# Patient Record
Sex: MALE | Race: WHITE | NOT HISPANIC OR LATINO | Employment: FULL TIME | URBAN - METROPOLITAN AREA
[De-identification: names, ages, dates, MRNs, and addresses within clinical notes are randomized per-mention and may not be internally consistent; named-entity substitution may affect disease eponyms.]

---

## 2017-01-16 ENCOUNTER — ALLSCRIPTS OFFICE VISIT (OUTPATIENT)
Dept: OTHER | Facility: OTHER | Age: 42
End: 2017-01-16

## 2017-01-31 ENCOUNTER — ALLSCRIPTS OFFICE VISIT (OUTPATIENT)
Dept: OTHER | Facility: OTHER | Age: 42
End: 2017-01-31

## 2017-01-31 DIAGNOSIS — M54.50 LOW BACK PAIN: ICD-10-CM

## 2017-01-31 DIAGNOSIS — M54.16 RADICULOPATHY OF LUMBAR REGION: ICD-10-CM

## 2017-01-31 DIAGNOSIS — G89.4 CHRONIC PAIN SYNDROME: ICD-10-CM

## 2017-01-31 DIAGNOSIS — M79.10 MYALGIA: ICD-10-CM

## 2017-01-31 DIAGNOSIS — E78.00 PURE HYPERCHOLESTEROLEMIA: ICD-10-CM

## 2017-02-01 ENCOUNTER — ALLSCRIPTS OFFICE VISIT (OUTPATIENT)
Dept: OTHER | Facility: OTHER | Age: 42
End: 2017-02-01

## 2017-02-08 ENCOUNTER — APPOINTMENT (OUTPATIENT)
Dept: PHYSICAL THERAPY | Facility: CLINIC | Age: 42
End: 2017-02-08
Payer: COMMERCIAL

## 2017-02-08 PROCEDURE — 97162 PT EVAL MOD COMPLEX 30 MIN: CPT

## 2017-02-13 ENCOUNTER — APPOINTMENT (OUTPATIENT)
Dept: PHYSICAL THERAPY | Facility: CLINIC | Age: 42
End: 2017-02-13
Payer: COMMERCIAL

## 2017-02-13 PROCEDURE — 97110 THERAPEUTIC EXERCISES: CPT

## 2017-02-14 ENCOUNTER — GENERIC CONVERSION - ENCOUNTER (OUTPATIENT)
Dept: OTHER | Facility: OTHER | Age: 42
End: 2017-02-14

## 2017-02-15 ENCOUNTER — APPOINTMENT (OUTPATIENT)
Dept: PHYSICAL THERAPY | Facility: CLINIC | Age: 42
End: 2017-02-15
Payer: COMMERCIAL

## 2017-02-15 PROCEDURE — 97110 THERAPEUTIC EXERCISES: CPT

## 2017-02-20 ENCOUNTER — APPOINTMENT (OUTPATIENT)
Dept: PHYSICAL THERAPY | Facility: CLINIC | Age: 42
End: 2017-02-20
Payer: COMMERCIAL

## 2017-02-20 PROCEDURE — 97110 THERAPEUTIC EXERCISES: CPT

## 2017-03-01 ENCOUNTER — APPOINTMENT (OUTPATIENT)
Dept: PHYSICAL THERAPY | Facility: CLINIC | Age: 42
End: 2017-03-01
Payer: COMMERCIAL

## 2017-03-01 PROCEDURE — 97110 THERAPEUTIC EXERCISES: CPT

## 2017-03-08 ENCOUNTER — APPOINTMENT (OUTPATIENT)
Dept: PHYSICAL THERAPY | Facility: CLINIC | Age: 42
End: 2017-03-08
Payer: COMMERCIAL

## 2017-03-08 PROCEDURE — 97110 THERAPEUTIC EXERCISES: CPT

## 2017-03-15 ENCOUNTER — GENERIC CONVERSION - ENCOUNTER (OUTPATIENT)
Dept: OTHER | Facility: OTHER | Age: 42
End: 2017-03-15

## 2017-03-15 ENCOUNTER — APPOINTMENT (OUTPATIENT)
Dept: PHYSICAL THERAPY | Facility: CLINIC | Age: 42
End: 2017-03-15
Payer: COMMERCIAL

## 2017-03-15 PROCEDURE — 97110 THERAPEUTIC EXERCISES: CPT

## 2017-03-15 PROCEDURE — 97164 PT RE-EVAL EST PLAN CARE: CPT

## 2017-03-22 ENCOUNTER — APPOINTMENT (OUTPATIENT)
Dept: PHYSICAL THERAPY | Facility: CLINIC | Age: 42
End: 2017-03-22
Payer: COMMERCIAL

## 2017-03-22 PROCEDURE — 97110 THERAPEUTIC EXERCISES: CPT

## 2017-03-29 ENCOUNTER — APPOINTMENT (OUTPATIENT)
Dept: PHYSICAL THERAPY | Facility: CLINIC | Age: 42
End: 2017-03-29
Payer: COMMERCIAL

## 2017-03-29 PROCEDURE — 97110 THERAPEUTIC EXERCISES: CPT

## 2017-04-06 ENCOUNTER — HOSPITAL ENCOUNTER (OUTPATIENT)
Dept: RADIOLOGY | Facility: MEDICAL CENTER | Age: 42
Discharge: HOME | End: 2017-04-06
Payer: COMMERCIAL

## 2017-04-06 DIAGNOSIS — Z85.850 PERSONAL HISTORY OF MALIGNANT NEOPLASM OF THYROID: ICD-10-CM

## 2017-04-06 PROCEDURE — 76536 US EXAM OF HEAD AND NECK: CPT

## 2017-04-11 ENCOUNTER — TRANSCRIBE ORDERS (OUTPATIENT)
Dept: ADMINISTRATIVE | Facility: HOSPITAL | Age: 42
End: 2017-04-11

## 2017-04-11 ENCOUNTER — ALLSCRIPTS OFFICE VISIT (OUTPATIENT)
Dept: OTHER | Facility: OTHER | Age: 42
End: 2017-04-11

## 2017-04-11 DIAGNOSIS — C77.9 MALIGNANT NEOPLASM METASTATIC TO LYMPH NODES, UNSPECIFIED LYMPH NODE REGION (HCC): Primary | ICD-10-CM

## 2017-05-16 ENCOUNTER — ALLSCRIPTS OFFICE VISIT (OUTPATIENT)
Dept: OTHER | Facility: OTHER | Age: 42
End: 2017-05-16

## 2017-05-16 DIAGNOSIS — M54.50 LOW BACK PAIN: ICD-10-CM

## 2017-06-01 ENCOUNTER — OFFICE VISIT (OUTPATIENT)
Dept: URGENT CARE | Facility: MEDICAL CENTER | Age: 42
End: 2017-06-01
Payer: COMMERCIAL

## 2017-06-01 PROCEDURE — 99213 OFFICE O/P EST LOW 20 MIN: CPT

## 2017-06-08 ENCOUNTER — GENERIC CONVERSION - ENCOUNTER (OUTPATIENT)
Dept: OTHER | Facility: OTHER | Age: 42
End: 2017-06-08

## 2017-06-16 ENCOUNTER — ALLSCRIPTS OFFICE VISIT (OUTPATIENT)
Dept: OTHER | Facility: OTHER | Age: 42
End: 2017-06-16

## 2017-06-16 ENCOUNTER — TRANSCRIBE ORDERS (OUTPATIENT)
Dept: ADMINISTRATIVE | Facility: HOSPITAL | Age: 42
End: 2017-06-16

## 2017-06-16 DIAGNOSIS — E03.9 HYPOTHYROIDISM: ICD-10-CM

## 2017-06-16 DIAGNOSIS — M25.562 PAIN IN LEFT KNEE: ICD-10-CM

## 2017-06-16 DIAGNOSIS — E11.65 TYPE 2 DIABETES MELLITUS WITH HYPERGLYCEMIA (HCC): ICD-10-CM

## 2017-06-16 DIAGNOSIS — S83.282A OTHER TEAR OF LATERAL MENISCUS, CURRENT INJURY, LEFT KNEE, INITIAL ENCOUNTER: ICD-10-CM

## 2017-06-16 DIAGNOSIS — E11.9 TYPE 2 DIABETES MELLITUS WITHOUT COMPLICATIONS (HCC): ICD-10-CM

## 2017-06-16 DIAGNOSIS — M25.562 CHRONIC PAIN OF LEFT KNEE: Primary | ICD-10-CM

## 2017-06-16 DIAGNOSIS — C77.9 SECONDARY AND UNSPECIFIED MALIGNANT NEOPLASM OF LYMPH NODE, UNSPECIFIED (HCC): ICD-10-CM

## 2017-06-16 DIAGNOSIS — E20.9 HYPOPARATHYROIDISM (HCC): ICD-10-CM

## 2017-06-16 DIAGNOSIS — I10 ESSENTIAL (PRIMARY) HYPERTENSION: ICD-10-CM

## 2017-06-16 DIAGNOSIS — G89.29 CHRONIC PAIN OF LEFT KNEE: Primary | ICD-10-CM

## 2017-06-22 ENCOUNTER — HOSPITAL ENCOUNTER (OUTPATIENT)
Dept: MRI IMAGING | Facility: HOSPITAL | Age: 42
Discharge: HOME | End: 2017-06-22
Attending: ORTHOPAEDIC SURGERY
Payer: COMMERCIAL

## 2017-06-22 DIAGNOSIS — M25.562 PAIN IN LEFT KNEE: ICD-10-CM

## 2017-06-22 PROCEDURE — 73721 MRI JNT OF LWR EXTRE W/O DYE: CPT

## 2017-06-23 ENCOUNTER — GENERIC CONVERSION - ENCOUNTER (OUTPATIENT)
Dept: OTHER | Facility: OTHER | Age: 42
End: 2017-06-23

## 2017-06-27 ENCOUNTER — GENERIC CONVERSION - ENCOUNTER (OUTPATIENT)
Dept: OTHER | Facility: OTHER | Age: 42
End: 2017-06-27

## 2017-06-27 ENCOUNTER — APPOINTMENT (OUTPATIENT)
Dept: LAB | Facility: OTHER | Age: 42
End: 2017-06-27
Payer: COMMERCIAL

## 2017-06-27 DIAGNOSIS — E78.00 PURE HYPERCHOLESTEROLEMIA: ICD-10-CM

## 2017-06-27 LAB
CHOLEST SERPL-MCNC: 130 MG/DL (ref 50–200)
HDLC SERPL-MCNC: 32 MG/DL (ref 40–60)
LDLC SERPL CALC-MCNC: 58 MG/DL (ref 0–100)
TRIGL SERPL-MCNC: 198 MG/DL

## 2017-06-27 PROCEDURE — 36415 COLL VENOUS BLD VENIPUNCTURE: CPT

## 2017-06-27 PROCEDURE — 80061 LIPID PANEL: CPT

## 2017-06-28 ENCOUNTER — APPOINTMENT (OUTPATIENT)
Dept: LAB | Facility: CLINIC | Age: 42
End: 2017-06-28
Payer: COMMERCIAL

## 2017-06-28 ENCOUNTER — TRANSCRIBE ORDERS (OUTPATIENT)
Dept: LAB | Facility: CLINIC | Age: 42
End: 2017-06-28

## 2017-06-28 ENCOUNTER — ALLSCRIPTS OFFICE VISIT (OUTPATIENT)
Dept: OTHER | Facility: OTHER | Age: 42
End: 2017-06-28

## 2017-06-28 DIAGNOSIS — I10 ESSENTIAL (PRIMARY) HYPERTENSION: ICD-10-CM

## 2017-06-28 DIAGNOSIS — E03.9 HYPOTHYROIDISM: ICD-10-CM

## 2017-06-28 DIAGNOSIS — C77.9 SECONDARY AND UNSPECIFIED MALIGNANT NEOPLASM OF LYMPH NODE, UNSPECIFIED (HCC): ICD-10-CM

## 2017-06-28 DIAGNOSIS — E11.65 TYPE 2 DIABETES MELLITUS WITH HYPERGLYCEMIA (HCC): ICD-10-CM

## 2017-06-28 DIAGNOSIS — E20.9 HYPOPARATHYROIDISM (HCC): ICD-10-CM

## 2017-06-28 LAB
ALBUMIN SERPL BCP-MCNC: 3.5 G/DL (ref 3.5–5)
ALP SERPL-CCNC: 82 U/L (ref 46–116)
ALT SERPL W P-5'-P-CCNC: 43 U/L (ref 12–78)
ANION GAP SERPL CALCULATED.3IONS-SCNC: 4 MMOL/L (ref 4–13)
AST SERPL W P-5'-P-CCNC: 22 U/L (ref 5–45)
BILIRUB SERPL-MCNC: 0.4 MG/DL (ref 0.2–1)
BUN SERPL-MCNC: 18 MG/DL (ref 5–25)
CALCIUM SERPL-MCNC: 7.9 MG/DL (ref 8.3–10.1)
CHLORIDE SERPL-SCNC: 99 MMOL/L (ref 100–108)
CO2 SERPL-SCNC: 33 MMOL/L (ref 21–32)
CREAT SERPL-MCNC: 0.82 MG/DL (ref 0.6–1.3)
CREAT UR-MCNC: 25.8 MG/DL
EST. AVERAGE GLUCOSE BLD GHB EST-MCNC: 157 MG/DL
GFR SERPL CREATININE-BSD FRML MDRD: >60 ML/MIN/1.73SQ M
GLUCOSE SERPL-MCNC: 222 MG/DL (ref 65–140)
HBA1C MFR BLD: 7.1 % (ref 4.2–6.3)
MICROALBUMIN UR-MCNC: 18.6 MG/L (ref 0–20)
MICROALBUMIN/CREAT 24H UR: 72 MG/G CREATININE (ref 0–30)
PHOSPHATE SERPL-MCNC: 3.7 MG/DL (ref 2.7–4.5)
POTASSIUM SERPL-SCNC: 3.8 MMOL/L (ref 3.5–5.3)
PROT SERPL-MCNC: 6.9 G/DL (ref 6.4–8.2)
PTH-INTACT SERPL-MCNC: <5.5 PG/ML (ref 14–72)
SODIUM SERPL-SCNC: 136 MMOL/L (ref 136–145)
T4 FREE SERPL-MCNC: 1.76 NG/DL (ref 0.76–1.46)
TSH SERPL DL<=0.05 MIU/L-ACNC: 0.13 UIU/ML (ref 0.36–3.74)

## 2017-06-28 PROCEDURE — 86800 THYROGLOBULIN ANTIBODY: CPT

## 2017-06-28 PROCEDURE — 80053 COMPREHEN METABOLIC PANEL: CPT

## 2017-06-28 PROCEDURE — 84100 ASSAY OF PHOSPHORUS: CPT

## 2017-06-28 PROCEDURE — 83036 HEMOGLOBIN GLYCOSYLATED A1C: CPT

## 2017-06-28 PROCEDURE — 84439 ASSAY OF FREE THYROXINE: CPT

## 2017-06-28 PROCEDURE — 82043 UR ALBUMIN QUANTITATIVE: CPT

## 2017-06-28 PROCEDURE — 84443 ASSAY THYROID STIM HORMONE: CPT

## 2017-06-28 PROCEDURE — 36415 COLL VENOUS BLD VENIPUNCTURE: CPT

## 2017-06-28 PROCEDURE — 82570 ASSAY OF URINE CREATININE: CPT

## 2017-06-28 PROCEDURE — 83970 ASSAY OF PARATHORMONE: CPT

## 2017-06-28 PROCEDURE — 84432 ASSAY OF THYROGLOBULIN: CPT

## 2017-06-29 ENCOUNTER — GENERIC CONVERSION - ENCOUNTER (OUTPATIENT)
Dept: OTHER | Facility: OTHER | Age: 42
End: 2017-06-29

## 2017-06-29 LAB
THYROGLOB AB SERPL-ACNC: <1 IU/ML (ref 0–0.9)
THYROGLOB SERPL-MCNC: 3.2 NG/ML (ref 1.4–29.2)

## 2017-06-30 ENCOUNTER — GENERIC CONVERSION - ENCOUNTER (OUTPATIENT)
Dept: OTHER | Facility: OTHER | Age: 42
End: 2017-06-30

## 2017-07-14 ENCOUNTER — ALLSCRIPTS OFFICE VISIT (OUTPATIENT)
Dept: OTHER | Facility: OTHER | Age: 42
End: 2017-07-14

## 2017-07-18 ENCOUNTER — GENERIC CONVERSION - ENCOUNTER (OUTPATIENT)
Dept: OTHER | Facility: OTHER | Age: 42
End: 2017-07-18

## 2017-07-22 ENCOUNTER — APPOINTMENT (OUTPATIENT)
Dept: LAB | Facility: MEDICAL CENTER | Age: 42
End: 2017-07-22
Payer: COMMERCIAL

## 2017-07-22 ENCOUNTER — APPOINTMENT (OUTPATIENT)
Dept: URGENT CARE | Facility: MEDICAL CENTER | Age: 42
End: 2017-07-22
Payer: COMMERCIAL

## 2017-07-22 ENCOUNTER — TRANSCRIBE ORDERS (OUTPATIENT)
Dept: ADMINISTRATIVE | Facility: HOSPITAL | Age: 42
End: 2017-07-22

## 2017-07-22 ENCOUNTER — GENERIC CONVERSION - ENCOUNTER (OUTPATIENT)
Dept: OTHER | Facility: OTHER | Age: 42
End: 2017-07-22

## 2017-07-22 DIAGNOSIS — S83.282A ACUTE LATERAL MENISCUS TEAR OF LEFT KNEE, INITIAL ENCOUNTER: Primary | ICD-10-CM

## 2017-07-22 DIAGNOSIS — S83.282A OTHER TEAR OF LATERAL MENISCUS, CURRENT INJURY, LEFT KNEE, INITIAL ENCOUNTER: ICD-10-CM

## 2017-07-22 DIAGNOSIS — E11.9 TYPE 2 DIABETES MELLITUS WITHOUT COMPLICATIONS (HCC): ICD-10-CM

## 2017-07-22 LAB
ALBUMIN SERPL BCP-MCNC: 3.5 G/DL (ref 3.5–5)
ALP SERPL-CCNC: 82 U/L (ref 46–116)
ALT SERPL W P-5'-P-CCNC: 44 U/L (ref 12–78)
ANION GAP SERPL CALCULATED.3IONS-SCNC: 6 MMOL/L (ref 4–13)
AST SERPL W P-5'-P-CCNC: 26 U/L (ref 5–45)
BACTERIA UR QL AUTO: NORMAL /HPF
BASOPHILS # BLD AUTO: 0.04 THOUSANDS/ΜL (ref 0–0.1)
BASOPHILS NFR BLD AUTO: 1 % (ref 0–1)
BILIRUB SERPL-MCNC: 0.62 MG/DL (ref 0.2–1)
BILIRUB UR QL STRIP: NEGATIVE
BUN SERPL-MCNC: 21 MG/DL (ref 5–25)
CALCIUM SERPL-MCNC: 7.4 MG/DL (ref 8.3–10.1)
CHLORIDE SERPL-SCNC: 98 MMOL/L (ref 100–108)
CLARITY UR: CLEAR
CO2 SERPL-SCNC: 32 MMOL/L (ref 21–32)
COLOR UR: YELLOW
CREAT SERPL-MCNC: 1.04 MG/DL (ref 0.6–1.3)
EOSINOPHIL # BLD AUTO: 0.13 THOUSAND/ΜL (ref 0–0.61)
EOSINOPHIL NFR BLD AUTO: 2 % (ref 0–6)
ERYTHROCYTE [DISTWIDTH] IN BLOOD BY AUTOMATED COUNT: 14.3 % (ref 11.6–15.1)
EST. AVERAGE GLUCOSE BLD GHB EST-MCNC: 163 MG/DL
GFR SERPL CREATININE-BSD FRML MDRD: >60 ML/MIN/1.73SQ M
GLUCOSE P FAST SERPL-MCNC: 269 MG/DL (ref 65–99)
GLUCOSE UR STRIP-MCNC: ABNORMAL MG/DL
HBA1C MFR BLD: 7.3 % (ref 4.2–6.3)
HCT VFR BLD AUTO: 45.4 % (ref 36.5–49.3)
HGB BLD-MCNC: 15.4 G/DL (ref 12–17)
HGB UR QL STRIP.AUTO: NEGATIVE
KETONES UR STRIP-MCNC: NEGATIVE MG/DL
LEUKOCYTE ESTERASE UR QL STRIP: NEGATIVE
LYMPHOCYTES # BLD AUTO: 1.43 THOUSANDS/ΜL (ref 0.6–4.47)
LYMPHOCYTES NFR BLD AUTO: 18 % (ref 14–44)
MCH RBC QN AUTO: 27.7 PG (ref 26.8–34.3)
MCHC RBC AUTO-ENTMCNC: 33.9 G/DL (ref 31.4–37.4)
MCV RBC AUTO: 82 FL (ref 82–98)
MONOCYTES # BLD AUTO: 0.55 THOUSAND/ΜL (ref 0.17–1.22)
MONOCYTES NFR BLD AUTO: 7 % (ref 4–12)
NEUTROPHILS # BLD AUTO: 5.95 THOUSANDS/ΜL (ref 1.85–7.62)
NEUTS SEG NFR BLD AUTO: 72 % (ref 43–75)
NITRITE UR QL STRIP: NEGATIVE
NON-SQ EPI CELLS URNS QL MICRO: NORMAL /HPF
NRBC BLD AUTO-RTO: 0 /100 WBCS
PH UR STRIP.AUTO: 6.5 [PH] (ref 4.5–8)
PLATELET # BLD AUTO: 171 THOUSANDS/UL (ref 149–390)
PMV BLD AUTO: 10.4 FL (ref 8.9–12.7)
POTASSIUM SERPL-SCNC: 3.8 MMOL/L (ref 3.5–5.3)
PROT SERPL-MCNC: 7.1 G/DL (ref 6.4–8.2)
PROT UR STRIP-MCNC: NEGATIVE MG/DL
RBC # BLD AUTO: 5.55 MILLION/UL (ref 3.88–5.62)
RBC #/AREA URNS AUTO: NORMAL /HPF
SODIUM SERPL-SCNC: 136 MMOL/L (ref 136–145)
SP GR UR STRIP.AUTO: 1.03 (ref 1–1.03)
UROBILINOGEN UR QL STRIP.AUTO: 0.2 E.U./DL
WBC # BLD AUTO: 8.12 THOUSAND/UL (ref 4.31–10.16)
WBC #/AREA URNS AUTO: NORMAL /HPF

## 2017-07-22 PROCEDURE — 85025 COMPLETE CBC W/AUTO DIFF WBC: CPT

## 2017-07-22 PROCEDURE — 81001 URINALYSIS AUTO W/SCOPE: CPT

## 2017-07-22 PROCEDURE — 93005 ELECTROCARDIOGRAM TRACING: CPT

## 2017-07-22 PROCEDURE — 36415 COLL VENOUS BLD VENIPUNCTURE: CPT

## 2017-07-22 PROCEDURE — 80053 COMPREHEN METABOLIC PANEL: CPT

## 2017-07-22 PROCEDURE — 83036 HEMOGLOBIN GLYCOSYLATED A1C: CPT

## 2017-07-24 LAB
ATRIAL RATE: 66 BPM
P AXIS: 18 DEGREES
PR INTERVAL: 178 MS
QRS AXIS: 55 DEGREES
QRSD INTERVAL: 110 MS
QT INTERVAL: 400 MS
QTC INTERVAL: 419 MS
T WAVE AXIS: 56 DEGREES
VENTRICULAR RATE: 66 BPM

## 2017-08-01 ENCOUNTER — ALLSCRIPTS OFFICE VISIT (OUTPATIENT)
Dept: OTHER | Facility: OTHER | Age: 42
End: 2017-08-01

## 2017-08-10 ENCOUNTER — GENERIC CONVERSION - ENCOUNTER (OUTPATIENT)
Dept: OTHER | Facility: OTHER | Age: 42
End: 2017-08-10

## 2017-08-11 RX ORDER — VALSARTAN AND HYDROCHLOROTHIAZIDE 320; 25 MG/1; MG/1
1 TABLET, FILM COATED ORAL DAILY
COMMUNITY
End: 2018-03-14 | Stop reason: SDUPTHER

## 2017-08-11 RX ORDER — TIZANIDINE HYDROCHLORIDE 4 MG/1
4 CAPSULE, GELATIN COATED ORAL 3 TIMES DAILY PRN
COMMUNITY
End: 2018-05-24 | Stop reason: SDUPTHER

## 2017-08-11 RX ORDER — FLUTICASONE PROPIONATE 50 MCG
1 SPRAY, SUSPENSION (ML) NASAL DAILY
COMMUNITY
End: 2018-05-14 | Stop reason: SDUPTHER

## 2017-08-11 RX ORDER — AMLODIPINE BESYLATE 5 MG/1
5 TABLET ORAL DAILY
COMMUNITY
End: 2018-03-14 | Stop reason: SDUPTHER

## 2017-08-11 RX ORDER — LEVOTHYROXINE SODIUM 0.2 MG/1
200 TABLET ORAL DAILY
COMMUNITY
End: 2018-04-18 | Stop reason: SDUPTHER

## 2017-08-11 RX ORDER — ALBUTEROL SULFATE 90 UG/1
2 AEROSOL, METERED RESPIRATORY (INHALATION) EVERY 6 HOURS PRN
COMMUNITY
End: 2018-05-14 | Stop reason: SDUPTHER

## 2017-08-11 RX ORDER — CALCITRIOL 0.25 UG/1
0.25 CAPSULE, LIQUID FILLED ORAL 2 TIMES DAILY
COMMUNITY
End: 2018-03-14 | Stop reason: SDUPTHER

## 2017-08-11 RX ORDER — SIMVASTATIN 10 MG
10 TABLET ORAL
COMMUNITY
End: 2018-02-23 | Stop reason: SDUPTHER

## 2017-08-11 RX ORDER — ASPIRIN 81 MG/1
81 TABLET ORAL DAILY
COMMUNITY

## 2017-08-17 ENCOUNTER — ANESTHESIA EVENT (OUTPATIENT)
Dept: PERIOP | Facility: HOSPITAL | Age: 42
End: 2017-08-17
Payer: COMMERCIAL

## 2017-08-17 ENCOUNTER — GENERIC CONVERSION - ENCOUNTER (OUTPATIENT)
Dept: PERIOP | Facility: HOSPITAL | Age: 42
End: 2017-08-17

## 2017-08-17 ENCOUNTER — HOSPITAL ENCOUNTER (OUTPATIENT)
Facility: HOSPITAL | Age: 42
Setting detail: OUTPATIENT SURGERY
Discharge: HOME | End: 2017-08-17
Attending: ORTHOPAEDIC SURGERY | Admitting: ORTHOPAEDIC SURGERY
Payer: COMMERCIAL

## 2017-08-17 ENCOUNTER — ANESTHESIA (OUTPATIENT)
Dept: PERIOP | Facility: HOSPITAL | Age: 42
End: 2017-08-17
Payer: COMMERCIAL

## 2017-08-17 VITALS
TEMPERATURE: 99.4 F | SYSTOLIC BLOOD PRESSURE: 160 MMHG | HEART RATE: 64 BPM | BODY MASS INDEX: 40.43 KG/M2 | OXYGEN SATURATION: 97 % | HEIGHT: 74 IN | RESPIRATION RATE: 18 BRPM | WEIGHT: 315 LBS | DIASTOLIC BLOOD PRESSURE: 81 MMHG

## 2017-08-17 DIAGNOSIS — E03.9 HYPOTHYROIDISM: ICD-10-CM

## 2017-08-17 LAB — GLUCOSE SERPL-MCNC: 140 MG/DL (ref 65–140)

## 2017-08-17 PROCEDURE — 82948 REAGENT STRIP/BLOOD GLUCOSE: CPT

## 2017-08-17 RX ORDER — BUPIVACAINE HYDROCHLORIDE 2.5 MG/ML
INJECTION, SOLUTION EPIDURAL; INFILTRATION; INTRACAUDAL AS NEEDED
Status: DISCONTINUED | OUTPATIENT
Start: 2017-08-17 | End: 2017-08-17 | Stop reason: HOSPADM

## 2017-08-17 RX ORDER — SCOLOPAMINE TRANSDERMAL SYSTEM 1 MG/1
PATCH, EXTENDED RELEASE TRANSDERMAL
Status: DISCONTINUED
Start: 2017-08-17 | End: 2017-08-17 | Stop reason: HOSPADM

## 2017-08-17 RX ORDER — LIDOCAINE HYDROCHLORIDE 10 MG/ML
INJECTION, SOLUTION INFILTRATION; PERINEURAL AS NEEDED
Status: DISCONTINUED | OUTPATIENT
Start: 2017-08-17 | End: 2017-08-17 | Stop reason: SURG

## 2017-08-17 RX ORDER — PROPOFOL 10 MG/ML
INJECTION, EMULSION INTRAVENOUS AS NEEDED
Status: DISCONTINUED | OUTPATIENT
Start: 2017-08-17 | End: 2017-08-17 | Stop reason: SURG

## 2017-08-17 RX ORDER — METOCLOPRAMIDE HYDROCHLORIDE 5 MG/ML
10 INJECTION INTRAMUSCULAR; INTRAVENOUS ONCE AS NEEDED
Status: DISCONTINUED | OUTPATIENT
Start: 2017-08-17 | End: 2017-08-17 | Stop reason: HOSPADM

## 2017-08-17 RX ORDER — MIDAZOLAM HYDROCHLORIDE 1 MG/ML
INJECTION INTRAMUSCULAR; INTRAVENOUS AS NEEDED
Status: DISCONTINUED | OUTPATIENT
Start: 2017-08-17 | End: 2017-08-17 | Stop reason: SURG

## 2017-08-17 RX ORDER — EPHEDRINE SULFATE 50 MG/ML
INJECTION, SOLUTION INTRAVENOUS AS NEEDED
Status: DISCONTINUED | OUTPATIENT
Start: 2017-08-17 | End: 2017-08-17 | Stop reason: SURG

## 2017-08-17 RX ORDER — ONDANSETRON 2 MG/ML
4 INJECTION INTRAMUSCULAR; INTRAVENOUS ONCE AS NEEDED
Status: DISCONTINUED | OUTPATIENT
Start: 2017-08-17 | End: 2017-08-17 | Stop reason: HOSPADM

## 2017-08-17 RX ORDER — FENTANYL CITRATE 50 UG/ML
INJECTION, SOLUTION INTRAMUSCULAR; INTRAVENOUS AS NEEDED
Status: DISCONTINUED | OUTPATIENT
Start: 2017-08-17 | End: 2017-08-17 | Stop reason: SURG

## 2017-08-17 RX ORDER — SODIUM CHLORIDE, SODIUM LACTATE, POTASSIUM CHLORIDE, CALCIUM CHLORIDE 600; 310; 30; 20 MG/100ML; MG/100ML; MG/100ML; MG/100ML
50 INJECTION, SOLUTION INTRAVENOUS CONTINUOUS
Status: DISCONTINUED | OUTPATIENT
Start: 2017-08-17 | End: 2017-08-17 | Stop reason: HOSPADM

## 2017-08-17 RX ORDER — FENTANYL CITRATE/PF 50 MCG/ML
25 SYRINGE (ML) INJECTION
Status: DISCONTINUED | OUTPATIENT
Start: 2017-08-17 | End: 2017-08-17 | Stop reason: HOSPADM

## 2017-08-17 RX ORDER — ONDANSETRON 2 MG/ML
INJECTION INTRAMUSCULAR; INTRAVENOUS AS NEEDED
Status: DISCONTINUED | OUTPATIENT
Start: 2017-08-17 | End: 2017-08-17 | Stop reason: SURG

## 2017-08-17 RX ADMIN — SODIUM CHLORIDE, SODIUM LACTATE, POTASSIUM CHLORIDE, AND CALCIUM CHLORIDE: .6; .31; .03; .02 INJECTION, SOLUTION INTRAVENOUS at 08:29

## 2017-08-17 RX ADMIN — SODIUM CHLORIDE, SODIUM LACTATE, POTASSIUM CHLORIDE, AND CALCIUM CHLORIDE 50 ML/HR: .6; .31; .03; .02 INJECTION, SOLUTION INTRAVENOUS at 08:01

## 2017-08-17 RX ADMIN — LIDOCAINE HYDROCHLORIDE 40 MG: 10 INJECTION, SOLUTION INFILTRATION; PERINEURAL at 08:39

## 2017-08-17 RX ADMIN — CEFAZOLIN SODIUM 2000 MG: 2 SOLUTION INTRAVENOUS at 08:59

## 2017-08-17 RX ADMIN — ONDANSETRON 4 MG: 2 INJECTION INTRAMUSCULAR; INTRAVENOUS at 08:46

## 2017-08-17 RX ADMIN — DEXAMETHASONE SODIUM PHOSPHATE 10 MG: 10 INJECTION INTRAMUSCULAR; INTRAVENOUS at 08:46

## 2017-08-17 RX ADMIN — PROPOFOL 250 MG: 10 INJECTION, EMULSION INTRAVENOUS at 08:39

## 2017-08-17 RX ADMIN — FENTANYL CITRATE 50 MCG: 50 INJECTION, SOLUTION INTRAMUSCULAR; INTRAVENOUS at 08:39

## 2017-08-17 RX ADMIN — EPHEDRINE SULFATE 10 MG: 50 INJECTION, SOLUTION INTRAMUSCULAR; INTRAVENOUS; SUBCUTANEOUS at 08:40

## 2017-08-17 RX ADMIN — EPHEDRINE SULFATE 5 MG: 50 INJECTION, SOLUTION INTRAMUSCULAR; INTRAVENOUS; SUBCUTANEOUS at 08:43

## 2017-08-17 RX ADMIN — CEFAZOLIN SODIUM 1000 MG: 1 SOLUTION INTRAVENOUS at 08:59

## 2017-08-17 RX ADMIN — FENTANYL CITRATE 50 MCG: 50 INJECTION, SOLUTION INTRAMUSCULAR; INTRAVENOUS at 08:47

## 2017-08-17 RX ADMIN — MIDAZOLAM HYDROCHLORIDE 2 MG: 1 INJECTION, SOLUTION INTRAMUSCULAR; INTRAVENOUS at 08:29

## 2017-08-23 ENCOUNTER — APPOINTMENT (OUTPATIENT)
Dept: PHYSICAL THERAPY | Facility: CLINIC | Age: 42
End: 2017-08-23
Payer: COMMERCIAL

## 2017-08-23 ENCOUNTER — GENERIC CONVERSION - ENCOUNTER (OUTPATIENT)
Dept: OTHER | Facility: OTHER | Age: 42
End: 2017-08-23

## 2017-08-23 PROCEDURE — 97110 THERAPEUTIC EXERCISES: CPT

## 2017-08-23 PROCEDURE — 97535 SELF CARE MNGMENT TRAINING: CPT

## 2017-08-23 PROCEDURE — 97161 PT EVAL LOW COMPLEX 20 MIN: CPT

## 2017-08-25 ENCOUNTER — APPOINTMENT (OUTPATIENT)
Dept: PHYSICAL THERAPY | Facility: CLINIC | Age: 42
End: 2017-08-25
Payer: COMMERCIAL

## 2017-08-25 PROCEDURE — 97110 THERAPEUTIC EXERCISES: CPT

## 2017-08-25 PROCEDURE — 97140 MANUAL THERAPY 1/> REGIONS: CPT

## 2017-08-29 ENCOUNTER — APPOINTMENT (OUTPATIENT)
Dept: PHYSICAL THERAPY | Facility: CLINIC | Age: 42
End: 2017-08-29
Payer: COMMERCIAL

## 2017-08-29 PROCEDURE — 97110 THERAPEUTIC EXERCISES: CPT

## 2017-08-29 PROCEDURE — G0283 ELEC STIM OTHER THAN WOUND: HCPCS

## 2017-08-29 PROCEDURE — 97140 MANUAL THERAPY 1/> REGIONS: CPT

## 2017-08-29 PROCEDURE — 97014 ELECTRIC STIMULATION THERAPY: CPT

## 2017-08-31 ENCOUNTER — APPOINTMENT (OUTPATIENT)
Dept: PHYSICAL THERAPY | Facility: CLINIC | Age: 42
End: 2017-08-31
Payer: COMMERCIAL

## 2017-08-31 ENCOUNTER — GENERIC CONVERSION - ENCOUNTER (OUTPATIENT)
Dept: OTHER | Facility: OTHER | Age: 42
End: 2017-08-31

## 2017-08-31 PROCEDURE — 97014 ELECTRIC STIMULATION THERAPY: CPT

## 2017-08-31 PROCEDURE — 97140 MANUAL THERAPY 1/> REGIONS: CPT

## 2017-08-31 PROCEDURE — G0283 ELEC STIM OTHER THAN WOUND: HCPCS

## 2017-08-31 PROCEDURE — 97110 THERAPEUTIC EXERCISES: CPT

## 2017-09-01 ENCOUNTER — ALLSCRIPTS OFFICE VISIT (OUTPATIENT)
Dept: OTHER | Facility: OTHER | Age: 42
End: 2017-09-01

## 2017-09-05 ENCOUNTER — APPOINTMENT (OUTPATIENT)
Dept: PHYSICAL THERAPY | Facility: CLINIC | Age: 42
End: 2017-09-05
Payer: COMMERCIAL

## 2017-09-05 PROCEDURE — 97140 MANUAL THERAPY 1/> REGIONS: CPT

## 2017-09-05 PROCEDURE — 97110 THERAPEUTIC EXERCISES: CPT

## 2017-09-07 ENCOUNTER — APPOINTMENT (OUTPATIENT)
Dept: PHYSICAL THERAPY | Facility: CLINIC | Age: 42
End: 2017-09-07
Payer: COMMERCIAL

## 2017-09-07 PROCEDURE — 97140 MANUAL THERAPY 1/> REGIONS: CPT

## 2017-09-07 PROCEDURE — 97110 THERAPEUTIC EXERCISES: CPT

## 2017-09-12 ENCOUNTER — APPOINTMENT (OUTPATIENT)
Dept: PHYSICAL THERAPY | Facility: CLINIC | Age: 42
End: 2017-09-12
Payer: COMMERCIAL

## 2017-09-14 ENCOUNTER — APPOINTMENT (OUTPATIENT)
Dept: PHYSICAL THERAPY | Facility: CLINIC | Age: 42
End: 2017-09-14
Payer: COMMERCIAL

## 2017-09-14 PROCEDURE — 97110 THERAPEUTIC EXERCISES: CPT

## 2017-09-14 PROCEDURE — 97140 MANUAL THERAPY 1/> REGIONS: CPT

## 2017-10-11 ENCOUNTER — APPOINTMENT (OUTPATIENT)
Dept: LAB | Facility: HOSPITAL | Age: 42
End: 2017-10-11
Attending: INTERNAL MEDICINE
Payer: COMMERCIAL

## 2017-10-11 ENCOUNTER — ALLSCRIPTS OFFICE VISIT (OUTPATIENT)
Dept: OTHER | Facility: OTHER | Age: 42
End: 2017-10-11

## 2017-10-11 DIAGNOSIS — E55.9 VITAMIN D DEFICIENCY: ICD-10-CM

## 2017-10-11 DIAGNOSIS — C77.9 SECONDARY AND UNSPECIFIED MALIGNANT NEOPLASM OF LYMPH NODE, UNSPECIFIED (CODE): ICD-10-CM

## 2017-10-11 DIAGNOSIS — E83.51 HYPOCALCEMIA: ICD-10-CM

## 2017-10-11 DIAGNOSIS — E20.9 HYPOPARATHYROIDISM (HCC): ICD-10-CM

## 2017-10-11 DIAGNOSIS — E11.65 TYPE 2 DIABETES MELLITUS WITH HYPERGLYCEMIA (HCC): ICD-10-CM

## 2017-10-11 DIAGNOSIS — E03.9 HYPOTHYROIDISM: ICD-10-CM

## 2017-10-11 LAB
25(OH)D3 SERPL-MCNC: 38.2 NG/ML (ref 30–100)
ALBUMIN SERPL BCP-MCNC: 3.5 G/DL (ref 3.5–5)
ALP SERPL-CCNC: 89 U/L (ref 46–116)
ALT SERPL W P-5'-P-CCNC: 46 U/L (ref 12–78)
ANION GAP SERPL CALCULATED.3IONS-SCNC: 9 MMOL/L (ref 4–13)
AST SERPL W P-5'-P-CCNC: 25 U/L (ref 5–45)
BILIRUB SERPL-MCNC: 0.49 MG/DL (ref 0.2–1)
BUN SERPL-MCNC: 22 MG/DL (ref 5–25)
CALCIUM SERPL-MCNC: 7.6 MG/DL (ref 8.3–10.1)
CHLORIDE SERPL-SCNC: 101 MMOL/L (ref 100–108)
CO2 SERPL-SCNC: 27 MMOL/L (ref 21–32)
CREAT SERPL-MCNC: 0.85 MG/DL (ref 0.6–1.3)
EST. AVERAGE GLUCOSE BLD GHB EST-MCNC: 169 MG/DL
GFR SERPL CREATININE-BSD FRML MDRD: 107 ML/MIN/1.73SQ M
GLUCOSE P FAST SERPL-MCNC: 206 MG/DL (ref 65–99)
HBA1C MFR BLD: 7.5 % (ref 4.2–6.3)
POTASSIUM SERPL-SCNC: 3.8 MMOL/L (ref 3.5–5.3)
PROT SERPL-MCNC: 7.2 G/DL (ref 6.4–8.2)
SODIUM SERPL-SCNC: 137 MMOL/L (ref 136–145)
T4 FREE SERPL-MCNC: 1.9 NG/DL (ref 0.76–1.46)
TSH SERPL DL<=0.05 MIU/L-ACNC: 0.12 UIU/ML (ref 0.36–3.74)

## 2017-10-11 PROCEDURE — 82306 VITAMIN D 25 HYDROXY: CPT

## 2017-10-11 PROCEDURE — 84443 ASSAY THYROID STIM HORMONE: CPT

## 2017-10-11 PROCEDURE — 84432 ASSAY OF THYROGLOBULIN: CPT

## 2017-10-11 PROCEDURE — 86800 THYROGLOBULIN ANTIBODY: CPT

## 2017-10-11 PROCEDURE — 83036 HEMOGLOBIN GLYCOSYLATED A1C: CPT

## 2017-10-11 PROCEDURE — 80053 COMPREHEN METABOLIC PANEL: CPT

## 2017-10-11 PROCEDURE — 36415 COLL VENOUS BLD VENIPUNCTURE: CPT

## 2017-10-11 PROCEDURE — 84439 ASSAY OF FREE THYROXINE: CPT

## 2017-10-12 ENCOUNTER — GENERIC CONVERSION - ENCOUNTER (OUTPATIENT)
Dept: OTHER | Facility: OTHER | Age: 42
End: 2017-10-12

## 2017-10-12 LAB
THYROGLOB AB SERPL-ACNC: <1 IU/ML (ref 0–0.9)
THYROGLOB SERPL-MCNC: 3.2 NG/ML (ref 1.4–29.2)

## 2017-10-12 NOTE — PROGRESS NOTES
Assessment  1  Diabetes type 2, uncontrolled (250 02) (E11 65)   2  Hypoparathyroidism (252 1) (E20 9)   3  Hypothyroidism (244 9) (E03 9)   4  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 (196 9) (C77 9)   5  Morbid obesity due to excess calories (278 01) (E66 01)    Plan  Diabetes type 2, uncontrolled    · (1) HEMOGLOBIN A1C; Status:Active; Requested for:11Oct2017;    Perform:[facility]erged with [facility]; Due:11Oct2018; Ordered; For:Diabetes type 2, uncontrolled; Ordered By:Jay Ordoñez;  Diabetes type 2, uncontrolled, Hypocalcemia, Hypoparathyroidism, Hypothyroidism,  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    · Follow-up visit in 4 Months Evaluation and Treatment  Follow-up  Status: Complete   Done: 28PKR6107   Ordered; For: Diabetes type 2, uncontrolled, Hypocalcemia, Hypoparathyroidism, Hypothyroidism, 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; Ordered By: Anoop Denney Performed:  Due: 96KEP2357; Last Updated By: Ludy Fernandes; 10/11/2017 9:06:53 AM  Diabetes type 2, uncontrolled, Hypoparathyroidism, Hypothyroidism, Metastatic papillary  carcinoma to lymph node    · (1) COMPREHENSIVE METABOLIC PANEL; Status:Active; Requested for:11Oct2017;    Perform:[facility]ed with [facility]; Due:11Oct2018; Ordered; For:Diabetes type 2, uncontrolled, Hypoparathyroidism, Hypothyroidism, 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; Ordered By:Margie Ordoñez;  Hypocalcemia, Hypoparathyroidism, Vitamin D deficiency    · (1) VITAMIN D 25-HYDROXY; Status:Active; Requested for:11Oct2017;    Perform:[facility] with [facility]; Due:11Oct2018; Ordered;For:Hypocalcemia, Hypoparathyroidism, Vitamin D deficiency; Ordered By:Jay Ordoñez;   · (Q) CALCIUM, 24 HOUR URINE (W/ CREATININE); Status:Active; Requested  for:11Oct2017;    Perform:[facility]; Due:11Oct2018; Ordered;For:Hypocalcemia, Hypoparathyroidism, Vitamin D deficiency; Ordered By:Jay Ordoñez;  Hypothyroidism    · (1) T4, FREE; Status:Active;  Requested for:11Oct2017;    Perform:[facility]; UDM:95GQB4313; Ordered;For:Hypothyroidism; Ordered By:Carmencita Ordoñez;   · (1) TSH; Status:Active; Requested for:11Oct2017;    Perform:[facility]; Due:11Oct2018; Ordered;For:Hypothyroidism; Ordered By:Carmencita Ordoñez;  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    · (1) THYROGLOBULIN/QUANT W/ANTIBODY PANEL; Status:Active; Requested  for:11Oct2017;    Perform:[facility]; Due:11Oct2018; Ordered; For: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; Ordered By:Carmencita Ordoñez;    Discussion/Summary  Uncontrolled type 2 diabetes - will check hemoglobin A1c today, continue current regimen, focus on dietary and lifestyle modifications and weight loss  2  Hypoparathyroidism /hypocalcemia, currently on calcium 2000 mg daily on divided doses, Calcitriol twice daily and vitamin D3 2000/10 units daily  Occasionally misses medications  Will check calcium today  3  Hypothyroidism- continue Synthroid at current dose, will check thyroid function test  4  Metastatic papillary thyroid cancer-thyroglobulin has been stable for the past couple of years, will recheck thyroglobulin and thyroglobulin antibodies, will be due for neck ultrasound next year   5  Morbid obesity- counseled on metabolic complications obesity, dietary and lifestyle modification and weight loss   Counseling Documentation With Imm: The patient was counseled regarding diagnostic results,-instructions for management,-risk factor reductions,-impressions,-risks and benefits of treatment options,-importance of compliance with treatment  Patient's Capacity to Self-Care: Patient is able to Self-Care  Medication SE Review and Pt Understands Tx: Possible side effects of new medications were reviewed with the patient/guardian today  The treatment plan was reviewed with the patient/guardian  The patient/guardian understands and agrees with the treatment plan      Chief Complaint  Chief Complaint Free Text Note Form: Follow up  History of Present Illness  Thyroid Cancer: The patient is being seen for follow-up of thyroid cancer  Past treatment has included total thyroidectomy, modified radical neck dissection, radioiodine thyroid ablation, levothyroxine suppression, vitamin D, calcitriol and calcium supplements  Current treatment includes levothyroxine suppression  Symptoms:  no hoarseness,-no dysphagia,-no dyspnea,-no weight loss,-no weight gain,-no cold intolerance,-no heat intolerance,-no fatigue,-no palpitations,-no anxiety,-no nausea,-no dry skin-and-no frequent stools  Pertinent medical history:  no radiation exposure  Family history:  no thyroid cancer  Diabetes: The patient is being seen for routine follow-up of Diabetes Mellitus 2  Current treatment includes Metformin HCl-and-jardiance  See Medication List for current medication(s)  See Medication List for dosage(s)  Source of information reported by the patient and indicates that the patient checks his blood sugars daily  Fasting blood sugars: 110-120s  By report, there is fair compliance with treatment  Current pertinent lifestyle factors include obesity  Symptoms reported by the patient include extremity numbness-and-extremity paresthesias, but-no polydipsia,-no polyuria,-no blurred vision,-no change in vision,-no diarrhea,-no vomiting,-no lower extremity ulcers,-no recent weight gain,-no nausea-and-no edema  Family History: diabetes mellitus type 2  Hypothyroidism: The patient is being seen for follow-up of hypothyroidism  The patient has surgically induced hypothyroidism  Current treatment includes levothyroxine  Symptoms:  arthralgias, but-no fatigue,-no weight gain,-no weight loss,-no depression,-no insomnia,-no dyspnea on exertion,-no palpitations,-no constipation,-no diarrhea,-no neck swelling,-no neck pain,-no hoarseness,-no myalgias-and-no peripheral edema  Risk factors:  no previous head/neck irradiation  Family history:  thyroid disorder        Review of Systems  Endo Adult ROS Male Established v2 - [facility]:   Constitutional/General: no recent weight gain,-no recent weight loss,-no poor energy/fatigue,-no increased energy level,-no insomnia/sleep problems,-no fever-and-no feeling weak  Heart: no high blood pressure,-no chest pain/tightness,-no rapid/racing heart rate-and-no palpitations  Genitourinary - Urinary no frequent urination,-no excess urination-and-no urinating during the night  Eyes: no blurred vision,-no double vision,-no bulging eyes,-no gritty/scratchy eyes-and-no excessive tearing  Mouth / Throat: no hoarseness-and-no difficulty swallowing  Neck: no lumps,-no swollen glands,-no neck pain,-no neck stiffness-and-no enlarged thyroid  Respiratory: no wheezing,-no asthma-and-no persistent cough  Musculoskeletal: no muscle aches/pain,-no joint aches/pain-and-no muscle weakness  Skin & Hair: no dry skin,-no acne,-the hair texture was not oily,-no hair loss-and-no excessive hair growth  Gastrointestinal: no constipation,-no diarrhea,-no waking at night to drink-and-no stomach ache  Neurological: no blackouts,-no weakness-and-no tremors  Genital: no testicular pain,-no testicular lumps/bumps/mass-and-does not perform monthly testicular exam    Endocrine: no feeling hot frequently,-no feeling cold frequently,-no shifts between feeling hot and cold,-no cold hands or feet,-no excessive sweating,-no thyroid problems,-no blood sugar problems,-no excessive thirst,-no excessive hunger,-no change in shoe size,-no nausea or vomiting-and-no shaky hands  ROS Reviewed:   ROS reviewed  Active Problems  1  Aftercare following other surgery of musculoskeletal system (V58 78) (Z47 89)   2  Benign essential hypertension (401 1) (I10)   3  Chronic pain syndrome (338 4) (G89 4)   4  Complex tear of lateral meniscus of left knee, unspecified whether old or current tear,   subsequent encounter (V58 89,836 1) (A52 236L)   5   Diabetes type 2, uncontrolled (250 02) (E11 65)   6  Gait difficulty (781 2) (R26 9)   7  Hypercholesterolemia (272 0) (E78 00)   8  Hypocalcemia (275 41) (E83 51)   9  Hypoparathyroidism (252 1) (E20 9)   10  Hypothyroidism (244 9) (E03 9)   11  Ingrown left greater toenail (703 0) (L60 0)   12  Knee pain, left (719 46) (M25 562)   13  Left knee pain (719 46) (M25 562)   14  Low back pain (724 2) (M54 5)   15  Lumbar radiculopathy (724 4) (M54 16)   16  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 (196 9) (C77 9)   17  Microalbuminuria (791 0) (R80 9)   18  Morbid obesity due to excess calories (278 01) (E66 01)   19  Myofascial pain syndrome (729 1) (M79 1)   20  Pre-operative clearance (V72 84) (Z01 818)   21  Type 2 diabetes mellitus (250 00) (E11 9)   22  Vitamin D deficiency (268 9) (E55 9)    Past Medical History  1  Acute bronchitis (466 0) (J20 9)   2  History of Asthma (493 90) (J45 909)   3  History of Colon, diverticulosis (562 10) (K57 30)   4  History of malignant neoplasm of thyroid (V10 87) (Z85 850)   5  History of Left knee pain (719 46) (M25 562)   6  History of Tiring Easily  Active Problems And Past Medical History Reviewed: The active problems and past medical history were reviewed and updated today  Surgical History  1  History of Biopsy Thyroid Using Percutaneous Core Needle   2  History of Colostomy   3  History of Thyroid Surgery Total Thyroidectomy  Surgical History Reviewed: The surgical history was reviewed and updated today  Family History  Mother    1  Family history of Diabetes Mellitus (V18 0)  Sister    2  Family history of Thyroid Disorder (V18 19)  Family History Reviewed: The family history was reviewed and updated today  Social History   · Denied: History of Alcohol Use (History)   · Denied: History of Drug Use   · Former smoker (T58 98) (F56 819)  Social History Reviewed: The social history was reviewed and updated today  Current Meds   1   AmLODIPine Besylate 5 MG Oral Tablet; Take 1 tablet daily; Therapy: 45Yfw4106 to (Last Rx:16Bbl5563)  Requested for: 72Ayb7499 Ordered   2  Aspirin 81 MG TABS; Therapy: (Recorded:09Mar2015) to Recorded   3  Calcitriol 0 25 MCG Oral Capsule; take 1 capsule twice a day; Therapy: 10DWB4040 to (Last Rx:46Hsm1244)  Requested for: 40Sfk3699 Ordered   4  Diclofenac-Misoprostol 75-0 2 MG Oral Tablet Delayed Release; take one tablet by   mouth twice daily; Therapy: 55Ald4919 to (Evaluate:17Mar2018)  Requested for: 18Jsf9675; Last   Rx:93Rtc8692 Ordered   5  Jardiance 10 MG Oral Tablet; Take 1 tablet daily; Therapy: 42Qvw8787 to (Last Rx:50Cwc4630)  Requested for: 97Nug3496 Ordered   6  MetFORMIN HCl - 1000 MG Oral Tablet; take 1 tablet twice a day; Therapy: 73KUB8530 to (Last Rx:14Meu8076)  Requested for: 12Sla7269 Ordered   7  Oyster Shell Calcium TABS; taking a total of 2000mg calcium split throughout the day; Therapy: (092 4858) to Recorded   8  Simvastatin 10 MG Oral Tablet; TAKE 1 TABLET AT BEDTIME; Therapy: 04CUX1619 to (Last Viji Memory)  Requested for: 14Yvd9095 Ordered   9  Synthroid 200 MCG Oral Tablet; Take 2 tablets daily; Therapy: 19VII4692 to (Last Rx:09Ztq1687)  Requested for: 10Ato7591 Ordered   10  Valsartan-Hydrochlorothiazide 320-25 MG Oral Tablet; Take 1 tablet daily; Therapy: 27MSJ9201 to (Last Rx:11Ifc2589)  Requested for: 42Qhf8080 Ordered   11  Vitamin D 2000 UNIT Oral Capsule; 1 po daily; Therapy: 95UGK4328 to (Evaluate:28Apr2017); Last MW:31NOW1782 Ordered  Medication List Reviewed: The medication list was reviewed and updated today  Allergies  1  No Known Drug Allergies    Vitals  Vital Signs    Recorded: 85XLN7519 08:36AM   Heart Rate 80   Systolic 047   Diastolic 88   Weight 740 lb 8 96 oz     Physical Exam    Constitutional   General appearance: No acute distress, well appearing and well nourished  Eyes   Conjunctiva and lids: No swelling, erythema, or discharge      Pupils: Equal, round and reactive to light  The sclera are anicteric  Extraocular movements are intact  Ears, Nose, Mouth, and Throat   External inspection of ears, nose and lips: Normal     Exam of Head: The head is atraumatic and normocephalic  Neck: Abnormal  -anterior neck surgical scar , no masses  Pulmonary   Auscultation of lungs: Clear to auscultation bilaterally with normal chest expansion  Cardiovascular   Auscultation of heart: Normal rate and rhythm with no murmurs, gallops or rubs  Examination of extremities for edema and/or varicosities: Normal     Abdomen   Abdomen: Abdomen is soft, non-tender with normal bowel sounds  Lymphatic   Palpation of lymph nodes: No supraclavicular or suboccipital lymphadenopathy  Musculoskeletal   Gait and station: Normal     Inspection/palpation of joints, bones, and muscles: Muscle bulk and tone is normal     Skin   Skin and subcutaneous tissue: Normal skin temperature and color  Neurologic   Motor Strength: Strength is 5/5 bilaterally  Psychiatric   Orientation to person, place and time: Normal     Mood and affect: Affect and attention span are normal        Health Management  Diabetes type 2, uncontrolled   (1) HEMOGLOBIN A1C; every 6 months; Last 12TWS4974; Next Due: 93JIE6604; Active  (1) MICROALBUMIN CREATININE RATIO, RANDOM URINE; every 1 year; Last 28Jun2017; Next Due:  84NHQ1691; Active  *VB - Eye Exam; every 1 year; Last 83ADR2632; Next Due: H8088681; Overdue  *VB - Foot Exam; every 1 year; Last 72RLK0737; Next Due: 14JZG4084; Overdue  History of Screening for diabetic retinopathy   *VB - Eye Exam; every 1 year; Last 68CXH5180; Next Due: A5860468; Overdue  Type 2 diabetes mellitus   *VB - Eye Exam; every 1 year; Last 77HXL7700; Next Due: V3768785;  Overdue    Future Appointments    Date/Time Provider Specialty Site   02/13/2018 03:00 PM Tamika Jaeger [facility] ENDOCRINOLOGY   04/10/2018 08:00 AM Goldy Austin MD Surgical On[facility]     Signatures   Electronically signed by : AUDREY Alejandra ; Oct 11 2017  9:34AM EST                       (Author)

## 2017-11-09 ENCOUNTER — GENERIC CONVERSION - ENCOUNTER (OUTPATIENT)
Dept: OTHER | Facility: OTHER | Age: 42
End: 2017-11-09

## 2017-12-05 ENCOUNTER — GENERIC CONVERSION - ENCOUNTER (OUTPATIENT)
Dept: OTHER | Facility: OTHER | Age: 42
End: 2017-12-05

## 2018-01-09 NOTE — RESULT NOTES
Verified Results  (1) THYROGLOBULIN/QUANT W/ANTIBODY PANEL 13JJK7464 10:22AM [facility] Order Number: [id number]     Test Name Result Flag Reference   THYROGLOB AB <1 0 IU/mL  0 0 - 0 9   Thyroglobulin Antibody measured by [facility] Methodology    Performed at:  [address]  [id number]  : Jeni Casas MD, Phone:  [phone]   THYROGLOBULIN-VERONICA 3 2 ng/mL  1 4 - 29 2   According to the [facility] of Clinical Biochemistry, the  reference interval for Thyroglobulin (TG) should be related to  euthyroid patients and not for patients who underwent thyroidectomy  TG reference intervals for these patients depend on the residual  mass of the thyroid tissue left after surgery  Establishing a  post-operative baseline is recommended    The assay limit of quantitation is 0 1 ng/mL  Thyroglobulin measured by [facility] Immunometric Assay    Performed at:  [address]  [id number]  : Jeni Casas MD, Phone:  [phone]

## 2018-01-10 NOTE — RESULT NOTES
Message   please get tg level     Verified Results  (1) ANTITHYROGLOBULIN ANTIBODY 55LJZ1750 10:08AM Marisela Winston   Performed at:  [address]  [id number]  : Luis Cuadra MD, Phone:  [phone]     Test Name Result Flag Reference   THYROGLOB AB <1 0 IU/mL  0 0 - 0 9   Thyroglobulin Antibody measured by [facility]

## 2018-01-10 NOTE — MISCELLANEOUS
Message   Recorded as Task   Date: 10/27/2016 03:50 PM, Created By: Angie Small   Task Name: Follow Up   Assigned To: [facility] clerical,Team   Regarding Patient: Cinthia Colindres, Status: In Progress   Comment:    MadiRoberta saenz - 27 Oct 2016 3:50 PM     TASK CREATED  Please inform patient of his xray results:      Lumbar spine:    Diffuse facet arthropathy (arthritic changes) most prominent L4-5 and L5-S1  Hip:   Moderate degeneartive changes of the right hip  During ov we discussed both Right hip injection and mbb  I would recommend right hip injection first     If we would like to discuss results during ov, please schedule it with Dr Camarena or Dr [place] - 28 Oct 2016 8:31 AM     TASK EDITED  Attempted to call the pt  and left a detailed mom in regards to the previous task  The pt  will CB to schedule a sovs c/ new provider  Susan Zimmerman - 28 Oct 2016 8:31 AM     TASK IN PROGRESS   Rachael Schmidt - 31 Oct 2016 3:40 PM     TASK EDITED   Cris Odonnell - 01 Nov 2016 8:03 AM     TASK EDITED   Cris Odonnell - 02 Nov 2016 9:28 AM     TASK EDITED     Savita Pemberton for pt to return call to discuss results & set up an sovs    Susan Zimmerman - 04 Nov 2016 7:52 AM     TASK EDITED  Can you please try to schedule a sovs? thanks   Marzena London - 07 Nov 2016 11:14 AM     TASK REPLIED TO: Previously Assigned To [facility] clerical,Team   LMOM FOR PT TO CB  Susan Zimmerman - 10 Nov 2016 1:08 PM     TASK EDITED  Received a vmlom from [facility] today requesting to schedule an SOVS c/ new physician  thanks   Martine Barragan - 14 Nov 2016 1:44 PM     TASK EDITED             PT IS SCHEDULED FOR 12/23/16 AT 2:45        Active Problems    1  Acute sinusitis (461 9) (J01 90)   2  Backache (724 5) (M54 9)   3  Benign essential hypertension (401 1) (I10)   4  Diabetes type 2, uncontrolled (250 02) (E11 65)   5  Gait difficulty (781 2) (R26 9)   6  Head congestion (478 19) (R09 81)   7   History of papillary adenocarcinoma of thyroid (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 87) (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 850)   8  Hypercholesterolemia (272 0) (E78 00)   9  Hypocalcemia (275 41) (E83 51)   10  Hypoparathyroidism (252 1) (E20 9)   11  Hypothyroidism (244 9) (E03 9)   12  Lower back pain (724 2) (M54 5)   13  Lump of skin of back (782 2) (R22 2)   14  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 (196 9) (C77 9)   15  Microalbuminuria (791 0) (R80 9)   16  Morbid obesity due to excess calories (278 01) (E66 01)   17  Need for influenza vaccination (V04 81) (Z23)   18  Right hip pain (719 45) (M25 551)   19  Screening for diabetic retinopathy (V80 2) (Z13 5)   20  Strep throat (034 0) (J02 0)   21  Thoracic back pain (724 1) (M54 6)   22  Type 2 diabetes mellitus (250 00) (E11 9)   23  Vitamin D deficiency (268 9) (E55 9)    Current Meds   1  AmLODIPine Besylate 5 MG Oral Tablet; Take 1 tablet daily; Therapy: 11Zor4933 to (Last Ardath Fabiano)  Requested for: 42Jek9696 Ordered   2  Aspirin 81 MG TABS; Therapy: (Recorded:09Mar2015) to Recorded   3  Calcitriol 0 25 MCG Oral Capsule; take 1 capsule twice a day; Therapy: 02HNJ6025 to (Last Ardath Fabiano)  Requested for: 45STW7525 Ordered   4  Diclofenac-Misoprostol 50-0 2 MG Oral Tablet Delayed Release; Take 1 tablet twice daily    Requested for: 32DVV7914; Last Rx:20Tkl9432 Ordered   5  Fexofenadine-Pseudoephed ER  MG Oral Tablet Extended Release 12 Hour   ([facility]); TAKE 1 TABLET EVERY 12 HOURS; Therapy: 61GSY2247 to (Farheen Like)  Requested for: 18VYW2666; Last   Rx:96Dpt2949 Ordered   6  Jardiance 10 MG Oral Tablet; 1 Tab daily  Requested for: 02Jun2016; Last   Rx:02Jun2016 Ordered   7  MetFORMIN HCl - 1000 MG Oral Tablet; take 1 tablet twice a day; Therapy: 99ROB4811 to (Last Rx:18Gne6292)  Requested for: 54JPR5999 Ordered   8  Oyster Shell Calcium TABS; taking a total of 2000mg calcium split throughout the day; Therapy: (Stephanie Ramos) to Recorded   9   Pennsaid 2 % Transdermal Solution; 2 pumps BID prn; Therapy: 90YBC1898 to (Evaluate:24Nov2016); Last Rx:06Ker1344 Ordered   10  ProAir  (90 Base) MCG/ACT Inhalation Aerosol Solution; INHALE 1 TO 2 PUFFS    EVERY 4 TO 6 HOURS AS NEEDED; Therapy: 28PKR4265 to (Last Rx:53Nwt0480)  Requested for: 00LUH9874 Ordered   11  Simvastatin 10 MG Oral Tablet; TAKE 1 TABLET AT BEDTIME; Therapy: 45VJJ5157 to (Last Jodi Reasoner)  Requested for: 81LCF6295 Ordered   12  Synthroid 200 MCG Oral Tablet (Levothyroxine Sodium); Take 2 tablets daily; Therapy: 43BAT7030 to (Last Rx:26Oct2016)  Requested for: 91Afb6748 Ordered   13  TiZANidine HCl - 4 MG Oral Tablet; TAKE 1 TABLET AT BEDTIME; Therapy: 61Hjr7695 to (Daria Salinas)  Requested for: 33Lmt1435; Last    Rx:40Ulo1427 Ordered   14  Tramadol-Acetaminophen 37 5-325 MG Oral Tablet; TAKE 1 TABLET 3 TIMES DAILY AS    NEEDED; Therapy: 80Hqx9843 to (Evaluate:38Rbs3301); Last Rx:93Bay8709 Ordered   15  Valsartan-Hydrochlorothiazide 320-25 MG Oral Tablet (Diovan HCT); Take 1 tablet daily; Therapy: 96YIG2991 to (Last Jodi Reasoner)  Requested for: 32MKC0315 Ordered   16  Vitamin D 2000 UNIT Oral Capsule; 1 po daily; Therapy: 91YGM7730 to (Evaluate:28Apr2017); Last QU:82PAV1570 Ordered    Allergies    1  No Known Drug Allergies    Signatures   Electronically signed by :  Paulina Summers, ; Nov 14 2016  1:45PM EST                       (Author)

## 2018-01-11 NOTE — RESULT NOTES
Verified Results  * MRI KNEE LEFT  WO CONTRAST [id number] 04:55PM Loree Rivera Order Number: [id number]   Performing Comments: 6 months of left knee pain,  Chrystine Hook and refractory to oral medication, anti-inflammatories and NSAIDs,    And unimproved by formal physical therapy and transition to a home exercise program    Predictable pain  Lateral compartment with provocative testing  A positive Jaja's test   - Patient Instructions: THURSDAY JUNE [email or web address]   [facility]   [address] [facility]   PLEASE ARRIVE 15MINS PRIOR TO APPT   BRING INS CARDS,PHOTO ID AND SCRIPT   NO METAL OR JEWELRY   To reschedule this appointment, please contact Central Scheduling at [phone]  Test Name Result Flag Reference   MRI KNEE LEFT  WO CONTRAST (Report)     MRI LEFT KNEE     INDICATION: [id number]: Pain in left knee  History taken directly from the electronic ordering system  COMPARISON: None  TECHNIQUE:  MR sequences were obtained of the left knee including: Localizer, axial T2 fat sat, coronal T1/T2 fat sat, sagittal PD/T2 fat sat  Images were acquired on a 1 5 Cadence unit  Gadolinium was not used  FINDINGS:     SUBCUTANEOUS TISSUES: Mild diffuse stranding  No focal collections or soft tissue masses  JOINT EFFUSION: Small  No synovial hypertrophy  Sizable osteochondral bodies in the lateral aspect of the suprapatellar recess, the largest measuring 10 mm x 5 mm  BAKER'S CYST: None  MENISCI: Complex tearing/degenerative maceration of the anterior horn and body of the lateral meniscus including vertical component far anterior  No displaced fragments  Free edge of the medial meniscus appears truncated suggesting very mild degenerative free age maceration  Miniscule roots appear intact  CRUCIATE LIGAMENTS: Increased central signal within the ACL, near fluid bright  Orientation of the ACL fibers remains normal with parallelism to Blumensaat's line  PCL is intact  Posterolateral corner structures including the popliteus are    unremarkable  EXTENSOR APPARATUS: Partial-thickness tearing of the patellar tendon at its patellar origin superimposed on chronic tendon thickening  Distal quadriceps appears thickened, though without ranjit tearing  COLLATERAL LIGAMENTS: Thickened appearance of the medial collateral ligament  Normal appearance of the lateral collateral ligamentous complex  ARTICULAR SURFACES: Thinning of the medial compartment cartilage without high-grade fissuring  Full-thickness fissuring of the cartilage at the lateral femoral condyle and lateral tibial plateau with associated subchondral edema  Diffuse thinning of    the patellar cartilage with areas of full-thickness fissuring at the apex and lateral facet  Trochlear cartilage is diffusely thin, though without full-thickness fissuring  Trochlear is morphologically shallow  Small synovial cyst arising from the    proximal tibiofibular joint  BONE MARROW: Chronic, nonunified avulsion fracture at the medial pole of the patella  No acute fractures  No pathologic marrow infiltration  Small chondral rest in the proximal tibial diaphysis  MUSCULATURE: Intact  IMPRESSION:     1  Sequelae of old, nonunified medial patellar pole avulsion and patellofemoral predominant degenerative changes likely due to chronic maltracking  2  Femorotibial degenerative changes are relatively mild though there is associated lateral greater than medial macerative tearing of the menisci  Secondary osteochondral fragments are seen in the joint  3  Increased signal centrally within the ACL could represent ganglion (favored) or partial thickness tearing         Workstation performed: [id number]     Signed by:   Kristine Reeves MD   6/23/17

## 2018-01-11 NOTE — RESULT NOTES
Verified Results  (1) HEMOGLOBIN A1C 28Jun2017 10:22AM [employer]  Order Number: [id number]     Test Name Result Flag Reference   HEMOGLOBIN A1C 7 1 % H 4 2-6 3   EST  AVG  GLUCOSE 157 mg/dl       (1) MICROALBUMIN CREATININE RATIO, RANDOM URINE [id number] 10:22AM [employer]  Order Number: [id number]     Test Name Result Flag Reference   MICROALBUMIN/ CREAT R 72 mg/g creatinine H 0-30   MICROALBUMIN,URINE 18 6 mg/L  0 0-20 0   CREATININE URINE 25 8 mg/dL       (1) COMPREHENSIVE METABOLIC PANEL 75WZQ9647 73:98DP [employer]  Order Number: [id number]     Test Name Result Flag Reference   GLUCOSE,RANDM 222 mg/dL H    If the patient is fasting, the ADA then defines impaired fasting glucose as > 100 mg/dL and diabetes as > or equal to 123 mg/dL  SODIUM 136 mmol/L  136-145   POTASSIUM 3 8 mmol/L  3 5-5 3   CHLORIDE 99 mmol/L L 100-108   CARBON DIOXIDE 33 mmol/L H 21-32   ANION GAP (CALC) 4 mmol/L  4-13   BLOOD UREA NITROGEN 18 mg/dL  5-25   CREATININE 0 82 mg/dL  0 60-1 30   Standardized to IDMS reference method   CALCIUM 7 9 mg/dL L 8 3-10 1   BILI, TOTAL 0 40 mg/dL  0 20-1 00   ALK PHOSPHATAS 82 U/L     ALT (SGPT) 43 U/L  12-78   AST(SGOT) 22 U/L  5-45   ALBUMIN 3 5 g/dL  3 5-5 0   TOTAL PROTEIN 6 9 g/dL  6 4-8 2   eGFR Non-African American      >60 0 ml/min/1 73sq m   [facility] Disease Education Program recommendations are as follows:  GFR calculation is accurate only with a steady state creatinine  Chronic Kidney disease less than 60 ml/min/1 73 sq  meters  Kidney failure less than 15 ml/min/1 73 sq  meters       (1) PHOSPHORUS 28Jun2017 10:22AM [employer] Order Number: [id number]     Test Name Result Flag Reference   PHOSPHORUS 3 7 mg/dL  2 7-4 5     (1) PTH N-TERMINAL (INTACT) 83LSJ4149 10:22BO Ramon Order Number: [id number]     Test Name Result Flag Reference   PARATHYROID HORMONE INTACT <5 5 pg/mL L 14 0-72 0     (1) TSH 28Jun2017 10:22AM [employer] Order Number: [id number]     Test Name Result Flag Reference   TSH 0 130 uIU/mL L 0 358-3 740   Patients undergoing fluorescein dye angiography may retain small amounts of fluorescein in the body for 48-72 hours post procedure  Samples containing fluorescein can produce falsely depressed TSH values  If the patient had this procedure,a specimen should be resubmitted post fluorescein clearance  (1) T4, FREE 28Jun2017 10:22AM [employer] Sheffield Order Number: [id number]     Test Name Result Flag Reference   T4,FREE 1 76 ng/dL H 0 76-1 46       Plan  Diabetes type 2, uncontrolled    · (1) HEMOGLOBIN A1C ; every 6 months; Last 08NKT2308; Next 60JKW4651;  Status:Active   · (1) MICROALBUMIN CREATININE RATIO, RANDOM URINE ; every 1 year;  Last  93MIB7330; Next 03CEF4148; Status:Active

## 2018-01-12 VITALS
BODY MASS INDEX: 40.43 KG/M2 | HEART RATE: 84 BPM | WEIGHT: 315 LBS | HEIGHT: 74 IN | SYSTOLIC BLOOD PRESSURE: 108 MMHG | DIASTOLIC BLOOD PRESSURE: 80 MMHG

## 2018-01-12 VITALS
HEART RATE: 78 BPM | HEIGHT: 74 IN | TEMPERATURE: 97.5 F | BODY MASS INDEX: 40.43 KG/M2 | DIASTOLIC BLOOD PRESSURE: 84 MMHG | OXYGEN SATURATION: 98 % | WEIGHT: 315 LBS | SYSTOLIC BLOOD PRESSURE: 120 MMHG

## 2018-01-12 VITALS
HEART RATE: 88 BPM | HEIGHT: 74 IN | OXYGEN SATURATION: 98 % | TEMPERATURE: 96.7 F | SYSTOLIC BLOOD PRESSURE: 128 MMHG | WEIGHT: 315 LBS | DIASTOLIC BLOOD PRESSURE: 80 MMHG | BODY MASS INDEX: 40.43 KG/M2

## 2018-01-12 NOTE — RESULT NOTES
Message   has upcoming f/u     Verified Results  (1) THYROGLOBULIN/QUANT W/ANTIBODY PANEL 66EBL4845 08:07AM Saurav Martinez Order Number: [id number]     Test Name Result Flag Reference   THYROGLOB AB <1 0 IU/mL  0 0 - 0 9   Thyroglobulin Antibody measured by [facility] Methodology    Performed at:  [address]  [id number]  : Kailey Shaw MD, Phone:  [phone]   THYROGLOBULIN-VERONICA 3 0 ng/mL  1 4 - 29 2   According to the [facility] of Clinical Biochemistry, the  reference interval for Thyroglobulin (TG) should be related to  euthyroid patients and not for patients who underwent thyroidectomy  TG reference intervals for these patients depend on the residual  mass of the thyroid tissue left after surgery  Establishing a  post-operative baseline is recommended    The assay limit of quantitation is 0 1 ng/mL  Thyroglobulin measured by [facility] Immunometric Assay    Performed at:  [address]  [id number]  : Kailey Shaw MD, Phone:  [phone]

## 2018-01-12 NOTE — RESULT NOTES
Discussion/Summary   calcium low at 7 6 - continue increase calcium by 500 mg daily and repeat BMP in 1 week   tsh at goal, continue synthroid    A1C 7 5 - improved but still high , watch diet , continue current regimen , check f s 2/day for 2 weeks and send over f s log    TG - 3 2 - stable , will moniitor      Verified Results  (1) COMPREHENSIVE METABOLIC PANEL 16PWK9526 04:37OB Jake Bailey    Order Number: [id number]     Test Name Result Flag Reference   SODIUM 137 mmol/L  136-145   POTASSIUM 3 8 mmol/L  3 5-5 3   CHLORIDE 101 mmol/L  100-108   CARBON DIOXIDE 27 mmol/L  21-32   ANION GAP (CALC) 9 mmol/L  4-13   BLOOD UREA NITROGEN 22 mg/dL  5-25   CREATININE 0 85 mg/dL  0 60-1 30   Standardized to IDMS reference method   CALCIUM 7 6 mg/dL L 8 3-10 1   BILI, TOTAL 0 49 mg/dL  0 20-1 00   ALK PHOSPHATAS 89 U/L     ALT (SGPT) 46 U/L  12-78   Specimen collection should occur prior to Sulfasalazine and/or Sulfapyridine administration due to the potential for falsely depressed results  AST(SGOT) 25 U/L  5-45   Specimen collection should occur prior to Sulfasalazine administration due to the potential for falsely depressed results  ALBUMIN 3 5 g/dL  3 5-5 0   TOTAL PROTEIN 7 2 g/dL  6 4-8 2   eGFR 107 ml/min/1 73sq m     [facility] Disease Education Program recommendations are as follows:  GFR calculation is accurate only with a steady state creatinine  Chronic Kidney disease less than 60 ml/min/1 73 sq  meters  Kidney failure less than 15 ml/min/1 73 sq  meters  GLUCOSE FASTING 206 mg/dL H 65-99   Specimen collection should occur prior to Sulfasalazine administration due to the potential for falsely depressed results  Specimen collection should occur prior to Sulfapyridine administration due to the potential for falsely elevated results       (1) HEMOGLOBIN A1C 11Oct2017 04:33PM Spencer Stout Order Number: [id number]     Test Name Result Flag Reference   HEMOGLOBIN A1C 7 5 % H 4 2-6 3   EST  AVG  GLUCOSE 169 mg/dl       (1) TSH 11Oct2017 04:33PM Santiago Ren    Order Number: [id number]     Test Name Result Flag Reference   TSH 0 120 uIU/mL L 0 358-3 740   Patients undergoing fluorescein dye angiography may retain small amounts of fluorescein in the body for 48-72 hours post procedure  Samples containing fluorescein can produce falsely depressed TSH values  If the patient had this procedure,a specimen should be resubmitted post fluorescein clearance  (1) T4, FREE 54JME3428 04:33PM Santiago Ren    Order Number: [id number]     Test Name Result Flag Reference   T4,FREE 1 90 ng/dL H 0 76-1 46   Specimen collection should occur prior to Sulfasalazine administration due to the potential for falsely elevated results  (1) THYROGLOBULIN/QUANT W/ANTIBODY PANEL 58NRG2803 04:33PM [employer] Order Number: [id number]     Test Name Result Flag Reference   THYROGLOB AB <1 0 IU/mL  0 0 - 0 9   Thyroglobulin Antibody measured by [facility] Methodology    Performed at:  [address] [facility] [employer] [address]  [id number]  : Edie Gunn MD, Phone:  [phone]   THYROGLOBULIN-VERONICA 3 2 ng/mL  1 4 - 29 2   According to the [facility] of Clinical Biochemistry, the  reference interval for Thyroglobulin (TG) should be related to  euthyroid patients and not for patients who underwent thyroidectomy  TG reference intervals for these patients depend on the residual  mass of the thyroid tissue left after surgery  Establishing a  post-operative baseline is recommended    The assay limit of quantitation is 0 1 ng/mL  Thyroglobulin measured by [facility] Immunometric Assay    Performed at:  [facility] [address]  [id number]  : Edie Gunn MD, Phone:  [phone]     (1) VITAMIN D 25-HYDROXY 15ARI2050 04:33PM [employer] Order Number: [id number]     Test Name Result Flag Reference   VIT D 25-HYDROX 38 2 ng/mL  30 0-100 0   This assay is a certified procedure of the CDC Vitamin D Standardization Certification Program (VDSCP)     Deficiency <20ng/ml   Insufficiency 20-30ng/ml   Sufficient  ng/ml     *Patients undergoing fluorescein dye angiography may retain small amounts of fluorescein in the body for 48-72 hours post procedure  Samples containing fluorescein can produce falsely elevated Vitamin D values  If the patient had this procedure, a specimen should be resubmitted post fluorescein clearance  Plan  Diabetes type 2, uncontrolled    · (1) HEMOGLOBIN A1C ; every 6 months;  Last 86EIH6653; Next 89Ily0304; Status:Active

## 2018-01-13 VITALS
SYSTOLIC BLOOD PRESSURE: 122 MMHG | BODY MASS INDEX: 40.43 KG/M2 | HEART RATE: 83 BPM | HEIGHT: 74 IN | WEIGHT: 315 LBS | DIASTOLIC BLOOD PRESSURE: 85 MMHG

## 2018-01-13 VITALS
HEIGHT: 74 IN | DIASTOLIC BLOOD PRESSURE: 78 MMHG | BODY MASS INDEX: 40.43 KG/M2 | SYSTOLIC BLOOD PRESSURE: 132 MMHG | HEART RATE: 94 BPM | WEIGHT: 315 LBS

## 2018-01-13 VITALS
BODY MASS INDEX: 40.43 KG/M2 | HEART RATE: 70 BPM | HEIGHT: 74 IN | DIASTOLIC BLOOD PRESSURE: 80 MMHG | SYSTOLIC BLOOD PRESSURE: 122 MMHG | WEIGHT: 315 LBS

## 2018-01-13 VITALS
WEIGHT: 315 LBS | DIASTOLIC BLOOD PRESSURE: 82 MMHG | BODY MASS INDEX: 40.43 KG/M2 | HEART RATE: 68 BPM | SYSTOLIC BLOOD PRESSURE: 130 MMHG | HEIGHT: 74 IN

## 2018-01-13 VITALS
BODY MASS INDEX: 40.43 KG/M2 | SYSTOLIC BLOOD PRESSURE: 143 MMHG | WEIGHT: 315 LBS | DIASTOLIC BLOOD PRESSURE: 90 MMHG | HEIGHT: 74 IN | HEART RATE: 82 BPM | OXYGEN SATURATION: 96 %

## 2018-01-13 NOTE — PROGRESS NOTES
History of Present Illness  Care Coordination Encounter Information:   Type of Encounter: Telephonic   Contact: Initial Contact    Spoke to Patient  Care Coordination SL Nurse [employer]:   The reason for call is to discuss outreach for follow up/needed services and coordination of meeting care plan treatment goals  Patient doing well  Denied pain or discomfort  Blood sugars has been in the 110s  Continues to follow diabetic diet  Continues to tolerate medications as ordered  Has no questions or concerns at this time  Stated he is managing health well, needs are met and does not need another f/u phone call at this time  Patient continues to have contact information  Will close from care coordination  Active Problems    1  Acute lateral meniscal tear, left, initial encounter (836 1) (A65 930Y)   2  Benign essential hypertension (401 1) (I10)   3  Chronic pain syndrome (338 4) (G89 4)   4  Diabetes type 2, uncontrolled (250 02) (E11 65)   5  Gait difficulty (781 2) (R26 9)   6  Hypercholesterolemia (272 0) (E78 00)   7  Hypocalcemia (275 41) (E83 51)   8  Hypoparathyroidism (252 1) (E20 9)   9  Hypothyroidism (244 9) (E03 9)   10  Ingrown left greater toenail (703 0) (L60 0)   11  Knee pain, left (719 46) (M25 562)   12  Left knee pain (719 46) (M25 562)   13  Low back pain (724 2) (M54 5)   14  Lumbar radiculopathy (724 4) (M54 16)   15  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 (196 9) (C77 9)   16  Microalbuminuria (791 0) (R80 9)   17  Morbid obesity due to excess calories (278 01) (E66 01)   18  Myofascial pain syndrome (729 1) (M79 1)   19  Type 2 diabetes mellitus (250 00) (E11 9)   20  Vitamin D deficiency (268 9) (E55 9)    Past Medical History    1  Acute bronchitis (466 0) (J20 9)   2  History of Asthma (493 90) (J45 909)   3  History of Colon, diverticulosis (562 10) (K57 30)   4  History of malignant neoplasm of thyroid (V10 87) (Z85 850)   5  History of Left knee pain (719 46) (M25 562)   6  History of Tiring Easily    Surgical History    1  History of Biopsy Thyroid Using Percutaneous Core Needle   2  History of Colostomy   3  History of Thyroid Surgery Total Thyroidectomy    Family History  Mother    1  Family history of Diabetes Mellitus (V18 0)  Sister    2  Family history of Thyroid Disorder (V18 19)    Social History    · Denied: History of Alcohol Use (History)   · Denied: History of Drug Use   · Former smoker (V15 82) (G08 139)    Current Meds    1  AmLODIPine Besylate 5 MG Oral Tablet; Take 1 tablet daily; Therapy: 90Tta1140 to (Last Rx:17Jun2017)  Requested for: 00YMC5823 Ordered   2  Valsartan-Hydrochlorothiazide 320-25 MG Oral Tablet (Diovan HCT); Take 1 tablet daily; Therapy: 47FAV7982 to (Last Rx:17Jun2017)  Requested for: 73EPA2609 Ordered    3  Jardiance 10 MG Oral Tablet; Take 1 tablet daily; Therapy: 57Cvu2523 to (Gideon Hummel)  Requested for: 20Jun2017 Ordered    4  Aspirin 81 MG TABS; Therapy: ([phone]) to Recorded   5  Oyster Shell Calcium TABS; taking a total of 2000mg calcium split throughout the day; Therapy: (Nayeli Hills) to Recorded    6  Simvastatin 10 MG Oral Tablet; TAKE 1 TABLET AT BEDTIME; Therapy: 03TCZ2010 to (Last Rx:25Jsl3011)  Requested for: 46PSL5508 Ordered    7  Calcitriol 0 25 MCG Oral Capsule; take 1 capsule twice a day; Therapy: 17THX7401 to (Last Rx:17Jun2017)  Requested for: 50SWS4055 Ordered    8  Synthroid 200 MCG Oral Tablet (Levothyroxine Sodium); Take 2 tablets Mon-Sat and one   tab on Sunday; Therapy: 98WHD3748 to [phone]  Requested for: 00UEW4941 Recorded    9  Cephalexin 500 MG Oral Capsule; TAKE 1 CAPSULE 3 TIMES DAILY; Therapy: 62Fii6154 to (Giorgi Chamorro)  Requested for: 25Uet6742; Last   Rx:78Bzl6038 Ordered    10  ProAir  (90 Base) MCG/ACT Inhalation Aerosol Solution; INHALE 1 TO 2 PUFFS    EVERY 4 TO 6 HOURS AS NEEDED;     Therapy: 22NMV6651 to (Last Rx:17Nov2014)  Requested for: 21FLD4183 Ordered    11  Fluticasone Propionate 50 MCG/ACT Nasal Suspension; use 1 spray in each nostril    twice daily; Therapy: 39XUO5571 to (Last Rx:09Dec2016)  Requested for: 86XWY9071 Ordered    12  TiZANidine HCl - 4 MG Oral Tablet; TAKE 1 TABLET AT BEDTIME; Therapy: 56Kdt8603 to (Terence Ibarra)  Requested for: 92Lkf2734; Last    Rx:74Pbt1443 Ordered    13  Pennsaid 2 % Transdermal Solution; 2 pumps BID prn; Therapy: 53VZK5431 to (Evaluate:24Nov2016); Last Rx:48Udc5441 Ordered    14  Diclofenac-Misoprostol 75-0 2 MG Oral Tablet Delayed Release; Take 1 tablet twice daily; Therapy: 85Iob3649 to (Evaluate:21Apr2017)  Requested for: 88GPZ1403; Last    Rx:23Mar2017 Ordered    15  MetFORMIN HCl - 1000 MG Oral Tablet; take 1 tablet twice a day; Therapy: 11BVO4138 to (Last Rx:17Jun2017)  Requested for: 89GEE1479 Ordered    16  Vitamin D 2000 UNIT Oral Capsule; 1 po daily; Therapy: 97BAM6513 to (Evaluate:28Apr2017); Last HM:83JJO3871 Ordered    Allergies    1  No Known Drug Allergies    Health Management   (1) HEMOGLOBIN A1C; every 6 months; Last 28Jun2017; Next Due: 71VDN4262; Active  (1) MICROALBUMIN CREATININE RATIO, RANDOM URINE; every 1 year; Last 28Jun2017; Next Due:  77HBC1380; Active  *VB - Eye Exam; every 1 year; Last 43VSR2598; Next Due: E1839947; Overdue  *VB - Foot Exam; every 1 year; Last 33MRW8915; Next Due: 91IBD7948; Overdue   *VB - Eye Exam; every 1 year; Last 09OYX3423; Next Due: I1042695; Overdue   *VB - Eye Exam; every 1 year; Last 67BVN7186; Next Due: Y7290291; Overdue    End of Encounter Meds    1  AmLODIPine Besylate 5 MG Oral Tablet; Take 1 tablet daily; Therapy: 30Gbv0463 to (Last Rx:17Jun2017)  Requested for: 86DTR5031 Ordered   2  Valsartan-Hydrochlorothiazide 320-25 MG Oral Tablet (Diovan HCT); Take 1 tablet daily; Therapy: 28MVI7856 to (Last Rx:17Jun2017)  Requested for: 18MQX4149 Ordered    3  Jardiance 10 MG Oral Tablet; Take 1 tablet daily;    Therapy: 17PZI4221 to (Last [facility] Pacer)  Requested for: 20Jun2017 Ordered    4  Aspirin 81 MG TABS; Therapy: (384 4776) to Recorded   5  Oyster Shell Calcium TABS; taking a total of 2000mg calcium split throughout the day; Therapy: (Honey Aw) to Recorded    6  Simvastatin 10 MG Oral Tablet; TAKE 1 TABLET AT BEDTIME; Therapy: 63LTD8222 to (Last Rx:50Yhz1968)  Requested for: 62TBE5491 Ordered    7  Calcitriol 0 25 MCG Oral Capsule; take 1 capsule twice a day; Therapy: 95EMI5179 to (Last Rx:17Jun2017)  Requested for: 16ANQ4991 Ordered    8  Synthroid 200 MCG Oral Tablet (Levothyroxine Sodium); Take 2 tablets Mon-Sat and one   tab on Sunday; Therapy: 38YHP2188 to ([phone])  Requested for: 22WNU4668 Recorded    9  Cephalexin 500 MG Oral Capsule; TAKE 1 CAPSULE 3 TIMES DAILY; Therapy: 22Gzk4543 to (Summer Frausto)  Requested for: 72Zlt8023; Last   Rx:11Apr2017 Ordered    10  ProAir  (90 Base) MCG/ACT Inhalation Aerosol Solution; INHALE 1 TO 2 PUFFS    EVERY 4 TO 6 HOURS AS NEEDED; Therapy: 83AGU7894 to (Last Rx:17Nov2014)  Requested for: 68HJP5450 Ordered    11  Fluticasone Propionate 50 MCG/ACT Nasal Suspension; use 1 spray in each nostril    twice daily; Therapy: 45BPI8064 to (Last Rx:01Cuy7648)  Requested for: 72UMW5109 Ordered    12  TiZANidine HCl - 4 MG Oral Tablet; TAKE 1 TABLET AT BEDTIME; Therapy: 44Xya8789 to (Enrike Yadav)  Requested for: 64Sky6274; Last    Rx:24Hlf2811 Ordered    13  Pennsaid 2 % Transdermal Solution; 2 pumps BID prn; Therapy: 24ELL8561 to (Evaluate:24Nov2016); Last Rx:29Vlj4192 Ordered    14  Diclofenac-Misoprostol 75-0 2 MG Oral Tablet Delayed Release; Take 1 tablet twice daily; Therapy: 02Erh8688 to (Evaluate:21Apr2017)  Requested for: 42ZAC1191; Last    Rx:23Mar2017 Ordered    15  MetFORMIN HCl - 1000 MG Oral Tablet; take 1 tablet twice a day;     Therapy: 11VKM7347 to (Last Rx:17Jun2017)  Requested for: 07YKI8666 Ordered 16  Vitamin D 2000 UNIT Oral Capsule; 1 po daily; Therapy: 01HIF4314 to (Evaluate:20Dmv5408); Last CJ:11CNM7666 Ordered    Future Appointments    Date/Time Provider Specialty Site   08/17/2017 09:45 AM AUDREY Fagan  [address] [place] [address] OR   09/01/2017 09:45 AM AUDREY Fagan  Orthopedic Surgery [facility] MEDICAL [facility] SURGICAL [facility]   10/11/2017 08:20 AUDREY San  Endocrinology [facility] ENDOCRINOLOGY   08/01/2017 03:20 PM Syed Choi MD Family Medicine [address] [place] [address]   04/10/2018 08:00 AM Ayden Beckham MD Surgical Oncology [facility] [address]     Patient Care Team    Care Team Member Role Specialty Office Number   Beckiemarco antonio [place]  Specialist Endocrinology [phone]   Devika Aparicio MD  Surgical Oncology [phone]   LEONARD Mcginnis MD  Pain Management [phone]   [address] [place]    Pain Management , [address] [phone]   Syed Choi MD  Family Medicine [phone]   Dann Lane  Nurse Practitioner [phone]     Signatures   Electronically signed by : Julio Cesar Batista RN; Jul 18 2017 11:56AM EST                       (Author)

## 2018-01-13 NOTE — RESULT NOTES
Verified Results  (1) LIPID PANEL, FASTING 01Oct2016 08:07AM Reyna ARVIZU Order Number: [id number]     Test Name Result Flag Reference   CHOLESTEROL 114 mg/dL     HDL,DIRECT 31 mg/dL L 40-60   Specimen collection should occur prior to Metamizole administration due to the potential for falsely depressed results  LDL CHOLESTEROL CALCULATED 56 mg/dL  0-100   - Patient Instructions: This is a fasting blood test  Water,black tea or black  coffee only after 9:00pm the night before test   Drink 2 glasses of water the morning of test     - Patient Instructions: This is a fasting blood test  Water,black tea or black  coffee only after 9:00pm the night before test Drink 2 glasses of water the morning of test - Patient Instructions: This bloodwork is non-fasting  Please drink two glasses of   water morning of bloodwork  Triglyceride:         Normal              <150 mg/dl       Borderline High    150-199 mg/dl       High               200-499 mg/dl       Very High          >499 mg/dl  Cholesterol:         Desirable        <200 mg/dl      Borderline High  200-239 mg/dl      High             >239 mg/dl  HDL Cholesterol:        High    >59 mg/dL      Low     <41 mg/dL  LDL CALCULATED:    This screening LDL is a calculated result  It does not have the accuracy of the Direct Measured LDL in the monitoring of patients with hyperlipidemia and/or statin therapy  Direct Measure LDL (AYA010) must be ordered separately in these patients  TRIGLYCERIDES 136 mg/dL  <=150   Specimen collection should occur prior to N-Acetylcysteine or Metamizole administration due to the potential for falsely depressed results       (1) COMPREHENSIVE METABOLIC PANEL 91OXW2838 81:24LB Katie Ellis Order Number: [id number]     Test Name Result Flag Reference   GLUCOSE,RANDM 136 mg/dL     If the patient is fasting, the ADA then defines impaired fasting glucose as > 100 mg/dL and diabetes as > or equal to 123 mg/dL    SODIUM 135 mmol/L L 136-145   POTASSIUM 3 8 mmol/L  3 5-5 3   CHLORIDE 99 mmol/L L 100-108   CARBON DIOXIDE 28 mmol/L  21-32   ANION GAP (CALC) 8 mmol/L  4-13   BLOOD UREA NITROGEN 17 mg/dL  5-25   CREATININE 0 78 mg/dL  0 60-1 30   Standardized to IDMS reference method   CALCIUM 7 3 mg/dL L 8 3-10 1   BILI, TOTAL 0 50 mg/dL  0 20-1 00   ALK PHOSPHATAS 82 U/L     ALT (SGPT) 44 U/L  12-78   AST(SGOT) 27 U/L  5-45   ALBUMIN 3 7 g/dL  3 5-5 0   TOTAL PROTEIN 7 8 g/dL  6 4-8 2   eGFR Non-African American      >60 0 ml/min/1 73sq m   - Patient Instructions: This is a fasting blood test  Water,black tea or black  coffee only after 9:00pm the night before test Drink 2 glasses of water the morning of test - Patient Instructions: This bloodwork is non-fasting  Please drink two glasses of   water morning of bloodwork  National Kidney Disease Education Program recommendations are as follows:  GFR calculation is accurate only with a steady state creatinine  Chronic Kidney disease less than 60 ml/min/1 73 sq  meters  Kidney failure less than 15 ml/min/1 73 sq  meters  (1) TSH 01Oct2016 08:07AM Kuldeep Cruz Order Number: [id number]     Test Name Result Flag Reference   TSH 0 103 uIU/mL L 0 358-3 740   - Patient Instructions: This bloodwork is non-fasting  Please drink two glasses of water morning of bloodwork  - Patient Instructions: This is a fasting blood test  Water,black tea or black  coffee only after 9:00pm the night before test Drink 2 glasses of water the morning of test - Patient Instructions: This bloodwork is non-fasting  Please drink two glasses of   water morning of bloodwork  Patients undergoing fluorescein dye angiography may retain small amounts of fluorescein in the body for 48-72 hours post procedure  Samples containing fluorescein can produce falsely depressed TSH values  If the patient had this procedure,a specimen should be resubmitted post fluorescein clearance  (1) T4, FREE 38ENB2366 08:07AM Brian Madrid Order Number: [id number]     Test Name Result Flag Reference   T4,FREE 1 81 ng/dL H 0 76-1 46   - Patient Instructions: This is a fasting blood test  Water,black tea or black  coffee only after 9:00pm the night before test Drink 2 glasses of water the morning of test - Patient Instructions: This bloodwork is non-fasting  Please drink two glasses of   water morning of bloodwork  (1) MICROALBUMIN CREATININE RATIO, RANDOM URINE 01Oct2016 08:07AM Mary Jane Pio    Order Number: [id number]     Test Name Result Flag Reference   MICROALBUMIN/ CREAT R 83 mg/g creatinine H 0-30   MICROALBUMIN,URINE 113 0 mg/L H 0 0-20 0   CREATININE URINE 136 0 mg/dL         Plan  Diabetes type 2, uncontrolled    · (1) MICROALBUMIN CREATININE RATIO, RANDOM URINE ; every 1 year;  Last  10CMN4678; Next 11SZW5215; Status:Active

## 2018-01-14 VITALS
DIASTOLIC BLOOD PRESSURE: 82 MMHG | BODY MASS INDEX: 40.43 KG/M2 | TEMPERATURE: 97.8 F | WEIGHT: 315 LBS | RESPIRATION RATE: 16 BRPM | OXYGEN SATURATION: 98 % | SYSTOLIC BLOOD PRESSURE: 120 MMHG | HEIGHT: 74 IN | HEART RATE: 77 BPM

## 2018-01-14 VITALS
OXYGEN SATURATION: 97 % | HEART RATE: 88 BPM | WEIGHT: 315 LBS | HEIGHT: 74 IN | SYSTOLIC BLOOD PRESSURE: 140 MMHG | DIASTOLIC BLOOD PRESSURE: 88 MMHG | TEMPERATURE: 97.6 F | BODY MASS INDEX: 40.43 KG/M2

## 2018-01-14 VITALS
BODY MASS INDEX: 50.01 KG/M2 | WEIGHT: 315 LBS | SYSTOLIC BLOOD PRESSURE: 146 MMHG | HEART RATE: 80 BPM | DIASTOLIC BLOOD PRESSURE: 88 MMHG

## 2018-01-14 VITALS
BODY MASS INDEX: 40.43 KG/M2 | TEMPERATURE: 97.6 F | DIASTOLIC BLOOD PRESSURE: 79 MMHG | SYSTOLIC BLOOD PRESSURE: 130 MMHG | WEIGHT: 315 LBS | HEIGHT: 74 IN | HEART RATE: 78 BPM

## 2018-01-16 NOTE — PROGRESS NOTES
History of Present Illness  Care Coordination Encounter Information:   Type of Encounter: Telephonic   Contact: Initial Contact    Spoke to Patient  Care Coordination SL Nurse Marky Fay:   The reason for call is to discuss outreach for follow up/needed services and coordination of meeting care plan treatment goals  Patient in stable condition  Stated that he has been doing good managing health at home  Denied pain but stated he does get discomfort to his knees and ortho is aware  He has a f/u appt  on 6/16/17  Blood sugars have been in the 120s  Reviewed over good diabetic foods along with signs and symptoms of hypo/hyperglycemia  Also when to seek medical attention with his health; appropriate ER utilization  Patient verbalized understanding  He did not have any questions or concerns at the moment but gladly took down contact information  Will f/u with patient in a few weeks  Active Problems    1  Benign essential hypertension (401 1) (I10)   2  Chronic pain syndrome (338 4) (G89 4)   3  Diabetes type 2, uncontrolled (250 02) (E11 65)   4  Gait difficulty (781 2) (R26 9)   5  Hypercholesterolemia (272 0) (E78 00)   6  Hypocalcemia (275 41) (E83 51)   7  Hypoparathyroidism (252 1) (E20 9)   8  Hypothyroidism (244 9) (E03 9)   9  Ingrown left greater toenail (703 0) (L60 0)   10  Knee pain, left (719 46) (M25 562)   11  Left knee pain (719 46) (M25 562)   12  Low back pain (724 2) (M54 5)   13  Lumbar radiculopathy (724 4) (M54 16)   14  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 (196 9) (C77 9)   15  Microalbuminuria (791 0) (R80 9)   16  Morbid obesity due to excess calories (278 01) (E66 01)   17  Myofascial pain syndrome (729 1) (M79 1)   18  Type 2 diabetes mellitus (250 00) (E11 9)   19  Vitamin D deficiency (268 9) (E55 9)    Past Medical History    1  Acute bronchitis (466 0) (J20 9)   2  History of Asthma (493 90) (J45 909)   3  History of Colon, diverticulosis (562 10) (K57 30)   4   History of malignant neoplasm of thyroid (V10 87) (Z85 189)   5  History of Left knee pain (719 46) (M29 650)   6  History of Tiring Easily    Surgical History    1  History of Biopsy Thyroid Using Percutaneous Core Needle   2  History of Colostomy   3  History of Thyroid Surgery Total Thyroidectomy    Family History  Mother    1  Family history of Diabetes Mellitus (V18 0)  Sister    2  Family history of Thyroid Disorder (V18 19)    Social History    · Denied: History of Alcohol Use (History)   · Denied: History of Drug Use   · Former smoker (V15 82) (L65 131)    Current Meds    1  AmLODIPine Besylate 5 MG Oral Tablet; Take 1 tablet daily; Therapy: 43Dbq8479 to (Last Rx:19Mar2017)  Requested for: 34DGC4168 Ordered   2  Valsartan-Hydrochlorothiazide 320-25 MG Oral Tablet (Diovan HCT); Take 1 tablet daily; Therapy: 60RCA8512 to (Last Rx:19Mar2017)  Requested for: 61HQB1495 Ordered    3  Jardiance 10 MG Oral Tablet; Take 1 tablet daily; Therapy: 91Qwt9532 to (Last Rx:78Alp9161)  Requested for: 82Uqq1332 Ordered    4  Aspirin 81 MG TABS; Therapy: (963 8006) to Recorded   5  Oyster Shell Calcium TABS; taking a total of 2000mg calcium split throughout the day; Therapy: (Brittany Basil) to Recorded    6  Simvastatin 10 MG Oral Tablet; TAKE 1 TABLET AT BEDTIME; Therapy: 73FVG7073 to (Last Rx:25Rhq7347)  Requested for: 11DQZ3192 Ordered    7  Calcitriol 0 25 MCG Oral Capsule; take 1 capsule twice a day; Therapy: 15DED0977 to (Last Rx:19Mar2017)  Requested for: 61GWW9606 Ordered    8  Synthroid 200 MCG Oral Tablet (Levothyroxine Sodium); Take 2 tablets daily; Therapy: 29IGQ3224 to (iGdeon Campo)  Requested for: 19Qnm3831 Ordered    9  Cephalexin 500 MG Oral Capsule; TAKE 1 CAPSULE 3 TIMES DAILY; Therapy: 24Hru5330 to (Zach Gaxiola)  Requested for: 67Zri0044; Last   Rx:70Pcf4899 Ordered    10   ProAir  (90 Base) MCG/ACT Inhalation Aerosol Solution; INHALE 1 TO 2 PUFFS    EVERY 4 TO 6 HOURS AS NEEDED; Therapy: 34UCN3832 to (Last Rx:17Nov2014)  Requested for: 44ASL2311 Ordered    11  Fluticasone Propionate 50 MCG/ACT Nasal Suspension; use 1 spray in each nostril    twice daily; Therapy: 40JKP4051 to (Last Rx:41Igb8219)  Requested for: 77HVA7235 Ordered    12  TiZANidine HCl - 4 MG Oral Tablet; TAKE 1 TABLET AT BEDTIME; Therapy: 82Hss0124 to (Teryl Arti)  Requested for: 27Uee7212; Last    Rx:29Dnn6253 Ordered    13  Pennsaid 2 % Transdermal Solution; 2 pumps BID prn; Therapy: 29VBN6627 to (Evaluate:24Nov2016); Last Rx:83Mvt7189 Ordered    14  Diclofenac-Misoprostol 75-0 2 MG Oral Tablet Delayed Release; Take 1 tablet twice daily; Therapy: 29Snv7057 to (Evaluate:21Apr2017)  Requested for: 60CJQ3750; Last    Rx:23Mar2017 Ordered    15  MetFORMIN HCl - 1000 MG Oral Tablet; take 1 tablet twice a day; Therapy: 59KDF4513 to (Last Rx:19Mar2017)  Requested for: 16WQZ9289 Ordered    16  Vitamin D 2000 UNIT Oral Capsule; 1 po daily; Therapy: 08WJG7828 to (Evaluate:28Apr2017); Last QY:89LUA9617 Ordered    Allergies    1  No Known Drug Allergies    Health Management   (1) HEMOGLOBIN A1C; every 6 months; Last 99ICG7402; Next Due: 37BPF9697; Overdue  (1) MICROALBUMIN CREATININE RATIO, RANDOM URINE; every 1 year; Last 11FRE1826; Next Due:  38LNN7109; Active  *VB - Eye Exam; every 1 year; Last 81TAT2512; Next Due: Y9438434; Overdue  *VB - Foot Exam; every 1 year; Last 50SYW5785; Next Due: 88VWR4285; Overdue   *VB - Eye Exam; every 1 year; Last 95GDV9239; Next Due: K0178589; Overdue   *VB - Eye Exam; every 1 year; Last 56SDY0521; Next Due: O8242612; Overdue    End of Encounter Meds    1  AmLODIPine Besylate 5 MG Oral Tablet; Take 1 tablet daily; Therapy: 61Kxf8771 to (Last Rx:19Mar2017)  Requested for: 66MGA0835 Ordered   2  Valsartan-Hydrochlorothiazide 320-25 MG Oral Tablet (Diovan HCT); Take 1 tablet daily;    Therapy: 47BYJ7041 to (Last Rx:19Mar2017)  Requested for: 92DJI1091 Ordered    3  Jardiance 10 MG Oral Tablet; Take 1 tablet daily; Therapy: 62Qdx5976 to (Last Rx:32Xgx2287)  Requested for: 76Vwo3764 Ordered    4  Aspirin 81 MG TABS; Therapy: (78 [phone]) to Recorded   5  Oyster Shell Calcium TABS; taking a total of 2000mg calcium split throughout the day; Therapy: (Bharti Hargrove) to Recorded    6  Simvastatin 10 MG Oral Tablet; TAKE 1 TABLET AT BEDTIME; Therapy: 86QKE0661 to (Last Rx:83Hoj7481)  Requested for: 62SGV9873 Ordered    7  Calcitriol 0 25 MCG Oral Capsule; take 1 capsule twice a day; Therapy: 74RHA2587 to (Last Rx:19Mar2017)  Requested for: 43IJC5361 Ordered    8  Synthroid 200 MCG Oral Tablet (Levothyroxine Sodium); Take 2 tablets daily; Therapy: 55SCA6975 to (Gideon Cuevas)  Requested for: 22Apr2017 Ordered    9  Cephalexin 500 MG Oral Capsule; TAKE 1 CAPSULE 3 TIMES DAILY; Therapy: 91Wue6435 to (Guido Menendez)  Requested for: 32Fhj0970; Last   Rx:54Xho5179 Ordered    10  ProAir  (90 Base) MCG/ACT Inhalation Aerosol Solution; INHALE 1 TO 2 PUFFS    EVERY 4 TO 6 HOURS AS NEEDED; Therapy: 43HCJ0684 to (Last Rx:17Nov2014)  Requested for: 61XAK1690 Ordered    11  Fluticasone Propionate 50 MCG/ACT Nasal Suspension; use 1 spray in each nostril    twice daily; Therapy: 99TSS9289 to (Last Rx:85Lga7453)  Requested for: 92QMX1785 Ordered    12  TiZANidine HCl - 4 MG Oral Tablet; TAKE 1 TABLET AT BEDTIME; Therapy: 30Ayi5287 to (Henny Graham)  Requested for: 66Jom1374; Last    Rx:55Xtw5889 Ordered    13  Pennsaid 2 % Transdermal Solution; 2 pumps BID prn; Therapy: 75XOT4954 to (Evaluate:24Nov2016); Last Rx:41Pja6993 Ordered    14  Diclofenac-Misoprostol 75-0 2 MG Oral Tablet Delayed Release; Take 1 tablet twice daily; Therapy: 64Qwp5222 to (Evaluate:21Apr2017)  Requested for: 60GEC7622; Last    Rx:23Mar2017 Ordered    15  MetFORMIN HCl - 1000 MG Oral Tablet; take 1 tablet twice a day;     Therapy: 67YOD5347 to (Last Rx:42Lge5071)  Requested for: 35RKC8555 Ordered    16  Vitamin D 2000 UNIT Oral Capsule; 1 po daily; Therapy: 40QFJ6188 to (Evaluate:95Vkj0450); Last LH:84NUA9869 Ordered    Future Appointments    Date/Time Provider Specialty Site   06/16/2017 09:15 AM AUDREY Suarez  Orthopedic Surgery [facility] MEDICAL AND SURGICAL [facility]   04/10/2018 08:00 AM Bryan Garcia MD Surgical Oncology [facility] SURGICAL ONCOLOGY   06/28/2017 08:15 AM Akash Topete, [address] [facility] ENDOCRINOLOGY     Patient Care Team    Care Team Member Role Specialty Office Number   Osiris WINSLOW  Specialist Endocrinology [phone]   Bryan Hence MD  Surgical Oncology [phone]   ISCGQBTW Olga Chi MD  Pain Management [phone]   Joceline WINSLOW    Pain Management , [address] [phone]   Cheyenne Mcghee MD  Family Medicine [phone]   Olaf Mckeon  Nurse Practitioner [phone]     Signatures   Electronically signed by : Kody Randolph RN; Jun 8 2017 11:17AM EST                       (Author)

## 2018-01-16 NOTE — RESULT NOTES
Message   a1c improved 7 9 but still high - send over log in 2 weeks   calcium and vitamin d low - is he taking calcitriol 0 25 mcg twice a day , calcium 500 mg 4/day ? ?    if so start vitamin d3 2000 iu daily , otc  if he is not taking meds on regular basis , let me know   repeat lbs in 6 weeks - do labs before taking synthroid in morning -    tsh, free t4 , ca/phos /albumin   Verified Results  (1) HEMOGLOBIN A1C 83BJM2010 07:56AM Beckie Phani   5 7-6 4% impaired fasting glucosern>=6 5% diagnosis of diabetesrnrnFalsely low levels are seen in conditions linked to short RBC life span-rnhemolytic anemia, and splenomegalyrnFalsely elevated levels are seen in situations where there is an increased production of RBC- receipt of erythropoietin or blood transfusionsrnAdopted from ADA-Clinical Practice Recommendations     Test Name Result Flag Reference   HEMOGLOBIN A1C 7 9 % H 4 0-5 6   EST  AVG  GLUCOSE 180 mg/dl       (1) VITAMIN D 25-HYDROXY 55RWG4493 08:33PM [employer]     Test Name Result Flag Reference   VIT D 25-HYDROX 25 2 ng/mL L 30 0-100 0     (1) T4, FREE 77QZC7702 07:53PM [employer]     Test Name Result Flag Reference   T4,FREE 1 79 ng/dL H 0 76-1 46     (1) COMPREHENSIVE METABOLIC PANEL 31BXP9902 21:85LA Pham Ordoñez Kidney Disease Education Program recommendations are as follows:rnGFR calculation is accurate only with a steady state creatininernChronic Kidney disease less than 60 ml/min/1 73 sq  metersrnKidney failure less than 15 ml/min/1 73 sq  meters       Test Name Result Flag Reference   GLUCOSE,RANDM 155 mg/dL H    SODIUM 138 mmol/L  136-145   POTASSIUM 3 8 mmol/L  3 5-5 3   CHLORIDE 101 mmol/L  100-108   CARBON DIOXIDE 27 mmol/L  21-32   ANION GAP (CALC) 10 mmol/L  4-13   BLOOD UREA NITROGEN 14 mg/dL  5-25   CREATININE 0 75 mg/dL  0 60-1 30   Standardized to IDMS reference method   CALCIUM 6 8 mg/dL L 8 3-10 1   BILI, TOTAL 0 46 mg/dL  0 20-1 00 ALK PHOSPHATAS 87 U/L     ALT (SGPT) 36 U/L  12-78   AST(SGOT) 20 U/L  5-45   ALBUMIN 3 4 g/dL L 3 5-5 0   TOTAL PROTEIN 7 2 g/dL  6 4-8 2   eGFR Non-African American > ml/min/1 73sq m       (1) TSH 10GCF3102 07:53PM Lyn Ordoñez   Patients undergoing fluorescein dye angiography may retain small amounts of fluorescein in the body for 48-72 hours post procedure  Samples containing fluorescein can produce falsely depressed TSH values  If the patient had this procedure,a specimen should be resubmitted post fluorescein clearance  Test Name Result Flag Reference   TSH 0 152 uIU/mL L 0 358-3 740     (1) CALCIUM, URINE 24HR 41JVM9300 07:29PM Nicola Maria     Test Name Result Flag Reference   24 HR URINE VOLUME 2100 mL     CALCIUM 24 HOUR URINE 277 2 mg/24 hrs         Plan  Diabetes type 2, uncontrolled    · (1) HEMOGLOBIN A1C ; every 6 months; Last 49VKN1474; Next 09FKD0036; Status:Active  Hypocalcemia    · (1) ALBUMIN; Status:Active; Requested for:13Mig6022;    · (1) CALCIUM; Status:Active; Requested for:95Sih2959;    · (1) PHOSPHORUS; Status:Active; Requested for:27Ctz7338;    · (1) T4, FREE; Status:Active; Requested for:10Szg9940;    · (1) TSH; Status:Active;  Requested for:94Nhm7790;

## 2018-01-16 NOTE — RESULT NOTES
Verified Results  (1) LIPID PANEL, FASTING 42NMU2537 07:13AM Elza Marquez Order Number: [id number]     Test Name Result Flag Reference   CHOLESTEROL 130 mg/dL     HDL,DIRECT 32 mg/dL L 40-60   Specimen collection should occur prior to Metamizole administration due to the potential for falsely depressed results  LDL CHOLESTEROL CALCULATED 58 mg/dL  0-100   - Patient Instructions: This is a fasting blood test  Water,black tea or black  coffee only after 9:00pm the night before test   Drink 2 glasses of water the morning of test       Triglyceride:         Normal              <150 mg/dl       Borderline High    150-199 mg/dl       High               200-499 mg/dl       Very High          >499 mg/dl  Cholesterol:         Desirable        <200 mg/dl      Borderline High  200-239 mg/dl      High             >239 mg/dl  HDL Cholesterol:        High    >59 mg/dL      Low     <41 mg/dL  LDL CALCULATED:    This screening LDL is a calculated result  It does not have the accuracy of the Direct Measured LDL in the monitoring of patients with hyperlipidemia and/or statin therapy  Direct Measure LDL (HDI160) must be ordered separately in these patients  TRIGLYCERIDES 198 mg/dL H <=150   Specimen collection should occur prior to N-Acetylcysteine or Metamizole administration due to the potential for falsely depressed results

## 2018-01-18 NOTE — MISCELLANEOUS
Provider Comments  Provider Comments:   PATIENT NO SHOWED FOR 9- APPT  Signatures   Electronically signed by :  Milton Hernandez; Sep 19 2016  9:04AM EST                       (Author)

## 2018-01-23 NOTE — MISCELLANEOUS
To whom it may concern: This letter is on behalf of Gautam Mills, who I first treated in 2009 for cancer, and again in 2011  He has done well post treatment and has remained cancer-free since 2011  I consider him cured       Sincerely,    Elsie Flak MD FACS      Electronically signed by:Sanket Donohue MD  Dec  6 2017  6:55PM EST

## 2018-01-23 NOTE — MISCELLANEOUS
Message  Return to work or school:  Phone call 5 December 2017     He can perform work without limitations  Patient is cleared to work as he desires,    per patient request         Signatures   Electronically signed by : Jj Brown, [facility]; Dec  5 2017 12:02PM EST                       (Author)

## 2018-02-23 DIAGNOSIS — E78.5 HYPERLIPIDEMIA, UNSPECIFIED HYPERLIPIDEMIA TYPE: Primary | ICD-10-CM

## 2018-02-23 RX ORDER — SIMVASTATIN 10 MG
TABLET ORAL
Qty: 90 TABLET | Refills: 0 | Status: SHIPPED | OUTPATIENT
Start: 2018-02-23 | End: 2018-05-24 | Stop reason: SDUPTHER

## 2018-02-27 DIAGNOSIS — R52 PAIN: Primary | ICD-10-CM

## 2018-02-27 RX ORDER — DICLOFENAC SODIUM AND MISOPROSTOL 75; 200 MG/1; UG/1
TABLET, DELAYED RELEASE ORAL
Qty: 180 TABLET | Refills: 1 | Status: SHIPPED | OUTPATIENT
Start: 2018-02-27 | End: 2018-08-26 | Stop reason: SDUPTHER

## 2018-03-14 DIAGNOSIS — E11.9 TYPE 2 DIABETES MELLITUS (HCC): ICD-10-CM

## 2018-03-14 DIAGNOSIS — I10 HYPERTENSION, UNSPECIFIED TYPE: Primary | ICD-10-CM

## 2018-03-14 RX ORDER — CALCITRIOL 0.25 UG/1
CAPSULE, LIQUID FILLED ORAL
Qty: 180 CAPSULE | Refills: 0 | Status: SHIPPED | OUTPATIENT
Start: 2018-03-14 | End: 2018-06-12 | Stop reason: SDUPTHER

## 2018-03-14 RX ORDER — VALSARTAN AND HYDROCHLOROTHIAZIDE 320; 25 MG/1; MG/1
TABLET, FILM COATED ORAL
Qty: 90 TABLET | Refills: 0 | Status: SHIPPED | OUTPATIENT
Start: 2018-03-14 | End: 2018-06-12 | Stop reason: SDUPTHER

## 2018-03-14 RX ORDER — AMLODIPINE BESYLATE 5 MG/1
TABLET ORAL
Qty: 90 TABLET | Refills: 0 | Status: SHIPPED | OUTPATIENT
Start: 2018-03-14 | End: 2018-06-12 | Stop reason: SDUPTHER

## 2018-03-19 ENCOUNTER — OFFICE VISIT (OUTPATIENT)
Dept: URGENT CARE | Facility: CLINIC | Age: 43
End: 2018-03-19
Payer: COMMERCIAL

## 2018-03-19 VITALS
SYSTOLIC BLOOD PRESSURE: 147 MMHG | RESPIRATION RATE: 16 BRPM | WEIGHT: 315 LBS | OXYGEN SATURATION: 97 % | TEMPERATURE: 96.7 F | BODY MASS INDEX: 40.43 KG/M2 | DIASTOLIC BLOOD PRESSURE: 79 MMHG | HEIGHT: 74 IN | HEART RATE: 86 BPM

## 2018-03-19 DIAGNOSIS — L03.031 PARONYCHIA OF GREAT TOE OF RIGHT FOOT: Primary | ICD-10-CM

## 2018-03-19 PROCEDURE — 99213 OFFICE O/P EST LOW 20 MIN: CPT | Performed by: PHYSICIAN ASSISTANT

## 2018-03-19 RX ORDER — CEPHALEXIN 250 MG/1
250 CAPSULE ORAL 4 TIMES DAILY
Qty: 28 CAPSULE | Refills: 0 | Status: SHIPPED | OUTPATIENT
Start: 2018-03-19 | End: 2018-03-26

## 2018-03-19 NOTE — PATIENT INSTRUCTIONS
paronychia is draining now  Continue warm soapy soaks  Will start Keflex due to diabetes and concern for spreading infection  Folow up with  Podiatry  Follow up with PCP in 3-5 days  Proceed to  ER if symptoms worsen

## 2018-03-19 NOTE — PROGRESS NOTES
[facility]        NAME: Elie Kearney  is a 36 y o  male  : 1975    MRN: [id number]  DATE: 2018  TIME: 6:52 PM    Assessment and Plan   Paronychia of great toe of right foot [L03 031]  1  Paronychia of great toe of right foot  cephalexin (KEFLEX) 250 mg capsule         Patient Instructions      paronychia is draining now  Continue warm soapy soaks  Will start Keflex due to diabetes and concern for spreading infection  Folow up with  Podiatry  Follow up with PCP in 3-5 days  Proceed to  ER if symptoms worsen  Chief Complaint     Chief Complaint   Patient presents with    Toe Pain     Started today, redness/swelling, and some yellow pus on side of nail         History of Present Illness        68-year-old male complains of right great toe swelling and redness and pain starting today  He woke up from a nap and noticed that  No fever or chills  He cleaned with peroxide  He does have diabetes          Review of Systems   Review of Systems      Current Medications       Current Outpatient Prescriptions:     albuterol (PROVENTIL HFA,VENTOLIN HFA) 90 mcg/act inhaler, Inhale 2 puffs every 6 (six) hours as needed for wheezing, Disp: , Rfl:     amLODIPine (NORVASC) 5 mg tablet, TAKE 1 TABLET DAILY, Disp: 90 tablet, Rfl: 0    aspirin (ECOTRIN LOW STRENGTH) 81 mg EC tablet, Take 81 mg by mouth daily, Disp: , Rfl:     calcitriol (ROCALTROL) 0 25 mcg capsule, TAKE 1 CAPSULE TWICE A DAY, Disp: 180 capsule, Rfl: 0    Calcium Carb-Cholecalciferol (CALCIUM 600 + D PO), Take by mouth, Disp: , Rfl:     diclofenac-misoprostol (ARTHROTEC 75) 75-0 2 MG per tablet, TAKE 1 TABLET TWICE A DAY, Disp: 180 tablet, Rfl: 1    Empagliflozin (JARDIANCE) 10 MG TABS, Take 10 mg by mouth every morning, Disp: , Rfl:     fluticasone (FLONASE) 50 mcg/act nasal spray, 1 spray into each nostril daily, Disp: , Rfl:     levothyroxine 200 mcg tablet, Take 200 mcg by mouth daily, Disp: , Rfl:     metFORMIN (GLUCOPHAGE) 1000 MG tablet, TAKE 1 TABLET TWICE A DAY, Disp: 180 tablet, Rfl: 0    simvastatin (ZOCOR) 10 mg tablet, TAKE 1 TABLET AT BEDTIME, Disp: 90 tablet, Rfl: 0    valsartan-hydrochlorothiazide (DIOVAN-HCT) 320-25 MG per tablet, TAKE 1 TABLET DAILY, Disp: 90 tablet, Rfl: 0    cephalexin (KEFLEX) 250 mg capsule, Take 1 capsule (250 mg total) by mouth 4 (four) times a day for 7 days, Disp: 28 capsule, Rfl: 0    TiZANidine (ZANAFLEX) 4 MG capsule, Take 4 mg by mouth 3 (three) times a day as needed for muscle spasms, Disp: , Rfl:     Current Allergies     Allergies as of 03/19/2018    (No Known Allergies)            The following portions of the patient's history were reviewed and updated as appropriate: allergies, current medications, past family history, past medical history, past social history, past surgical history and problem list      Past Medical History:   Diagnosis Date    Diabetes mellitus ([facility] [address] )     Diverticulitis     Hyperlipidemia     Hypertension     Thyroid cancer ([facility] [address] )     radioactive iodine        Past Surgical History:   Procedure Laterality Date    COLOSTOMY      with reversal    NE KNEE SCOPE,MED/LAT MENISECTOMY Left 8/17/2017    Procedure: KNEE ARTHROSCOPIC PARTIAL LATERAL MENISCECTOMY ; PATELLO FEMOEAL CHONDROPLASTY;  Surgeon: Huang Velazquez MD;  Location: [facility];  Service: Orthopedics    THYROIDECTOMY         History reviewed  No pertinent family history  Medications have been verified  Objective   /79   Pulse 86   Temp (!) 96 7 °F (35 9 °C)   Resp 16   Ht 6' 2" (1 88 m)   Wt (!) 179 kg (394 lb 12 8 oz)   SpO2 97%   BMI 50 69 kg/m²        Physical Exam     Physical Exam   Constitutional: He appears well-developed and well-nourished  Skin:     Right great toe lateral nail fold with small paronychia erythema some purulent drainage expressed  Ingrown right lateral nail  Erythema over lateral nail fold

## 2018-04-05 ENCOUNTER — HOSPITAL ENCOUNTER (OUTPATIENT)
Dept: RADIOLOGY | Facility: MEDICAL CENTER | Age: 43
Discharge: HOME | End: 2018-04-05
Payer: COMMERCIAL

## 2018-04-05 DIAGNOSIS — C77.9 SECONDARY AND UNSPECIFIED MALIGNANT NEOPLASM OF LYMPH NODES: ICD-10-CM

## 2018-04-05 DIAGNOSIS — C77.9 SECONDARY AND UNSPECIFIED MALIGNANT NEOPLASM OF LYMPH NODE, UNSPECIFIED (HCC): ICD-10-CM

## 2018-04-05 PROCEDURE — 76536 US EXAM OF HEAD AND NECK: CPT

## 2018-04-11 DIAGNOSIS — C77.9 SECONDARY AND UNSPECIFIED MALIGNANT NEOPLASM OF LYMPH NODE, UNSPECIFIED (HCC): ICD-10-CM

## 2018-04-11 PROBLEM — M25.562 KNEE PAIN, LEFT: Status: RESOLVED | Noted: 2017-05-16 | Resolved: 2018-04-11

## 2018-04-11 PROBLEM — L60.0 INGROWN LEFT GREATER TOENAIL: Status: ACTIVE | Noted: 2017-04-11

## 2018-04-11 PROBLEM — L60.0 INGROWN LEFT GREATER TOENAIL: Status: RESOLVED | Noted: 2017-04-11 | Resolved: 2018-04-11

## 2018-04-11 PROBLEM — M25.562 LEFT KNEE PAIN: Status: ACTIVE | Noted: 2017-06-01

## 2018-04-11 PROBLEM — M25.562 KNEE PAIN, LEFT: Status: ACTIVE | Noted: 2017-05-16

## 2018-04-11 PROBLEM — M54.16 LUMBAR RADICULOPATHY: Status: ACTIVE | Noted: 2017-01-31

## 2018-04-11 PROBLEM — G89.4 CHRONIC PAIN DISORDER: Status: ACTIVE | Noted: 2017-01-31

## 2018-04-11 PROBLEM — M79.18 MYOFASCIAL PAIN SYNDROME: Status: ACTIVE | Noted: 2017-01-31

## 2018-04-11 PROBLEM — M54.50 LOW BACK PAIN: Status: ACTIVE | Noted: 2017-05-16

## 2018-04-11 PROBLEM — M54.50 LOW BACK PAIN: Status: RESOLVED | Noted: 2017-05-16 | Resolved: 2018-04-11

## 2018-04-11 PROBLEM — M25.562 LEFT KNEE PAIN: Status: RESOLVED | Noted: 2017-06-01 | Resolved: 2018-04-11

## 2018-04-16 ENCOUNTER — OFFICE VISIT (OUTPATIENT)
Dept: SURGICAL ONCOLOGY | Facility: CLINIC | Age: 43
End: 2018-04-16
Payer: COMMERCIAL

## 2018-04-16 VITALS
HEART RATE: 80 BPM | BODY MASS INDEX: 40.43 KG/M2 | RESPIRATION RATE: 16 BRPM | HEIGHT: 74 IN | DIASTOLIC BLOOD PRESSURE: 80 MMHG | WEIGHT: 315 LBS | TEMPERATURE: 98.4 F | SYSTOLIC BLOOD PRESSURE: 120 MMHG

## 2018-04-16 DIAGNOSIS — C77.9 METASTATIC PAPILLARY CARCINOMA TO LYMPH NODE (HCC): Primary | ICD-10-CM

## 2018-04-16 PROCEDURE — 99214 OFFICE O/P EST MOD 30 MIN: CPT | Performed by: SURGERY

## 2018-04-16 NOTE — PROGRESS NOTES
Surgical Oncology Follow Up       [address] [facility] [place]  [address]  [facility] [address] [facility] [address]  [address]   1975  [id number]  [facility]  [address] [facility] [address]  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 ([facility] [address] )  -     Thyroglobulin Panel; Future  -     Thyroid Panel With TSH; Future  -     US head neck lymph node mapping; Future        Advance Care Planning/Advance Directives:  Discussed disease status, cancer treatment plans and/or cancer treatment goals with the patient  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 ([facility] [address] )    2009 Initial Diagnosis     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 [facility])         2009 Surgery     Total thyroidectomy with bilateral neck dissection         2011 Surgery     Reexploration of thyroid bed for recurence            History of Present Illness: NECK ULTRASOUND   -Interval History:Diagnosis and Staging: T3, N1, M0 stage I thyroid cancer   Treatment History: Total thyroidectomy with BLND, 2009 with the reexploration 2011 for recurrent disease  Disease Status: JIHAN   Interval History: Mr Dipesh Pedroza is a 76-year-old man with a history of stage I thyroid cancer status post total thyroidectomy and bilateral neck dissection here for his yearly surveillance visit  He's had no issues since I last saw him one year ago  He continues to follow Dr Alma Goins for adjustments to his thyroid replacement  He had a recent cervical ultrasound done and is doing well  Review of Systems:  Review of Systems   Constitutional: Negative  HENT: Negative  Eyes: Negative  Respiratory: Negative  Cardiovascular: Negative  Gastrointestinal: Negative  Endocrine: Negative  Genitourinary: Negative  Musculoskeletal: Negative  Skin: Negative  Allergic/Immunologic: Negative  Neurological: Negative  Hematological: Negative  Psychiatric/Behavioral: Negative  Patient Active Problem List   Diagnosis    Benign essential hypertension    Chronic pain disorder    Diabetes type 2, uncontrolled ([place] [address] )    Gait difficulty    Hypercholesterolemia    Hypocalcemia    Hypoparathyroidism ([facility] )    Hypothyroidism    Lumbar radiculopathy    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)    Microalbuminuria    Morbid obesity due to excess calories (HCC)    Myofascial pain syndrome    Type 2 diabetes mellitus (HCC)    Vitamin D deficiency     Past Medical History:   Diagnosis Date    Diabetes mellitus ([place] [address] )     Diverticulitis     Hyperlipidemia     Hypertension     Thyroid cancer ([place] [address] )     radioactive iodine      Past Surgical History:   Procedure Laterality Date    COLOSTOMY      with reversal    NE KNEE SCOPE,MED/LAT MENISECTOMY Left 8/17/2017    Procedure: KNEE ARTHROSCOPIC PARTIAL LATERAL MENISCECTOMY ; PATELLO FEMOEAL CHONDROPLASTY;  Surgeon: Miguel Boothe MD;  [facility];  Service: Orthopedics    THYROIDECTOMY       No family history on file  Social History     Social History    Marital status: /Civil Union     Spouse name: N/A    Number of children: N/A    Years of education: N/A     Occupational History    Not on file       Social History Main Topics    Smoking status: Former Smoker     Quit date: 2002    Smokeless tobacco: Never Used    Alcohol use No    Drug use: No    Sexual activity: Not on file     Other Topics Concern    Not on file     Social History Narrative    No narrative on file       Current Outpatient Prescriptions:     albuterol (PROVENTIL HFA,VENTOLIN HFA) 90 mcg/act inhaler, Inhale 2 puffs every 6 (six) hours as needed for wheezing, Disp: , Rfl:     amLODIPine (NORVASC) 5 mg tablet, TAKE 1 TABLET DAILY, Disp: 90 tablet, Rfl: 0    aspirin (ECOTRIN LOW STRENGTH) 81 mg EC tablet, Take 81 mg by mouth daily, Disp: , Rfl:     calcitriol (ROCALTROL) 0 25 mcg capsule, TAKE 1 CAPSULE TWICE A DAY, Disp: 180 capsule, Rfl: 0    Calcium Carb-Cholecalciferol (CALCIUM 600 + D PO), Take by mouth, Disp: , Rfl:     diclofenac-misoprostol (ARTHROTEC 75) 75-0 2 MG per tablet, TAKE 1 TABLET TWICE A DAY, Disp: 180 tablet, Rfl: 1    Empagliflozin (JARDIANCE) 10 MG TABS, Take 10 mg by mouth every morning, Disp: , Rfl:     fluticasone (FLONASE) 50 mcg/act nasal spray, 1 spray into each nostril daily, Disp: , Rfl:     levothyroxine 200 mcg tablet, Take 200 mcg by mouth daily, Disp: , Rfl:     metFORMIN (GLUCOPHAGE) 1000 MG tablet, TAKE 1 TABLET TWICE A DAY, Disp: 180 tablet, Rfl: 0    simvastatin (ZOCOR) 10 mg tablet, TAKE 1 TABLET AT BEDTIME, Disp: 90 tablet, Rfl: 0    TiZANidine (ZANAFLEX) 4 MG capsule, Take 4 mg by mouth 3 (three) times a day as needed for muscle spasms, Disp: , Rfl:     valsartan-hydrochlorothiazide (DIOVAN-HCT) 320-25 MG per tablet, TAKE 1 TABLET DAILY, Disp: 90 tablet, Rfl: 0  No Known Allergies  Vitals:    04/16/18 1612   BP: 120/80   Pulse: 80   Resp: 16   Temp: 98 4 °F (36 9 °C)       Physical Exam   Constitutional: He is oriented to person, place, and time  He appears well-developed and well-nourished  HENT:   Head: Normocephalic and atraumatic  Right Ear: External ear normal    Left Ear: External ear normal    Eyes: Conjunctivae are normal  Pupils are equal, round, and reactive to light  Neck: Normal range of motion  Neck supple  Cardiovascular: Normal rate, regular rhythm, normal heart sounds and intact distal pulses  Pulmonary/Chest: Effort normal and breath sounds normal    Abdominal: Soft   Bowel sounds are normal    Musculoskeletal: Normal range of motion  Neurological: He is alert and oriented to person, place, and time  Skin: Skin is warm and dry  Psychiatric: He has a normal mood and affect  His behavior is normal  Judgment and thought content normal          Results:  Labs:   Ref Range & Units 10/11/17  4:33 PM   Thyroglobulin-VERONICA 1 4 - 29 2 ng/mL 3 2    Comments: According to the [facility] of Clinical Biochemistry, the   reference interval for Thyroglobulin (TG) should be related to   euthyroid patients and not for patients who underwent thyroidectomy  TG reference intervals for these patients depend on the residual   mass of the thyroid tissue left after surgery  Establishing a   post-operative baseline is recommended  The assay limit of quantitation is 0 1 ng/mL   Thyroglobulin measured by [facility] Immunometric Assay   Narrative         Ref Range & Units 10/11/17  4:33 PM   Thyroglobulin Ab 0 0 - 0 9 IU/mL <1 0    Comments: Thyroglobulin Antibody measured by [facility] Methodology   Narrative            Imaging  Us Head Neck Lymph Node Mapping    Result Date: 4/10/2018  Narrative: NECK ULTRASOUND INDICATION:     C77 9: Secondary and unspecified malignant neoplasm of lymph node, unspecified  COMPARISON:   None FINDINGS: Ultrasound of the thyroidectomy bed and cervical lymph node chains was performed with a high frequency linear transducer  No recurrent mass in the thyroid bed  Stable 5 x 4 x 4 mm hypoechoic nodule in the left thyroid bed Lymph nodes maintain normal morphologic contour, echogenicity and short axis dimensions of less than 0 7 cm  No evidence for microcalcification or focal nodularity  Impression: No evidence of recurrent or metastatic disease  Stable subcentimeter nodule in the left thyroidectomy bed  No new nodule  Continued imaging surveillance is advised  Workstation performed: [id number]     I reviewed the above laboratory and imaging data      Discussion/Summary:  History of bilateral neck dissections/thyroid cancer, now JIHAN  Plan on 1 year follow-up with surveillance blood work and ultrasound at that time

## 2018-04-16 NOTE — LETTER
April 16, 2018     Sherry Frye, [address]  [address] [facility]  [address]    Patient: Uriah Mata  YOB: 1975   Date of Visit: 4/16/2018       Dear Dr Mariola Dill:    Thank you for referring Fely Wong to me for evaluation  Below are my notes for this consultation  If you have questions, please do not hesitate to call me  I look forward to following your patient along with you  Sincerely,        Debbie Torres MD        CC: MD Dyaana Ballard MD Zerita Blinks, CRNP Verl Moon, MD  4/16/2018  4:53 PM  Sign at close encounter     Surgical Oncology Follow Up       [facility]  [address] [facility] [address]  [facility] [address] [facility] [address]  [address]  1975  [id number]  [address] [facility] [address] [facility]  [address] [facility] [address]  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 ([facility] [address] )  -     Thyroglobulin Panel; Future  -     Thyroid Panel With TSH; Future  -     US head neck lymph node mapping; Future        Advance Care Planning/Advance Directives:  Discussed disease status, cancer treatment plans and/or cancer treatment goals with the patient          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 ([facility] [address] )    2009 Initial Diagnosis     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 [facility])         2009 Surgery     Total thyroidectomy with bilateral neck dissection         2011 Surgery     Reexploration of thyroid bed for recurence            History of Present Illness: NECK ULTRASOUND   -Interval History:Diagnosis and Staging: T3, N1, M0 stage I thyroid cancer   Treatment History: Total thyroidectomy with BLND, 2009 with the reexploration 2011 for recurrent disease  Disease Status: JIHAN   Interval History: Mr Sandra Davison is a 79-year-old man with a history of stage I thyroid cancer status post total thyroidectomy and bilateral neck dissection here for his yearly surveillance visit  He's had no issues since I last saw him one year ago  He continues to follow Dr Wyatt Brown for adjustments to his thyroid replacement  He had a recent cervical ultrasound done and is doing well  Review of Systems:  Review of Systems   Constitutional: Negative  HENT: Negative  Eyes: Negative  Respiratory: Negative  Cardiovascular: Negative  Gastrointestinal: Negative  Endocrine: Negative  Genitourinary: Negative  Musculoskeletal: Negative  Skin: Negative  Allergic/Immunologic: Negative  Neurological: Negative  Hematological: Negative  Psychiatric/Behavioral: Negative          Patient Active Problem List   Diagnosis    Benign essential hypertension    Chronic pain disorder    Diabetes type 2, uncontrolled ([address] )    Gait difficulty    Hypercholesterolemia    Hypocalcemia    Hypoparathyroidism ([address] )    Hypothyroidism    Lumbar radiculopathy    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)    Microalbuminuria    Morbid obesity due to excess calories (HCC)    Myofascial pain syndrome    Type 2 diabetes mellitus (HCC)    Vitamin D deficiency     Past Medical History:   Diagnosis Date    Diabetes mellitus ([address] )     Diverticulitis     Hyperlipidemia     Hypertension     Thyroid cancer ([address] )     radioactive iodine      Past Surgical History:   Procedure Laterality Date    COLOSTOMY      with reversal    UT KNEE SCOPE,MED/LAT MENISECTOMY Left 8/17/2017    Procedure: KNEE ARTHROSCOPIC PARTIAL LATERAL MENISCECTOMY ; PATELLO FEMOEAL CHONDROPLASTY;  Surgeon: Renu Reyes MD;  [facility];  Service: Orthopedics    THYROIDECTOMY       No family history on file  Social History     Social History    Marital status: /Civil Union     Spouse name: N/A    Number of children: N/A    Years of education: N/A     Occupational History    Not on file       Social History Main Topics    Smoking status: Former Smoker     Quit date: 2002    Smokeless tobacco: Never Used    Alcohol use No    Drug use: No    Sexual activity: Not on file     Other Topics Concern    Not on file     Social History Narrative    No narrative on file       Current Outpatient Prescriptions:     albuterol (PROVENTIL HFA,VENTOLIN HFA) 90 mcg/act inhaler, Inhale 2 puffs every 6 (six) hours as needed for wheezing, Disp: , Rfl:     amLODIPine (NORVASC) 5 mg tablet, TAKE 1 TABLET DAILY, Disp: 90 tablet, Rfl: 0    aspirin (ECOTRIN LOW STRENGTH) 81 mg EC tablet, Take 81 mg by mouth daily, Disp: , Rfl:     calcitriol (ROCALTROL) 0 25 mcg capsule, TAKE 1 CAPSULE TWICE A DAY, Disp: 180 capsule, Rfl: 0    Calcium Carb-Cholecalciferol (CALCIUM 600 + D PO), Take by mouth, Disp: , Rfl:     diclofenac-misoprostol (ARTHROTEC 75) 75-0 2 MG per tablet, TAKE 1 TABLET TWICE A DAY, Disp: 180 tablet, Rfl: 1    Empagliflozin (JARDIANCE) 10 MG TABS, Take 10 mg by mouth every morning, Disp: , Rfl:     fluticasone (FLONASE) 50 mcg/act nasal spray, 1 spray into each nostril daily, Disp: , Rfl:     levothyroxine 200 mcg tablet, Take 200 mcg by mouth daily, Disp: , Rfl:     metFORMIN (GLUCOPHAGE) 1000 MG tablet, TAKE 1 TABLET TWICE A DAY, Disp: 180 tablet, Rfl: 0    simvastatin (ZOCOR) 10 mg tablet, TAKE 1 TABLET AT BEDTIME, Disp: 90 tablet, Rfl: 0    TiZANidine (ZANAFLEX) 4 MG capsule, Take 4 mg by mouth 3 (three) times a day as needed for muscle spasms, Disp: , Rfl:     valsartan-hydrochlorothiazide (DIOVAN-HCT) 320-25 MG per tablet, TAKE 1 TABLET DAILY, Disp: 90 tablet, Rfl: 0  No Known Allergies  Vitals:    04/16/18 1612   BP: 120/80   Pulse: 80 Resp: 16   Temp: 98 4 °F (36 9 °C)       Physical Exam   Constitutional: He is oriented to person, place, and time  He appears well-developed and well-nourished  HENT:   Head: Normocephalic and atraumatic  Right Ear: External ear normal    Left Ear: External ear normal    Eyes: Conjunctivae are normal  Pupils are equal, round, and reactive to light  Neck: Normal range of motion  Neck supple  Cardiovascular: Normal rate, regular rhythm, normal heart sounds and intact distal pulses  Pulmonary/Chest: Effort normal and breath sounds normal    Abdominal: Soft  Bowel sounds are normal    Musculoskeletal: Normal range of motion  Neurological: He is alert and oriented to person, place, and time  Skin: Skin is warm and dry  Psychiatric: He has a normal mood and affect  His behavior is normal  Judgment and thought content normal          Results:  Labs:   Ref Range & Units 10/11/17  4:33 PM   Thyroglobulin-VERONICA 1 4 - 29 2 ng/mL 3 2    Comments: According to the [facility] of Clinical Biochemistry, the   reference interval for Thyroglobulin (TG) should be related to   euthyroid patients and not for patients who underwent thyroidectomy  TG reference intervals for these patients depend on the residual   mass of the thyroid tissue left after surgery  Establishing a   post-operative baseline is recommended  The assay limit of quantitation is 0 1 ng/mL   Thyroglobulin measured by [facility] Immunometric Assay   Narrative         Ref Range & Units 10/11/17  4:33 PM   Thyroglobulin Ab 0 0 - 0 9 IU/mL <1 0    Comments: Thyroglobulin Antibody measured by [facility] Methodology   Narrative            Imaging  Us Head Neck Lymph Node Mapping    Result Date: 4/10/2018  Narrative: NECK ULTRASOUND INDICATION:     C77 9: Secondary and unspecified malignant neoplasm of lymph node, unspecified   COMPARISON:   None FINDINGS: Ultrasound of the thyroidectomy bed and cervical lymph node chains was performed with a high frequency linear transducer  No recurrent mass in the thyroid bed  Stable 5 x 4 x 4 mm hypoechoic nodule in the left thyroid bed Lymph nodes maintain normal morphologic contour, echogenicity and short axis dimensions of less than 0 7 cm  No evidence for microcalcification or focal nodularity  Impression: No evidence of recurrent or metastatic disease  Stable subcentimeter nodule in the left thyroidectomy bed  No new nodule  Continued imaging surveillance is advised  Workstation performed: [id number]     I reviewed the above laboratory and imaging data  Discussion/Summary:  History of bilateral neck dissections/thyroid cancer, now JIHAN  Plan on 1 year follow-up with surveillance blood work and ultrasound at that time

## 2018-04-18 DIAGNOSIS — E03.9 HYPOTHYROIDISM, UNSPECIFIED TYPE: Primary | ICD-10-CM

## 2018-04-18 RX ORDER — LEVOTHYROXINE SODIUM 200 MCG
TABLET ORAL
Qty: 180 TABLET | Refills: 0 | Status: SHIPPED | OUTPATIENT
Start: 2018-04-18 | End: 2018-07-17 | Stop reason: SDUPTHER

## 2018-05-14 ENCOUNTER — OFFICE VISIT (OUTPATIENT)
Dept: URGENT CARE | Facility: CLINIC | Age: 43
End: 2018-05-14
Payer: COMMERCIAL

## 2018-05-14 VITALS
BODY MASS INDEX: 40.43 KG/M2 | TEMPERATURE: 97.9 F | HEART RATE: 87 BPM | RESPIRATION RATE: 18 BRPM | DIASTOLIC BLOOD PRESSURE: 74 MMHG | HEIGHT: 74 IN | OXYGEN SATURATION: 97 % | SYSTOLIC BLOOD PRESSURE: 132 MMHG | WEIGHT: 315 LBS

## 2018-05-14 DIAGNOSIS — J06.9 ACUTE URI: Primary | ICD-10-CM

## 2018-05-14 PROCEDURE — 99213 OFFICE O/P EST LOW 20 MIN: CPT | Performed by: PHYSICIAN ASSISTANT

## 2018-05-14 RX ORDER — FLUTICASONE PROPIONATE 50 MCG
1 SPRAY, SUSPENSION (ML) NASAL DAILY
Qty: 16 G | Refills: 0 | Status: SHIPPED | OUTPATIENT
Start: 2018-05-14 | End: 2020-01-08 | Stop reason: SDUPTHER

## 2018-05-14 RX ORDER — ALBUTEROL SULFATE 90 UG/1
2 AEROSOL, METERED RESPIRATORY (INHALATION) EVERY 6 HOURS PRN
Qty: 8 G | Refills: 0 | Status: SHIPPED | OUTPATIENT
Start: 2018-05-14 | End: 2020-06-15 | Stop reason: ALTCHOICE

## 2018-05-14 RX ORDER — DEXTROMETHORPHAN HYDROBROMIDE AND PROMETHAZINE HYDROCHLORIDE 15; 6.25 MG/5ML; MG/5ML
5 SYRUP ORAL 4 TIMES DAILY PRN
Qty: 118 ML | Refills: 0 | Status: SHIPPED | OUTPATIENT
Start: 2018-05-14 | End: 2019-03-27

## 2018-05-14 NOTE — PROGRESS NOTES
[facility]        NAME: Yeni Garcia  is a 36 y o  male  : 1975    MRN: [id number]  DATE: May 14, 2018  TIME: 6:54 PM    Assessment and Plan   Acute URI [J06 9]  1  Acute URI  albuterol (PROVENTIL HFA,VENTOLIN HFA) 90 mcg/act inhaler    promethazine-dextromethorphan (PHENERGAN-DM) 6 25-15 mg/5 mL oral syrup    fluticasone (FLONASE) 50 mcg/act nasal spray         Patient Instructions       Benign exam here  Certainly can use an inhaler for symptoms and renew the Flonase  Promethazine for cough  Coricidin for decongestant  Follow up with PCP in 3-5 days  Proceed to  ER if symptoms worsen  Chief Complaint     Chief Complaint   Patient presents with    Sore Throat     x last night loss of voice    Cough     x last night, chest burning         History of Present Illness         36year-old male complains of 1-2 days of cough and congestion  His lungs are burning  He has a history of allergies  He is out of his inhaler and Flonase  He has not take anything over-the-counter for this  No fever or chills  No shortness of breath  Tolerating p  o   His daughter is here with similar symptoms          Review of Systems   Review of Systems      Current Medications       Current Outpatient Prescriptions:     albuterol (PROVENTIL HFA,VENTOLIN HFA) 90 mcg/act inhaler, Inhale 2 puffs every 6 (six) hours as needed for wheezing, Disp: 8 g, Rfl: 0    amLODIPine (NORVASC) 5 mg tablet, TAKE 1 TABLET DAILY, Disp: 90 tablet, Rfl: 0    aspirin (ECOTRIN LOW STRENGTH) 81 mg EC tablet, Take 81 mg by mouth daily, Disp: , Rfl:     calcitriol (ROCALTROL) 0 25 mcg capsule, TAKE 1 CAPSULE TWICE A DAY, Disp: 180 capsule, Rfl: 0    Calcium Carb-Cholecalciferol (CALCIUM 600 + D PO), Take by mouth, Disp: , Rfl:     diclofenac-misoprostol (ARTHROTEC 75) 75-0 2 MG per tablet, TAKE 1 TABLET TWICE A DAY, Disp: 180 tablet, Rfl: 1    Empagliflozin (JARDIANCE) 10 MG TABS, Take 10 mg by mouth every morning, Disp: , Rfl:     metFORMIN (GLUCOPHAGE) 1000 MG tablet, TAKE 1 TABLET TWICE A DAY, Disp: 180 tablet, Rfl: 0    simvastatin (ZOCOR) 10 mg tablet, TAKE 1 TABLET AT BEDTIME, Disp: 90 tablet, Rfl: 0    SYNTHROID 200 MCG tablet, TAKE 2 TABLETS DAILY, Disp: 180 tablet, Rfl: 0    valsartan-hydrochlorothiazide (DIOVAN-HCT) 320-25 MG per tablet, TAKE 1 TABLET DAILY, Disp: 90 tablet, Rfl: 0    fluticasone (FLONASE) 50 mcg/act nasal spray, 1 spray into each nostril daily, Disp: 16 g, Rfl: 0    promethazine-dextromethorphan (PHENERGAN-DM) 6 25-15 mg/5 mL oral syrup, Take 5 mL by mouth 4 (four) times a day as needed for cough, Disp: 118 mL, Rfl: 0    TiZANidine (ZANAFLEX) 4 MG capsule, Take 4 mg by mouth 3 (three) times a day as needed for muscle spasms, Disp: , Rfl:     Current Allergies     Allergies as of 05/14/2018    (No Known Allergies)            The following portions of the patient's history were reviewed and updated as appropriate: allergies, current medications, past family history, past medical history, past social history, past surgical history and problem list      Past Medical History:   Diagnosis Date    Diabetes mellitus ([facility] [address] )     Diverticulitis     Hyperlipidemia     Hypertension     Thyroid cancer ([facility] [address] )     radioactive iodine        Past Surgical History:   Procedure Laterality Date    COLOSTOMY      with reversal    ID KNEE SCOPE,MED/LAT MENISECTOMY Left 8/17/2017    Procedure: KNEE ARTHROSCOPIC PARTIAL LATERAL MENISCECTOMY ; PATELLO FEMOEAL CHONDROPLASTY;  Surgeon: Tiffanie Bustamante MD;  Location: [facility];  Service: Orthopedics    THYROIDECTOMY         Family History   Problem Relation Age of Onset    Diabetes Mother     Hypertension Mother     Hypertension Father     Hyperlipidemia Father          Medications have been verified          Objective   /74 (BP Location: Left arm, Patient Position: Sitting, Cuff Size: Large)   Pulse 87   Temp 97 9 °F (36 6 °C) (Tympanic)   Resp 18  6' 2" (1 88 m)   Wt (!) 176 kg (388 lb)   SpO2 97%   BMI 49 82 kg/m²        Physical Exam     Physical Exam   Constitutional: He appears well-developed and well-nourished  No distress  HENT:   Right Ear: Tympanic membrane, external ear and ear canal normal    Left Ear: Tympanic membrane, external ear and ear canal normal    Nose: Nose normal  Right sinus exhibits no maxillary sinus tenderness and no frontal sinus tenderness  Left sinus exhibits no maxillary sinus tenderness and no frontal sinus tenderness  Mouth/Throat: Oropharynx is clear and moist  No posterior oropharyngeal erythema  Eyes: Conjunctivae and EOM are normal  Pupils are equal, round, and reactive to light  No scleral icterus  Neck: Normal range of motion  Neck supple  Cardiovascular: Normal rate, regular rhythm and normal heart sounds  Pulmonary/Chest: Effort normal and breath sounds normal  No respiratory distress  He has no wheezes  He has no rales  Abdominal: Soft  Bowel sounds are normal  He exhibits no distension and no mass  There is no tenderness  There is no rebound and no guarding  Lymphadenopathy:     He has no cervical adenopathy  Skin: Skin is warm and dry  No rash noted

## 2018-05-21 ENCOUNTER — OFFICE VISIT (OUTPATIENT)
Dept: FAMILY MEDICINE CLINIC | Facility: CLINIC | Age: 43
End: 2018-05-21
Payer: COMMERCIAL

## 2018-05-21 VITALS
WEIGHT: 315 LBS | HEIGHT: 74 IN | SYSTOLIC BLOOD PRESSURE: 126 MMHG | DIASTOLIC BLOOD PRESSURE: 78 MMHG | RESPIRATION RATE: 20 BRPM | BODY MASS INDEX: 40.43 KG/M2 | OXYGEN SATURATION: 97 % | HEART RATE: 92 BPM

## 2018-05-21 DIAGNOSIS — G57.02 SCIATIC NERVE DISEASE, LEFT: Primary | ICD-10-CM

## 2018-05-21 PROCEDURE — 99214 OFFICE O/P EST MOD 30 MIN: CPT | Performed by: NURSE PRACTITIONER

## 2018-05-21 RX ORDER — TRAMADOL HYDROCHLORIDE 50 MG/1
50 TABLET ORAL EVERY 8 HOURS PRN
Qty: 30 TABLET | Refills: 0 | Status: SHIPPED | OUTPATIENT
Start: 2018-05-21 | End: 2018-05-24

## 2018-05-21 RX ORDER — KETOROLAC TROMETHAMINE 30 MG/ML
30 INJECTION, SOLUTION INTRAMUSCULAR; INTRAVENOUS ONCE
Status: COMPLETED | OUTPATIENT
Start: 2018-05-21 | End: 2018-05-21

## 2018-05-21 RX ORDER — PREDNISONE 20 MG/1
TABLET ORAL
Qty: 12 TABLET | Refills: 0 | Status: SHIPPED | OUTPATIENT
Start: 2018-05-21 | End: 2018-05-21 | Stop reason: SDUPTHER

## 2018-05-21 RX ORDER — PREDNISONE 20 MG/1
TABLET ORAL
Qty: 21 TABLET | Refills: 0 | Status: SHIPPED | OUTPATIENT
Start: 2018-05-21 | End: 2019-03-27

## 2018-05-21 RX ADMIN — KETOROLAC TROMETHAMINE 30 MG: 30 INJECTION, SOLUTION INTRAMUSCULAR; INTRAVENOUS at 15:04

## 2018-05-21 NOTE — LETTER
May 21, 2018     Patient: Chidi Nielsen  YOB: 1975   Date of Visit: 5/21/2018       To Whom it May Concern:    Orlando Singh is under my professional care  He was seen in my office on 5/21/2018  He may return to work on 5-22-18  If you have any questions or concerns, please don't hesitate to call           Sincerely,          ISAÍAS Romero        CC: No Recipients

## 2018-05-21 NOTE — PATIENT INSTRUCTIONS
Sciatic  Lumbar Radiculopathy   WHAT YOU NEED TO KNOW:   Lumbar radiculopathy is a painful condition that happens when a nerve in your lumbar spine (lower back) is pinched or irritated  Nerves control feeling and movement in your body  You may have numbness or pain that shoots down from your lower back towards your foot  DISCHARGE INSTRUCTIONS:   Medicines:   · Medicines:     ¨ NSAIDs , such as ibuprofen, help decrease swelling, pain, and fever  This medicine is available with or without a doctor's order  NSAIDs can cause stomach bleeding or kidney problems in certain people  If you take blood thinner medicine, always ask your healthcare provider if NSAIDs are safe for you  Always read the medicine label and follow directions  ¨ Muscle relaxers  help decrease pain and muscle spasms  ¨ Opioids: This is a strong medicine given to reduce severe pain  It is also called narcotic pain medicine  Take this medicine exactly as directed by your healthcare provider  ¨ Oral steroids: Steroids may also be given to reduce pain and swelling  ¨ Take your medicine as directed  Contact your healthcare provider if you think your medicine is not helping or if you have side effects  Tell him of her if you are allergic to any medicine  Keep a list of the medicines, vitamins, and herbs you take  Include the amounts, and when and why you take them  Bring the list or the pill bottles to follow-up visits  Carry your medicine list with you in case of an emergency  Follow up with your healthcare provider or spine specialist within 1 to 3 weeks:  After your first follow-up appointment, return to your healthcare provider or spine specialist every 2 weeks until you have healed  Ask for information about physical therapy for your condition  Write down your questions so you remember to ask them during your visits  Physical therapy:  You may need physical therapy to improve your condition   Your physical therapist may teach you certain exercises to improve posture (the way you stand and sit), flexibility, and strength in your lower back  Self care:   · Stay active: It is best to be active when you have lumbar radiculopathy  Your physical therapist or healthcare provider may tell you to take walks to ease yourself back into your daily routine  Avoid long periods of bed rest  Bed rest could worsen your symptoms  Do not move in ways that increase your pain  Ask for more information about the best ways to stay active  · Use ice or heat packs:  Use ice or heat packs as directed on the sore area of your body to decrease the pain and swelling  Put ice in a plastic bag covered with a towel on your low back  Cover heated items with a towel to avoid burns  Use ice and heat as directed  · Avoid heavy lifting: Your condition may worsen if you lift heavy things  Avoid lifting if possible  · Maintain a healthy weight:  Excess body weight may strain your back  Talk with your healthcare provider about ways to lose excess weight if you are overweight  Contact your healthcare provider or spine specialist if:   · Your pain does not improve within 1 to 3 weeks after treatment  · Your pain and weakness keep you from your normal activities at work, home, or school  · You lose more than 10 pounds in 6 months without trying  · You become depressed or sad because of the pain  · You have questions or concerns about your condition or care  Return to the emergency department if:   · You have a fever greater than 100 4°F for longer than 2 days  · You have new, severe back or leg pain, or your pain spreads to both legs  · You have any new signs of numbness or weakness, especially in your lower back, legs, arms, or genital area  · You have new trouble controlling your urine and bowel movements  · You do not feel like your bladder empties when you urinate    © 2017 [address] [employer] [employer] Information is for End User's use only and may not be sold, redistributed or otherwise used for commercial purposes  All illustrations and images included in CareNotes® are the copyrighted property of [employer]  or [employer]  The above information is an  only  It is not intended as medical advice for individual conditions or treatments  Talk to your doctor, nurse or pharmacist before following any medical regimen to see if it is safe and effective for you

## 2018-05-21 NOTE — PROGRESS NOTES
Assessment/Plan:    No problem-specific Assessment & Plan notes found for this encounter  There are no diagnoses linked to this encounter  Subjective:      Patient ID: Renato Scott  is a 36 y o  male  Pt is here for sciatic nerve pain  He has had intermittent flares for 5 years  He tells me he has permanent leg and nerve damage on left side because of it  Typically, he takes his anti inflammatories and the symptoms resolve  Pain started 6-7 days ago  He has been applying ice  He walks throughout the day while at work  The following portions of the patient's history were reviewed and updated as appropriate:   He  has a past medical history of Diabetes mellitus ([facility] ); Diverticulitis; Hyperlipidemia; Hypertension; and Thyroid cancer ([facility] )  He   Patient Active Problem List    Diagnosis Date Noted    Chronic pain disorder 01/31/2017    Lumbar radiculopathy 01/31/2017    Myofascial pain syndrome 01/31/2017    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 ([facility] ) 10/11/2016    Morbid obesity due to excess calories ([facility] ) 10/11/2016    Vitamin D deficiency 05/03/2016    Gait difficulty 10/19/2015    Diabetes type 2, uncontrolled ([facility] ) 05/06/2014    Hypoparathyroidism ([facility] ) 12/17/2013    Microalbuminuria 07/17/2013    Type 2 diabetes mellitus ([facility] ) 07/17/2013    Hypothyroidism 03/15/2013    Benign essential hypertension 10/04/2012    Hypercholesterolemia 08/09/2012    Hypocalcemia 08/09/2012     He  has a past surgical history that includes Thyroidectomy; Colostomy; and pr knee scope,med/lat menisectomy (Left, 8/17/2017)  His family history includes Diabetes in his mother; Hyperlipidemia in his father; Hypertension in his father and mother  He  reports that he quit smoking about 16 years ago  He has never used smokeless tobacco  He reports that he does not drink alcohol or use drugs    Current Outpatient Prescriptions   Medication Sig Dispense Refill    albuterol (PROVENTIL HFA,VENTOLIN HFA) 90 mcg/act inhaler Inhale 2 puffs every 6 (six) hours as needed for wheezing 8 g 0    amLODIPine (NORVASC) 5 mg tablet TAKE 1 TABLET DAILY 90 tablet 0    aspirin (ECOTRIN LOW STRENGTH) 81 mg EC tablet Take 81 mg by mouth daily      calcitriol (ROCALTROL) 0 25 mcg capsule TAKE 1 CAPSULE TWICE A  capsule 0    Calcium Carb-Cholecalciferol (CALCIUM 600 + D PO) Take by mouth      diclofenac-misoprostol (ARTHROTEC 75) 75-0 2 MG per tablet TAKE 1 TABLET TWICE A  tablet 1    Empagliflozin (JARDIANCE) 10 MG TABS Take 10 mg by mouth every morning      fluticasone (FLONASE) 50 mcg/act nasal spray 1 spray into each nostril daily 16 g 0    metFORMIN (GLUCOPHAGE) 1000 MG tablet TAKE 1 TABLET TWICE A  tablet 0    promethazine-dextromethorphan (PHENERGAN-DM) 6 25-15 mg/5 mL oral syrup Take 5 mL by mouth 4 (four) times a day as needed for cough 118 mL 0    simvastatin (ZOCOR) 10 mg tablet TAKE 1 TABLET AT BEDTIME 90 tablet 0    SYNTHROID 200 MCG tablet TAKE 2 TABLETS DAILY 180 tablet 0    TiZANidine (ZANAFLEX) 4 MG capsule Take 4 mg by mouth 3 (three) times a day as needed for muscle spasms      valsartan-hydrochlorothiazide (DIOVAN-HCT) 320-25 MG per tablet TAKE 1 TABLET DAILY 90 tablet 0     No current facility-administered medications for this visit        Current Outpatient Prescriptions on File Prior to Visit   Medication Sig    albuterol (PROVENTIL HFA,VENTOLIN HFA) 90 mcg/act inhaler Inhale 2 puffs every 6 (six) hours as needed for wheezing    amLODIPine (NORVASC) 5 mg tablet TAKE 1 TABLET DAILY    aspirin (ECOTRIN LOW STRENGTH) 81 mg EC tablet Take 81 mg by mouth daily    calcitriol (ROCALTROL) 0 25 mcg capsule TAKE 1 CAPSULE TWICE A DAY    Calcium Carb-Cholecalciferol (CALCIUM 600 + D PO) Take by mouth    diclofenac-misoprostol (ARTHROTEC 75) 75-0 2 MG per tablet TAKE 1 TABLET TWICE A DAY    Empagliflozin (JARDIANCE) 10 MG TABS Take 10 mg by mouth every morning    fluticasone (FLONASE) 50 mcg/act nasal spray 1 spray into each nostril daily    metFORMIN (GLUCOPHAGE) 1000 MG tablet TAKE 1 TABLET TWICE A DAY    promethazine-dextromethorphan (PHENERGAN-DM) 6 25-15 mg/5 mL oral syrup Take 5 mL by mouth 4 (four) times a day as needed for cough    simvastatin (ZOCOR) 10 mg tablet TAKE 1 TABLET AT BEDTIME    SYNTHROID 200 MCG tablet TAKE 2 TABLETS DAILY    TiZANidine (ZANAFLEX) 4 MG capsule Take 4 mg by mouth 3 (three) times a day as needed for muscle spasms    valsartan-hydrochlorothiazide (DIOVAN-HCT) 320-25 MG per tablet TAKE 1 TABLET DAILY     No current facility-administered medications on file prior to visit  He has No Known Allergies       Review of Systems   Constitutional: Negative for fatigue and fever  HENT: Negative for congestion  Eyes: Negative for visual disturbance  Respiratory: Negative for cough and shortness of breath  Cardiovascular: Negative for chest pain, palpitations and leg swelling  Gastrointestinal: Negative for abdominal distention and abdominal pain  Endocrine: Negative for cold intolerance, polydipsia and polyuria  Genitourinary: Negative for difficulty urinating  Musculoskeletal: Positive for arthralgias and back pain  Negative for joint swelling  Skin: Negative for color change and rash  Allergic/Immunologic: Negative for immunocompromised state  Neurological: Positive for numbness  Negative for dizziness and headaches  Hematological: Negative for adenopathy  Psychiatric/Behavioral: Negative for behavioral problems and sleep disturbance  All other systems reviewed and are negative  Objective: There were no vitals taken for this visit  Physical Exam   Constitutional: He is oriented to person, place, and time  He appears well-developed and well-nourished  No distress  HENT:   Head: Normocephalic and atraumatic  Mouth/Throat: No oropharyngeal exudate     Eyes: Conjunctivae and EOM are normal  Pupils are equal, round, and reactive to light  Right eye exhibits no discharge  Left eye exhibits no discharge  Neck: Normal range of motion  Neck supple  Cardiovascular: Normal rate, regular rhythm and normal heart sounds  Exam reveals no gallop and no friction rub  No murmur heard  Pulmonary/Chest: Effort normal and breath sounds normal  No respiratory distress  He has no wheezes  Abdominal: Soft  Musculoskeletal: Normal range of motion  He exhibits tenderness  He exhibits no edema  Tenderness of left lower back    Lymphadenopathy:     He has no cervical adenopathy  Neurological: He is alert and oriented to person, place, and time  Skin: Skin is warm and dry  No rash noted  He is not diaphoretic  No erythema  Psychiatric: He has a normal mood and affect  His behavior is normal  Judgment and thought content normal    Nursing note and vitals reviewed

## 2018-05-22 ENCOUNTER — TELEPHONE (OUTPATIENT)
Dept: FAMILY MEDICINE CLINIC | Facility: CLINIC | Age: 43
End: 2018-05-22

## 2018-05-24 ENCOUNTER — OFFICE VISIT (OUTPATIENT)
Dept: FAMILY MEDICINE CLINIC | Facility: CLINIC | Age: 43
End: 2018-05-24
Payer: COMMERCIAL

## 2018-05-24 VITALS
HEIGHT: 74 IN | DIASTOLIC BLOOD PRESSURE: 90 MMHG | BODY MASS INDEX: 40.43 KG/M2 | WEIGHT: 315 LBS | TEMPERATURE: 98.2 F | SYSTOLIC BLOOD PRESSURE: 142 MMHG | OXYGEN SATURATION: 98 % | HEART RATE: 80 BPM

## 2018-05-24 DIAGNOSIS — E78.5 HYPERLIPIDEMIA, UNSPECIFIED HYPERLIPIDEMIA TYPE: ICD-10-CM

## 2018-05-24 DIAGNOSIS — G57.02 SCIATIC NERVE DISEASE, LEFT: Primary | ICD-10-CM

## 2018-05-24 DIAGNOSIS — E66.01 MORBID OBESITY DUE TO EXCESS CALORIES (HCC): ICD-10-CM

## 2018-05-24 DIAGNOSIS — G89.4 CHRONIC PAIN DISORDER: ICD-10-CM

## 2018-05-24 DIAGNOSIS — M54.16 LUMBAR RADICULOPATHY: ICD-10-CM

## 2018-05-24 PROCEDURE — 99213 OFFICE O/P EST LOW 20 MIN: CPT | Performed by: NURSE PRACTITIONER

## 2018-05-24 RX ORDER — TIZANIDINE HYDROCHLORIDE 4 MG/1
4 CAPSULE, GELATIN COATED ORAL 3 TIMES DAILY PRN
Qty: 45 CAPSULE | Refills: 0 | Status: SHIPPED | OUTPATIENT
Start: 2018-05-24 | End: 2019-07-02

## 2018-05-24 RX ORDER — SIMVASTATIN 10 MG
TABLET ORAL
Qty: 90 TABLET | Refills: 0 | Status: SHIPPED | OUTPATIENT
Start: 2018-05-24 | End: 2018-08-22 | Stop reason: SDUPTHER

## 2018-05-24 NOTE — PROGRESS NOTES
Assessment/Plan:    No problem-specific Assessment & Plan notes found for this encounter  Diagnoses and all orders for this visit:    Sciatic nerve disease, left  Comments:  discussed wiht patient will add the zanaflex and the TENs unit   Orders:  -     Nerve Stimulator (TENS THERAPY PAIN RELIEF) GHAZAL; 1 application by Does not apply route daily for 30 days  -     TiZANidine (ZANAFLEX) 4 MG capsule; Take 1 capsule (4 mg total) by mouth 3 (three) times a day as needed for muscle spasms for up to 15 days    Lumbar radiculopathy  -     Nerve Stimulator (TENS THERAPY PAIN RELIEF) GHAZAL; 1 application by Does not apply route daily for 30 days  -     TiZANidine (ZANAFLEX) 4 MG capsule; Take 1 capsule (4 mg total) by mouth 3 (three) times a day as needed for muscle spasms for up to 15 days    Chronic pain disorder  -     Nerve Stimulator (TENS THERAPY PAIN RELIEF) GHAZAL; 1 application by Does not apply route daily for 30 days  -     TiZANidine (ZANAFLEX) 4 MG capsule; Take 1 capsule (4 mg total) by mouth 3 (three) times a day as needed for muscle spasms for up to 15 days    Morbid obesity due to excess calories [facility])  Comments:  discussed with patient will order the TENs unit rx provided           Subjective:      Patient ID: Tequila Aquino  is a 36 y o  male  Pt is here for sciatic nerve pain  He has had intermittent flares for 5 years  He tells me he has permanent leg and nerve damage on left side because of it  Typically, he takes his anti inflammatories and the symptoms resolve  Pain started 6-7 days ago  He has been applying ice  He walks throughout the day while at work  Patient was seen a few days ago and things are improving and just having lower back pain and travels to the back of his knee symptoms are improving but still present no recent injuries to his back  Patient has had this before usually taking antiinflammatories and stretching exercises and when flares up he needs to have prednisone taper  Back Pain   Pertinent negatives include no abdominal pain, chest pain or fever  The following portions of the patient's history were reviewed and updated as appropriate:   He  has a past medical history of Diabetes mellitus ([place] [address] ); Diverticulitis; Hyperlipidemia; Hypertension; and Thyroid cancer ([place] [address] )  He   Patient Active Problem List    Diagnosis Date Noted    Sciatic nerve disease, left 05/21/2018    Chronic pain disorder 01/31/2017    Lumbar radiculopathy 01/31/2017    Myofascial pain syndrome 01/31/2017    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 ([place] [address] ) 10/11/2016    Morbid obesity due to excess calories ([place] [address] ) 10/11/2016    Vitamin D deficiency 05/03/2016    Gait difficulty 10/19/2015    Diabetes type 2, uncontrolled ([place] [address] ) 05/06/2014    Hypoparathyroidism ([place] [address] ) 12/17/2013    Microalbuminuria 07/17/2013    Type 2 diabetes mellitus ([place] [address] ) 07/17/2013    Hypothyroidism 03/15/2013    Benign essential hypertension 10/04/2012    Hypercholesterolemia 08/09/2012    Hypocalcemia 08/09/2012     He  has a past surgical history that includes Thyroidectomy; Colostomy; and pr knee scope,med/lat menisectomy (Left, 8/17/2017)  His family history includes Diabetes in his mother; Hyperlipidemia in his father; Hypertension in his father and mother  He  reports that he quit smoking about 16 years ago  He has never used smokeless tobacco  He reports that he does not drink alcohol or use drugs  He has No Known Allergies       Review of Systems   Constitutional: Negative for activity change, appetite change, chills, diaphoresis, fatigue, fever and unexpected weight change  HENT: Negative for congestion, ear pain, hearing loss, postnasal drip, sinus pain, sinus pressure, sneezing and sore throat  Eyes: Negative for pain, redness and visual disturbance  Respiratory: Negative for cough and shortness of breath  Cardiovascular: Negative for chest pain and leg swelling     Gastrointestinal: Negative for abdominal pain, diarrhea, nausea and vomiting  Musculoskeletal: Positive for arthralgias and back pain  Neurological: Negative for dizziness and light-headedness  Psychiatric/Behavioral: Negative for behavioral problems and dysphoric mood  Objective:      /90   Pulse 80   Temp 98 2 °F (36 8 °C)   Ht 6' 2" (1 88 m)   Wt (!) 172 kg (379 lb 9 6 oz)   SpO2 98%   BMI 48 74 kg/m²          Physical Exam   Constitutional: He is oriented to person, place, and time  Vital signs are normal  He appears well-developed and well-nourished  No distress  Obesity    HENT:   Head: Normocephalic and atraumatic  Eyes: Pupils are equal, round, and reactive to light  Neck: Normal range of motion  No thyromegaly present  Cardiovascular: Normal rate, regular rhythm, normal heart sounds and intact distal pulses  No murmur heard  Pulmonary/Chest: Effort normal and breath sounds normal  No respiratory distress  He has no wheezes  Abdominal: Soft  Bowel sounds are normal    Musculoskeletal:        Lumbar back: He exhibits decreased range of motion, tenderness and spasm  Neurological: He is alert and oriented to person, place, and time  Skin: Skin is warm and dry  Psychiatric: He has a normal mood and affect  Nursing note and vitals reviewed

## 2018-05-24 NOTE — LETTER
May 24, 2018     Patient: Uriah Mata  YOB: 1975   Date of Visit: 5/24/2018       To Whom it May Concern:    Fely Wong is under my professional care  He was seen in my office on 5/24/2018  He may return to work on 5/25/18 please excuse for 5/24/18  If you have any questions or concerns, please don't hesitate to call           Sincerely,          ISAÍAS Auguste        CC: No Recipients

## 2018-06-12 DIAGNOSIS — E11.9 TYPE 2 DIABETES MELLITUS (HCC): ICD-10-CM

## 2018-06-12 DIAGNOSIS — I10 HYPERTENSION, UNSPECIFIED TYPE: ICD-10-CM

## 2018-06-12 RX ORDER — CALCITRIOL 0.25 UG/1
CAPSULE, LIQUID FILLED ORAL
Qty: 180 CAPSULE | Refills: 0 | Status: SHIPPED | OUTPATIENT
Start: 2018-06-12 | End: 2018-09-10 | Stop reason: SDUPTHER

## 2018-06-12 RX ORDER — AMLODIPINE BESYLATE 5 MG/1
TABLET ORAL
Qty: 90 TABLET | Refills: 0 | Status: SHIPPED | OUTPATIENT
Start: 2018-06-12 | End: 2018-09-10 | Stop reason: SDUPTHER

## 2018-06-12 RX ORDER — VALSARTAN AND HYDROCHLOROTHIAZIDE 320; 25 MG/1; MG/1
TABLET, FILM COATED ORAL
Qty: 90 TABLET | Refills: 0 | Status: SHIPPED | OUTPATIENT
Start: 2018-06-12 | End: 2018-09-10 | Stop reason: SDUPTHER

## 2018-07-17 DIAGNOSIS — E03.9 HYPOTHYROIDISM, UNSPECIFIED TYPE: ICD-10-CM

## 2018-07-17 RX ORDER — LEVOTHYROXINE SODIUM 200 MCG
TABLET ORAL
Qty: 180 TABLET | Refills: 0 | Status: SHIPPED | OUTPATIENT
Start: 2018-07-17 | End: 2018-10-15 | Stop reason: SDUPTHER

## 2018-08-22 DIAGNOSIS — E78.5 HYPERLIPIDEMIA, UNSPECIFIED HYPERLIPIDEMIA TYPE: ICD-10-CM

## 2018-08-22 RX ORDER — SIMVASTATIN 10 MG
TABLET ORAL
Qty: 90 TABLET | Refills: 0 | Status: SHIPPED | OUTPATIENT
Start: 2018-08-22 | End: 2018-11-20 | Stop reason: SDUPTHER

## 2018-08-26 DIAGNOSIS — R52 PAIN: ICD-10-CM

## 2018-08-27 RX ORDER — DICLOFENAC SODIUM AND MISOPROSTOL 75; 200 MG/1; UG/1
TABLET, DELAYED RELEASE ORAL
Qty: 180 TABLET | Refills: 1 | Status: SHIPPED | OUTPATIENT
Start: 2018-08-27 | End: 2019-02-23 | Stop reason: SDUPTHER

## 2018-09-10 DIAGNOSIS — I10 HYPERTENSION, UNSPECIFIED TYPE: ICD-10-CM

## 2018-09-10 DIAGNOSIS — E11.9 TYPE 2 DIABETES MELLITUS (HCC): ICD-10-CM

## 2018-09-10 RX ORDER — VALSARTAN AND HYDROCHLOROTHIAZIDE 320; 25 MG/1; MG/1
TABLET, FILM COATED ORAL
Qty: 90 TABLET | Refills: 0 | Status: CANCELLED | OUTPATIENT
Start: 2018-09-10

## 2018-09-10 RX ORDER — CALCITRIOL 0.25 UG/1
CAPSULE, LIQUID FILLED ORAL
Qty: 180 CAPSULE | Refills: 0 | Status: SHIPPED | OUTPATIENT
Start: 2018-09-10 | End: 2018-12-09 | Stop reason: SDUPTHER

## 2018-09-10 RX ORDER — AMLODIPINE BESYLATE 5 MG/1
TABLET ORAL
Qty: 90 TABLET | Refills: 0 | Status: SHIPPED | OUTPATIENT
Start: 2018-09-10 | End: 2018-12-09 | Stop reason: SDUPTHER

## 2018-09-10 RX ORDER — LOSARTAN POTASSIUM AND HYDROCHLOROTHIAZIDE 25; 100 MG/1; MG/1
1 TABLET ORAL DAILY
Qty: 90 TABLET | Refills: 3 | Status: SHIPPED | OUTPATIENT
Start: 2018-09-10 | End: 2018-09-19 | Stop reason: SDUPTHER

## 2018-09-13 RX ORDER — EMPAGLIFLOZIN 10 MG/1
TABLET, FILM COATED ORAL
Qty: 90 TABLET | Refills: 3 | OUTPATIENT
Start: 2018-09-13

## 2018-09-19 ENCOUNTER — TELEPHONE (OUTPATIENT)
Dept: ENDOCRINOLOGY | Facility: CLINIC | Age: 43
End: 2018-09-19

## 2018-09-19 ENCOUNTER — OFFICE VISIT (OUTPATIENT)
Dept: ENDOCRINOLOGY | Facility: CLINIC | Age: 43
End: 2018-09-19
Payer: COMMERCIAL

## 2018-09-19 VITALS
HEIGHT: 74 IN | HEART RATE: 95 BPM | BODY MASS INDEX: 40.43 KG/M2 | WEIGHT: 315 LBS | SYSTOLIC BLOOD PRESSURE: 138 MMHG | DIASTOLIC BLOOD PRESSURE: 74 MMHG

## 2018-09-19 DIAGNOSIS — E20.9 HYPOPARATHYROIDISM, UNSPECIFIED HYPOPARATHYROIDISM TYPE (HCC): ICD-10-CM

## 2018-09-19 DIAGNOSIS — I10 HYPERTENSION, UNSPECIFIED TYPE: ICD-10-CM

## 2018-09-19 DIAGNOSIS — I10 BENIGN ESSENTIAL HYPERTENSION: ICD-10-CM

## 2018-09-19 DIAGNOSIS — C77.9 METASTATIC PAPILLARY CARCINOMA TO LYMPH NODE (HCC): ICD-10-CM

## 2018-09-19 DIAGNOSIS — E11.65 UNCONTROLLED TYPE 2 DIABETES MELLITUS WITH HYPERGLYCEMIA, WITHOUT LONG-TERM CURRENT USE OF INSULIN (HCC): Primary | ICD-10-CM

## 2018-09-19 DIAGNOSIS — E83.51 HYPOCALCEMIA: ICD-10-CM

## 2018-09-19 DIAGNOSIS — E89.0 POSTOPERATIVE HYPOTHYROIDISM: ICD-10-CM

## 2018-09-19 DIAGNOSIS — E78.00 HYPERCHOLESTEROLEMIA: ICD-10-CM

## 2018-09-19 PROCEDURE — 99214 OFFICE O/P EST MOD 30 MIN: CPT | Performed by: NURSE PRACTITIONER

## 2018-09-19 RX ORDER — LOSARTAN POTASSIUM AND HYDROCHLOROTHIAZIDE 25; 100 MG/1; MG/1
1 TABLET ORAL DAILY
Qty: 90 TABLET | Refills: 3 | Status: SHIPPED | OUTPATIENT
Start: 2018-09-19 | End: 2019-07-11 | Stop reason: ALTCHOICE

## 2018-09-19 NOTE — PROGRESS NOTES
Established Patient Progress Note      Chief Complaint   Patient presents with    Diabetes Type 2          History of Present Illness:   Tigist Coppola  is a 39 y o  male with a history of HTN HLD, thyroid cancer, postsurgical hypothyroidism, hypoparathyroidism/hypocalcemia, and type 2 diabetes without long term use of insulin  Reports no complications of diabetes  Last A1C 7 5  He reports he was not checking his BG and only started checking about 4 days ago  Denies recent illness or hospitalizations  Denies recent severe hypoglycemic or severe hyperglycemic episodes  Denies any issues with his current regimen  Home glucose monitoring: are performed sporadically    Home blood glucose readings:   Before breakfast: 150-170s  Before lunch: does not check  Before dinner: 200s  Bedtime: does not check     Current regimen: Jardiance 25 mg and Metformin 1,000 mg BID   compliant all of the timedenies any side effects from current medications     Hypoglycemic episodes: No never   H/o of hypoglycemia causing hospitalization or Intervention such as glucagon injection or ambulance call No     Last Eye Exam: yearly, no retinopathy   Last Foot Exam: every 6 months, has neuropathy     Has hypertension: Taking Amlodipine   Has hyperlipidemia: Taking Simvastatin     Has history of papillary thyroid cancer: had thyroidectomy in 2009 with subsequent RUIZ and radical neck dissection in 2011, thyroid US showed no evidence of recurrence or metastasis, thyroglobulin level has been stable   Has postsurgical hypothyroidism: Taking Levothyroxine 200 mcg, 2 tablets daily , denies symptoms of hypo or hyperthyroidism   Has hypoparathyroidism/hypocalcemia: Taking calcium 2,000 mg daily, calcitriol BID and vitamin D 2,000 units daily     Patient Active Problem List   Diagnosis    Benign essential hypertension    Chronic pain disorder    Diabetes type 2, uncontrolled ([facility] [address] )    Gait difficulty    Hypercholesterolemia    Hypocalcemia    Hypoparathyroidism ([place] [address] )    Hypothyroidism    Lumbar radiculopathy    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)    Microalbuminuria    Morbid obesity due to excess calories (HCC)    Myofascial pain syndrome    Type 2 diabetes mellitus ([place] [address] )    Vitamin D deficiency    Sciatic nerve disease, left      Past Medical History:   Diagnosis Date    Diabetes mellitus ([place] [address] )     Diverticulitis     Hyperlipidemia     Hypertension     Thyroid cancer ([place] [address] )     radioactive iodine       Past Surgical History:   Procedure Laterality Date    COLOSTOMY      with reversal    AR KNEE SCOPE,MED/LAT MENISECTOMY Left 8/17/2017    Procedure: KNEE ARTHROSCOPIC PARTIAL LATERAL MENISCECTOMY ; PATELLO FEMOEAL CHONDROPLASTY;  Surgeon: Lyndsey Ogden MD;  [facility];  Service: Orthopedics    THYROIDECTOMY        Family History   Problem Relation Age of Onset    Diabetes Mother     Hypertension Mother     Hypertension Father     Hyperlipidemia Father      Social History   Substance Use Topics    Smoking status: Former Smoker     Quit date: 2002    Smokeless tobacco: Never Used    Alcohol use No     No Known Allergies      Current Outpatient Prescriptions:     albuterol (PROVENTIL HFA,VENTOLIN HFA) 90 mcg/act inhaler, Inhale 2 puffs every 6 (six) hours as needed for wheezing, Disp: 8 g, Rfl: 0    amLODIPine (NORVASC) 5 mg tablet, TAKE 1 TABLET DAILY, Disp: 90 tablet, Rfl: 0    aspirin (ECOTRIN LOW STRENGTH) 81 mg EC tablet, Take 81 mg by mouth daily, Disp: , Rfl:     calcitriol (ROCALTROL) 0 25 mcg capsule, TAKE 1 CAPSULE TWICE A DAY, Disp: 180 capsule, Rfl: 0    Calcium Carb-Cholecalciferol (CALCIUM 600 + D PO), Take by mouth, Disp: , Rfl:     diclofenac-misoprostol (ARTHROTEC 75) 75-0 2 MG per tablet, TAKE 1 TABLET TWICE A DAY, Disp: 180 tablet, Rfl: 1    Empagliflozin (JARDIANCE) 10 MG TABS, Take 1 tablet (10 mg total) by mouth every morning, Disp: 90 tablet, Rfl: 3    fluticasone (FLONASE) 50 mcg/act nasal spray, 1 spray into each nostril daily, Disp: 16 g, Rfl: 0    losartan-hydrochlorothiazide (HYZAAR) 100-25 MG per tablet, Take 1 tablet by mouth daily, Disp: 90 tablet, Rfl: 3    metFORMIN (GLUCOPHAGE) 1000 MG tablet, TAKE 1 TABLET TWICE A DAY, Disp: 180 tablet, Rfl: 0    simvastatin (ZOCOR) 10 mg tablet, TAKE 1 TABLET AT BEDTIME, Disp: 90 tablet, Rfl: 0    SYNTHROID 200 MCG tablet, TAKE 2 TABLETS DAILY, Disp: 180 tablet, Rfl: 0    predniSONE 20 mg tablet, Take tabs twice a day for 3 days then 1 tab twice a day for 3 days then one tab every other day until gone  (Patient not taking: Reported on 9/19/2018 ), Disp: 21 tablet, Rfl: 0    promethazine-dextromethorphan (PHENERGAN-DM) 6 25-15 mg/5 mL oral syrup, Take 5 mL by mouth 4 (four) times a day as needed for cough (Patient not taking: Reported on 9/19/2018 ), Disp: 118 mL, Rfl: 0    TiZANidine (ZANAFLEX) 4 MG capsule, Take 1 capsule (4 mg total) by mouth 3 (three) times a day as needed for muscle spasms for up to 15 days, Disp: 45 capsule, Rfl: 0    Review of Systems   Constitutional: Negative for activity change, appetite change and fatigue  HENT: Negative for sore throat, trouble swallowing and voice change  Eyes: Negative for visual disturbance  Respiratory: Negative for choking, chest tightness and shortness of breath  Cardiovascular: Negative for chest pain, palpitations and leg swelling  Gastrointestinal: Negative for abdominal pain, constipation and diarrhea  Endocrine: Negative for cold intolerance, heat intolerance, polydipsia, polyphagia and polyuria  Genitourinary: Negative for frequency  Musculoskeletal: Negative for arthralgias and myalgias  Skin: Negative for rash  Neurological: Negative for dizziness and syncope  Hematological: Negative for adenopathy  Psychiatric/Behavioral: Negative for sleep disturbance  All other systems reviewed and are negative        Physical Exam:  Body mass index is 48 83 kg/m²   /74   Pulse 95   Ht 6' 2" (1 88 m)   Wt (!) 173 kg (380 lb 4 8 oz)   BMI 48 83 kg/m²    Wt Readings from Last 3 Encounters:   09/19/18 (!) 173 kg (380 lb 4 8 oz)   05/24/18 (!) 172 kg (379 lb 9 6 oz)   05/21/18 (!) 175 kg (385 lb 8 oz)       Physical Exam   Constitutional: He is oriented to person, place, and time  He appears well-developed and well-nourished  No distress  HENT:   Head: Normocephalic and atraumatic  Mouth/Throat: Oropharynx is clear and moist    Eyes: Conjunctivae and EOM are normal  Pupils are equal, round, and reactive to light  Neck: Normal range of motion  Neck supple  No thyromegaly present  Cardiovascular: Normal rate, regular rhythm and normal heart sounds  No murmur heard  Pulmonary/Chest: Effort normal and breath sounds normal  No respiratory distress  He has no wheezes  He has no rales  Abdominal: Soft  Bowel sounds are normal  He exhibits no distension  There is no tenderness  Musculoskeletal: Normal range of motion  He exhibits no edema  Lymphadenopathy:     He has no cervical adenopathy  Neurological: He is alert and oriented to person, place, and time  Skin: Skin is warm and dry  Psychiatric: He has a normal mood and affect  Vitals reviewed      Diabetic Foot Exam: not performed, sees podiatrist     Labs:     Lab Results   Component Value Date    HGBA1C 7 5 (H) 10/11/2017       Lab Results   Component Value Date     10/11/2017    K 3 8 10/11/2017     10/11/2017    CO2 27 10/11/2017    ANIONGAP 5 10/03/2015    BUN 22 10/11/2017    CREATININE 0 85 10/11/2017    GLUCOSE 241 (H) 10/03/2015    GLUF 206 (H) 10/11/2017    CALCIUM 7 6 (L) 10/11/2017    AST 25 10/11/2017    ALT 46 10/11/2017    ALKPHOS 89 10/11/2017    PROT 7 3 10/03/2015    BILITOT 0 44 10/03/2015    EGFR 107 10/11/2017         Lab Results   Component Value Date    CHOL 129 10/03/2015    HDL 32 (L) 06/27/2017    TRIG 198 (H) 06/27/2017       Lab Results   Component Value Date    SDM1EXUSBNYC 0 120 (L) 10/11/2017    DPP0KAIGKBIG 0 130 (L) 06/28/2017    ACZ0HOOLREJZ 0 103 (L) 10/01/2016     Lab Results   Component Value Date    FREET4 1 90 (H) 10/11/2017     Component      Latest Ref Rng & Units 10/11/2017   Thyroglobulin-VERONICA      1 4 - 29 2 ng/mL 3 2       NECK ULTRASOUND     INDICATION:     C77 9: Secondary and unspecified malignant neoplasm of lymph node, unspecified      COMPARISON:   None      FINDINGS:     Ultrasound of the thyroidectomy bed and cervical lymph node chains was performed with a high frequency linear transducer  No recurrent mass in the thyroid bed  Stable 5 x 4 x 4 mm hypoechoic nodule in the left thyroid bed     Lymph nodes maintain normal morphologic contour, echogenicity and short axis dimensions of less than 0 7 cm  No evidence for microcalcification or focal nodularity      IMPRESSION:     No evidence of recurrent or metastatic disease  Stable subcentimeter nodule in the left thyroidectomy bed  No new nodule  Continued imaging surveillance is advised      Impression & Plan:    Problem List Items Addressed This Visit     Benign essential hypertension     BP stable, continue current regimen          Relevant Medications    losartan-hydrochlorothiazide (HYZAAR) 100-25 MG per tablet    Diabetes type 2, uncontrolled ([facility] [address] ) - Primary     Uncontrolled/poorly controlled, has not been seen since last October, patient has not been checking BG  Instructed to check BG 1-2x per day at alternating times of day and send BG readings into the office in two weeks  Continue current regimen for now  Focus on dietary and lifestyle modifications in addition to weight loss  Check A1C, cmp, and microalbumin              Relevant Medications    Empagliflozin (JARDIANCE) 10 MG TABS    Other Relevant Orders    Hemoglobin A1C    Microalbumin / creatinine urine ratio    Comprehensive metabolic panel    Hypercholesterolemia     Continue statin, check lipid panel         Relevant Orders    Lipid Panel with Direct LDL reflex    Hypocalcemia     Continue calcium supplementation  Relevant Orders    Comprehensive metabolic panel    Calcium, urine, 24 hour Lab Collect    Hypoparathyroidism (HCC)     Continue calcium and vitamin d supplementation          Relevant Orders    Comprehensive metabolic panel    Calcium, urine, 24 hour Lab Collect    Hypothyroidism     Continue current dose of Synthroid, check TSH and T4  Relevant Orders    T4, free    TSH, 3rd generation    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 ([address] )     Recent US was negative for recurrence or metastasis, will be repeated next year for continued surveillance  Due for thyroglobulin level  Following with Dr Garland Mean yearly  Continue TSH suppression  Relevant Orders    Thyroglobulin      Other Visit Diagnoses     Hypertension, unspecified type        Relevant Medications    losartan-hydrochlorothiazide (HYZAAR) 100-25 MG per tablet          Orders Placed This Encounter   Procedures    Hemoglobin A1C    Lipid Panel with Direct LDL reflex     This is a patient instruction: This test requires patient fasting for 10-12 hours or longer  Drinking of black coffee or black tea is acceptable   Microalbumin / creatinine urine ratio    T4, free    TSH, 3rd generation     This is a patient instruction: This test is non-fasting  Please drink two glasses of water morning of bloodwork   Comprehensive metabolic panel     This is a patient instruction: Patient fasting for 8 hours or longer recommended   Calcium, urine, 24 hour Lab Collect     Standing Status:   Future     Standing Expiration Date:   9/19/2019    Thyroglobulin     Thyroglobulin PANEL-- includes both quantitative thyroglobulin and thyroglobulin Antibody         Discussed with the patient and all questioned fully answered  He will call me if any problems arise  Follow-up appointment in 3 months       Counseled patient on diagnostic results, prognosis, risk and benefit of treatment options, instruction for management, importance of treatment compliance, Risk  factor reduction and impressions      ISA[address] Lane

## 2018-09-20 NOTE — ASSESSMENT & PLAN NOTE
Uncontrolled/poorly controlled, has not been seen since last October, patient has not been checking BG  Instructed to check BG 1-2x per day at alternating times of day and send BG readings into the office in two weeks  Continue current regimen for now  Focus on dietary and lifestyle modifications in addition to weight loss  Check A1C, cmp, and microalbumin

## 2018-09-20 NOTE — ASSESSMENT & PLAN NOTE
Recent US was negative for recurrence or metastasis, will be repeated next year for continued surveillance  Due for thyroglobulin level  Following with Dr Lizy Uriostegui yearly  Continue TSH suppression

## 2018-09-22 ENCOUNTER — APPOINTMENT (OUTPATIENT)
Dept: LAB | Facility: CLINIC | Age: 43
End: 2018-09-22
Payer: COMMERCIAL

## 2018-09-22 LAB
ALBUMIN SERPL BCP-MCNC: 3.4 G/DL (ref 3.5–5)
ALP SERPL-CCNC: 76 U/L (ref 46–116)
ALT SERPL W P-5'-P-CCNC: 47 U/L (ref 12–78)
ANION GAP SERPL CALCULATED.3IONS-SCNC: 6 MMOL/L (ref 4–13)
AST SERPL W P-5'-P-CCNC: 26 U/L (ref 5–45)
BILIRUB SERPL-MCNC: 0.42 MG/DL (ref 0.2–1)
BUN SERPL-MCNC: 19 MG/DL (ref 5–25)
CALCIUM SERPL-MCNC: 7.1 MG/DL (ref 8.3–10.1)
CHLORIDE SERPL-SCNC: 101 MMOL/L (ref 100–108)
CHOLEST SERPL-MCNC: 105 MG/DL (ref 50–200)
CO2 SERPL-SCNC: 29 MMOL/L (ref 21–32)
CREAT SERPL-MCNC: 0.77 MG/DL (ref 0.6–1.3)
CREAT UR-MCNC: 279 MG/DL
EST. AVERAGE GLUCOSE BLD GHB EST-MCNC: 163 MG/DL
GFR SERPL CREATININE-BSD FRML MDRD: 110 ML/MIN/1.73SQ M
GLUCOSE P FAST SERPL-MCNC: 126 MG/DL (ref 65–99)
HBA1C MFR BLD: 7.3 % (ref 4.2–6.3)
HDLC SERPL-MCNC: 34 MG/DL (ref 40–60)
LDLC SERPL CALC-MCNC: 48 MG/DL (ref 0–100)
MICROALBUMIN UR-MCNC: 183 MG/L (ref 0–20)
MICROALBUMIN/CREAT 24H UR: 66 MG/G CREATININE (ref 0–30)
POTASSIUM SERPL-SCNC: 3.7 MMOL/L (ref 3.5–5.3)
PROT SERPL-MCNC: 7.2 G/DL (ref 6.4–8.2)
SODIUM SERPL-SCNC: 136 MMOL/L (ref 136–145)
T4 FREE SERPL-MCNC: 1.95 NG/DL (ref 0.76–1.46)
TRIGL SERPL-MCNC: 115 MG/DL
TSH SERPL DL<=0.05 MIU/L-ACNC: 0.02 UIU/ML (ref 0.36–3.74)

## 2018-09-22 PROCEDURE — 84443 ASSAY THYROID STIM HORMONE: CPT | Performed by: NURSE PRACTITIONER

## 2018-09-22 PROCEDURE — 36415 COLL VENOUS BLD VENIPUNCTURE: CPT | Performed by: NURSE PRACTITIONER

## 2018-09-22 PROCEDURE — 84439 ASSAY OF FREE THYROXINE: CPT | Performed by: NURSE PRACTITIONER

## 2018-09-22 PROCEDURE — 82043 UR ALBUMIN QUANTITATIVE: CPT | Performed by: NURSE PRACTITIONER

## 2018-09-22 PROCEDURE — 86800 THYROGLOBULIN ANTIBODY: CPT | Performed by: NURSE PRACTITIONER

## 2018-09-22 PROCEDURE — 83036 HEMOGLOBIN GLYCOSYLATED A1C: CPT | Performed by: NURSE PRACTITIONER

## 2018-09-22 PROCEDURE — 84432 ASSAY OF THYROGLOBULIN: CPT | Performed by: NURSE PRACTITIONER

## 2018-09-22 PROCEDURE — 3060F POS MICROALBUMINURIA REV: CPT | Performed by: NURSE PRACTITIONER

## 2018-09-22 PROCEDURE — 82570 ASSAY OF URINE CREATININE: CPT | Performed by: NURSE PRACTITIONER

## 2018-09-22 PROCEDURE — 80053 COMPREHEN METABOLIC PANEL: CPT | Performed by: NURSE PRACTITIONER

## 2018-09-22 PROCEDURE — 80061 LIPID PANEL: CPT | Performed by: NURSE PRACTITIONER

## 2018-09-24 ENCOUNTER — APPOINTMENT (OUTPATIENT)
Dept: LAB | Facility: MEDICAL CENTER | Age: 43
End: 2018-09-24
Payer: COMMERCIAL

## 2018-09-24 DIAGNOSIS — E83.51 HYPOCALCEMIA: ICD-10-CM

## 2018-09-24 DIAGNOSIS — E20.9 HYPOPARATHYROIDISM, UNSPECIFIED HYPOPARATHYROIDISM TYPE (HCC): ICD-10-CM

## 2018-09-24 LAB
CALCIUM 24H UR-MCNC: 190.8 MG/24 HRS (ref 42–353)
SPECIMEN VOL UR: 1200 ML

## 2018-09-24 PROCEDURE — 82340 ASSAY OF CALCIUM IN URINE: CPT

## 2018-09-25 ENCOUNTER — TELEPHONE (OUTPATIENT)
Dept: ENDOCRINOLOGY | Facility: CLINIC | Age: 43
End: 2018-09-25

## 2018-09-25 DIAGNOSIS — E20.9 HYPOPARATHYROIDISM, UNSPECIFIED HYPOPARATHYROIDISM TYPE (HCC): Primary | ICD-10-CM

## 2018-09-25 LAB
THYROGLOB AB SERPL-ACNC: <1 IU/ML (ref 0–0.9)
THYROGLOB SERPL-MCNC: 3.1 NG/ML (ref 1.4–29.2)

## 2018-10-15 DIAGNOSIS — E03.9 HYPOTHYROIDISM, UNSPECIFIED TYPE: ICD-10-CM

## 2018-10-15 RX ORDER — LEVOTHYROXINE SODIUM 200 MCG
TABLET ORAL
Qty: 180 TABLET | Refills: 0 | Status: SHIPPED | OUTPATIENT
Start: 2018-10-15 | End: 2019-01-13 | Stop reason: SDUPTHER

## 2018-10-16 DIAGNOSIS — E11.649 UNCONTROLLED TYPE 2 DIABETES MELLITUS WITH HYPOGLYCEMIA, UNSPECIFIED HYPOGLYCEMIA COMA STATUS (HCC): Primary | ICD-10-CM

## 2018-10-31 LAB
LEFT EYE DIABETIC RETINOPATHY: NORMAL
RIGHT EYE DIABETIC RETINOPATHY: NORMAL

## 2018-11-20 DIAGNOSIS — E78.5 HYPERLIPIDEMIA, UNSPECIFIED HYPERLIPIDEMIA TYPE: ICD-10-CM

## 2018-11-20 RX ORDER — SIMVASTATIN 10 MG
TABLET ORAL
Qty: 90 TABLET | Refills: 0 | Status: SHIPPED | OUTPATIENT
Start: 2018-11-20 | End: 2019-02-18 | Stop reason: SDUPTHER

## 2018-12-02 ENCOUNTER — OFFICE VISIT (OUTPATIENT)
Dept: URGENT CARE | Facility: CLINIC | Age: 43
End: 2018-12-02
Payer: COMMERCIAL

## 2018-12-02 VITALS
TEMPERATURE: 98.1 F | RESPIRATION RATE: 16 BRPM | DIASTOLIC BLOOD PRESSURE: 80 MMHG | OXYGEN SATURATION: 95 % | HEART RATE: 77 BPM | BODY MASS INDEX: 40.43 KG/M2 | WEIGHT: 315 LBS | HEIGHT: 74 IN | SYSTOLIC BLOOD PRESSURE: 140 MMHG

## 2018-12-02 DIAGNOSIS — J01.00 ACUTE MAXILLARY SINUSITIS, RECURRENCE NOT SPECIFIED: Primary | ICD-10-CM

## 2018-12-02 PROCEDURE — 99213 OFFICE O/P EST LOW 20 MIN: CPT | Performed by: PHYSICIAN ASSISTANT

## 2018-12-02 RX ORDER — AMOXICILLIN 500 MG/1
500 TABLET, FILM COATED ORAL 3 TIMES DAILY
Qty: 21 TABLET | Refills: 0 | Status: SHIPPED | OUTPATIENT
Start: 2018-12-02 | End: 2018-12-09

## 2018-12-02 NOTE — PROGRESS NOTES
[facility]        NAME: Minerva Room  is a 39 y o  male  : 1975    MRN: [id number]  DATE: 2018  TIME: 12:07 PM    Assessment and Plan   Acute maxillary sinusitis, recurrence not specified [J01 00]  1  Acute maxillary sinusitis, recurrence not specified  amoxicillin (AMOXIL) 500 MG tablet         Patient Instructions       Follow up with PCP in 3-5 days  Proceed to  ER if symptoms worsen  Chief Complaint     Chief Complaint   Patient presents with    Earache     R ear, started yesterday    Facial Pain     started yesterday         History of Present Illness        50-year-old male complains of right ear and right cheek pressure and pain starting last night  He has Flonase over-the-counter  No fever at home          Review of Systems   Review of Systems      Current Medications       Current Outpatient Prescriptions:     albuterol (PROVENTIL HFA,VENTOLIN HFA) 90 mcg/act inhaler, Inhale 2 puffs every 6 (six) hours as needed for wheezing, Disp: 8 g, Rfl: 0    amLODIPine (NORVASC) 5 mg tablet, TAKE 1 TABLET DAILY, Disp: 90 tablet, Rfl: 0    amoxicillin (AMOXIL) 500 MG tablet, Take 1 tablet (500 mg total) by mouth 3 (three) times a day for 7 days, Disp: 21 tablet, Rfl: 0    aspirin (ECOTRIN LOW STRENGTH) 81 mg EC tablet, Take 81 mg by mouth daily, Disp: , Rfl:     calcitriol (ROCALTROL) 0 25 mcg capsule, TAKE 1 CAPSULE TWICE A DAY, Disp: 180 capsule, Rfl: 0    Calcium Carb-Cholecalciferol (CALCIUM 600 + D PO), Take by mouth, Disp: , Rfl:     diclofenac-misoprostol (ARTHROTEC 75) 75-0 2 MG per tablet, TAKE 1 TABLET TWICE A DAY, Disp: 180 tablet, Rfl: 1    Empagliflozin (JARDIANCE) 10 MG TABS, Take 1 tablet (10 mg total) by mouth every morning, Disp: 90 tablet, Rfl: 3    fluticasone (FLONASE) 50 mcg/act nasal spray, 1 spray into each nostril daily, Disp: 16 g, Rfl: 0    glucose blood (ONETOUCH VERIO) test strip, 1 each by Other route 5 (five) times a day for 90 days Use as instructed, Disp: 450 each, Rfl: 0    losartan-hydrochlorothiazide (HYZAAR) 100-25 MG per tablet, Take 1 tablet by mouth daily, Disp: 90 tablet, Rfl: 3    metFORMIN (GLUCOPHAGE) 1000 MG tablet, TAKE 1 TABLET TWICE A DAY, Disp: 180 tablet, Rfl: 0    ONETOUCH DELICA LANCETS FINE MISC, by Does not apply route 5 (five) times a day for 90 days, Disp: 450 each, Rfl: 0    predniSONE 20 mg tablet, Take tabs twice a day for 3 days then 1 tab twice a day for 3 days then one tab every other day until gone   (Patient not taking: Reported on 9/19/2018 ), Disp: 21 tablet, Rfl: 0    promethazine-dextromethorphan (PHENERGAN-DM) 6 25-15 mg/5 mL oral syrup, Take 5 mL by mouth 4 (four) times a day as needed for cough (Patient not taking: Reported on 9/19/2018 ), Disp: 118 mL, Rfl: 0    simvastatin (ZOCOR) 10 mg tablet, TAKE 1 TABLET AT BEDTIME, Disp: 90 tablet, Rfl: 0    SYNTHROID 200 MCG tablet, TAKE 2 TABLETS DAILY, Disp: 180 tablet, Rfl: 0    TiZANidine (ZANAFLEX) 4 MG capsule, Take 1 capsule (4 mg total) by mouth 3 (three) times a day as needed for muscle spasms for up to 15 days, Disp: 45 capsule, Rfl: 0    Current Allergies     Allergies as of 12/02/2018    (No Known Allergies)            The following portions of the patient's history were reviewed and updated as appropriate: allergies, current medications, past family history, past medical history, past social history, past surgical history and problem list      Past Medical History:   Diagnosis Date    Diabetes mellitus ([address] )     Diverticulitis     Hyperlipidemia     Hypertension     Thyroid cancer ([facility] [address] )     radioactive iodine        Past Surgical History:   Procedure Laterality Date    COLOSTOMY      with reversal    AK KNEE SCOPE,MED/LAT MENISECTOMY Left 8/17/2017    Procedure: KNEE ARTHROSCOPIC PARTIAL LATERAL MENISCECTOMY ; PATELLO FEMOEAL CHONDROPLASTY;  Surgeon: Kori Gilliland MD;  [facility];  Service: Orthopedics    THYROIDECTOMY Family History   Problem Relation Age of Onset    Diabetes Mother     Hypertension Mother     Hypertension Father     Hyperlipidemia Father          Medications have been verified  Objective   /80   Pulse 77   Temp 98 1 °F (36 7 °C) (Temporal)   Resp 16   Ht 6' 2" (1 88 m)   Wt (!) 162 kg (357 lb)   SpO2 95%   BMI 45 84 kg/m²        Physical Exam     Physical Exam   Constitutional: He appears well-developed and well-nourished  No distress  HENT:   Right Ear: Tympanic membrane, external ear and ear canal normal    Left Ear: Tympanic membrane, external ear and ear canal normal    Nose: Mucosal edema present  Right sinus exhibits maxillary sinus tenderness  Right sinus exhibits no frontal sinus tenderness  Left sinus exhibits no maxillary sinus tenderness and no frontal sinus tenderness  Mouth/Throat: Oropharynx is clear and moist  No posterior oropharyngeal erythema  Eyes: Pupils are equal, round, and reactive to light  Conjunctivae and EOM are normal  No scleral icterus  Neck: Normal range of motion  Neck supple  Cardiovascular: Normal rate, regular rhythm and normal heart sounds  Pulmonary/Chest: Effort normal and breath sounds normal  No respiratory distress  He has no wheezes  He has no rales  Abdominal: Soft  Bowel sounds are normal  He exhibits no distension and no mass  There is no tenderness  There is no rebound and no guarding  Lymphadenopathy:     He has no cervical adenopathy  Skin: Skin is warm and dry  No rash noted

## 2018-12-09 DIAGNOSIS — E11.9 TYPE 2 DIABETES MELLITUS (HCC): ICD-10-CM

## 2018-12-09 DIAGNOSIS — I10 HYPERTENSION, UNSPECIFIED TYPE: ICD-10-CM

## 2018-12-09 RX ORDER — CALCITRIOL 0.25 UG/1
CAPSULE, LIQUID FILLED ORAL
Qty: 180 CAPSULE | Refills: 0 | Status: SHIPPED | OUTPATIENT
Start: 2018-12-09 | End: 2019-03-09 | Stop reason: SDUPTHER

## 2018-12-09 RX ORDER — AMLODIPINE BESYLATE 5 MG/1
TABLET ORAL
Qty: 90 TABLET | Refills: 0 | Status: SHIPPED | OUTPATIENT
Start: 2018-12-09 | End: 2019-03-09 | Stop reason: SDUPTHER

## 2018-12-27 DIAGNOSIS — E11.649 UNCONTROLLED TYPE 2 DIABETES MELLITUS WITH HYPOGLYCEMIA, UNSPECIFIED HYPOGLYCEMIA COMA STATUS (HCC): ICD-10-CM

## 2018-12-27 RX ORDER — BLOOD SUGAR DIAGNOSTIC
STRIP MISCELLANEOUS
Qty: 450 EACH | Refills: 0 | Status: SHIPPED | OUTPATIENT
Start: 2018-12-27 | End: 2019-04-03 | Stop reason: SDUPTHER

## 2019-01-03 ENCOUNTER — OFFICE VISIT (OUTPATIENT)
Dept: FAMILY MEDICINE CLINIC | Facility: CLINIC | Age: 44
End: 2019-01-03
Payer: COMMERCIAL

## 2019-01-03 VITALS
HEART RATE: 82 BPM | OXYGEN SATURATION: 97 % | DIASTOLIC BLOOD PRESSURE: 92 MMHG | RESPIRATION RATE: 18 BRPM | WEIGHT: 315 LBS | BODY MASS INDEX: 40.43 KG/M2 | HEIGHT: 74 IN | SYSTOLIC BLOOD PRESSURE: 138 MMHG

## 2019-01-03 DIAGNOSIS — M54.42 CHRONIC BILATERAL LOW BACK PAIN WITH LEFT-SIDED SCIATICA: Primary | ICD-10-CM

## 2019-01-03 DIAGNOSIS — Z23 NEED FOR INFLUENZA VACCINATION: ICD-10-CM

## 2019-01-03 DIAGNOSIS — M25.462 EFFUSION OF BURSA OF LEFT KNEE: ICD-10-CM

## 2019-01-03 DIAGNOSIS — Z23 NEED FOR PNEUMOCOCCAL VACCINATION: ICD-10-CM

## 2019-01-03 DIAGNOSIS — Z23 NEED FOR TETANUS BOOSTER: ICD-10-CM

## 2019-01-03 DIAGNOSIS — M51.26 LUMBAR DISC HERNIATION: ICD-10-CM

## 2019-01-03 DIAGNOSIS — G89.29 CHRONIC BILATERAL LOW BACK PAIN WITH LEFT-SIDED SCIATICA: Primary | ICD-10-CM

## 2019-01-03 PROCEDURE — 3008F BODY MASS INDEX DOCD: CPT | Performed by: NURSE PRACTITIONER

## 2019-01-03 PROCEDURE — 99214 OFFICE O/P EST MOD 30 MIN: CPT | Performed by: NURSE PRACTITIONER

## 2019-01-03 PROCEDURE — 90471 IMMUNIZATION ADMIN: CPT

## 2019-01-03 PROCEDURE — 90715 TDAP VACCINE 7 YRS/> IM: CPT

## 2019-01-03 PROCEDURE — 90472 IMMUNIZATION ADMIN EACH ADD: CPT

## 2019-01-03 PROCEDURE — 90686 IIV4 VACC NO PRSV 0.5 ML IM: CPT

## 2019-01-03 PROCEDURE — 90732 PPSV23 VACC 2 YRS+ SUBQ/IM: CPT

## 2019-01-03 NOTE — PROGRESS NOTES
Assessment/Plan:    No problem-specific Assessment & Plan notes found for this encounter  Diagnoses and all orders for this visit:    Chronic bilateral low back pain with left-sided sciatica    Lumbar disc herniation    Effusion of bursa of left knee    Need for influenza vaccination  -     SYRINGE/SINGLE-DOSE VIAL: influenza vaccine, [id number], quadrivalent, 0 5 mL, preservative-free (AFLURIA, FLUARIX, FLULAVAL, FLUZONE)    Need for tetanus booster  -     TDAP VACCINE GREATER THAN OR EQUAL TO 6YO IM    Need for pneumococcal vaccination  -     Cancel: PNEUMOCOCCAL CONJUGATE VACCINE 13-VALENT GREATER THAN 6 MONTHS  -     PNEUMOCOCCAL POLYSACCHARIDE VACCINE 23-VALENT =>1YO SQ IM          Subjective:      Patient ID: Quinn Schroeder  is a 39 y o  male  Patient has had chronic back pain due to multiple herniated discs in his back  Most of the pain is in the lumbar spine  He has some numbness of his left lower legs due to this  He has a TENS unit which he uses once a week  He also uses anti inflammatory medication every day  Muscle relaxants were tried in the past, but they didn't work  He works as [employer] on the [employer]  He will need his FMLA paperwork completed again  Pt had surgery last year left knee for meniscal repair  At times the knee "locks" and he feels a lump  He started keto diet 2 months ago and has lost 40 pounds  The following portions of the patient's history were reviewed and updated as appropriate:   He  has a past medical history of Asthma; Diabetes mellitus ([address] ); Diverticulitis; Hyperlipidemia; Hypertension; and Thyroid cancer ([facility] [address] )    He   Patient Active Problem List    Diagnosis Date Noted    Lumbar disc herniation 01/03/2019    Need for influenza vaccination 01/03/2019    Need for pneumococcal vaccination 01/03/2019    Sciatic nerve disease, left 05/21/2018    Chronic bilateral low back pain with left-sided sciatica 05/16/2017    Chronic pain disorder 01/31/2017  Lumbar radiculopathy 01/31/2017    Myofascial pain syndrome 01/31/2017    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 ) 10/11/2016    Morbid obesity due to excess calories ([facility] ) 10/11/2016    Vitamin D deficiency 05/03/2016    Gait difficulty 10/19/2015    Diabetes type 2, uncontrolled ([facility] ) 05/06/2014    Hypoparathyroidism ([place] [address] ) 12/17/2013    Microalbuminuria 07/17/2013    Type 2 diabetes mellitus ([place] [address] ) 07/17/2013    Hypothyroidism 03/15/2013    Benign essential hypertension 10/04/2012    Hypercholesterolemia 08/09/2012    Hypocalcemia 08/09/2012     He  has a past surgical history that includes Thyroidectomy; Colostomy; pr knee scope,med/lat menisectomy (Left, 8/17/2017); and BIOPSY CORE NEEDLE  His family history includes Diabetes in his mother; Hyperlipidemia in his father; Hypertension in his father and mother; Thyroid disease unspecified in his sister  He  reports that he quit smoking about 17 years ago  He has never used smokeless tobacco  He reports that he drinks alcohol  He reports that he does not use drugs    Current Outpatient Prescriptions   Medication Sig Dispense Refill    albuterol (PROVENTIL HFA,VENTOLIN HFA) 90 mcg/act inhaler Inhale 2 puffs every 6 (six) hours as needed for wheezing 8 g 0    amLODIPine (NORVASC) 5 mg tablet TAKE 1 TABLET DAILY 90 tablet 0    aspirin (ECOTRIN LOW STRENGTH) 81 mg EC tablet Take 81 mg by mouth daily      calcitriol (ROCALTROL) 0 25 mcg capsule TAKE 1 CAPSULE TWICE A  capsule 0    Calcium Carb-Cholecalciferol (CALCIUM 600 + D PO) Take by mouth      diclofenac-misoprostol (ARTHROTEC 75) 75-0 2 MG per tablet TAKE 1 TABLET TWICE A  tablet 1    Empagliflozin (JARDIANCE) 10 MG TABS Take 1 tablet (10 mg total) by mouth every morning 90 tablet 3    fluticasone (FLONASE) 50 mcg/act nasal spray 1 spray into each nostril daily 16 g 0    losartan-hydrochlorothiazide (HYZAAR) 100-25 MG per tablet Take 1 tablet by mouth daily 90 tablet 3    metFORMIN (GLUCOPHAGE) 1000 MG tablet TAKE 1 TABLET TWICE A  tablet 0    ONETOUCH DELICA LANCETS FINE MISC USE 5 TIMES DAILY 500 each 0    ONETOUCH VERIO test strip USE AS INSTRUCTED 5 TIMES DAILY 450 each 0    predniSONE 20 mg tablet Take tabs twice a day for 3 days then 1 tab twice a day for 3 days then one tab every other day until gone  (Patient not taking: Reported on 9/19/2018 ) 21 tablet 0    promethazine-dextromethorphan (PHENERGAN-DM) 6 25-15 mg/5 mL oral syrup Take 5 mL by mouth 4 (four) times a day as needed for cough (Patient not taking: Reported on 9/19/2018 ) 118 mL 0    simvastatin (ZOCOR) 10 mg tablet TAKE 1 TABLET AT BEDTIME 90 tablet 0    SYNTHROID 200 MCG tablet TAKE 2 TABLETS DAILY 180 tablet 0    TiZANidine (ZANAFLEX) 4 MG capsule Take 1 capsule (4 mg total) by mouth 3 (three) times a day as needed for muscle spasms for up to 15 days 45 capsule 0     No current facility-administered medications for this visit        Current Outpatient Prescriptions on File Prior to Visit   Medication Sig    albuterol (PROVENTIL HFA,VENTOLIN HFA) 90 mcg/act inhaler Inhale 2 puffs every 6 (six) hours as needed for wheezing    amLODIPine (NORVASC) 5 mg tablet TAKE 1 TABLET DAILY    aspirin (ECOTRIN LOW STRENGTH) 81 mg EC tablet Take 81 mg by mouth daily    calcitriol (ROCALTROL) 0 25 mcg capsule TAKE 1 CAPSULE TWICE A DAY    Calcium Carb-Cholecalciferol (CALCIUM 600 + D PO) Take by mouth    diclofenac-misoprostol (ARTHROTEC 75) 75-0 2 MG per tablet TAKE 1 TABLET TWICE A DAY    Empagliflozin (JARDIANCE) 10 MG TABS Take 1 tablet (10 mg total) by mouth every morning    fluticasone (FLONASE) 50 mcg/act nasal spray 1 spray into each nostril daily    losartan-hydrochlorothiazide (HYZAAR) 100-25 MG per tablet Take 1 tablet by mouth daily    metFORMIN (GLUCOPHAGE) 1000 MG tablet TAKE 1 TABLET TWICE A DAY    ONETOUCH DELICA LANCETS FINE MISC USE 5 TIMES DAILY    ONETOUCH VERIO test strip USE AS INSTRUCTED 5 TIMES DAILY    predniSONE 20 mg tablet Take tabs twice a day for 3 days then 1 tab twice a day for 3 days then one tab every other day until gone  (Patient not taking: Reported on 9/19/2018 )    promethazine-dextromethorphan (PHENERGAN-DM) 6 25-15 mg/5 mL oral syrup Take 5 mL by mouth 4 (four) times a day as needed for cough (Patient not taking: Reported on 9/19/2018 )    simvastatin (ZOCOR) 10 mg tablet TAKE 1 TABLET AT BEDTIME    SYNTHROID 200 MCG tablet TAKE 2 TABLETS DAILY    TiZANidine (ZANAFLEX) 4 MG capsule Take 1 capsule (4 mg total) by mouth 3 (three) times a day as needed for muscle spasms for up to 15 days     No current facility-administered medications on file prior to visit  He has No Known Allergies       Review of Systems   Constitutional: Negative for fatigue and fever  HENT: Negative for congestion  Eyes: Negative for visual disturbance  Respiratory: Negative for cough and shortness of breath  Cardiovascular: Negative for chest pain, palpitations and leg swelling  Gastrointestinal: Negative for abdominal distention and abdominal pain  Endocrine: Negative for cold intolerance, polydipsia and polyuria  Genitourinary: Negative for difficulty urinating  Musculoskeletal: Positive for back pain and neck pain  Negative for joint swelling  Left knee pain   Skin: Negative for color change and rash  Allergic/Immunologic: Negative for immunocompromised state  Neurological: Positive for numbness  Negative for dizziness and headaches  Hematological: Negative for adenopathy  Psychiatric/Behavioral: Negative for behavioral problems and sleep disturbance  All other systems reviewed and are negative          Objective:      /92 (BP Location: Left arm, Patient Position: Sitting)   Pulse 82   Resp 18   Ht 6' 2" (1 88 m)   Wt (!) 162 kg (358 lb)   SpO2 97%   BMI 45 96 kg/m²          Physical Exam   Constitutional: He is oriented to person, place, and time  He appears well-developed and well-nourished  No distress  HENT:   Head: Normocephalic and atraumatic  Right Ear: External ear normal    Left Ear: External ear normal    Nose: Nose normal    Mouth/Throat: Oropharynx is clear and moist  No oropharyngeal exudate  Eyes: Pupils are equal, round, and reactive to light  Conjunctivae and EOM are normal  Right eye exhibits no discharge  Left eye exhibits no discharge  Neck: Normal range of motion  Neck supple  Cardiovascular: Normal rate, regular rhythm and normal heart sounds  Exam reveals no gallop and no friction rub  No murmur heard  Pulmonary/Chest: Effort normal and breath sounds normal  No respiratory distress  He has no wheezes  Abdominal: Soft  Bowel sounds are normal  He exhibits no distension  obese   Musculoskeletal: Normal range of motion  He exhibits no edema  Possible effusion of left knee  Lymphadenopathy:     He has no cervical adenopathy  Neurological: He is alert and oriented to person, place, and time  Skin: Skin is warm and dry  No rash noted  He is not diaphoretic  No erythema  Psychiatric: He has a normal mood and affect  His behavior is normal  Judgment and thought content normal    Nursing note and vitals reviewed

## 2019-01-03 NOTE — PATIENT INSTRUCTIONS
chronic back pain due to herniated discs- completed FMLA paperwork for work  Left knee effusion- take ricco inflammatory meds , effusion is small  No aspiration recommended at this time  Obesity-conitnue current dietary mods as they seem to be working   Encourage daily exercise

## 2019-01-13 DIAGNOSIS — E03.9 HYPOTHYROIDISM, UNSPECIFIED TYPE: ICD-10-CM

## 2019-01-13 RX ORDER — LEVOTHYROXINE SODIUM 200 MCG
TABLET ORAL
Qty: 180 TABLET | Refills: 0 | Status: SHIPPED | OUTPATIENT
Start: 2019-01-13 | End: 2019-01-28 | Stop reason: SDUPTHER

## 2019-01-28 ENCOUNTER — OFFICE VISIT (OUTPATIENT)
Dept: ENDOCRINOLOGY | Facility: CLINIC | Age: 44
End: 2019-01-28
Payer: COMMERCIAL

## 2019-01-28 VITALS
BODY MASS INDEX: 40.43 KG/M2 | HEIGHT: 74 IN | WEIGHT: 315 LBS | SYSTOLIC BLOOD PRESSURE: 136 MMHG | DIASTOLIC BLOOD PRESSURE: 86 MMHG | HEART RATE: 75 BPM

## 2019-01-28 DIAGNOSIS — E89.0 POSTOPERATIVE HYPOTHYROIDISM: ICD-10-CM

## 2019-01-28 DIAGNOSIS — E83.51 HYPOCALCEMIA: ICD-10-CM

## 2019-01-28 DIAGNOSIS — E11.65 UNCONTROLLED TYPE 2 DIABETES MELLITUS WITH HYPERGLYCEMIA (HCC): Primary | ICD-10-CM

## 2019-01-28 DIAGNOSIS — E03.9 HYPOTHYROIDISM, UNSPECIFIED TYPE: ICD-10-CM

## 2019-01-28 DIAGNOSIS — E55.9 VITAMIN D DEFICIENCY: ICD-10-CM

## 2019-01-28 DIAGNOSIS — C77.9 METASTATIC PAPILLARY CARCINOMA TO LYMPH NODE (HCC): ICD-10-CM

## 2019-01-28 PROCEDURE — 99214 OFFICE O/P EST MOD 30 MIN: CPT | Performed by: INTERNAL MEDICINE

## 2019-01-28 RX ORDER — LEVOTHYROXINE SODIUM 200 MCG
TABLET ORAL
Qty: 180 TABLET | Refills: 1 | Status: SHIPPED | OUTPATIENT
Start: 2019-01-28 | End: 2019-06-19

## 2019-01-28 NOTE — PROGRESS NOTES
Eugenio Camargo  39 y o  male MRN: [id number]    Encounter: [id number]      Assessment/Plan     Problem List Items Addressed This Visit     Diabetes type 2, uncontrolled ([address] ) - Primary     Lab Results   Component Value Date    HGBA1C 7 3 (H) 09/22/2018       Fingersticks have improved-continue current regimen and will check hemoglobin A1c          Relevant Orders    Comprehensive metabolic panel Lab Collect    HEMOGLOBIN A1C W/ EAG ESTIMATION Lab Collect    Hypocalcemia     Increase calcium to 2500 mg daily, continue calcitriol  Will repeat calcium and phos in the next month         Relevant Orders    Phosphorus Lab Collect    Hypothyroidism     TSH was suppressed in September-he was supposed to cut back on Synthroid however never did  Given recent 40 lb weight loss his requirements are going to be lower  For now I have advised him to take Synthroid 1 tablet on Sunday, continue 2 tablets Monday to Saturday  Repeat thyroid function test in the next 4 weeks         Relevant Medications    SYNTHROID 200 MCG tablet    Other Relevant Orders    TSH, 3rd generation Lab Collect    T4, free Lab Collect    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)     Status post total thyroidectomy and neck dissection-radioactive iodine ablation twice  Thyroglobulin has been stable around 3 for the past few years    Will continue to monitor-no evidence of structural disease on neck ultrasound         Relevant Orders    Thyroglobulin w/ab Lab Collect    Vitamin D deficiency    Relevant Orders    Vitamin D 25 hydroxy Lab Collect    Phosphorus Lab Collect        CC:   Thyroid cancer, postsurgical hypothyroidism and type 2 diabetes    History of Present Illness      HPI:  79-year-old gentleman with history of thyroid cancer, postsurgical hypothyroidism, post surgical hypoparathyroidism, type 2 diabetes, hypertension and hyperlipidemia here for follow-up  Since last visit he has lost - 40 lbs over the past 3-4 months Currently on metformin ,jardiance - checks f s 2/day  Most sugars are ranging between 90-120s   No polyuria ,polydypsia     + numbness and tingling in feet  None in fingerstick    For postsurgical hypothyroidism is currently on Synthroid 400 mcg daily and has been taking it regularly and properly      For postsurgical hypothyroidism is currently on Calcitriol 0 25 mg BID and Calcium 600 mg 4/day         Review of Systems   Constitutional: Positive for fatigue  Negative for unexpected weight change  Eyes: Negative for visual disturbance  Respiratory: Negative for cough and shortness of breath  Cardiovascular: Negative for palpitations and leg swelling  Gastrointestinal: Negative for constipation, diarrhea, nausea and vomiting  Endocrine: Negative for polydipsia and polyuria  Musculoskeletal: Positive for arthralgias and myalgias  Negative for gait problem  Skin: Negative for color change  Psychiatric/Behavioral: Negative for sleep disturbance  All other systems reviewed and are negative        Historical Information   Past Medical History:   Diagnosis Date    Asthma     Diabetes mellitus ([facility] [address] )     Diverticulitis     Hyperlipidemia     Hypertension     Thyroid cancer ([facility] )     radioactive iodine      Past Surgical History:   Procedure Laterality Date    BIOPSY CORE NEEDLE      Thyroid    COLOSTOMY      with reversal    AK KNEE SCOPE,MED/LAT MENISECTOMY Left 8/17/2017    Procedure: KNEE ARTHROSCOPIC PARTIAL LATERAL MENISCECTOMY ; PATELLO FEMOEAL CHONDROPLASTY;  Surgeon: Bryanna Schmidt MD;  Location: [facility];  Service: Orthopedics    THYROIDECTOMY       Social History   History   Alcohol Use    Yes     Comment: 1 drink every 2 weeks     History   Drug Use No     History   Smoking Status    Former Smoker    Quit date: 2002   Smokeless Tobacco    Never Used     Family History:   Family History   Problem Relation Age of Onset    Diabetes Mother     Hypertension Mother    Vandana Marialuisa Hypertension Father     Hyperlipidemia Father     Thyroid disease unspecified Sister        Meds/Allergies   Current Outpatient Prescriptions   Medication Sig Dispense Refill    albuterol (PROVENTIL HFA,VENTOLIN HFA) 90 mcg/act inhaler Inhale 2 puffs every 6 (six) hours as needed for wheezing 8 g 0    amLODIPine (NORVASC) 5 mg tablet TAKE 1 TABLET DAILY 90 tablet 0    aspirin (ECOTRIN LOW STRENGTH) 81 mg EC tablet Take 81 mg by mouth daily      calcitriol (ROCALTROL) 0 25 mcg capsule TAKE 1 CAPSULE TWICE A  capsule 0    Calcium Carb-Cholecalciferol (CALCIUM 600 + D PO) Take 600 mg by mouth 4 (four) times a day        diclofenac-misoprostol (ARTHROTEC 75) 75-0 2 MG per tablet TAKE 1 TABLET TWICE A  tablet 1    Empagliflozin (JARDIANCE) 10 MG TABS Take 1 tablet (10 mg total) by mouth every morning 90 tablet 3    fluticasone (FLONASE) 50 mcg/act nasal spray 1 spray into each nostril daily 16 g 0    losartan-hydrochlorothiazide (HYZAAR) 100-25 MG per tablet Take 1 tablet by mouth daily 90 tablet 3    metFORMIN (GLUCOPHAGE) 1000 MG tablet TAKE 1 TABLET TWICE A  tablet 0    ONETOUCH DELICA LANCETS FINE MISC USE 5 TIMES DAILY 500 each 0    ONETOUCH VERIO test strip USE AS INSTRUCTED 5 TIMES DAILY 450 each 0    simvastatin (ZOCOR) 10 mg tablet TAKE 1 TABLET AT BEDTIME 90 tablet 0    SYNTHROID 200 MCG tablet 2 tab mon-sat and one on Sunday 180 tablet 1    predniSONE 20 mg tablet Take tabs twice a day for 3 days then 1 tab twice a day for 3 days then one tab every other day until gone   (Patient not taking: Reported on 9/19/2018 ) 21 tablet 0    promethazine-dextromethorphan (PHENERGAN-DM) 6 25-15 mg/5 mL oral syrup Take 5 mL by mouth 4 (four) times a day as needed for cough (Patient not taking: Reported on 9/19/2018 ) 118 mL 0    TiZANidine (ZANAFLEX) 4 MG capsule Take 1 capsule (4 mg total) by mouth 3 (three) times a day as needed for muscle spasms for up to 15 days (Patient not taking: Reported on 1/28/2019 ) 45 capsule 0     No current facility-administered medications for this visit  No Known Allergies    Objective   Vitals: Blood pressure 136/86, pulse 75, height 6' 2" (1 88 m), weight (!) 157 kg (347 lb 3 2 oz)  Physical Exam   Constitutional: He is oriented to person, place, and time  He appears well-developed and well-nourished  No distress  HENT:   Head: Normocephalic and atraumatic  Mouth/Throat: No oropharyngeal exudate  Eyes: Conjunctivae and EOM are normal  No scleral icterus  Neck: Normal range of motion  Neck supple  Anterior neck surgical scar-no masses   Cardiovascular: Normal rate and regular rhythm  No murmur heard  Pulmonary/Chest: Effort normal and breath sounds normal  No respiratory distress  He has no wheezes  He has no rales  Abdominal: Soft  Bowel sounds are normal  He exhibits no distension  There is no tenderness  There is no rebound  Musculoskeletal: Normal range of motion  He exhibits no edema or deformity  Lymphadenopathy:     He has no cervical adenopathy  Neurological: He is alert and oriented to person, place, and time  Skin: Skin is dry  No rash noted  No erythema  Psychiatric: He has a normal mood and affect  His behavior is normal  Judgment and thought content normal        The history was obtained from the review of the chart, patient  Lab Results:   Lab Results   Component Value Date/Time    TSH 3RD GENERATON 0 016 (L) 09/22/2018 07:59 AM    Free T4 1 95 (H) 09/22/2018 07:59 AM    Thyroglobulin Ab <1 0 09/22/2018 07:59 AM     Imaging Studies:  Results for orders placed during the hospital encounter of 04/05/18   US head neck lymph node mapping    Impression No evidence of recurrent or metastatic disease  Stable subcentimeter nodule in the left thyroidectomy bed  No new nodule  Continued imaging surveillance is advised        Workstation performed: [id number]            I have personally reviewed pertinent reports  Portions of the record may have been created with voice recognition software  Occasional wrong word or "sound a like" substitutions may have occurred due to the inherent limitations of voice recognition software  Read the chart carefully and recognize, using context, where substitutions have occurred

## 2019-01-28 NOTE — PATIENT INSTRUCTIONS
Take Synthroid 2  tablet Monday to Saturday- only 1 on Sunday    Increase calcium by 1 tablet daily    Repeat blood test in 1 month

## 2019-01-29 NOTE — ASSESSMENT & PLAN NOTE
Lab Results   Component Value Date    HGBA1C 7 3 (H) 09/22/2018       Fingersticks have improved-continue current regimen and will check hemoglobin A1c

## 2019-01-29 NOTE — ASSESSMENT & PLAN NOTE
TSH was suppressed in September-he was supposed to cut back on Synthroid however never did  Given recent 40 lb weight loss his requirements are going to be lower  For now I have advised him to take Synthroid 1 tablet on Sunday, continue 2 tablets Monday to Saturday    Repeat thyroid function test in the next 4 weeks

## 2019-01-29 NOTE — ASSESSMENT & PLAN NOTE
Increase calcium to 2500 mg daily, continue calcitriol    Will repeat calcium and phos in the next month

## 2019-01-29 NOTE — ASSESSMENT & PLAN NOTE
Status post total thyroidectomy and neck dissection-radioactive iodine ablation twice  Thyroglobulin has been stable around 3 for the past few years    Will continue to monitor-no evidence of structural disease on neck ultrasound

## 2019-02-18 DIAGNOSIS — E78.5 HYPERLIPIDEMIA, UNSPECIFIED HYPERLIPIDEMIA TYPE: ICD-10-CM

## 2019-02-18 RX ORDER — SIMVASTATIN 10 MG
TABLET ORAL
Qty: 90 TABLET | Refills: 0 | Status: SHIPPED | OUTPATIENT
Start: 2019-02-18 | End: 2019-05-19 | Stop reason: SDUPTHER

## 2019-02-23 DIAGNOSIS — R52 PAIN: ICD-10-CM

## 2019-02-25 RX ORDER — DICLOFENAC SODIUM AND MISOPROSTOL 75; 200 MG/1; UG/1
TABLET, DELAYED RELEASE ORAL
Qty: 180 TABLET | Refills: 1 | Status: SHIPPED | OUTPATIENT
Start: 2019-02-25 | End: 2019-08-24 | Stop reason: SDUPTHER

## 2019-03-09 DIAGNOSIS — I10 HYPERTENSION, UNSPECIFIED TYPE: ICD-10-CM

## 2019-03-09 DIAGNOSIS — E11.9 TYPE 2 DIABETES MELLITUS (HCC): ICD-10-CM

## 2019-03-10 RX ORDER — CALCITRIOL 0.25 UG/1
CAPSULE, LIQUID FILLED ORAL
Qty: 180 CAPSULE | Refills: 0 | Status: SHIPPED | OUTPATIENT
Start: 2019-03-10 | End: 2019-06-07 | Stop reason: SDUPTHER

## 2019-03-10 RX ORDER — AMLODIPINE BESYLATE 5 MG/1
TABLET ORAL
Qty: 90 TABLET | Refills: 0 | Status: SHIPPED | OUTPATIENT
Start: 2019-03-10 | End: 2019-06-07 | Stop reason: SDUPTHER

## 2019-03-27 ENCOUNTER — LAB (OUTPATIENT)
Dept: LAB | Facility: CLINIC | Age: 44
End: 2019-03-27
Payer: COMMERCIAL

## 2019-03-27 ENCOUNTER — OFFICE VISIT (OUTPATIENT)
Dept: URGENT CARE | Facility: CLINIC | Age: 44
End: 2019-03-27
Payer: COMMERCIAL

## 2019-03-27 VITALS
OXYGEN SATURATION: 98 % | WEIGHT: 315 LBS | HEIGHT: 74 IN | SYSTOLIC BLOOD PRESSURE: 128 MMHG | TEMPERATURE: 96.2 F | BODY MASS INDEX: 40.43 KG/M2 | RESPIRATION RATE: 18 BRPM | DIASTOLIC BLOOD PRESSURE: 80 MMHG | HEART RATE: 82 BPM

## 2019-03-27 DIAGNOSIS — E20.9 HYPOPARATHYROIDISM, UNSPECIFIED HYPOPARATHYROIDISM TYPE (HCC): ICD-10-CM

## 2019-03-27 DIAGNOSIS — J31.0 RHINOSINUSITIS: Primary | ICD-10-CM

## 2019-03-27 DIAGNOSIS — E83.51 HYPOCALCEMIA: ICD-10-CM

## 2019-03-27 DIAGNOSIS — C77.9 METASTATIC PAPILLARY CARCINOMA TO LYMPH NODE (HCC): ICD-10-CM

## 2019-03-27 DIAGNOSIS — E89.0 POSTOPERATIVE HYPOTHYROIDISM: ICD-10-CM

## 2019-03-27 DIAGNOSIS — J32.9 RHINOSINUSITIS: Primary | ICD-10-CM

## 2019-03-27 DIAGNOSIS — E11.65 UNCONTROLLED TYPE 2 DIABETES MELLITUS WITH HYPERGLYCEMIA (HCC): ICD-10-CM

## 2019-03-27 DIAGNOSIS — E55.9 VITAMIN D DEFICIENCY: ICD-10-CM

## 2019-03-27 LAB
25(OH)D3 SERPL-MCNC: 61.2 NG/ML (ref 30–100)
ALBUMIN SERPL BCP-MCNC: 3.9 G/DL (ref 3.5–5)
ALP SERPL-CCNC: 64 U/L (ref 46–116)
ALT SERPL W P-5'-P-CCNC: 48 U/L (ref 12–78)
ANION GAP SERPL CALCULATED.3IONS-SCNC: 6 MMOL/L (ref 4–13)
AST SERPL W P-5'-P-CCNC: 24 U/L (ref 5–45)
BILIRUB SERPL-MCNC: 0.76 MG/DL (ref 0.2–1)
BUN SERPL-MCNC: 20 MG/DL (ref 5–25)
CALCIUM SERPL-MCNC: 7.6 MG/DL (ref 8.3–10.1)
CHLORIDE SERPL-SCNC: 102 MMOL/L (ref 100–108)
CO2 SERPL-SCNC: 30 MMOL/L (ref 21–32)
CREAT SERPL-MCNC: 0.76 MG/DL (ref 0.6–1.3)
EST. AVERAGE GLUCOSE BLD GHB EST-MCNC: 100 MG/DL
GFR SERPL CREATININE-BSD FRML MDRD: 111 ML/MIN/1.73SQ M
GLUCOSE P FAST SERPL-MCNC: 94 MG/DL (ref 65–99)
HBA1C MFR BLD: 5.1 % (ref 4.2–6.3)
PHOSPHATE SERPL-MCNC: 4.3 MG/DL (ref 2.7–4.5)
POTASSIUM SERPL-SCNC: 3.7 MMOL/L (ref 3.5–5.3)
PROT SERPL-MCNC: 7.5 G/DL (ref 6.4–8.2)
SODIUM SERPL-SCNC: 138 MMOL/L (ref 136–145)
T3FREE SERPL-MCNC: 2.72 PG/ML (ref 2.3–4.2)
T4 FREE SERPL-MCNC: 2.34 NG/DL (ref 0.76–1.46)
TSH SERPL DL<=0.05 MIU/L-ACNC: <0.007 UIU/ML (ref 0.36–3.74)

## 2019-03-27 PROCEDURE — 84443 ASSAY THYROID STIM HORMONE: CPT

## 2019-03-27 PROCEDURE — 86800 THYROGLOBULIN ANTIBODY: CPT

## 2019-03-27 PROCEDURE — 84481 FREE ASSAY (FT-3): CPT

## 2019-03-27 PROCEDURE — 36415 COLL VENOUS BLD VENIPUNCTURE: CPT

## 2019-03-27 PROCEDURE — 80053 COMPREHEN METABOLIC PANEL: CPT

## 2019-03-27 PROCEDURE — 84100 ASSAY OF PHOSPHORUS: CPT

## 2019-03-27 PROCEDURE — 83036 HEMOGLOBIN GLYCOSYLATED A1C: CPT

## 2019-03-27 PROCEDURE — 84439 ASSAY OF FREE THYROXINE: CPT

## 2019-03-27 PROCEDURE — 99213 OFFICE O/P EST LOW 20 MIN: CPT | Performed by: PHYSICIAN ASSISTANT

## 2019-03-27 PROCEDURE — 84432 ASSAY OF THYROGLOBULIN: CPT

## 2019-03-27 PROCEDURE — 82306 VITAMIN D 25 HYDROXY: CPT

## 2019-03-27 RX ORDER — AMOXICILLIN 500 MG/1
500 TABLET, FILM COATED ORAL 3 TIMES DAILY
Qty: 21 TABLET | Refills: 0 | Status: SHIPPED | OUTPATIENT
Start: 2019-03-27 | End: 2019-04-03

## 2019-03-27 NOTE — PROGRESS NOTES
[facility]        NAME: Suraj Marroquin  is a 39 y o  male  : 1975    MRN: [id number]  DATE: 2019  TIME: 9:49 AM    Assessment and Plan   Rhinosinusitis [J32 9]  1  Rhinosinusitis  amoxicillin (AMOXIL) 500 MG tablet         Patient Instructions     Patient Instructions     Less than 5 days of symptoms  Sinuses nontender  No fever  Likely viral   We can start Coricidin over-the-counter  Continue Flonase  Can use Afrin for 3 days  If symptoms persist past 1 week feel prescription for amoxicillin  Follow up with PCP in 3-5 days  Proceed to  ER if symptoms worsen  Chief Complaint     Chief Complaint   Patient presents with    Cold Like Symptoms     believes he has a sinus infection  nasal congestion x 2 days  also feels like he had fluid in his L ear  History of Present Illness         39year-old male complains of 2 days of cough and congestion with sinus pressure in his cheeks  No fever home  He is using Flonase  No decongestants over-the-counter  No nausea or vomiting  Review of Systems   Review of Systems   Constitutional: Negative for fever  HENT: Positive for congestion, ear pain and sinus pressure  Eyes: Negative  Respiratory: Positive for cough            Current Medications       Current Outpatient Medications:     albuterol (PROVENTIL HFA,VENTOLIN HFA) 90 mcg/act inhaler, Inhale 2 puffs every 6 (six) hours as needed for wheezing, Disp: 8 g, Rfl: 0    amLODIPine (NORVASC) 5 mg tablet, TAKE 1 TABLET DAILY, Disp: 90 tablet, Rfl: 0    aspirin (ECOTRIN LOW STRENGTH) 81 mg EC tablet, Take 81 mg by mouth daily, Disp: , Rfl:     calcitriol (ROCALTROL) 0 25 mcg capsule, TAKE 1 CAPSULE TWICE A DAY, Disp: 180 capsule, Rfl: 0    Calcium Carb-Cholecalciferol (CALCIUM 600 + D PO), Take 600 mg by mouth 4 (four) times a day  , Disp: , Rfl:     diclofenac-misoprostol (ARTHROTEC 75) 75-0 2 MG per tablet, TAKE 1 TABLET TWICE A DAY, Disp: 180 tablet, Rfl: 1    Empagliflozin (JARDIANCE) 10 MG TABS, Take 1 tablet (10 mg total) by mouth every morning, Disp: 90 tablet, Rfl: 3    fluticasone (FLONASE) 50 mcg/act nasal spray, 1 spray into each nostril daily, Disp: 16 g, Rfl: 0    losartan-hydrochlorothiazide (HYZAAR) 100-25 MG per tablet, Take 1 tablet by mouth daily, Disp: 90 tablet, Rfl: 3    metFORMIN (GLUCOPHAGE) 1000 MG tablet, TAKE 1 TABLET TWICE A DAY, Disp: 180 tablet, Rfl: 0    ONETOUCH DELICA LANCETS FINE MISC, USE 5 TIMES DAILY, Disp: 500 each, Rfl: 0    ONETOUCH VERIO test strip, USE AS INSTRUCTED 5 TIMES DAILY, Disp: 450 each, Rfl: 0    simvastatin (ZOCOR) 10 mg tablet, TAKE 1 TABLET AT BEDTIME, Disp: 90 tablet, Rfl: 0    SYNTHROID 200 MCG tablet, 2 tab mon-sat and one on Sunday, Disp: 180 tablet, Rfl: 1    amoxicillin (AMOXIL) 500 MG tablet, Take 1 tablet (500 mg total) by mouth 3 (three) times a day for 7 days, Disp: 21 tablet, Rfl: 0    TiZANidine (ZANAFLEX) 4 MG capsule, Take 1 capsule (4 mg total) by mouth 3 (three) times a day as needed for muscle spasms for up to 15 days (Patient not taking: Reported on 1/28/2019 ), Disp: 45 capsule, Rfl: 0    Current Allergies     Allergies as of 03/27/2019    (No Known Allergies)            The following portions of the patient's history were reviewed and updated as appropriate: allergies, current medications, past family history, past medical history, past social history, past surgical history and problem list      Past Medical History:   Diagnosis Date    Asthma     Diabetes mellitus ([address] )     Diverticulitis     Hyperlipidemia     Hypertension     Thyroid cancer ([facility] [address] )     radioactive iodine        Past Surgical History:   Procedure Laterality Date    BIOPSY CORE NEEDLE      Thyroid    COLOSTOMY      with reversal    IN KNEE SCOPE,MED/LAT MENISECTOMY Left 8/17/2017    Procedure: KNEE ARTHROSCOPIC PARTIAL LATERAL MENISCECTOMY ; PATELLO FEMOEAL CHONDROPLASTY;  Surgeon: Sally Alexandra MD; Location: [facility];  Service: Orthopedics    THYROIDECTOMY         Family History   Problem Relation Age of Onset    Diabetes Mother     Hypertension Mother     Hypertension Father     Hyperlipidemia Father     Thyroid disease unspecified Sister          Medications have been verified  Objective   /80 (BP Location: Left arm, Patient Position: Sitting)   Pulse 82   Temp (!) 96 2 °F (35 7 °C) (Tympanic)   Resp 18   Ht 6' 2" (1 88 m)   Wt (!) 149 kg (327 lb 6 4 oz)   SpO2 98%   BMI 42 04 kg/m²        Physical Exam     Physical Exam   Constitutional: He appears well-developed and well-nourished  No distress  HENT:   Right Ear: Tympanic membrane, external ear and ear canal normal    Left Ear: Tympanic membrane, external ear and ear canal normal    Nose: Mucosal edema and rhinorrhea present  Right sinus exhibits no maxillary sinus tenderness and no frontal sinus tenderness  Left sinus exhibits no maxillary sinus tenderness and no frontal sinus tenderness  Mouth/Throat: Oropharynx is clear and moist  No posterior oropharyngeal erythema  Eyes: Pupils are equal, round, and reactive to light  Conjunctivae and EOM are normal  No scleral icterus  Neck: Normal range of motion  Neck supple  Cardiovascular: Normal rate, regular rhythm and normal heart sounds  Pulmonary/Chest: Effort normal and breath sounds normal  No respiratory distress  He has no wheezes  He has no rales  Abdominal: Soft  Bowel sounds are normal  He exhibits no distension and no mass  There is no tenderness  There is no rebound and no guarding  Lymphadenopathy:     He has no cervical adenopathy  Skin: Skin is warm and dry  No rash noted

## 2019-03-27 NOTE — PATIENT INSTRUCTIONS
Less than 5 days of symptoms  Sinuses nontender  No fever  Likely viral   We can start Coricidin over-the-counter  Continue Flonase  Can use Afrin for 3 days  If symptoms persist past 1 week feel prescription for amoxicillin

## 2019-03-28 ENCOUNTER — HOSPITAL ENCOUNTER (OUTPATIENT)
Dept: RADIOLOGY | Facility: MEDICAL CENTER | Age: 44
Discharge: HOME | End: 2019-03-28
Payer: COMMERCIAL

## 2019-03-28 DIAGNOSIS — C77.9 METASTATIC PAPILLARY CARCINOMA TO LYMPH NODE (HCC): ICD-10-CM

## 2019-03-28 LAB
THYROGLOB AB SERPL-ACNC: <1 IU/ML (ref 0–0.9)
THYROGLOB SERPL-MCNC: 1.8 NG/ML (ref 1.4–29.2)

## 2019-03-28 PROCEDURE — 76536 US EXAM OF HEAD AND NECK: CPT

## 2019-03-29 ENCOUNTER — TELEPHONE (OUTPATIENT)
Dept: ENDOCRINOLOGY | Facility: CLINIC | Age: 44
End: 2019-03-29

## 2019-03-29 NOTE — RESULT ENCOUNTER NOTE
Please call the patient regarding labs - labs okay except TSH is suppressed and calcium is still low  Hemoglobin A1c has improved from 7 3-5 1 -has he lost a lot of weight? Any hypoglycemic episodes?   Please confirm the exact dose of levothyroxine, calcium, calcitriol and vitamin-D

## 2019-04-01 DIAGNOSIS — C77.9 METASTATIC PAPILLARY CARCINOMA TO LYMPH NODE (HCC): Primary | ICD-10-CM

## 2019-04-01 DIAGNOSIS — E83.51 HYPOCALCEMIA: ICD-10-CM

## 2019-04-01 DIAGNOSIS — E89.0 POSTOPERATIVE HYPOTHYROIDISM: ICD-10-CM

## 2019-04-01 DIAGNOSIS — E55.9 VITAMIN D DEFICIENCY: ICD-10-CM

## 2019-04-01 RX ORDER — LEVOTHYROXINE SODIUM 200 MCG
200 TABLET ORAL
COMMUNITY
End: 2020-02-12 | Stop reason: SDUPTHER

## 2019-04-03 DIAGNOSIS — E11.649 UNCONTROLLED TYPE 2 DIABETES MELLITUS WITH HYPOGLYCEMIA, UNSPECIFIED HYPOGLYCEMIA COMA STATUS (HCC): ICD-10-CM

## 2019-04-03 RX ORDER — BLOOD SUGAR DIAGNOSTIC
STRIP MISCELLANEOUS
Qty: 450 EACH | Refills: 0 | Status: SHIPPED | OUTPATIENT
Start: 2019-04-03 | End: 2019-07-03 | Stop reason: SDUPTHER

## 2019-04-24 PROBLEM — Z08 ENCOUNTER FOR FOLLOW-UP SURVEILLANCE OF THYROID CANCER: Status: ACTIVE | Noted: 2019-04-24

## 2019-04-24 PROBLEM — Z85.850 ENCOUNTER FOR FOLLOW-UP SURVEILLANCE OF THYROID CANCER: Status: ACTIVE | Noted: 2019-04-24

## 2019-04-29 ENCOUNTER — OFFICE VISIT (OUTPATIENT)
Dept: SURGICAL ONCOLOGY | Facility: CLINIC | Age: 44
End: 2019-04-29
Payer: COMMERCIAL

## 2019-04-29 VITALS
TEMPERATURE: 98.5 F | RESPIRATION RATE: 16 BRPM | DIASTOLIC BLOOD PRESSURE: 76 MMHG | HEIGHT: 74 IN | SYSTOLIC BLOOD PRESSURE: 134 MMHG | BODY MASS INDEX: 40.43 KG/M2 | WEIGHT: 315 LBS | HEART RATE: 73 BPM

## 2019-04-29 DIAGNOSIS — Z08 ENCOUNTER FOR FOLLOW-UP SURVEILLANCE OF THYROID CANCER: Primary | ICD-10-CM

## 2019-04-29 DIAGNOSIS — Z85.850 HISTORY OF THYROID CANCER: ICD-10-CM

## 2019-04-29 DIAGNOSIS — Z85.850 ENCOUNTER FOR FOLLOW-UP SURVEILLANCE OF THYROID CANCER: Primary | ICD-10-CM

## 2019-04-29 PROCEDURE — 99214 OFFICE O/P EST MOD 30 MIN: CPT | Performed by: SURGERY

## 2019-05-19 DIAGNOSIS — E78.5 HYPERLIPIDEMIA, UNSPECIFIED HYPERLIPIDEMIA TYPE: ICD-10-CM

## 2019-05-19 RX ORDER — SIMVASTATIN 10 MG
TABLET ORAL
Qty: 90 TABLET | Refills: 0 | Status: SHIPPED | OUTPATIENT
Start: 2019-05-19 | End: 2019-08-17 | Stop reason: SDUPTHER

## 2019-06-07 DIAGNOSIS — E11.9 TYPE 2 DIABETES MELLITUS (HCC): ICD-10-CM

## 2019-06-07 DIAGNOSIS — I10 HYPERTENSION, UNSPECIFIED TYPE: ICD-10-CM

## 2019-06-07 RX ORDER — CALCITRIOL 0.25 UG/1
CAPSULE, LIQUID FILLED ORAL
Qty: 180 CAPSULE | Refills: 0 | Status: SHIPPED | OUTPATIENT
Start: 2019-06-07 | End: 2019-09-05 | Stop reason: SDUPTHER

## 2019-06-07 RX ORDER — AMLODIPINE BESYLATE 5 MG/1
TABLET ORAL
Qty: 90 TABLET | Refills: 0 | Status: SHIPPED | OUTPATIENT
Start: 2019-06-07 | End: 2019-07-11 | Stop reason: ALTCHOICE

## 2019-06-15 ENCOUNTER — LAB (OUTPATIENT)
Dept: LAB | Facility: CLINIC | Age: 44
End: 2019-06-15
Payer: COMMERCIAL

## 2019-06-15 DIAGNOSIS — E83.51 HYPOCALCEMIA: ICD-10-CM

## 2019-06-15 DIAGNOSIS — E55.9 VITAMIN D DEFICIENCY: ICD-10-CM

## 2019-06-15 DIAGNOSIS — E89.0 POSTOPERATIVE HYPOTHYROIDISM: ICD-10-CM

## 2019-06-15 DIAGNOSIS — C77.9 METASTATIC PAPILLARY CARCINOMA TO LYMPH NODE (HCC): ICD-10-CM

## 2019-06-15 LAB
25(OH)D3 SERPL-MCNC: 47.6 NG/ML (ref 30–100)
CALCIUM SERPL-MCNC: 8.2 MG/DL (ref 8.3–10.1)
T4 FREE SERPL-MCNC: 2.02 NG/DL (ref 0.76–1.46)
TSH SERPL DL<=0.05 MIU/L-ACNC: 0.02 UIU/ML (ref 0.36–3.74)

## 2019-06-15 PROCEDURE — 36415 COLL VENOUS BLD VENIPUNCTURE: CPT

## 2019-06-15 PROCEDURE — 84432 ASSAY OF THYROGLOBULIN: CPT

## 2019-06-15 PROCEDURE — 86800 THYROGLOBULIN ANTIBODY: CPT

## 2019-06-15 PROCEDURE — 84439 ASSAY OF FREE THYROXINE: CPT

## 2019-06-15 PROCEDURE — 82306 VITAMIN D 25 HYDROXY: CPT

## 2019-06-15 PROCEDURE — 82310 ASSAY OF CALCIUM: CPT

## 2019-06-15 PROCEDURE — 84443 ASSAY THYROID STIM HORMONE: CPT

## 2019-06-18 ENCOUNTER — TELEPHONE (OUTPATIENT)
Dept: ENDOCRINOLOGY | Facility: CLINIC | Age: 44
End: 2019-06-18

## 2019-06-18 DIAGNOSIS — E89.0 POSTOPERATIVE HYPOTHYROIDISM: Primary | ICD-10-CM

## 2019-06-19 LAB
THYROGLOB AB SERPL-ACNC: <1 IU/ML (ref 0–0.9)
THYROGLOB SERPL-MCNC: 2.2 NG/ML (ref 1.4–29.2)

## 2019-07-01 NOTE — PROGRESS NOTES
Assessment/Plan:       Diagnoses and all orders for this visit:    Well adult exam  -     Measles/Mumps/Rubella Immunity; Future    Immunity to measles determined by serologic test  -     Measles/Mumps/Rubella Immunity; Future        No problem-specific Assessment & Plan notes found for this encounter  Subjective:      Patient ID: Abbi Mccann  is a 39 y o  male  Patient is here for Physical for [id number] [facility]  The following portions of the patient's history were reviewed and updated as appropriate:   He has a past medical history of Asthma, Diabetes mellitus ([facility] [address] ), Diverticulitis, Hyperlipidemia, Hypertension, and Thyroid cancer ([facility] )  ,  does not have any pertinent problems on file  ,   has a past surgical history that includes Thyroidectomy; Colostomy; pr knee scope,med/lat menisectomy (Left, 8/17/2017); and BIOPSY CORE NEEDLE ,  family history includes Diabetes in his mother; Hyperlipidemia in his father; Hypertension in his father and mother; Thyroid disease unspecified in his sister  ,   reports that he quit smoking about 17 years ago  He has never used smokeless tobacco  He reports that he drinks alcohol  He reports that he does not use drugs  ,  has No Known Allergies     Current Outpatient Medications   Medication Sig Dispense Refill    albuterol (PROVENTIL HFA,VENTOLIN HFA) 90 mcg/act inhaler Inhale 2 puffs every 6 (six) hours as needed for wheezing (Patient not taking: Reported on 4/29/2019) 8 g 0    amLODIPine (NORVASC) 5 mg tablet TAKE 1 TABLET DAILY 90 tablet 0    aspirin (ECOTRIN LOW STRENGTH) 81 mg EC tablet Take 81 mg by mouth daily      calcitriol (ROCALTROL) 0 25 mcg capsule TAKE 1 CAPSULE TWICE A  capsule 0    Calcium Carb-Cholecalciferol (CALCIUM 600 + D PO) Take 600 mg by mouth 4 (four) times a day        diclofenac-misoprostol (ARTHROTEC 75) 75-0 2 MG per tablet TAKE 1 TABLET TWICE A  tablet 1    fluticasone (FLONASE) 50 mcg/act nasal spray 1 spray into each nostril daily 16 g 0    losartan-hydrochlorothiazide (HYZAAR) 100-25 MG per tablet Take 1 tablet by mouth daily 90 tablet 3    metFORMIN (GLUCOPHAGE) 1000 MG tablet TAKE 1 TABLET TWICE A  tablet 0    ONETOUCH DELICA LANCETS FINE MISC USE FIVE TIMES DAILY 500 each 0    ONETOUCH VERIO test strip USE AS INSTRUCTED FIVE TIMES DAILY 450 each 0    simvastatin (ZOCOR) 10 mg tablet TAKE 1 TABLET AT BEDTIME 90 tablet 0    SYNTHROID 200 MCG tablet Take 200 mcg by mouth Take one tab Mon-Fri and 2 tabs Sat/Sun       No current facility-administered medications for this visit  Review of Systems   Constitutional: Negative for fatigue and fever  HENT: Negative for congestion  Eyes: Negative for visual disturbance  Respiratory: Negative for cough and shortness of breath  Cardiovascular: Negative for chest pain, palpitations and leg swelling  Gastrointestinal: Negative for abdominal distention and abdominal pain  Endocrine: Negative for cold intolerance, polydipsia and polyuria  Genitourinary: Negative for difficulty urinating  Musculoskeletal: Negative for back pain and joint swelling  Skin: Negative for color change and rash  Allergic/Immunologic: Negative for immunocompromised state  Neurological: Negative for dizziness and headaches  Hematological: Negative for adenopathy  Psychiatric/Behavioral: Negative for behavioral problems and sleep disturbance  All other systems reviewed and are negative  Objective:  Vitals:    07/02/19 1621 07/02/19 1647   BP: 152/86 130/84   BP Location:  Left arm   Pulse: 78    Temp: (!) 97 4 °F (36 3 °C)    SpO2: 98%    Weight: (!) 140 kg (309 lb)    Height: 6' 2" (1 88 m)      Body mass index is 39 67 kg/m²  Physical Exam   Constitutional: He is oriented to person, place, and time  He appears well-developed and well-nourished  No distress  HENT:   Head: Normocephalic and atraumatic     Right Ear: External ear normal    Left Ear: External ear normal    Nose: Nose normal    Mouth/Throat: Oropharynx is clear and moist  No oropharyngeal exudate  Eyes: Pupils are equal, round, and reactive to light  Conjunctivae and EOM are normal  Right eye exhibits no discharge  Left eye exhibits no discharge  No scleral icterus  Neck: Normal range of motion  Neck supple  No JVD present  No thyromegaly present  Cardiovascular: Normal rate, regular rhythm, normal heart sounds and intact distal pulses  Exam reveals no gallop and no friction rub  No murmur heard  Pulmonary/Chest: Effort normal and breath sounds normal  No respiratory distress  Abdominal: Soft  Bowel sounds are normal  He exhibits no distension  There is no tenderness  Musculoskeletal: Normal range of motion  He exhibits no edema or tenderness  Lymphadenopathy:     He has no cervical adenopathy  Neurological: He is alert and oriented to person, place, and time  He has normal reflexes  No cranial nerve deficit or sensory deficit  Coordination normal    Skin: Skin is warm and dry  Capillary refill takes less than 2 seconds  He is not diaphoretic  Psychiatric: He has a normal mood and affect  His behavior is normal  Judgment and thought content normal    Nursing note and vitals reviewed  BMI Counseling: Body mass index is 39 67 kg/m²  Discussed the patient's BMI with him  The BMI is above average  BMI counseling and education was provided to the patient  Nutrition recommendations include reducing portion sizes and decreasing overall calorie intake  Exercise recommendations include exercising 3-5 times per week

## 2019-07-01 NOTE — PATIENT INSTRUCTIONS
Cleared for [facility]  Wellness Visit for Adults   AMBULATORY CARE:   A wellness visit  is when you see your healthcare provider to get screened for health problems  You can also get advice on how to stay healthy  Write down your questions so you remember to ask them  Ask your healthcare provider how often you should have a wellness visit  What happens at a wellness visit:  Your healthcare provider will ask about your health, and your family history of health problems  This includes high blood pressure, heart disease, and cancer  He or she will ask if you have symptoms that concern you, if you smoke, and about your mood  You may also be asked about your intake of medicines, supplements, food, and alcohol  Any of the following may be done:  · Your weight  will be checked  Your height may also be checked so your body mass index (BMI) can be calculated  Your BMI shows if you are at a healthy weight  · Your blood pressure  and heart rate will be checked  Your temperature may also be checked  · Blood and urine tests  may be done  Blood tests may be done to check your cholesterol levels  Abnormal cholesterol levels increase your risk for heart disease and stroke  You may also need a blood or urine test to check for diabetes if you are at increased risk  Urine tests may be done to look for signs of an infection or kidney disease  · A physical exam  includes checking your heartbeat and lungs with a stethoscope  Your healthcare provider may also check your skin to look for sun damage  · Screening tests  may be recommended  A screening test is done to check for diseases that may not cause symptoms  The screening tests you may need depend on your age, gender, family history, and lifestyle habits  For example, colorectal screening may be recommended if you are 48years old or older  Screening tests you need if you are a woman:   · A Pap smear  is used to screen for cervical cancer   Pap smears are usually done every 3 to 5 years depending on your age  You may need them more often if you have had abnormal Pap smear test results in the past  Ask your healthcare provider how often you should have a Pap smear  · A mammogram  is an x-ray of your breasts to screen for breast cancer  Experts recommend mammograms every 2 years starting at age 48 years  You may need a mammogram at age 52 years or younger if you have an increased risk for breast cancer  Talk to your healthcare provider about when you should start having mammograms and how often you need them  Vaccines you may need:   · Get an influenza vaccine  every year  The influenza vaccine protects you from the flu  Several types of viruses cause the flu  The viruses change over time, so new vaccines are made each year  · Get a tetanus-diphtheria (Td) booster vaccine  every 10 years  This vaccine protects you against tetanus and diphtheria  Tetanus is a severe infection that may cause painful muscle spasms and lockjaw  Diphtheria is a severe bacterial infection that causes a thick covering in the back of your mouth and throat  · Get a human papillomavirus (HPV) vaccine  if you are female and aged 23 to 32 or male 23 to 24 and never received it  This vaccine protects you from HPV infection  HPV is the most common infection spread by sexual contact  HPV may also cause vaginal, penile, and anal cancers  · Get a pneumococcal vaccine  if you are aged 72 years or older  The pneumococcal vaccine is an injection given to protect you from pneumococcal disease  Pneumococcal disease is an infection caused by pneumococcal bacteria  The infection may cause pneumonia, meningitis, or an ear infection  · Get a shingles vaccine  if you are aged 61 or older, even if you have had shingles before  The shingles vaccine is an injection to protect you from the varicella-zoster virus  This is the same virus that causes chickenpox   Shingles is a painful rash that develops in people who had chickenpox or have been exposed to the virus  How to eat healthy:  My Plate is a model for planning healthy meals  It shows the types and amounts of foods that should go on your plate  Fruits and vegetables make up about half of your plate, and grains and protein make up the other half  A serving of dairy is included on the side of your plate  The amount of calories and serving sizes you need depends on your age, gender, weight, and height  Examples of healthy foods are listed below:  · Eat a variety of vegetables  such as dark green, red, and orange vegetables  You can also include canned vegetables low in sodium (salt) and frozen vegetables without added butter or sauces  · Eat a variety of fresh fruits , canned fruit in 100% juice, frozen fruit, and dried fruit  · Include whole grains  At least half of the grains you eat should be whole grains  Examples include whole-wheat bread, wheat pasta, brown rice, and whole-grain cereals such as oatmeal     · Eat a variety of protein foods such as seafood (fish and shellfish), lean meat, and poultry without skin (turkey and chicken)  Examples of lean meats include pork leg, shoulder, or tenderloin, and beef round, sirloin, tenderloin, and extra lean ground beef  Other protein foods include eggs and egg substitutes, beans, peas, soy products, nuts, and seeds  · Choose low-fat dairy products such as skim or 1% milk or low-fat yogurt, cheese, and cottage cheese  · Limit unhealthy fats  such as butter, hard margarine, and shortening  Exercise:  Exercise at least 30 minutes per day on most days of the week  Some examples of exercise include walking, biking, dancing, and swimming  You can also fit in more physical activity by taking the stairs instead of the elevator or parking farther away from stores  Include muscle strengthening activities 2 days each week  Regular exercise provides many health benefits   It helps you manage your weight, and decreases your risk for type 2 diabetes, heart disease, stroke, and high blood pressure  Exercise can also help improve your mood  Ask your healthcare provider about the best exercise plan for you  General health and safety guidelines:   · Do not smoke  Nicotine and other chemicals in cigarettes and cigars can cause lung damage  Ask your healthcare provider for information if you currently smoke and need help to quit  E-cigarettes or smokeless tobacco still contain nicotine  Talk to your healthcare provider before you use these products  · Limit alcohol  A drink of alcohol is 12 ounces of beer, 5 ounces of wine, or 1½ ounces of liquor  · Lose weight, if needed  Being overweight increases your risk of certain health conditions  These include heart disease, high blood pressure, type 2 diabetes, and certain types of cancer  · Protect your skin  Do not sunbathe or use tanning beds  Use sunscreen with a SPF 15 or higher  Apply sunscreen at least 15 minutes before you go outside  Reapply sunscreen every 2 hours  Wear protective clothing, hats, and sunglasses when you are outside  · Drive safely  Always wear your seatbelt  Make sure everyone in your car wears a seatbelt  A seatbelt can save your life if you are in an accident  Do not use your cell phone when you are driving  This could distract you and cause an accident  Pull over if you need to make a call or send a text message  · Practice safe sex  Use latex condoms if are sexually active and have more than one partner  Your healthcare provider may recommend screening tests for sexually transmitted infections (STIs)  · Wear helmets, lifejackets, and protective gear  Always wear a helmet when you ride a bike or motorcycle, go skiing, or play sports that could cause a head injury  Wear protective equipment when you play sports  Wear a lifejacket when you are on a boat or doing water sports    © 2017 [address] [facility] is for End User's use only and may not be sold, redistributed or otherwise used for commercial purposes  All illustrations and images included in CareNotes® are the copyrighted property of A [employer] M [employer] Inc  or [employer]  The above information is an  only  It is not intended as medical advice for individual conditions or treatments  Talk to your doctor, nurse or pharmacist before following any medical regimen to see if it is safe and effective for you

## 2019-07-02 ENCOUNTER — OFFICE VISIT (OUTPATIENT)
Dept: FAMILY MEDICINE CLINIC | Facility: CLINIC | Age: 44
End: 2019-07-02
Payer: COMMERCIAL

## 2019-07-02 VITALS
DIASTOLIC BLOOD PRESSURE: 84 MMHG | SYSTOLIC BLOOD PRESSURE: 130 MMHG | OXYGEN SATURATION: 98 % | TEMPERATURE: 97.4 F | HEART RATE: 78 BPM | WEIGHT: 309 LBS | HEIGHT: 74 IN | BODY MASS INDEX: 39.66 KG/M2

## 2019-07-02 DIAGNOSIS — Z01.84 IMMUNITY TO MEASLES DETERMINED BY SEROLOGIC TEST: ICD-10-CM

## 2019-07-02 DIAGNOSIS — Z00.00 WELL ADULT EXAM: Primary | ICD-10-CM

## 2019-07-02 PROCEDURE — 99396 PREV VISIT EST AGE 40-64: CPT | Performed by: NURSE PRACTITIONER

## 2019-07-03 DIAGNOSIS — E11.649 UNCONTROLLED TYPE 2 DIABETES MELLITUS WITH HYPOGLYCEMIA, UNSPECIFIED HYPOGLYCEMIA COMA STATUS (HCC): ICD-10-CM

## 2019-07-03 RX ORDER — BLOOD SUGAR DIAGNOSTIC
STRIP MISCELLANEOUS
Qty: 500 EACH | Refills: 0 | Status: SHIPPED | OUTPATIENT
Start: 2019-07-03 | End: 2019-10-01 | Stop reason: SDUPTHER

## 2019-07-08 ENCOUNTER — LAB (OUTPATIENT)
Dept: LAB | Facility: MEDICAL CENTER | Age: 44
End: 2019-07-08
Payer: COMMERCIAL

## 2019-07-08 DIAGNOSIS — Z01.84 IMMUNITY TO MEASLES DETERMINED BY SEROLOGIC TEST: ICD-10-CM

## 2019-07-08 DIAGNOSIS — Z00.00 WELL ADULT EXAM: ICD-10-CM

## 2019-07-08 DIAGNOSIS — E89.0 POSTOPERATIVE HYPOTHYROIDISM: ICD-10-CM

## 2019-07-08 LAB
RUBV IGG SERPL IA-ACNC: 29.9 IU/ML
T4 FREE SERPL-MCNC: 1.66 NG/DL (ref 0.76–1.46)
TSH SERPL DL<=0.05 MIU/L-ACNC: 0.05 UIU/ML (ref 0.36–3.74)

## 2019-07-08 PROCEDURE — 86762 RUBELLA ANTIBODY: CPT

## 2019-07-08 PROCEDURE — 86765 RUBEOLA ANTIBODY: CPT

## 2019-07-08 PROCEDURE — 84443 ASSAY THYROID STIM HORMONE: CPT

## 2019-07-08 PROCEDURE — 36415 COLL VENOUS BLD VENIPUNCTURE: CPT

## 2019-07-08 PROCEDURE — 86735 MUMPS ANTIBODY: CPT

## 2019-07-08 PROCEDURE — 84439 ASSAY OF FREE THYROXINE: CPT

## 2019-07-09 LAB
MEV IGG SER QL: NORMAL
MUV IGG SER QL: NORMAL

## 2019-07-11 ENCOUNTER — OFFICE VISIT (OUTPATIENT)
Dept: ENDOCRINOLOGY | Facility: CLINIC | Age: 44
End: 2019-07-11
Payer: COMMERCIAL

## 2019-07-11 VITALS
WEIGHT: 312 LBS | HEART RATE: 78 BPM | SYSTOLIC BLOOD PRESSURE: 114 MMHG | DIASTOLIC BLOOD PRESSURE: 76 MMHG | BODY MASS INDEX: 40.04 KG/M2 | HEIGHT: 74 IN

## 2019-07-11 DIAGNOSIS — I10 HYPERTENSION, UNSPECIFIED TYPE: Primary | ICD-10-CM

## 2019-07-11 DIAGNOSIS — C77.9 METASTATIC PAPILLARY CARCINOMA TO LYMPH NODE (HCC): ICD-10-CM

## 2019-07-11 DIAGNOSIS — E78.5 HYPERLIPIDEMIA, UNSPECIFIED HYPERLIPIDEMIA TYPE: ICD-10-CM

## 2019-07-11 DIAGNOSIS — E89.0 POSTOPERATIVE HYPOTHYROIDISM: ICD-10-CM

## 2019-07-11 DIAGNOSIS — Z85.850 HISTORY OF THYROID CANCER: ICD-10-CM

## 2019-07-11 DIAGNOSIS — E83.51 HYPOCALCEMIA: ICD-10-CM

## 2019-07-11 DIAGNOSIS — I10 BENIGN ESSENTIAL HYPERTENSION: ICD-10-CM

## 2019-07-11 DIAGNOSIS — E55.9 VITAMIN D DEFICIENCY: ICD-10-CM

## 2019-07-11 DIAGNOSIS — R80.9 MICROALBUMINURIA: ICD-10-CM

## 2019-07-11 DIAGNOSIS — E78.00 HYPERCHOLESTEROLEMIA: ICD-10-CM

## 2019-07-11 DIAGNOSIS — E20.9 HYPOPARATHYROIDISM, UNSPECIFIED HYPOPARATHYROIDISM TYPE (HCC): ICD-10-CM

## 2019-07-11 DIAGNOSIS — E11.9 TYPE 2 DIABETES MELLITUS WITHOUT COMPLICATION, WITHOUT LONG-TERM CURRENT USE OF INSULIN (HCC): ICD-10-CM

## 2019-07-11 DIAGNOSIS — E11.649 UNCONTROLLED TYPE 2 DIABETES MELLITUS WITH HYPOGLYCEMIA, UNSPECIFIED HYPOGLYCEMIA COMA STATUS (HCC): ICD-10-CM

## 2019-07-11 PROCEDURE — 3074F SYST BP LT 130 MM HG: CPT | Performed by: PHYSICIAN ASSISTANT

## 2019-07-11 PROCEDURE — 3066F NEPHROPATHY DOC TX: CPT | Performed by: PHYSICIAN ASSISTANT

## 2019-07-11 PROCEDURE — 99214 OFFICE O/P EST MOD 30 MIN: CPT | Performed by: PHYSICIAN ASSISTANT

## 2019-07-11 RX ORDER — LOSARTAN POTASSIUM 100 MG/1
100 TABLET ORAL DAILY
Qty: 90 TABLET | Refills: 1 | Status: SHIPPED | OUTPATIENT
Start: 2019-07-11 | End: 2019-07-11 | Stop reason: ALTCHOICE

## 2019-07-11 RX ORDER — LOSARTAN POTASSIUM AND HYDROCHLOROTHIAZIDE 25; 100 MG/1; MG/1
1 TABLET ORAL DAILY
Qty: 90 TABLET | Refills: 3 | Status: SHIPPED | OUTPATIENT
Start: 2019-07-11 | End: 2020-02-12 | Stop reason: SDUPTHER

## 2019-07-11 NOTE — PROGRESS NOTES
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, He has had history of Thyroidectomy in 2009, RadioIodine Therapy 2009, and Radical Neck Dissection in 2011  Recent thyroglobulin levels have been stable and ultrasound has shown no evidence of recurrent/mestastic disease       For the Hypothyroidism, he is taking Synthroid 200mcg Mon-Fri, 2 tabs sat/sun  Dose has been reduced due to weight loss       For the Hypoparathyroidism, he is taking Calcium supplements and calcitriol  He denies kidney stones or symptoms of hypocalcemia       For the Type 2 Diabetes, he stopped the jardiance now taking metformin only  He has lost 100 pounds with lifestyle modifications, following low carb diet       For the Hypertension, he is taking Amlodipine and Losartan HCTZ  For hyperlipidemia, he is taking Simvastatin and Denies myalgias         Patient Active Problem List   Diagnosis    Benign essential hypertension    Chronic pain disorder    Diabetes type 2, uncontrolled ([address] )    Gait difficulty    Hypercholesterolemia    Hypocalcemia    Hypoparathyroidism ([address] )    Hypothyroidism    Chronic bilateral low back pain with left-sided sciatica    Lumbar radiculopathy    History of thyroid cancer    Microalbuminuria    Morbid obesity due to excess calories (HCC)    Myofascial pain syndrome    Type 2 diabetes mellitus ([address] )    Vitamin D deficiency    Sciatic nerve disease, left    Lumbar disc herniation    Need for influenza vaccination    Need for pneumococcal vaccination    Encounter for follow-up surveillance of thyroid cancer    Well adult exam    Immunity to measles determined by serologic test Past Medical History:   Diagnosis Date    Asthma     [facility]     Diverticulitis     Hyperlipidemia     Hypertension     Thyroid cancer ([facility] [facility] )     radioactive iodine       Past Surgical History:   Procedure Laterality Date    BIOPSY CORE NEEDLE      Thyroid    COLOSTOMY      with reversal    WI KNEE SCOPE,MED/LAT MENISECTOMY Left 2017    Procedure: KNEE ARTHROSCOPIC PARTIAL LATERAL MENISCECTOMY ; PATELLO FEMOEAL CHONDROPLASTY;  Surgeon: Caty Hein MD;  [facility];  Service: Orthopedics    THYROIDECTOMY        Family History   Problem Relation Age of Onset    Diabetes Mother     Hypertension Mother     Hypertension Father     Hyperlipidemia Father     Thyroid disease unspecified Sister      Social History     Tobacco Use    Smoking status: Former Smoker     Last attempt to quit:      Years since quittin 5    Smokeless tobacco: Never Used   Substance Use Topics    Alcohol use: Yes     Comment: 1 drink every 2 weeks     No Known Allergies    Current Outpatient Medications:     albuterol (PROVENTIL HFA,VENTOLIN HFA) 90 mcg/act inhaler, Inhale 2 puffs every 6 (six) hours as needed for wheezing, Disp: 8 g, Rfl: 0    aspirin (ECOTRIN LOW STRENGTH) 81 mg EC tablet, Take 81 mg by mouth daily, Disp: , Rfl:     calcitriol (ROCALTROL) 0 25 mcg capsule, TAKE 1 CAPSULE TWICE A DAY, Disp: 180 capsule, Rfl: 0    Calcium Carb-Cholecalciferol (CALCIUM 600 + D PO), Take 600 mg by mouth 4 (four) times a day  , Disp: , Rfl:     diclofenac-misoprostol (ARTHROTEC 75) 75-0 2 MG per tablet, TAKE 1 TABLET TWICE A DAY, Disp: 180 tablet, Rfl: 1    fluticasone (FLONASE) 50 mcg/act nasal spray, 1 spray into each nostril daily, Disp: 16 g, Rfl: 0    losartan-hydrochlorothiazide (HYZAAR) 100-25 MG per tablet, Take 1 tablet by mouth daily, Disp: 90 tablet, Rfl: 3    metFORMIN (GLUCOPHAGE) 1000 MG tablet, TAKE 1 TABLET TWICE A DAY, Disp: 180 tablet, Rfl: 0    Mercy Hernandez LANCETS FINE MISC, USE FIVE TIMES DAILY, Disp: 500 each, Rfl: 0    ONETOUCH VERIO test strip, USE AS INSTRUCTED FIVE TIMES DAILY, Disp: 500 each, Rfl: 0    simvastatin (ZOCOR) 10 mg tablet, TAKE 1 TABLET AT BEDTIME, Disp: 90 tablet, Rfl: 0    SYNTHROID 200 MCG tablet, Take 200 mcg by mouth Take one tab Mon-Fri and 2 tabs Sat/Sun, Disp: , Rfl:     Review of Systems   Constitutional: Negative for activity change, appetite change and fatigue  HENT: Negative for sore throat, trouble swallowing and voice change  Eyes: Negative for visual disturbance  Respiratory: Negative for choking, chest tightness and shortness of breath  Cardiovascular: Negative for chest pain, palpitations and leg swelling  Gastrointestinal: Negative for abdominal pain, constipation and diarrhea  Endocrine: Negative for cold intolerance, heat intolerance, polydipsia, polyphagia and polyuria  Genitourinary: Negative for frequency  Musculoskeletal: Negative for arthralgias and myalgias  Skin: Negative for rash  Neurological: Negative for dizziness and syncope  Hematological: Negative for adenopathy  Psychiatric/Behavioral: Negative for sleep disturbance  All other systems reviewed and are negative  Physical Exam:  Body mass index is 40 06 kg/m²  /76   Pulse 78   Ht 6' 2" (1 88 m)   Wt (!) 142 kg (312 lb)   BMI 40 06 kg/m²    Wt Readings from Last 3 Encounters:   07/11/19 (!) 142 kg (312 lb)   07/02/19 (!) 140 kg (309 lb)   04/29/19 (!) 150 kg (330 lb)       Physical Exam   Constitutional: He is oriented to person, place, and time  He appears well-developed and well-nourished  No distress  HENT:   Head: Normocephalic and atraumatic  Mouth/Throat: Oropharynx is clear and moist    Eyes: Pupils are equal, round, and reactive to light  Conjunctivae and EOM are normal    Neck: Normal range of motion  Neck supple  No thyromegaly present  Cardiovascular: Normal rate, regular rhythm and normal heart sounds  Pulses are no weak pulses  No murmur heard  Pulses:       Dorsalis pedis pulses are 2+ on the right side, and 2+ on the left side  Posterior tibial pulses are 2+ on the right side, and 2+ on the left side  Pulmonary/Chest: Effort normal and breath sounds normal  No respiratory distress  He has no wheezes  He has no rales  Abdominal: Soft  Bowel sounds are normal  He exhibits no distension  There is no tenderness  Musculoskeletal: Normal range of motion  He exhibits no edema  Feet:   Right Foot:   Skin Integrity: Negative for ulcer, skin breakdown, erythema, warmth, callus or dry skin  Left Foot:   Skin Integrity: Negative for ulcer, skin breakdown, erythema, warmth, callus or dry skin  Lymphadenopathy:     He has no cervical adenopathy  Neurological: He is alert and oriented to person, place, and time  Skin: Skin is warm and dry  Psychiatric: He has a normal mood and affect  Vitals reviewed  Diabetic Foot Exam    Patient's shoes and socks removed  Right Foot/Ankle   Right Foot Inspection  Skin Exam: skin normal and skin intact no dry skin, no warmth, no callus, no erythema, no maceration, no abnormal color, no pre-ulcer, no ulcer and no callus                          Toe Exam: ROM and strength within normal limitsno swelling, no tenderness, erythema and  no right toe deformity  Sensory       Monofilament testing: intact  Vascular  Capillary refills: < 3 seconds  The right DP pulse is 2+  The right PT pulse is 2+  Left Foot/Ankle  Left Foot Inspection  Skin Exam: skin normal and skin intactno dry skin, no warmth, no erythema, no maceration, normal color, no pre-ulcer, no ulcer and no callus                         Toe Exam: ROM and strength within normal limitsno swelling, no tenderness, no erythema and no left toe deformity                   Sensory       Monofilament: intact  Vascular  Capillary refills: < 3 seconds  The left DP pulse is 2+  The left PT pulse is 2+     Assign Risk Category:  No deformity present; No loss of protective sensation; No weak pulses       Risk: 0    Labs:       Lab Results   Component Value Date    CREATININE 0 76 03/27/2019    CREATININE 0 77 09/22/2018    CREATININE 0 85 10/11/2017    BUN 20 03/27/2019     10/03/2015    K 3 7 03/27/2019     03/27/2019    CO2 30 03/27/2019     eGFR   Date Value Ref Range Status   03/27/2019 111 ml/min/1 73sq m Final       Lab Results   Component Value Date    CHOL 129 10/03/2015    HDL 34 (L) 09/22/2018    TRIG 115 09/22/2018       Lab Results   Component Value Date    ALT 48 03/27/2019    AST 24 03/27/2019    ALKPHOS 64 03/27/2019    BILITOT 0 44 10/03/2015       Lab Results   Component Value Date    FREET4 1 66 (H) 07/08/2019         Impression & Plan:    Problem List Items Addressed This Visit        Endocrine    Diabetes type 2, uncontrolled ([facility] [address] )     Well controlled- last A1C 5 1 and he has stopped jardiance  Check A1C continue lifestyle modifications and weight loss  Relevant Orders    Hemoglobin A1C    Comprehensive metabolic panel    Microalbumin / creatinine urine ratio    Hypoparathyroidism (HCC)     Continue calcium, calcitriol, and Vitamin D supplements  Hypothyroidism     Continue levothyroxine Check TSH/Free T4 may need additional reductions due to significant weight loss  Relevant Orders    TSH, 3rd generation    T4, free    Type 2 diabetes mellitus ([facility] )       Cardiovascular and Mediastinum    Benign essential hypertension     He has lost 100 pounds, /76  Continue losartan HCTZ, will discontinue amlodipine and he will monitor BP at home and resume if /85 or higher            Relevant Medications    losartan-hydrochlorothiazide (HYZAAR) 100-25 MG per tablet       Other    Hypercholesterolemia    Hypocalcemia    History of thyroid cancer     Stable thyroglobulin and recent ultrasound showed no evidence of recurrent/metastatic disease will continue to monitor Microalbuminuria     Continue ARB         Vitamin D deficiency     Continue supplements  Other Visit Diagnoses     Hypertension, unspecified type    -  Primary    Relevant Medications    losartan-hydrochlorothiazide (HYZAAR) 100-25 MG per tablet    Hyperlipidemia, unspecified hyperlipidemia type        Relevant Orders    Lipid Panel with Direct LDL reflex    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 ([facility] [address] )              Orders Placed This Encounter   Procedures    TSH, 3rd generation     This is a patient instruction: This test is non-fasting  Please drink two glasses of water morning of bloodwork  Standing Status:   Future     Standing Expiration Date:   7/11/2020    T4, free     Standing Status:   Future     Standing Expiration Date:   7/11/2020    Hemoglobin A1C     Standing Status:   Future     Standing Expiration Date:   7/11/2020    Lipid Panel with Direct LDL reflex     This is a patient instruction: This test requires patient fasting for 10-12 hours or longer  Drinking of black coffee or black tea is acceptable  Standing Status:   Future     Standing Expiration Date:   7/11/2020    Comprehensive metabolic panel     This is a patient instruction: Patient fasting for 8 hours or longer recommended  Standing Status:   Future     Standing Expiration Date:   7/11/2020    Microalbumin / creatinine urine ratio     Standing Status:   Future     Standing Expiration Date:   7/11/2020       There are no Patient Instructions on file for this visit  Discussed with the patient and all questioned fully answered  He will call me if any problems arise  Follow-up appointment in 6 months       Counseled patient on diagnostic results, prognosis, risk and benefit of treatment options, instruction for management, importance of treatment compliance, Risk  factor reduction and impressions      Shawnie Spurling, PA-C

## 2019-07-17 NOTE — ASSESSMENT & PLAN NOTE
Stable thyroglobulin and recent ultrasound showed no evidence of recurrent/metastatic disease will continue to monitor

## 2019-07-17 NOTE — ASSESSMENT & PLAN NOTE
Continue levothyroxine Check TSH/Free T4 may need additional reductions due to significant weight loss

## 2019-07-17 NOTE — ASSESSMENT & PLAN NOTE
Well controlled- last A1C 5 1 and he has stopped jardiance  Check A1C continue lifestyle modifications and weight loss

## 2019-07-17 NOTE — ASSESSMENT & PLAN NOTE
He has lost 100 pounds, /76  Continue losartan HCTZ, will discontinue amlodipine and he will monitor BP at home and resume if /85 or higher

## 2019-08-17 DIAGNOSIS — E78.5 HYPERLIPIDEMIA, UNSPECIFIED HYPERLIPIDEMIA TYPE: ICD-10-CM

## 2019-08-17 RX ORDER — SIMVASTATIN 10 MG
TABLET ORAL
Qty: 90 TABLET | Refills: 4 | Status: SHIPPED | OUTPATIENT
Start: 2019-08-17 | End: 2020-11-09

## 2019-08-24 DIAGNOSIS — R52 PAIN: ICD-10-CM

## 2019-08-24 RX ORDER — DICLOFENAC SODIUM AND MISOPROSTOL 75; 200 MG/1; UG/1
TABLET, DELAYED RELEASE ORAL
Qty: 180 TABLET | Refills: 4 | Status: SHIPPED | OUTPATIENT
Start: 2019-08-24 | End: 2020-10-29

## 2019-09-05 DIAGNOSIS — E11.9 TYPE 2 DIABETES MELLITUS (HCC): ICD-10-CM

## 2019-09-05 DIAGNOSIS — I10 HYPERTENSION, UNSPECIFIED TYPE: ICD-10-CM

## 2019-09-05 RX ORDER — AMLODIPINE BESYLATE 5 MG/1
TABLET ORAL
Qty: 90 TABLET | Refills: 4 | Status: SHIPPED | OUTPATIENT
Start: 2019-09-05 | End: 2020-02-12

## 2019-09-05 RX ORDER — CALCITRIOL 0.25 UG/1
CAPSULE, LIQUID FILLED ORAL
Qty: 180 CAPSULE | Refills: 4 | Status: SHIPPED | OUTPATIENT
Start: 2019-09-05 | End: 2020-11-28

## 2019-09-17 DIAGNOSIS — E03.9 HYPOTHYROIDISM, UNSPECIFIED TYPE: ICD-10-CM

## 2019-09-17 RX ORDER — LEVOTHYROXINE SODIUM 200 MCG
TABLET ORAL
Qty: 180 TABLET | Refills: 4 | Status: SHIPPED | OUTPATIENT
Start: 2019-09-17 | End: 2020-02-12 | Stop reason: SDUPTHER

## 2019-10-01 DIAGNOSIS — E11.649 UNCONTROLLED TYPE 2 DIABETES MELLITUS WITH HYPOGLYCEMIA, UNSPECIFIED HYPOGLYCEMIA COMA STATUS (HCC): ICD-10-CM

## 2019-10-01 RX ORDER — BLOOD SUGAR DIAGNOSTIC
STRIP MISCELLANEOUS
Qty: 500 EACH | Refills: 3 | Status: SHIPPED | OUTPATIENT
Start: 2019-10-01 | End: 2020-06-15 | Stop reason: ALTCHOICE

## 2019-10-12 ENCOUNTER — APPOINTMENT (OUTPATIENT)
Dept: LAB | Facility: CLINIC | Age: 44
End: 2019-10-12
Payer: COMMERCIAL

## 2019-10-12 DIAGNOSIS — E11.649 UNCONTROLLED TYPE 2 DIABETES MELLITUS WITH HYPOGLYCEMIA, UNSPECIFIED HYPOGLYCEMIA COMA STATUS (HCC): ICD-10-CM

## 2019-10-12 DIAGNOSIS — E78.5 HYPERLIPIDEMIA, UNSPECIFIED HYPERLIPIDEMIA TYPE: ICD-10-CM

## 2019-10-12 DIAGNOSIS — E89.0 POSTOPERATIVE HYPOTHYROIDISM: ICD-10-CM

## 2019-10-12 LAB
ALBUMIN SERPL BCP-MCNC: 4.1 G/DL (ref 3.5–5)
ALP SERPL-CCNC: 52 U/L (ref 46–116)
ALT SERPL W P-5'-P-CCNC: 32 U/L (ref 12–78)
ANION GAP SERPL CALCULATED.3IONS-SCNC: 5 MMOL/L (ref 4–13)
AST SERPL W P-5'-P-CCNC: 18 U/L (ref 5–45)
BILIRUB SERPL-MCNC: 0.65 MG/DL (ref 0.2–1)
BUN SERPL-MCNC: 23 MG/DL (ref 5–25)
CALCIUM SERPL-MCNC: 8.6 MG/DL (ref 8.3–10.1)
CHLORIDE SERPL-SCNC: 104 MMOL/L (ref 100–108)
CHOLEST SERPL-MCNC: 158 MG/DL (ref 50–200)
CO2 SERPL-SCNC: 31 MMOL/L (ref 21–32)
CREAT SERPL-MCNC: 0.84 MG/DL (ref 0.6–1.3)
CREAT UR-MCNC: 56.3 MG/DL
EST. AVERAGE GLUCOSE BLD GHB EST-MCNC: 94 MG/DL
GFR SERPL CREATININE-BSD FRML MDRD: 106 ML/MIN/1.73SQ M
GLUCOSE P FAST SERPL-MCNC: 84 MG/DL (ref 65–99)
HBA1C MFR BLD: 4.9 % (ref 4.2–6.3)
HDLC SERPL-MCNC: 53 MG/DL (ref 40–60)
LDLC SERPL CALC-MCNC: 86 MG/DL (ref 0–100)
MICROALBUMIN UR-MCNC: 34.8 MG/L (ref 0–20)
MICROALBUMIN/CREAT 24H UR: 62 MG/G CREATININE (ref 0–30)
POTASSIUM SERPL-SCNC: 4.3 MMOL/L (ref 3.5–5.3)
PROT SERPL-MCNC: 7.6 G/DL (ref 6.4–8.2)
SODIUM SERPL-SCNC: 140 MMOL/L (ref 136–145)
T4 FREE SERPL-MCNC: 1.13 NG/DL (ref 0.76–1.46)
TRIGL SERPL-MCNC: 95 MG/DL
TSH SERPL DL<=0.05 MIU/L-ACNC: 1.31 UIU/ML (ref 0.36–3.74)

## 2019-10-12 PROCEDURE — 80053 COMPREHEN METABOLIC PANEL: CPT

## 2019-10-12 PROCEDURE — 82043 UR ALBUMIN QUANTITATIVE: CPT

## 2019-10-12 PROCEDURE — 82570 ASSAY OF URINE CREATININE: CPT

## 2019-10-12 PROCEDURE — 84439 ASSAY OF FREE THYROXINE: CPT

## 2019-10-12 PROCEDURE — 84443 ASSAY THYROID STIM HORMONE: CPT

## 2019-10-12 PROCEDURE — 36415 COLL VENOUS BLD VENIPUNCTURE: CPT

## 2019-10-12 PROCEDURE — 83036 HEMOGLOBIN GLYCOSYLATED A1C: CPT

## 2019-10-12 PROCEDURE — 80061 LIPID PANEL: CPT

## 2019-10-14 ENCOUNTER — TELEPHONE (OUTPATIENT)
Dept: ENDOCRINOLOGY | Facility: CLINIC | Age: 44
End: 2019-10-14

## 2019-10-14 DIAGNOSIS — E11.649 UNCONTROLLED TYPE 2 DIABETES MELLITUS WITH HYPOGLYCEMIA, UNSPECIFIED HYPOGLYCEMIA COMA STATUS (HCC): Primary | ICD-10-CM

## 2019-10-14 NOTE — TELEPHONE ENCOUNTER
----- Message from Debra Padilla PA-C sent at 10/14/2019  8:45 AM EDT -----  All testing normal Except continues with small amount of protein in urine  A1C down to 4 9--- great job  He can discontinue metformin, monitor BG periodically and repeat A1C in 3 months

## 2020-01-08 ENCOUNTER — OFFICE VISIT (OUTPATIENT)
Dept: FAMILY MEDICINE CLINIC | Facility: CLINIC | Age: 45
End: 2020-01-08
Payer: COMMERCIAL

## 2020-01-08 VITALS
TEMPERATURE: 97.8 F | SYSTOLIC BLOOD PRESSURE: 120 MMHG | HEART RATE: 72 BPM | OXYGEN SATURATION: 95 % | BODY MASS INDEX: 40.43 KG/M2 | WEIGHT: 315 LBS | HEIGHT: 74 IN | DIASTOLIC BLOOD PRESSURE: 78 MMHG

## 2020-01-08 DIAGNOSIS — R22.2 MASS ON BACK: Primary | ICD-10-CM

## 2020-01-08 DIAGNOSIS — J06.9 ACUTE URI: ICD-10-CM

## 2020-01-08 PROBLEM — Z01.84 IMMUNITY TO MEASLES DETERMINED BY SEROLOGIC TEST: Status: RESOLVED | Noted: 2019-07-02 | Resolved: 2020-01-08

## 2020-01-08 PROBLEM — Z23 NEED FOR PNEUMOCOCCAL VACCINATION: Status: RESOLVED | Noted: 2019-01-03 | Resolved: 2020-01-08

## 2020-01-08 PROBLEM — Z23 NEED FOR INFLUENZA VACCINATION: Status: RESOLVED | Noted: 2019-01-03 | Resolved: 2020-01-08

## 2020-01-08 PROBLEM — Z00.00 WELL ADULT EXAM: Status: RESOLVED | Noted: 2019-07-02 | Resolved: 2020-01-08

## 2020-01-08 PROCEDURE — 3008F BODY MASS INDEX DOCD: CPT | Performed by: FAMILY MEDICINE

## 2020-01-08 PROCEDURE — 99214 OFFICE O/P EST MOD 30 MIN: CPT | Performed by: FAMILY MEDICINE

## 2020-01-08 RX ORDER — FLUTICASONE PROPIONATE 50 MCG
1 SPRAY, SUSPENSION (ML) NASAL DAILY
Qty: 16 G | Refills: 1 | Status: SHIPPED | OUTPATIENT
Start: 2020-01-08 | End: 2021-06-03 | Stop reason: ALTCHOICE

## 2020-01-09 ENCOUNTER — HOSPITAL ENCOUNTER (OUTPATIENT)
Dept: RADIOLOGY | Facility: MEDICAL CENTER | Age: 45
Discharge: HOME | End: 2020-01-09
Payer: COMMERCIAL

## 2020-01-09 DIAGNOSIS — R22.2 MASS ON BACK: ICD-10-CM

## 2020-01-09 PROCEDURE — 76705 ECHO EXAM OF ABDOMEN: CPT

## 2020-01-13 ENCOUNTER — TELEPHONE (OUTPATIENT)
Dept: FAMILY MEDICINE CLINIC | Facility: CLINIC | Age: 45
End: 2020-01-13

## 2020-01-28 ENCOUNTER — CONSULT (OUTPATIENT)
Dept: SURGERY | Facility: CLINIC | Age: 45
End: 2020-01-28
Payer: COMMERCIAL

## 2020-01-28 VITALS
BODY MASS INDEX: 40.3 KG/M2 | WEIGHT: 314 LBS | SYSTOLIC BLOOD PRESSURE: 148 MMHG | HEIGHT: 74 IN | DIASTOLIC BLOOD PRESSURE: 86 MMHG | TEMPERATURE: 97.7 F | HEART RATE: 70 BPM

## 2020-01-28 DIAGNOSIS — R22.2 MASS ON BACK: ICD-10-CM

## 2020-01-28 PROCEDURE — 99243 OFF/OP CNSLTJ NEW/EST LOW 30: CPT | Performed by: SURGERY

## 2020-01-28 RX ORDER — SODIUM CHLORIDE, SODIUM LACTATE, POTASSIUM CHLORIDE, CALCIUM CHLORIDE 600; 310; 30; 20 MG/100ML; MG/100ML; MG/100ML; MG/100ML
125 INJECTION, SOLUTION INTRAVENOUS
Status: CANCELLED | OUTPATIENT
Start: 2020-01-29 | End: 2020-01-30

## 2020-01-28 NOTE — PATIENT INSTRUCTIONS
Epidermal Inclusion Cysts   WHAT YOU NEED TO KNOW:   What are epidermal inclusion cysts? Epidermal inclusion cysts are the most common skin cysts in adults  These cysts are usually round, firm lumps filled with a cheese-like material called keratin  They are also called epidermoid, keratin, or sebaceous cysts  They can be found almost anywhere on your body  The cysts are most common on the face, back, neck, chest, and around your ears  They can be caused by blocked hair follicle and oil gland ducts in your skin  Epidermal inclusion cysts may grow slowly but are not cancerous  How are the cysts treated? Treatment is not needed if you have no symptoms  The cysts can be opened and drained if the cysts become infected or cause problems  The cysts can grow larger and make it hard for you to sit or walk if they are on your legs or back  You may also need antibiotics if there is an infection  You may need surgery to remove the cyst completely  When should I contact my healthcare provider? · Your cyst becomes swollen, red, and painful  · Your cyst is large and leads to trouble moving or a deformed area  · You have questions or concerns about your condition or care

## 2020-01-28 NOTE — PROGRESS NOTES
Consult- General Surgery   Jeny Marie  38 y o  male MRN: [id number]  Unit/Bed#:  Encounter: [id number]    Assessment/Plan     Assessment:  Infected sebaceous cyst  Morbid obesity  History of thyroid cancer  Hypothyroidism  Hypertension  Hyperlipidemia  Asthma  Plan:  The patient was advised to undergo excision of sebaceous cyst under mild sedation in the near future  He will not require pre-admission testing  He will stop aspirin 7 days prior to surgery  I discussed the operative procedure, risks, benefits and alternatives with the patient, he understood and agreed to proceed  History of Present Illness     HPI:  Jeny Marie  is a 38 y o  male who presents to my office for evaluation of infected cyst   The patient has been complaining of discomfort and pain from the lump for several months, the cyst becomes painful at times with occasional drainage and has increased in size  Denied having any fever, chills or any other constitutional symptoms  He does not recall any single event the provoked the lump  Review of Systems   Constitutional: Negative for chills and fever  HENT: Negative for nosebleeds and sore throat  Eyes: Negative for pain and discharge  Respiratory: Negative for choking and shortness of breath  Cardiovascular: Negative for chest pain and palpitations  Gastrointestinal: Negative for blood in stool, constipation and diarrhea  Endocrine: Negative for cold intolerance and heat intolerance  Genitourinary: Negative for dysuria and hematuria  Neurological: Negative for seizures and headaches  Hematological: Negative for adenopathy  Does not bruise/bleed easily  Psychiatric/Behavioral: Negative for confusion  The patient is not nervous/anxious          Historical Information   Past Medical History:   Diagnosis Date    Asthma     Diabetes mellitus ([facility] [address] )     Diabetes type 2, uncontrolled ([facility] ) 5/6/2014    Diverticulitis     Hyperlipidemia     Hypertension  Immunity to measles determined by serologic test 2019    Thyroid [facility])     radioactive iodine     Type 2 diabetes mellitus ([facility] [address] ) 2013     Past Surgical History:   Procedure Laterality Date    BIOPSY CORE NEEDLE      Thyroid    COLOSTOMY      with reversal    [place] KNEE SCOPE,MED/LAT MENISECTOMY Left 2017    Procedure: KNEE ARTHROSCOPIC PARTIAL LATERAL MENISCECTOMY ; PATELLO FEMOEAL CHONDROPLASTY;  Surgeon: Yang Ghosh MD;  Location: [facility];  Service: Orthopedics    THYROIDECTOMY       Social History   Social History     Substance and Sexual Activity   Alcohol Use Yes    Comment: 1 drink every 2 weeks     Social History     Substance and Sexual Activity   Drug Use No     Social History     Tobacco Use   Smoking Status Former Smoker    Last attempt to quit:     Years since quittin 0   Smokeless Tobacco Never Used     Family History: non-contributory    Meds/Allergies   all medications and allergies reviewed     Current Outpatient Medications:     albuterol (PROVENTIL HFA,VENTOLIN HFA) 90 mcg/act inhaler, Inhale 2 puffs every 6 (six) hours as needed for wheezing, Disp: 8 g, Rfl: 0    amLODIPine (NORVASC) 5 mg tablet, TAKE 1 TABLET DAILY, Disp: 90 tablet, Rfl: 4    aspirin (ECOTRIN LOW STRENGTH) 81 mg EC tablet, Take 81 mg by mouth daily, Disp: , Rfl:     calcitriol (ROCALTROL) 0 25 mcg capsule, TAKE 1 CAPSULE TWICE A DAY, Disp: 180 capsule, Rfl: 4    Calcium Carb-Cholecalciferol (CALCIUM 600 + D PO), Take 600 mg by mouth 4 (four) times a day  , Disp: , Rfl:     diclofenac-misoprostol (ARTHROTEC 75) 75-0 2 MG per tablet, TAKE 1 TABLET TWICE A DAY, Disp: 180 tablet, Rfl: 4    fluticasone (FLONASE) 50 mcg/act nasal spray, 1 spray into each nostril daily, Disp: 16 g, Rfl: 1    losartan-hydrochlorothiazide (HYZAAR) 100-25 MG per tablet, Take 1 tablet by mouth daily, Disp: 90 tablet, Rfl: 3    ONETOUCH DELICA LANCETS FINE MISC, USE FIVE TIMES DAILY, Disp: 500 each, Rfl: 3    ONETOUCH VERIO test strip, USE AS INSTRUCTED FIVE TIMES DAILY, Disp: 500 each, Rfl: 3    simvastatin (ZOCOR) 10 mg tablet, TAKE 1 TABLET AT BEDTIME, Disp: 90 tablet, Rfl: 4    SYNTHROID 200 MCG tablet, Take 200 mcg by mouth Take one tab Mon-Fri and 2 tabs Sat/Sun, Disp: , Rfl:     SYNTHROID 200 MCG tablet, TAKE 2 TABLETS ON MONDAY THROUGH SATURDAY AND 1 TABLET ON SUNDAY, Disp: 180 tablet, Rfl: 4  No Known Allergies    Objective     Current Vitals:   Blood Pressure: 148/86 (01/28/20 1145)  Pulse: 70 (01/28/20 1145)  Temperature: 97 7 °F (36 5 °C) (01/28/20 1145)  Temp Source: Oral (01/28/20 1145)  Height: 6' 2" (188 cm) (01/28/20 1145)  Weight - Scale: (!) 142 kg (314 lb) (01/28/20 1145)      Invasive Devices     None                 Physical Exam   Constitutional: He is oriented to person, place, and time  He appears well-developed and well-nourished  No distress  Morbidly obese  HENT:   Head: Normocephalic  Mouth/Throat: No oropharyngeal exudate  Neck: Normal range of motion  Lower neck surgical scars  Cardiovascular: Normal rate and regular rhythm  No murmur heard  Pulmonary/Chest: Effort normal and breath sounds normal  No respiratory distress  Abdominal: Soft  He exhibits no mass  There is no tenderness  Lymphadenopathy:     He has no cervical adenopathy  Neurological: He is alert and oriented to person, place, and time  No cranial nerve deficit  Skin: No erythema  No pallor  There is a 3 5 x 3 5 cm soft tissue mass in the middle of the back, mild redness, mild tenderness, no drainage  Psychiatric: He has a normal mood and affect  His behavior is normal    Nursing note and vitals reviewed  Imaging: I have personally reviewed pertinent reports  Procedure: Us Superficial Lump (non Extremity)    Result Date: 1/13/2020  Narrative: Mid upper back ULTRASOUND INDICATION:   R22 2: Localized swelling, mass and lump, trunk   COMPARISON:  None TECHNIQUE:   Real-time ultrasound of the upper mid back was performed with a linear transducer with both volumetric sweeps and still imaging techniques  FINDINGS:  Within the upper back midline soft tissues there is a cystic lesion in a region of palpable abnormality measuring 1 7 x 1 8 x 1 1 cm without evidence of internal vascularity  Low level echoes are noted suggesting complex fluid internally  Findings may reflect a sebaceous cyst         Impression: Cystic collection within the subcutaneous soft tissues of the mid upper back of uncertain etiology, possibly a sebaceous cyst   Consider contrast-enhanced CT examination through this region for further characterization and exclusion of underlying infection  The study was marked in EPIC for significant notification   Workstation performed: [id number]     EKG, Pathology, and Other Studies: I have personally reviewed pertinent films in PACS

## 2020-01-28 NOTE — H&P (VIEW-ONLY)
Consult- General Surgery   Thiago Edouard  38 y o  male MRN: [id number]  Unit/Bed#:  Encounter: [id number]    Assessment/Plan     Assessment:  Infected sebaceous cyst  Morbid obesity  History of thyroid cancer  Hypothyroidism  Hypertension  Hyperlipidemia  Asthma  Plan:  The patient was advised to undergo excision of sebaceous cyst under mild sedation in the near future  He will not require pre-admission testing  He will stop aspirin 7 days prior to surgery  I discussed the operative procedure, risks, benefits and alternatives with the patient, he understood and agreed to proceed  History of Present Illness     HPI:  Thiago Edouard  is a 38 y o  male who presents to my office for evaluation of infected cyst   The patient has been complaining of discomfort and pain from the lump for several months, the cyst becomes painful at times with occasional drainage and has increased in size  Denied having any fever, chills or any other constitutional symptoms  He does not recall any single event the provoked the lump  Review of Systems   Constitutional: Negative for chills and fever  HENT: Negative for nosebleeds and sore throat  Eyes: Negative for pain and discharge  Respiratory: Negative for choking and shortness of breath  Cardiovascular: Negative for chest pain and palpitations  Gastrointestinal: Negative for blood in stool, constipation and diarrhea  Endocrine: Negative for cold intolerance and heat intolerance  Genitourinary: Negative for dysuria and hematuria  Neurological: Negative for seizures and headaches  Hematological: Negative for adenopathy  Does not bruise/bleed easily  Psychiatric/Behavioral: Negative for confusion  The patient is not nervous/anxious          Historical Information   Past Medical History:   Diagnosis Date    Asthma     Diabetes mellitus ([facility] )     Diabetes type 2, uncontrolled ([facility] ) 5/6/2014    Diverticulitis     Hyperlipidemia     Hypertension  Immunity to measles determined by serologic test 2019    Thyroid cancer [facility])     radioactive iodine     Type 2 diabetes mellitus ([facility] [address] ) 2013     Past Surgical History:   Procedure Laterality Date    BIOPSY CORE NEEDLE      Thyroid    COLOSTOMY      with reversal    SD KNEE SCOPE,MED/LAT MENISECTOMY Left 2017    Procedure: KNEE ARTHROSCOPIC PARTIAL LATERAL MENISCECTOMY ; PATELLO FEMOEAL CHONDROPLASTY;  Surgeon: Indira Lorenzo MD;  [facility];  Service: Orthopedics    THYROIDECTOMY       Social History   Social History     Substance and Sexual Activity   Alcohol Use Yes    Comment: 1 drink every 2 weeks     Social History     Substance and Sexual Activity   Drug Use No     Social History     Tobacco Use   Smoking Status Former Smoker    Last attempt to quit:     Years since quittin 0   Smokeless Tobacco Never Used     Family History: non-contributory    Meds/Allergies   all medications and allergies reviewed     Current Outpatient Medications:     albuterol (PROVENTIL HFA,VENTOLIN HFA) 90 mcg/act inhaler, Inhale 2 puffs every 6 (six) hours as needed for wheezing, Disp: 8 g, Rfl: 0    amLODIPine (NORVASC) 5 mg tablet, TAKE 1 TABLET DAILY, Disp: 90 tablet, Rfl: 4    aspirin (ECOTRIN LOW STRENGTH) 81 mg EC tablet, Take 81 mg by mouth daily, Disp: , Rfl:     calcitriol (ROCALTROL) 0 25 mcg capsule, TAKE 1 CAPSULE TWICE A DAY, Disp: 180 capsule, Rfl: 4    Calcium Carb-Cholecalciferol (CALCIUM 600 + D PO), Take 600 mg by mouth 4 (four) times a day  , Disp: , Rfl:     diclofenac-misoprostol (ARTHROTEC 75) 75-0 2 MG per tablet, TAKE 1 TABLET TWICE A DAY, Disp: 180 tablet, Rfl: 4    fluticasone (FLONASE) 50 mcg/act nasal spray, 1 spray into each nostril daily, Disp: 16 g, Rfl: 1    losartan-hydrochlorothiazide (HYZAAR) 100-25 MG per tablet, Take 1 tablet by mouth daily, Disp: 90 tablet, Rfl: 3    ONETOUCH DELICA LANCETS FINE MISC, USE FIVE TIMES DAILY, Disp: 500 each, Rfl: 3    ONETOUCH VERIO test strip, USE AS INSTRUCTED FIVE TIMES DAILY, Disp: 500 each, Rfl: 3    simvastatin (ZOCOR) 10 mg tablet, TAKE 1 TABLET AT BEDTIME, Disp: 90 tablet, Rfl: 4    SYNTHROID 200 MCG tablet, Take 200 mcg by mouth Take one tab Mon-Fri and 2 tabs Sat/Sun, Disp: , Rfl:     SYNTHROID 200 MCG tablet, TAKE 2 TABLETS ON MONDAY THROUGH SATURDAY AND 1 TABLET ON SUNDAY, Disp: 180 tablet, Rfl: 4  No Known Allergies    Objective     Current Vitals:   Blood Pressure: 148/86 (01/28/20 1145)  Pulse: 70 (01/28/20 1145)  Temperature: 97 7 °F (36 5 °C) (01/28/20 1145)  Temp Source: Oral (01/28/20 1145)  Height: 6' 2" (188 cm) (01/28/20 1145)  Weight - Scale: (!) 142 kg (314 lb) (01/28/20 1145)      Invasive Devices     None                 Physical Exam   Constitutional: He is oriented to person, place, and time  He appears well-developed and well-nourished  No distress  Morbidly obese  HENT:   Head: Normocephalic  Mouth/Throat: No oropharyngeal exudate  Neck: Normal range of motion  Lower neck surgical scars  Cardiovascular: Normal rate and regular rhythm  No murmur heard  Pulmonary/Chest: Effort normal and breath sounds normal  No respiratory distress  Abdominal: Soft  He exhibits no mass  There is no tenderness  Lymphadenopathy:     He has no cervical adenopathy  Neurological: He is alert and oriented to person, place, and time  No cranial nerve deficit  Skin: No erythema  No pallor  There is a 3 5 x 3 5 cm soft tissue mass in the middle of the back, mild redness, mild tenderness, no drainage  Psychiatric: He has a normal mood and affect  His behavior is normal    Nursing note and vitals reviewed  Imaging: I have personally reviewed pertinent reports  Procedure: Us Superficial Lump (non Extremity)    Result Date: 1/13/2020  Narrative: Mid upper back ULTRASOUND INDICATION:   R22 2: Localized swelling, mass and lump, trunk   COMPARISON:  None TECHNIQUE:   Real-time ultrasound of the upper mid back was performed with a linear transducer with both volumetric sweeps and still imaging techniques  FINDINGS:  Within the upper back midline soft tissues there is a cystic lesion in a region of palpable abnormality measuring 1 7 x 1 8 x 1 1 cm without evidence of internal vascularity  Low level echoes are noted suggesting complex fluid internally  Findings may reflect a sebaceous cyst         Impression: Cystic collection within the subcutaneous soft tissues of the mid upper back of uncertain etiology, possibly a sebaceous cyst   Consider contrast-enhanced CT examination through this region for further characterization and exclusion of underlying infection  The study was marked in EPIC for significant notification   Workstation performed: [id number]     EKG, Pathology, and Other Studies: I have personally reviewed pertinent films in PACS

## 2020-02-03 DIAGNOSIS — Z91.89 RISK FACTORS FOR OBSTRUCTIVE SLEEP APNEA: Primary | ICD-10-CM

## 2020-02-03 NOTE — PRE-PROCEDURE INSTRUCTIONS
Pre-Surgery Instructions:   Medication Instructions    albuterol (PROVENTIL HFA,VENTOLIN HFA) 90 mcg/act inhaler Instructed patient per Anesthesia Guidelines   amLODIPine (NORVASC) 5 mg tablet Instructed patient per Anesthesia Guidelines   aspirin (ECOTRIN LOW STRENGTH) 81 mg EC tablet Pt instructed to hold for one week prior to surgery    calcitriol (ROCALTROL) 0 25 mcg capsule Instructed patient per Anesthesia Guidelines   Calcium Carb-Cholecalciferol (CALCIUM 600 + D PO) Instructed patient per Anesthesia Guidelines   diclofenac-misoprostol (ARTHROTEC 75) 75-0 2 MG per tablet Instructed patient per Anesthesia Guidelines   fluticasone (FLONASE) 50 mcg/act nasal spray Instructed patient per Anesthesia Guidelines   losartan-hydrochlorothiazide (HYZAAR) 100-25 MG per tablet Pt instructed to hold morning of surgery    simvastatin (ZOCOR) 10 mg tablet Instructed patient per Anesthesia Guidelines   SYNTHROID 200 MCG tablet Instructed patient per Anesthesia Guidelines

## 2020-02-06 ENCOUNTER — ANESTHESIA EVENT (OUTPATIENT)
Dept: PERIOP | Facility: HOSPITAL | Age: 45
End: 2020-02-06
Payer: COMMERCIAL

## 2020-02-07 ENCOUNTER — HOSPITAL ENCOUNTER (OUTPATIENT)
Facility: HOSPITAL | Age: 45
Setting detail: OUTPATIENT SURGERY
Discharge: HOME | End: 2020-02-07
Attending: SURGERY | Admitting: SURGERY
Payer: COMMERCIAL

## 2020-02-07 ENCOUNTER — ANESTHESIA (OUTPATIENT)
Dept: PERIOP | Facility: HOSPITAL | Age: 45
End: 2020-02-07
Payer: COMMERCIAL

## 2020-02-07 VITALS
RESPIRATION RATE: 14 BRPM | BODY MASS INDEX: 41.31 KG/M2 | HEART RATE: 59 BPM | DIASTOLIC BLOOD PRESSURE: 96 MMHG | SYSTOLIC BLOOD PRESSURE: 143 MMHG | WEIGHT: 311.73 LBS | OXYGEN SATURATION: 99 % | TEMPERATURE: 97 F | HEIGHT: 73 IN

## 2020-02-07 DIAGNOSIS — R22.2 MASS ON BACK: ICD-10-CM

## 2020-02-07 LAB
GLUCOSE SERPL-MCNC: 101 MG/DL (ref 65–140)
GLUCOSE SERPL-MCNC: 93 MG/DL (ref 65–140)

## 2020-02-07 PROCEDURE — 82948 REAGENT STRIP/BLOOD GLUCOSE: CPT

## 2020-02-07 PROCEDURE — 88304 TISSUE EXAM BY PATHOLOGIST: CPT | Performed by: PATHOLOGY

## 2020-02-07 PROCEDURE — 11403 EXC TR-EXT B9+MARG 2.1-3CM: CPT | Performed by: SURGERY

## 2020-02-07 PROCEDURE — 11403 EXC TR-EXT B9+MARG 2.1-3CM: CPT | Performed by: CLINICAL NURSE SPECIALIST

## 2020-02-07 RX ORDER — MAGNESIUM HYDROXIDE 1200 MG/15ML
LIQUID ORAL AS NEEDED
Status: DISCONTINUED | OUTPATIENT
Start: 2020-02-07 | End: 2020-02-07 | Stop reason: HOSPADM

## 2020-02-07 RX ORDER — KETOROLAC TROMETHAMINE 30 MG/ML
INJECTION, SOLUTION INTRAMUSCULAR; INTRAVENOUS AS NEEDED
Status: DISCONTINUED | OUTPATIENT
Start: 2020-02-07 | End: 2020-02-07 | Stop reason: SURG

## 2020-02-07 RX ORDER — SODIUM CHLORIDE, SODIUM LACTATE, POTASSIUM CHLORIDE, CALCIUM CHLORIDE 600; 310; 30; 20 MG/100ML; MG/100ML; MG/100ML; MG/100ML
125 INJECTION, SOLUTION INTRAVENOUS CONTINUOUS
Status: DISCONTINUED | OUTPATIENT
Start: 2020-02-07 | End: 2020-02-07 | Stop reason: HOSPADM

## 2020-02-07 RX ORDER — SODIUM CHLORIDE, SODIUM LACTATE, POTASSIUM CHLORIDE, CALCIUM CHLORIDE 600; 310; 30; 20 MG/100ML; MG/100ML; MG/100ML; MG/100ML
125 INJECTION, SOLUTION INTRAVENOUS
Status: DISCONTINUED | OUTPATIENT
Start: 2020-02-07 | End: 2020-02-07 | Stop reason: HOSPADM

## 2020-02-07 RX ORDER — FENTANYL CITRATE 50 UG/ML
INJECTION, SOLUTION INTRAMUSCULAR; INTRAVENOUS AS NEEDED
Status: DISCONTINUED | OUTPATIENT
Start: 2020-02-07 | End: 2020-02-07 | Stop reason: SURG

## 2020-02-07 RX ORDER — SODIUM CHLORIDE, SODIUM LACTATE, POTASSIUM CHLORIDE, CALCIUM CHLORIDE 600; 310; 30; 20 MG/100ML; MG/100ML; MG/100ML; MG/100ML
INJECTION, SOLUTION INTRAVENOUS CONTINUOUS PRN
Status: DISCONTINUED | OUTPATIENT
Start: 2020-02-07 | End: 2020-02-07 | Stop reason: SURG

## 2020-02-07 RX ORDER — MIDAZOLAM HYDROCHLORIDE 2 MG/2ML
INJECTION, SOLUTION INTRAMUSCULAR; INTRAVENOUS AS NEEDED
Status: DISCONTINUED | OUTPATIENT
Start: 2020-02-07 | End: 2020-02-07 | Stop reason: SURG

## 2020-02-07 RX ORDER — CEFAZOLIN SODIUM 2 G/50ML
2000 SOLUTION INTRAVENOUS
Status: COMPLETED | OUTPATIENT
Start: 2020-02-07 | End: 2020-02-07

## 2020-02-07 RX ORDER — PROPOFOL 10 MG/ML
INJECTION, EMULSION INTRAVENOUS AS NEEDED
Status: DISCONTINUED | OUTPATIENT
Start: 2020-02-07 | End: 2020-02-07 | Stop reason: SURG

## 2020-02-07 RX ORDER — GLYCOPYRROLATE 0.2 MG/ML
INJECTION INTRAMUSCULAR; INTRAVENOUS AS NEEDED
Status: DISCONTINUED | OUTPATIENT
Start: 2020-02-07 | End: 2020-02-07 | Stop reason: SURG

## 2020-02-07 RX ORDER — ACETAMINOPHEN 325 MG/1
975 TABLET ORAL EVERY 8 HOURS PRN
Status: DISCONTINUED | OUTPATIENT
Start: 2020-02-07 | End: 2020-02-07 | Stop reason: HOSPADM

## 2020-02-07 RX ORDER — KETAMINE HCL IN NACL, ISO-OSM 100MG/10ML
SYRINGE (ML) INJECTION AS NEEDED
Status: DISCONTINUED | OUTPATIENT
Start: 2020-02-07 | End: 2020-02-07 | Stop reason: SURG

## 2020-02-07 RX ADMIN — PROPOFOL 20 MG: 10 INJECTION, EMULSION INTRAVENOUS at 10:21

## 2020-02-07 RX ADMIN — MIDAZOLAM HYDROCHLORIDE 2 MG: 1 INJECTION, SOLUTION INTRAMUSCULAR; INTRAVENOUS at 10:04

## 2020-02-07 RX ADMIN — PROPOFOL 20 MG: 10 INJECTION, EMULSION INTRAVENOUS at 10:38

## 2020-02-07 RX ADMIN — PROPOFOL 20 MG: 10 INJECTION, EMULSION INTRAVENOUS at 10:19

## 2020-02-07 RX ADMIN — PROPOFOL 20 MG: 10 INJECTION, EMULSION INTRAVENOUS at 10:24

## 2020-02-07 RX ADMIN — SODIUM CHLORIDE, SODIUM LACTATE, POTASSIUM CHLORIDE, AND CALCIUM CHLORIDE: .6; .31; .03; .02 INJECTION, SOLUTION INTRAVENOUS at 09:58

## 2020-02-07 RX ADMIN — CEFAZOLIN SODIUM 2000 MG: 2 SOLUTION INTRAVENOUS at 10:04

## 2020-02-07 RX ADMIN — PROPOFOL 100 MG: 10 INJECTION, EMULSION INTRAVENOUS at 10:11

## 2020-02-07 RX ADMIN — Medication 50 MG: at 10:13

## 2020-02-07 RX ADMIN — KETOROLAC TROMETHAMINE 30 MG: 30 INJECTION, SOLUTION INTRAMUSCULAR at 10:41

## 2020-02-07 RX ADMIN — PROPOFOL 20 MG: 10 INJECTION, EMULSION INTRAVENOUS at 10:17

## 2020-02-07 RX ADMIN — PROPOFOL 20 MG: 10 INJECTION, EMULSION INTRAVENOUS at 10:31

## 2020-02-07 RX ADMIN — GLYCOPYRROLATE 0.2 MG: 0.2 INJECTION, SOLUTION INTRAMUSCULAR; INTRAVENOUS at 10:04

## 2020-02-07 RX ADMIN — SODIUM CHLORIDE, SODIUM LACTATE, POTASSIUM CHLORIDE, AND CALCIUM CHLORIDE 125 ML/HR: .6; .31; .03; .02 INJECTION, SOLUTION INTRAVENOUS at 09:10

## 2020-02-07 RX ADMIN — PROPOFOL 20 MG: 10 INJECTION, EMULSION INTRAVENOUS at 10:14

## 2020-02-07 RX ADMIN — PROPOFOL 20 MG: 10 INJECTION, EMULSION INTRAVENOUS at 10:27

## 2020-02-07 RX ADMIN — PROPOFOL 20 MG: 10 INJECTION, EMULSION INTRAVENOUS at 10:34

## 2020-02-07 RX ADMIN — FENTANYL CITRATE 50 MCG: 50 INJECTION, SOLUTION INTRAMUSCULAR; INTRAVENOUS at 10:04

## 2020-02-07 NOTE — INTERVAL H&P NOTE
H&P reviewed  After examining the patient I find no changes in the patients condition since the H&P had been written      Vitals:    02/07/20 0902   BP: 144/84   Pulse: 69   Resp: 15   Temp: 97 7 °F (36 5 °C)   SpO2: 100%

## 2020-02-07 NOTE — ANESTHESIA PREPROCEDURE EVALUATION
Review of Systems/Medical History  Patient summary reviewed    No history of anesthetic complications     Cardiovascular  Exercise tolerance (METS): >4,  Hyperlipidemia, Hypertension controlled, No dysrhythmias , No angina ,    Pulmonary  Asthma , well controlled/ stable ,        GI/Hepatic  Negative GI/hepatic ROS          Negative  ROS        Endo/Other  Diabetes Diet controlled, History of thyroid disease , hypothyroidism,   Obesity  morbid obesity   GYN       Hematology  Negative hematology ROS      Musculoskeletal  Negative musculoskeletal ROS        Neurology  Negative neurology ROS      Psychology   Negative psychology ROS              Physical Exam    Airway    Mallampati score: I  TM Distance: >3 FB  Neck ROM: full     Dental   No notable dental hx     Cardiovascular  Rhythm: regular, Rate: normal,     Pulmonary  Breath sounds clear to auscultation,     Other Findings        Anesthesia Plan  ASA Score- 3     Anesthesia Type- IV sedation with anesthesia with ASA Monitors  Additional Monitors:   Airway Plan:         Plan Factors-  Patient did not smoke on day of surgery  Induction- intravenous  Postoperative Plan-     Informed Consent- Anesthetic plan and risks discussed with patient  I personally reviewed this patient with the CRNA  Discussed and agreed on the Anesthesia Plan with the CRNA  Renard Barbosa

## 2020-02-07 NOTE — ANESTHESIA POSTPROCEDURE EVALUATION
Post-Op Assessment Note    CV Status:  Stable  Pain Score: 0    Pain management: adequate     Mental Status:  Sleepy   Hydration Status:  Stable   PONV Controlled:  None   Airway Patency:  Patent and adequate   Post Op Vitals Reviewed: Yes      Staff: CRNA   Comments: VSS AP PACU          BP      Temp      Pulse     Resp      SpO2

## 2020-02-07 NOTE — DISCHARGE INSTRUCTIONS
No diet restriction for this surgery  May shower every day  Remove dressings in 3 days  Remove strips in 7 days  Call office to make an appointment in 2 weeks [phone]  Call office with any issues regarding the surgery  Resume home medications  Apply ice to the incisions  May take regular Tylenol or ibuprofen for pain

## 2020-02-07 NOTE — OP NOTE
OPERATIVE REPORT  PATIENT NAME: Deanna Alejo  :  1975  MRN: [id number]  Pt Location: [facility] OR ROOM 02    SURGERY DATE: 2020    Surgeon(s) and Role:     * Henna Orr MD - Primary     * Steffi Mejia PA-C - Assisting    Preop Diagnosis:  Mass on back [R22 2]    Post-Op Diagnosis Codes:     * Mass on back [R22 2]    Procedure(s) (LRB):  EXCISION  BIOPSY LESION/MASS from back (N/A)    Specimen(s):  ID Type Source Tests Collected by Time Destination   1 : mass from back Tissue Back TISSUE EXAM Henna Orr MD 2020 1024        Estimated Blood Loss:   Minimal    Drains:  None    Anesthesia Type:   IV Sedation with Anesthesia    Operative Indications: Mass on back [R22 2]    Operative Findings:  The patient had a cyst measure approximately 3 x 2 cm in the largest diameter  Closest margin 2 mm  Complications:   None    Procedure and Technique:  The patient was identified he was placed in the operating table in a left lateral decubitus  After adequate IV sedation the back was prepped and draped in sterile usual fashion with ChloraPrep  Time-out was called the patient was identified as was surgical site  1% lidocaine with or% Marcaine was infiltrated  An elliptical incision was with a scalpel, taken down through the subcutaneous tissue with scalpel dissection until the cyst mass was completely excised  Specimen sent to pathology  Hemostasis was accomplished with cautery  Subcutaneous tissue was approximated with 3 0 Vicryl in an interrupted fashion skin was closed with 4 O Vicryl in a continuous subcuticular fashion  Sterile dressing were applied  At the end of the case instrument, needles, sponges counts were correct  Patient tolerated the procedure well     I was present for the entire procedure, A qualified resident physician was not available and A physician assistant was required during the procedure for retraction tissue handling,dissection and suturing    Patient Disposition:  PACU, hemodynamically stable      SIGNATURE: Janelle Lilly MD  DATE: February 7, 2020  TIME: 10:36 AM

## 2020-02-12 ENCOUNTER — OFFICE VISIT (OUTPATIENT)
Dept: ENDOCRINOLOGY | Facility: CLINIC | Age: 45
End: 2020-02-12
Payer: COMMERCIAL

## 2020-02-12 VITALS
BODY MASS INDEX: 41.75 KG/M2 | HEIGHT: 73 IN | WEIGHT: 315 LBS | HEART RATE: 62 BPM | DIASTOLIC BLOOD PRESSURE: 90 MMHG | SYSTOLIC BLOOD PRESSURE: 132 MMHG

## 2020-02-12 DIAGNOSIS — C73 THYROID CANCER (HCC): ICD-10-CM

## 2020-02-12 DIAGNOSIS — E78.00 HYPERCHOLESTEROLEMIA: ICD-10-CM

## 2020-02-12 DIAGNOSIS — E03.9 HYPOTHYROIDISM, UNSPECIFIED TYPE: ICD-10-CM

## 2020-02-12 DIAGNOSIS — I10 BENIGN ESSENTIAL HYPERTENSION: ICD-10-CM

## 2020-02-12 DIAGNOSIS — E66.01 MORBID OBESITY DUE TO EXCESS CALORIES (HCC): ICD-10-CM

## 2020-02-12 DIAGNOSIS — I10 HYPERTENSION, UNSPECIFIED TYPE: ICD-10-CM

## 2020-02-12 DIAGNOSIS — E55.9 VITAMIN D DEFICIENCY: ICD-10-CM

## 2020-02-12 DIAGNOSIS — E89.0 POSTOPERATIVE HYPOTHYROIDISM: ICD-10-CM

## 2020-02-12 DIAGNOSIS — E11.9 TYPE 2 DIABETES MELLITUS WITHOUT COMPLICATION, WITHOUT LONG-TERM CURRENT USE OF INSULIN (HCC): ICD-10-CM

## 2020-02-12 DIAGNOSIS — E20.9 HYPOPARATHYROIDISM, UNSPECIFIED HYPOPARATHYROIDISM TYPE (HCC): Primary | ICD-10-CM

## 2020-02-12 PROCEDURE — 99214 OFFICE O/P EST MOD 30 MIN: CPT | Performed by: INTERNAL MEDICINE

## 2020-02-12 PROCEDURE — 3075F SYST BP GE 130 - 139MM HG: CPT | Performed by: INTERNAL MEDICINE

## 2020-02-12 PROCEDURE — 4010F ACE/ARB THERAPY RXD/TAKEN: CPT | Performed by: INTERNAL MEDICINE

## 2020-02-12 PROCEDURE — 3080F DIAST BP >= 90 MM HG: CPT | Performed by: INTERNAL MEDICINE

## 2020-02-12 PROCEDURE — 1036F TOBACCO NON-USER: CPT | Performed by: INTERNAL MEDICINE

## 2020-02-12 PROCEDURE — 3066F NEPHROPATHY DOC TX: CPT | Performed by: INTERNAL MEDICINE

## 2020-02-12 PROCEDURE — 3008F BODY MASS INDEX DOCD: CPT | Performed by: INTERNAL MEDICINE

## 2020-02-12 RX ORDER — LEVOTHYROXINE SODIUM 200 MCG
TABLET ORAL
Qty: 120 TABLET | Refills: 4 | Status: SHIPPED | OUTPATIENT
Start: 2020-02-12 | End: 2020-08-26 | Stop reason: SDUPTHER

## 2020-02-12 RX ORDER — LOSARTAN POTASSIUM AND HYDROCHLOROTHIAZIDE 25; 100 MG/1; MG/1
1 TABLET ORAL DAILY
Qty: 90 TABLET | Refills: 3 | Status: SHIPPED | OUTPATIENT
Start: 2020-02-12 | End: 2021-04-04 | Stop reason: SDUPTHER

## 2020-02-12 NOTE — ASSESSMENT & PLAN NOTE
Lab Results   Component Value Date    HGBA1C 4 9 10/12/2019   Off medications for the past 6 months    Will repeat fasting glucose and check hemoglobin A1c

## 2020-02-12 NOTE — PROGRESS NOTES
Shawn Sutton  38 y o  male MRN: [id number]    Encounter: [id number]      Assessment/Plan     Problem List Items Addressed This Visit        Endocrine    Hypoparathyroidism ([address] ) - Primary     Continue calcium and calcitriol-will check calcium, phosphorus         Relevant Orders    Phosphorus Lab Collect    Hypothyroidism     Continue Synthroid at current dose- will check thyroid function test         Relevant Medications    SYNTHROID 200 MCG tablet    Other Relevant Orders    T4, free Lab Collect    TSH, 3rd generation Lab Collect    Thyroid cancer (HCC)     Repeat thyroglobulin level as well as check neck ultrasound with lymph node mapping         Relevant Medications    SYNTHROID 200 MCG tablet    Other Relevant Orders    Thyroglobulin w/ab Lab Collect    US head neck lymph node mapping    Type 2 diabetes mellitus without complication, without long-term current use of insulin (HCC)       Lab Results   Component Value Date    HGBA1C 4 9 10/12/2019   Off medications for the past 6 months    Will repeat fasting glucose and check hemoglobin A1c         Relevant Orders    Comprehensive metabolic panel Lab Collect    HEMOGLOBIN A1C W/ EAG ESTIMATION Lab Collect    Lipid Panel with Direct LDL reflex Lab Collect    Microalbumin / creatinine urine ratio Lab Collect       Cardiovascular and Mediastinum    Benign essential hypertension    Relevant Medications    losartan-hydrochlorothiazide (HYZAAR) 100-25 MG per tablet       Other    Hypercholesterolemia     Continue statins         Morbid obesity due to excess calories (HCC)    Vitamin D deficiency     Will check vitamin-D 25 hydroxy         Relevant Orders    Vitamin D 25 hydroxy Lab Collect      Other Visit Diagnoses     Hypertension, unspecified type        Relevant Medications    losartan-hydrochlorothiazide (HYZAAR) 100-25 MG per tablet    Other Relevant Orders    Comprehensive metabolic panel Lab Collect        CC:   Postsurgical hypothyroidism, hypoparathyroidism and diabetes    History of Present Illness      HPI:  28-year-old male with history of thyroid cancer, postsurgical hypo thyroidism, postsurgical hypoparathyroidism, please, hypertension, hyperlipidemia and obesity here for follow-up  He has history of 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  He underwent total thyroidectomy, neck dissection as well as radioactive iodine ablation in 2009-had repeat neck dissection in 2011  Thyroglobulin has been stable in the past few years  No radiographic evidence of recurrent cancer  For postsurgical hypothyroidism is currently on Synthroid 200 mon-sat and 400 on Sunday   Waits 45- mins before breakfast         Has been of antidiabetic medication for the past 6 months or so  He had lost about 100 lb with lifestyle modification following a low carb diet  Weight gain -15 lbs since the last 2 months   For postsurgical hypoparathyroidism and hypocalcemia he is currently on the Calcitriol twice a day, calcium  , ( calcium and D) 2 TABS TWICE DAILY AND 1 TAB WITH LUNCH  No myalgias , numbness         Review of Systems   Constitutional: Negative for fatigue and unexpected weight change  HENT: Negative for trouble swallowing  Eyes: Negative for visual disturbance  Cardiovascular: Negative for palpitations and leg swelling  Gastrointestinal: Negative for constipation, diarrhea, nausea and vomiting  Musculoskeletal: Negative for arthralgias, gait problem and myalgias  Skin: Negative for pallor, rash and wound  Neurological: Negative for weakness and numbness  Psychiatric/Behavioral: Negative for sleep disturbance  All other systems reviewed and are negative        Historical Information   Past Medical History:   Diagnosis Date    Asthma     Diabetes mellitus ([facility] [address] )     Diabetes type 2, uncontrolled ([facility] [address] ) 5/6/2014    Diverticulitis     Hyperlipidemia     Hypertension     Immunity to measles determined by serologic test 2019    Thyroid cancer ([facility] [address] )     radioactive iodine     Type 2 diabetes mellitus ([facility] [address] ) 2013     Past Surgical History:   Procedure Laterality Date    BIOPSY CORE NEEDLE      Thyroid    CHEST WALL BIOPSY N/A 2020    Procedure: EXCISION  BIOPSY LESION/MASS from back;  Surgeon: Isa Siemens, MD;  Location: [facility];  Service: General    COLOSTOMY      with reversal    [place] KNEE SCOPE,MED/LAT MENISECTOMY Left 2017    Procedure: KNEE ARTHROSCOPIC PARTIAL LATERAL MENISCECTOMY ; PATELLO FEMOEAL CHONDROPLASTY;  Surgeon: Amelia Flores MD;  Location: [facility];  Service: Orthopedics    THYROIDECTOMY       Social History   Social History     Substance and Sexual Activity   Alcohol Use Yes    Frequency: Monthly or less    Comment: 1 drink every 2 weeks     Social History     Substance and Sexual Activity   Drug Use No     Social History     Tobacco Use   Smoking Status Former Smoker    Last attempt to quit:     Years since quittin 1   Smokeless Tobacco Never Used     Family History:   Family History   Problem Relation Age of Onset    Diabetes Mother     Hypertension Mother     Hypertension Father     Hyperlipidemia Father     Thyroid disease unspecified Sister        Meds/Allergies   Current Outpatient Medications   Medication Sig Dispense Refill    albuterol (PROVENTIL HFA,VENTOLIN HFA) 90 mcg/act inhaler Inhale 2 puffs every 6 (six) hours as needed for wheezing 8 g 0    aspirin (ECOTRIN LOW STRENGTH) 81 mg EC tablet Take 81 mg by mouth daily      calcitriol (ROCALTROL) 0 25 mcg capsule TAKE 1 CAPSULE TWICE A  capsule 4    Calcium Carb-Cholecalciferol (CALCIUM 600 + D PO) Take 600 mg by mouth 4 (four) times a day        diclofenac-misoprostol (ARTHROTEC 75) 75-0 2 MG per tablet TAKE 1 TABLET TWICE A  tablet 4    fluticasone (FLONASE) 50 mcg/act nasal spray 1 spray into each nostril daily 16 g 1    ONETOUCH DELICA LANCETS FINE MISC USE FIVE TIMES DAILY 500 each 3    ONETOUCH VERIO test strip USE AS INSTRUCTED FIVE TIMES DAILY 500 each 3    simvastatin (ZOCOR) 10 mg tablet TAKE 1 TABLET AT BEDTIME 90 tablet 4    losartan-hydrochlorothiazide (HYZAAR) 100-25 MG per tablet Take 1 tablet by mouth daily 90 tablet 3    SYNTHROID 200 MCG tablet TAKE 1 TABLETSON MONDAY THROUGH SATURDAY AND 2 TABLETS ON SUNDAY 120 tablet 4     No current facility-administered medications for this visit  No Known Allergies    Objective   Vitals: Blood pressure 132/90, pulse 62, height 6' 1" (1 854 m), weight (!) 146 kg (322 lb 9 6 oz)  Physical Exam   Constitutional: He is oriented to person, place, and time  He appears well-developed and well-nourished  No distress  HENT:   Head: Normocephalic and atraumatic  Eyes: EOM are normal  No scleral icterus  Neck: Normal range of motion  Neck supple  Anterior neck surgical scar-no masses   Cardiovascular: Normal rate, regular rhythm and normal heart sounds  No murmur heard  Pulmonary/Chest: Effort normal and breath sounds normal  No respiratory distress  He has no wheezes  He has no rales  Abdominal: Soft  Bowel sounds are normal  He exhibits no distension  There is no tenderness  There is no guarding  Musculoskeletal: Normal range of motion  He exhibits no edema or deformity  Lymphadenopathy:     He has no cervical adenopathy  Neurological: He is alert and oriented to person, place, and time  Skin: Skin is warm and dry  Psychiatric: He has a normal mood and affect  His behavior is normal  Judgment and thought content normal    Vitals reviewed  The history was obtained from the review of the chart, patient      Lab Results:   Lab Results   Component Value Date/Time    TSH 3RD GENERATON 1 310 10/12/2019 09:34 AM    TSH 3RD GENERATON 0 046 (L) 07/08/2019 03:39 PM    TSH 3RD GENERATON 0 024 (L) 06/15/2019 08:06 AM    Free T4 1 13 10/12/2019 09:34 AM    Free T4 1 66 (H) 07/08/2019 03:39 PM    Free T4 2 02 (H) 06/15/2019 08:06 AM    Thyroglobulin Ab <1 0 06/15/2019 08:06 AM    Thyroglobulin Ab <1 0 03/27/2019 11:49 AM     Imaging Studies:  Results for orders placed during the hospital encounter of 03/28/19   US head neck lymph node mapping    Impression No evidence of recurrent or metastatic disease  Workstation performed: [id number]            I have personally reviewed pertinent reports  Portions of the record may have been created with voice recognition software  Occasional wrong word or "sound a like" substitutions may have occurred due to the inherent limitations of voice recognition software  Read the chart carefully and recognize, using context, where substitutions have occurred

## 2020-02-18 ENCOUNTER — OFFICE VISIT (OUTPATIENT)
Dept: SURGERY | Facility: CLINIC | Age: 45
End: 2020-02-18

## 2020-02-18 VITALS
HEIGHT: 73 IN | SYSTOLIC BLOOD PRESSURE: 124 MMHG | DIASTOLIC BLOOD PRESSURE: 78 MMHG | BODY MASS INDEX: 41.75 KG/M2 | TEMPERATURE: 97.9 F | HEART RATE: 78 BPM | WEIGHT: 315 LBS

## 2020-02-18 DIAGNOSIS — Z48.89 POSTOPERATIVE VISIT: Primary | ICD-10-CM

## 2020-02-18 PROCEDURE — 3078F DIAST BP <80 MM HG: CPT | Performed by: SURGERY

## 2020-02-18 PROCEDURE — 3008F BODY MASS INDEX DOCD: CPT | Performed by: SURGERY

## 2020-02-18 PROCEDURE — 99024 POSTOP FOLLOW-UP VISIT: CPT | Performed by: SURGERY

## 2020-02-18 PROCEDURE — 3066F NEPHROPATHY DOC TX: CPT | Performed by: SURGERY

## 2020-02-18 PROCEDURE — 3074F SYST BP LT 130 MM HG: CPT | Performed by: SURGERY

## 2020-02-18 NOTE — PROGRESS NOTES
Post-Op Follow Up- General Surgery   Liz Satno  38 y o  male MRN: [id number]  Unit/Bed#:  Encounter: [id number]    Assessment/Plan     Assessment:  Status post excision sebaceous cyst from back, improved  Plan:  Patient is discharged from my care and I will be glad to see him if any problem arises in the future    History of Present Illness     HPI:  Liz Santo  is a 38 y o  male who presents to my office for 1st postop follow-up after excision of sebaceous cyst from back  He offers no complaints at this time        Historical Information   Past Medical History:   Diagnosis Date    Asthma     Diabetes mellitus ([facility] )     Diabetes type 2, uncontrolled ([place] [address] ) 2014    Diverticulitis     Hyperlipidemia     Hypertension     Immunity to measles determined by serologic test 2019    Thyroid cancer ([place] [address] )     radioactive iodine     Type 2 diabetes mellitus ([place] [address] ) 2013     Past Surgical History:   Procedure Laterality Date    BIOPSY CORE NEEDLE      Thyroid    CHEST WALL BIOPSY N/A 2020    Procedure: EXCISION  BIOPSY LESION/MASS from back;  Surgeon: Ulysses Augustine MD;  Location: [facility];  Service: General    COLOSTOMY      with reversal    LA KNEE SCOPE,MED/LAT MENISECTOMY Left 2017    Procedure: KNEE ARTHROSCOPIC PARTIAL LATERAL MENISCECTOMY ; JOSEPHINE Mei;  Surgeon: Lorri Pascual MD;  Location:  MAIN OR;  Service: Orthopedics    THYROIDECTOMY       Social History   Social History     Substance and Sexual Activity   Alcohol Use Yes    Frequency: Monthly or less    Comment: 1 drink every 2 weeks     Social History     Substance and Sexual Activity   Drug Use No     Social History     Tobacco Use   Smoking Status Former Smoker    Last attempt to quit:     Years since quittin 1   Smokeless Tobacco Never Used     Family History: non-contributory    Meds/Allergies   all medications and allergies reviewed     Current Outpatient Medications:   albuterol (PROVENTIL HFA,VENTOLIN HFA) 90 mcg/act inhaler, Inhale 2 puffs every 6 (six) hours as needed for wheezing, Disp: 8 g, Rfl: 0    aspirin (ECOTRIN LOW STRENGTH) 81 mg EC tablet, Take 81 mg by mouth daily, Disp: , Rfl:     calcitriol (ROCALTROL) 0 25 mcg capsule, TAKE 1 CAPSULE TWICE A DAY, Disp: 180 capsule, Rfl: 4    Calcium Carb-Cholecalciferol (CALCIUM 600 + D PO), Take 600 mg by mouth 4 (four) times a day  , Disp: , Rfl:     diclofenac-misoprostol (ARTHROTEC 75) 75-0 2 MG per tablet, TAKE 1 TABLET TWICE A DAY, Disp: 180 tablet, Rfl: 4    fluticasone (FLONASE) 50 mcg/act nasal spray, 1 spray into each nostril daily, Disp: 16 g, Rfl: 1    losartan-hydrochlorothiazide (HYZAAR) 100-25 MG per tablet, Take 1 tablet by mouth daily, Disp: 90 tablet, Rfl: 3    ONETOUCH DELICA LANCETS FINE MISC, USE FIVE TIMES DAILY, Disp: 500 each, Rfl: 3    ONETOUCH VERIO test strip, USE AS INSTRUCTED FIVE TIMES DAILY, Disp: 500 each, Rfl: 3    simvastatin (ZOCOR) 10 mg tablet, TAKE 1 TABLET AT BEDTIME, Disp: 90 tablet, Rfl: 4    SYNTHROID 200 MCG tablet, TAKE 1 TABLETSON MONDAY THROUGH SATURDAY AND 2 TABLETS ON SUNDAY, Disp: 120 tablet, Rfl: 4  No Known Allergies    Objective     Current Vitals:   Blood Pressure: 124/78 (02/18/20 0743)  Pulse: 78 (02/18/20 0743)  Temperature: 97 9 °F (36 6 °C) (02/18/20 0743)  Temp Source: Oral (02/18/20 0743)  Height: 6' 1" (185 4 cm) (02/18/20 0743)  Weight - Scale: (!) 143 kg (315 lb 6 4 oz) (02/18/20 0743)      Invasive Devices     None                 Physical Exam   Skin:   Incision from back is well-healed without evidence of infection  Nursing note and vitals reviewed

## 2020-03-07 ENCOUNTER — LAB (OUTPATIENT)
Dept: LAB | Facility: CLINIC | Age: 45
End: 2020-03-07
Payer: COMMERCIAL

## 2020-03-07 DIAGNOSIS — E11.9 TYPE 2 DIABETES MELLITUS WITHOUT COMPLICATION, WITHOUT LONG-TERM CURRENT USE OF INSULIN (HCC): ICD-10-CM

## 2020-03-07 DIAGNOSIS — E03.9 HYPOTHYROIDISM, UNSPECIFIED TYPE: ICD-10-CM

## 2020-03-07 DIAGNOSIS — E55.9 VITAMIN D DEFICIENCY: ICD-10-CM

## 2020-03-07 DIAGNOSIS — I10 HYPERTENSION, UNSPECIFIED TYPE: ICD-10-CM

## 2020-03-07 DIAGNOSIS — C73 THYROID CANCER (HCC): ICD-10-CM

## 2020-03-07 DIAGNOSIS — E20.9 HYPOPARATHYROIDISM, UNSPECIFIED HYPOPARATHYROIDISM TYPE (HCC): ICD-10-CM

## 2020-03-07 LAB
25(OH)D3 SERPL-MCNC: 65.3 NG/ML (ref 30–100)
ALBUMIN SERPL BCP-MCNC: 3.8 G/DL (ref 3.5–5)
ALP SERPL-CCNC: 47 U/L (ref 46–116)
ALT SERPL W P-5'-P-CCNC: 37 U/L (ref 12–78)
ANION GAP SERPL CALCULATED.3IONS-SCNC: 8 MMOL/L (ref 4–13)
AST SERPL W P-5'-P-CCNC: 20 U/L (ref 5–45)
BILIRUB SERPL-MCNC: 0.51 MG/DL (ref 0.2–1)
BUN SERPL-MCNC: 30 MG/DL (ref 5–25)
CALCIUM SERPL-MCNC: 7.8 MG/DL (ref 8.3–10.1)
CHLORIDE SERPL-SCNC: 104 MMOL/L (ref 100–108)
CHOLEST SERPL-MCNC: 143 MG/DL (ref 50–200)
CO2 SERPL-SCNC: 29 MMOL/L (ref 21–32)
CREAT SERPL-MCNC: 0.87 MG/DL (ref 0.6–1.3)
CREAT UR-MCNC: 78.1 MG/DL
EST. AVERAGE GLUCOSE BLD GHB EST-MCNC: 97 MG/DL
GFR SERPL CREATININE-BSD FRML MDRD: 104 ML/MIN/1.73SQ M
GLUCOSE P FAST SERPL-MCNC: 102 MG/DL (ref 65–99)
HBA1C MFR BLD: 5 %
HDLC SERPL-MCNC: 44 MG/DL
LDLC SERPL CALC-MCNC: 84 MG/DL (ref 0–100)
MICROALBUMIN UR-MCNC: 64.9 MG/L (ref 0–20)
MICROALBUMIN/CREAT 24H UR: 83 MG/G CREATININE (ref 0–30)
PHOSPHATE SERPL-MCNC: 4.7 MG/DL (ref 2.7–4.5)
POTASSIUM SERPL-SCNC: 4 MMOL/L (ref 3.5–5.3)
PROT SERPL-MCNC: 7.3 G/DL (ref 6.4–8.2)
SODIUM SERPL-SCNC: 141 MMOL/L (ref 136–145)
T4 FREE SERPL-MCNC: 1.3 NG/DL (ref 0.76–1.46)
TRIGL SERPL-MCNC: 75 MG/DL
TSH SERPL DL<=0.05 MIU/L-ACNC: 1.33 UIU/ML (ref 0.36–3.74)

## 2020-03-07 PROCEDURE — 84439 ASSAY OF FREE THYROXINE: CPT

## 2020-03-07 PROCEDURE — 83036 HEMOGLOBIN GLYCOSYLATED A1C: CPT

## 2020-03-07 PROCEDURE — 36415 COLL VENOUS BLD VENIPUNCTURE: CPT

## 2020-03-07 PROCEDURE — 86800 THYROGLOBULIN ANTIBODY: CPT

## 2020-03-07 PROCEDURE — 82570 ASSAY OF URINE CREATININE: CPT

## 2020-03-07 PROCEDURE — 84100 ASSAY OF PHOSPHORUS: CPT

## 2020-03-07 PROCEDURE — 84443 ASSAY THYROID STIM HORMONE: CPT

## 2020-03-07 PROCEDURE — 80053 COMPREHEN METABOLIC PANEL: CPT

## 2020-03-07 PROCEDURE — 82043 UR ALBUMIN QUANTITATIVE: CPT

## 2020-03-07 PROCEDURE — 82306 VITAMIN D 25 HYDROXY: CPT

## 2020-03-07 PROCEDURE — 3060F POS MICROALBUMINURIA REV: CPT | Performed by: PHYSICIAN ASSISTANT

## 2020-03-07 PROCEDURE — 84432 ASSAY OF THYROGLOBULIN: CPT

## 2020-03-07 PROCEDURE — 80061 LIPID PANEL: CPT

## 2020-03-10 LAB
THYROGLOB AB SERPL-ACNC: <1 IU/ML (ref 0–0.9)
THYROGLOB SERPL-MCNC: 3.5 NG/ML (ref 1.4–29.2)

## 2020-03-12 NOTE — RESULT ENCOUNTER NOTE
Please call the patient regarding labs - thyroglobulin as a little bit higher than before and calcium is a little lower  Please confirm the current dose of Synthroid, calcium, vitamin-D and Calcitriol

## 2020-03-13 ENCOUNTER — TELEPHONE (OUTPATIENT)
Dept: ENDOCRINOLOGY | Facility: CLINIC | Age: 45
End: 2020-03-13

## 2020-03-13 NOTE — TELEPHONE ENCOUNTER
----- Message from Yaima Hart MD sent at 3/12/2020  3:57 PM EDT -----  Please call the patient regarding labs - thyroglobulin as a little bit higher than before and calcium is a little lower  Please confirm the current dose of Synthroid, calcium, vitamin-D and Calcitriol

## 2020-06-15 ENCOUNTER — OFFICE VISIT (OUTPATIENT)
Dept: FAMILY MEDICINE CLINIC | Facility: CLINIC | Age: 45
End: 2020-06-15
Payer: COMMERCIAL

## 2020-06-15 VITALS
DIASTOLIC BLOOD PRESSURE: 86 MMHG | WEIGHT: 315 LBS | OXYGEN SATURATION: 95 % | TEMPERATURE: 98.2 F | RESPIRATION RATE: 18 BRPM | HEIGHT: 73 IN | HEART RATE: 66 BPM | SYSTOLIC BLOOD PRESSURE: 134 MMHG | BODY MASS INDEX: 41.75 KG/M2

## 2020-06-15 DIAGNOSIS — E89.0 POSTOPERATIVE HYPOTHYROIDISM: ICD-10-CM

## 2020-06-15 DIAGNOSIS — I10 BENIGN ESSENTIAL HYPERTENSION: ICD-10-CM

## 2020-06-15 DIAGNOSIS — Z00.00 ANNUAL PHYSICAL EXAM: Primary | ICD-10-CM

## 2020-06-15 DIAGNOSIS — E78.00 HYPERCHOLESTEROLEMIA: ICD-10-CM

## 2020-06-15 DIAGNOSIS — E66.01 MORBID OBESITY DUE TO EXCESS CALORIES (HCC): ICD-10-CM

## 2020-06-15 PROCEDURE — 99396 PREV VISIT EST AGE 40-64: CPT | Performed by: NURSE PRACTITIONER

## 2020-06-15 PROCEDURE — 3044F HG A1C LEVEL LT 7.0%: CPT | Performed by: NURSE PRACTITIONER

## 2020-06-15 RX ORDER — PHENTERMINE HYDROCHLORIDE 37.5 MG/1
37.5 TABLET ORAL
Qty: 30 TABLET | Refills: 0 | Status: SHIPPED | OUTPATIENT
Start: 2020-06-15 | End: 2020-07-13 | Stop reason: SDUPTHER

## 2020-06-22 ENCOUNTER — APPOINTMENT (OUTPATIENT)
Dept: LAB | Facility: CLINIC | Age: 45
End: 2020-06-22
Payer: COMMERCIAL

## 2020-06-22 DIAGNOSIS — E89.0 POSTOPERATIVE HYPOTHYROIDISM: ICD-10-CM

## 2020-06-22 DIAGNOSIS — I10 BENIGN ESSENTIAL HYPERTENSION: ICD-10-CM

## 2020-06-22 LAB
ALBUMIN SERPL BCP-MCNC: 4.3 G/DL (ref 3.5–5)
ALP SERPL-CCNC: 56 U/L (ref 46–116)
ALT SERPL W P-5'-P-CCNC: 55 U/L (ref 12–78)
ANION GAP SERPL CALCULATED.3IONS-SCNC: 5 MMOL/L (ref 4–13)
AST SERPL W P-5'-P-CCNC: 29 U/L (ref 5–45)
BILIRUB SERPL-MCNC: 0.52 MG/DL (ref 0.2–1)
BUN SERPL-MCNC: 29 MG/DL (ref 5–25)
CALCIUM SERPL-MCNC: 9 MG/DL (ref 8.3–10.1)
CHLORIDE SERPL-SCNC: 105 MMOL/L (ref 100–108)
CO2 SERPL-SCNC: 31 MMOL/L (ref 21–32)
CREAT SERPL-MCNC: 1.44 MG/DL (ref 0.6–1.3)
GFR SERPL CREATININE-BSD FRML MDRD: 58 ML/MIN/1.73SQ M
GLUCOSE SERPL-MCNC: 114 MG/DL (ref 65–140)
POTASSIUM SERPL-SCNC: 3.7 MMOL/L (ref 3.5–5.3)
PROT SERPL-MCNC: 8 G/DL (ref 6.4–8.2)
SODIUM SERPL-SCNC: 141 MMOL/L (ref 136–145)
TSH SERPL DL<=0.05 MIU/L-ACNC: 0.69 UIU/ML (ref 0.36–3.74)

## 2020-06-22 PROCEDURE — 80053 COMPREHEN METABOLIC PANEL: CPT

## 2020-06-22 PROCEDURE — 84443 ASSAY THYROID STIM HORMONE: CPT

## 2020-06-22 PROCEDURE — 36415 COLL VENOUS BLD VENIPUNCTURE: CPT

## 2020-06-23 DIAGNOSIS — N28.9 DECREASED RENAL FUNCTION: Primary | ICD-10-CM

## 2020-07-13 ENCOUNTER — OFFICE VISIT (OUTPATIENT)
Dept: FAMILY MEDICINE CLINIC | Facility: CLINIC | Age: 45
End: 2020-07-13
Payer: COMMERCIAL

## 2020-07-13 VITALS
SYSTOLIC BLOOD PRESSURE: 118 MMHG | HEART RATE: 83 BPM | OXYGEN SATURATION: 97 % | DIASTOLIC BLOOD PRESSURE: 84 MMHG | WEIGHT: 315 LBS | BODY MASS INDEX: 41.75 KG/M2 | HEIGHT: 73 IN

## 2020-07-13 DIAGNOSIS — E66.01 MORBID OBESITY DUE TO EXCESS CALORIES (HCC): Primary | ICD-10-CM

## 2020-07-13 PROCEDURE — 3079F DIAST BP 80-89 MM HG: CPT | Performed by: NURSE PRACTITIONER

## 2020-07-13 PROCEDURE — 3044F HG A1C LEVEL LT 7.0%: CPT | Performed by: NURSE PRACTITIONER

## 2020-07-13 PROCEDURE — 3060F POS MICROALBUMINURIA REV: CPT | Performed by: NURSE PRACTITIONER

## 2020-07-13 PROCEDURE — 99213 OFFICE O/P EST LOW 20 MIN: CPT | Performed by: NURSE PRACTITIONER

## 2020-07-13 PROCEDURE — 3074F SYST BP LT 130 MM HG: CPT | Performed by: NURSE PRACTITIONER

## 2020-07-13 PROCEDURE — 3066F NEPHROPATHY DOC TX: CPT | Performed by: NURSE PRACTITIONER

## 2020-07-13 PROCEDURE — 3008F BODY MASS INDEX DOCD: CPT | Performed by: NURSE PRACTITIONER

## 2020-07-13 PROCEDURE — 1036F TOBACCO NON-USER: CPT | Performed by: NURSE PRACTITIONER

## 2020-07-13 RX ORDER — PHENTERMINE HYDROCHLORIDE 37.5 MG/1
37.5 TABLET ORAL
Qty: 30 TABLET | Refills: 0 | Status: SHIPPED | OUTPATIENT
Start: 2020-07-13 | End: 2020-10-21

## 2020-07-13 NOTE — PROGRESS NOTES
Assessment/Plan:    No problem-specific Assessment & Plan notes found for this encounter  Problem List Items Addressed This Visit        Other    Morbid obesity due to excess calories ([facility] ) - Primary    BMI 40 0-44 9, adult ([facility] )     Will continue with his Phentermine for the next 4 weeks and recheck his weight in 4 weeks          Relevant Medications    phentermine (ADIPEX-P) 37 5 MG tablet            Subjective:      Patient ID: Yoel Brand  is a 38 y o  male  Patient doing well on his Phentermine medication and has lost 12 pounds over the past 4 weeks and is doing well  Patient is not traveling to [place] for his vacation  Patient denies any side effects from the medication  The following portions of the patient's history were reviewed and updated as appropriate: He  has a past medical history of Asthma, Diabetes mellitus ([facility] ), Diabetes type 2, uncontrolled ([facility] ) (5/6/2014), Diverticulitis, Hyperlipidemia, Hypertension, Immunity to measles determined by serologic test (7/2/2019), Thyroid cancer ([facility] ), and Type 2 diabetes mellitus ([facility] ) (7/17/2013)    He   Patient Active Problem List    Diagnosis Date Noted    BMI 40 0-44 9, adult ([facility] ) 06/15/2020    Type 2 diabetes mellitus without complication, without long-term current use of insulin ([facility] ) 02/12/2020    Thyroid cancer ([facility] ) 02/12/2020    Mass on back 01/08/2020    Annual physical exam 07/02/2019    Encounter for follow-up surveillance of thyroid cancer 04/24/2019    Lumbar disc herniation 01/03/2019    Sciatic nerve disease, left 05/21/2018    Chronic bilateral low back pain with left-sided sciatica 05/16/2017    Chronic pain disorder 01/31/2017    Lumbar radiculopathy 01/31/2017    Myofascial pain syndrome 01/31/2017    History of thyroid cancer 10/11/2016    Morbid obesity due to excess calories ([address] ) 10/11/2016    Vitamin D deficiency 05/03/2016    Gait difficulty 10/19/2015    Hypoparathyroidism ([facility] ) 12/17/2013  Microalbuminuria 07/17/2013    Hypothyroidism 03/15/2013    Benign essential hypertension 10/04/2012    Hypercholesterolemia 08/09/2012    Hypocalcemia 08/09/2012     He  has a past surgical history that includes Thyroidectomy; Colostomy; pr knee scope,med/lat menisectomy (Left, 8/17/2017); BIOPSY CORE NEEDLE; and Chest wall biopsy (N/A, 2/7/2020)  His family history includes Diabetes in his mother; Hyperlipidemia in his father; Hypertension in his father and mother; Thyroid disease unspecified in his sister  He  reports that he quit smoking about 18 years ago  He has never used smokeless tobacco  He reports that he drinks alcohol  He reports that he does not use drugs  He has No Known Allergies       Review of Systems   Constitutional: Negative for activity change, appetite change, chills, diaphoresis, fatigue, fever and unexpected weight change  HENT: Negative for congestion, ear pain, hearing loss, postnasal drip, sinus pressure, sinus pain, sneezing and sore throat  Eyes: Negative for pain, redness and visual disturbance  Respiratory: Negative for cough and shortness of breath  Cardiovascular: Negative for chest pain and leg swelling  Gastrointestinal: Negative for abdominal pain, diarrhea, nausea and vomiting  Musculoskeletal: Negative for arthralgias  Neurological: Negative for dizziness and light-headedness  Psychiatric/Behavioral: Negative for behavioral problems and dysphoric mood  Objective:      /84   Pulse 83   Ht 6' 1" (1 854 m)   Wt (!) 148 kg (326 lb 3 2 oz)   SpO2 97%   BMI 43 04 kg/m²          Physical Exam   Constitutional: He is oriented to person, place, and time  Vital signs are normal  He appears well-developed and well-nourished  No distress  HENT:   Head: Normocephalic and atraumatic  Eyes: Pupils are equal, round, and reactive to light  Neck: Normal range of motion  No thyromegaly present     Cardiovascular: Normal rate, regular rhythm, normal heart sounds and intact distal pulses  No murmur heard  Pulmonary/Chest: Effort normal and breath sounds normal  No respiratory distress  He has no wheezes  Abdominal: Soft  Bowel sounds are normal    Musculoskeletal: Normal range of motion  Neurological: He is alert and oriented to person, place, and time  Skin: Skin is warm and dry  Psychiatric: He has a normal mood and affect  Nursing note and vitals reviewed

## 2020-07-29 NOTE — TELEPHONE ENCOUNTER
Care Manager contacted the patient by telephone in follow up. Verified  and zip code with patient as identifiers. 64 yo female recurrent asthma exacerbation, SOB, coughing, increased physical debility and dysphonia;   PMH:  COPD, Asthma, HTN, Diastolic dysfunction, hyperlipidemia, Type 2 Diabetes and CHF; Assessment of clinical changes and knowledge demonstrated since last call:   Ongoing Plan of Care:     Impaired Gas Exchange, asthma exacerbation  Goal met:  Demonstrates self-management skills to lessen SOB in next 30 days;  Goal met:  Demonstrates behaviors to prevent or lessen risk of infection;  · Off all steroids, denies problems with asthma flare ups with return to work;  · Continues to use inhalers appropriately;  · Has not needed Nebulizer recently;   · Reports Vocal cord dysfunction improved, continues Speech Therapy;  · Continues warm water with lemon for globus sensation in throat;   · Breaking habit of throat clearing as well; Activity Intolerance  New Goal:   Demonstrates improved endurance in activity with self-management of SOB;  · Patient has been able to return to work, usually 4-6 hr shifts;  · Reports mild SOB from car to job site;   · Continues with some SOB with exertion;   · Reports had to work a 12 hr shift once, was exhausted;  · Has begun daily rest breaks, resting a day in between work as well;   · Performing energy saving strategies to save ebergy for when needed;    Review and discussion of plan of care with patient, who has provided input to plan, verbalized understanding and agrees with current goals. Any recurrence Red Flags or continued symptoms:  None;     Medication Regimen Change:  No changes; Completed a review of medications with patient, who verbalized understanding of how and when to take medications.       Barriers / Adherence with medications:  SOB, which is improving;     Upcoming Appointments:    Future Appointments   Date Time Provider Department Notified pt re: labs  His BP was 121/78 , I will place an order for an A! C to be done in 3 mos    Pt is aware Center   8/12/2020  7:50 AM Nu Briscoe NP [facility]   8/31/2020  8:30 AM Shen Krueger DO [facility]   9/22/2020  9:30 AM Ravi You MD [facility]       Patient asked questions appropriately and denied any additional needs at this time. Patient verbalized understanding of all information discussed. Patient has my name and contact information for any follow up needs or questions. Plan next call:   Continue to FU with speech, exertion/asthma with working; This note will not be viewable in [address]

## 2020-08-12 ENCOUNTER — OFFICE VISIT (OUTPATIENT)
Dept: ENDOCRINOLOGY | Facility: CLINIC | Age: 45
End: 2020-08-12
Payer: COMMERCIAL

## 2020-08-12 VITALS
WEIGHT: 315 LBS | HEART RATE: 61 BPM | SYSTOLIC BLOOD PRESSURE: 122 MMHG | HEIGHT: 73 IN | BODY MASS INDEX: 41.75 KG/M2 | DIASTOLIC BLOOD PRESSURE: 80 MMHG

## 2020-08-12 DIAGNOSIS — E11.9 TYPE 2 DIABETES MELLITUS WITHOUT COMPLICATION, WITHOUT LONG-TERM CURRENT USE OF INSULIN (HCC): Primary | ICD-10-CM

## 2020-08-12 DIAGNOSIS — E55.9 VITAMIN D DEFICIENCY: ICD-10-CM

## 2020-08-12 DIAGNOSIS — E83.51 HYPOCALCEMIA: ICD-10-CM

## 2020-08-12 DIAGNOSIS — E89.0 POSTOPERATIVE HYPOTHYROIDISM: ICD-10-CM

## 2020-08-12 DIAGNOSIS — E20.9 HYPOPARATHYROIDISM, UNSPECIFIED HYPOPARATHYROIDISM TYPE (HCC): ICD-10-CM

## 2020-08-12 DIAGNOSIS — C73 THYROID CANCER (HCC): ICD-10-CM

## 2020-08-12 LAB — SL AMB POCT HEMOGLOBIN AIC: 4.9 (ref ?–6.5)

## 2020-08-12 PROCEDURE — 3079F DIAST BP 80-89 MM HG: CPT | Performed by: PHYSICIAN ASSISTANT

## 2020-08-12 PROCEDURE — 3060F POS MICROALBUMINURIA REV: CPT | Performed by: PHYSICIAN ASSISTANT

## 2020-08-12 PROCEDURE — 3066F NEPHROPATHY DOC TX: CPT | Performed by: PHYSICIAN ASSISTANT

## 2020-08-12 PROCEDURE — 3008F BODY MASS INDEX DOCD: CPT | Performed by: PHYSICIAN ASSISTANT

## 2020-08-12 PROCEDURE — 99214 OFFICE O/P EST MOD 30 MIN: CPT | Performed by: PHYSICIAN ASSISTANT

## 2020-08-12 PROCEDURE — 83036 HEMOGLOBIN GLYCOSYLATED A1C: CPT | Performed by: PHYSICIAN ASSISTANT

## 2020-08-12 PROCEDURE — 1036F TOBACCO NON-USER: CPT | Performed by: PHYSICIAN ASSISTANT

## 2020-08-12 PROCEDURE — 3074F SYST BP LT 130 MM HG: CPT | Performed by: PHYSICIAN ASSISTANT

## 2020-08-12 PROCEDURE — 3044F HG A1C LEVEL LT 7.0%: CPT | Performed by: PHYSICIAN ASSISTANT

## 2020-08-12 NOTE — PROGRESS NOTES
Established Patient Progress Note       Chief Complaint   Patient presents with    Thyroid Cancer    Hypothyroidism    Diabetes Type 2        History of Present Illness:     Chanell Madison  is a 38 y o  male with a history of Thyroid Cancer, Post-Surgical Hypothyroidism, Type 2 Diabetes, Hypertension, and Hyperlipidemia      For the Papillary thyroid Cancer metastatic to cervical lymph nodes, He has had history of Thyroidectomy in 2009, RadioIodine Therapy 2009, and Radical Neck Dissection in 2011  Ultrasound ordered at last visit not yet completed  Recent thyroglobulin level 3 5       For the Hypothyroidism, he is taking Synthroid 200mcg Mon-Sat, 2 tabs Sunday  Complains that weight loss has slowed since synthroid dosing reduced  He is following with his family physician and taking phentermine for weight loss      For the Hypoparathyroidism, he is taking Calcium supplements and calcitriol  He denies kidney stones  Rarely will have symptoms of hypoglycemia which improves quickly with a calcium suppleemnt       For the Type 2 Diabetes, he remains off all medication x 1 year  Remains on low carb diet  Now taking phentermine  Following with family physician for HTN and hyperlipidemia  Hoping to get off all meds  He has been keeping more hydrated since the labs showed increase in creatinine and has order for repeat BMP       Patient Active Problem List   Diagnosis    Benign essential hypertension    Chronic pain disorder    Gait difficulty    Hypercholesterolemia    Hypocalcemia    Hypoparathyroidism ([address] )    Hypothyroidism    Chronic bilateral low back pain with left-sided sciatica    Lumbar radiculopathy    History of thyroid cancer    Microalbuminuria    Morbid obesity due to excess calories (HCC)    Myofascial pain syndrome    Vitamin D deficiency    Sciatic nerve disease, left    Lumbar disc herniation    Encounter for follow-up surveillance of thyroid cancer    Annual physical exam    Mass on back    Type 2 diabetes mellitus without complication, without long-term current use of insulin (HCC)    Thyroid cancer ([place] [address] )    BMI 40 0-44 9, adult (HCC)      Past Medical History:   Diagnosis Date    Asthma     Diabetes mellitus ([place] [address] )     Diabetes type 2, uncontrolled ([place] [address] ) 2014    Diverticulitis     Hyperlipidemia     Hypertension     Immunity to measles determined by serologic test 2019    Thyroid cancer ([place] [address] )     radioactive iodine     Type 2 diabetes mellitus ([place] [address] ) 2013      Past Surgical History:   Procedure Laterality Date    BIOPSY CORE NEEDLE      Thyroid    CHEST WALL BIOPSY N/A 2020    Procedure: EXCISION  BIOPSY LESION/MASS from back;  Surgeon: Angel Christopher MD;  Location: [facility];  Service: General    COLOSTOMY      with reversal    VA KNEE SCOPE,MED/LAT MENISECTOMY Left 2017    Procedure: KNEE ARTHROSCOPIC PARTIAL LATERAL MENISCECTOMY ; PATELLO Renard Bash;  Surgeon: Bk Welch MD;  Location: [facility];  Service: Orthopedics    THYROIDECTOMY        Family History   Problem Relation Age of Onset    Diabetes Mother     Hypertension Mother     Hypertension Father     Hyperlipidemia Father     Thyroid disease unspecified Sister      Social History     Tobacco Use    Smoking status: Former Smoker     Packs/day: 1 00     Types: Cigarettes     Last attempt to quit:      Years since quittin 6    Smokeless tobacco: Never Used   Substance Use Topics    Alcohol use: Yes     Frequency: Monthly or less     Drinks per session: 1 or 2     Binge frequency: Never     Comment: 1 drink every 2 weeks     No Known Allergies    Current Outpatient Medications:     aspirin (ECOTRIN LOW STRENGTH) 81 mg EC tablet, Take 81 mg by mouth daily, Disp: , Rfl:     calcitriol (ROCALTROL) 0 25 mcg capsule, TAKE 1 CAPSULE TWICE A DAY, Disp: 180 capsule, Rfl: 4    Calcium Carb-Cholecalciferol (CALCIUM 600 + D PO), Take 600 mg by mouth 4 (four) times a day  , Disp: , Rfl:     diclofenac-misoprostol (ARTHROTEC 75) 75-0 2 MG per tablet, TAKE 1 TABLET TWICE A DAY, Disp: 180 tablet, Rfl: 4    fluticasone (FLONASE) 50 mcg/act nasal spray, 1 spray into each nostril daily, Disp: 16 g, Rfl: 1    losartan-hydrochlorothiazide (HYZAAR) 100-25 MG per tablet, Take 1 tablet by mouth daily, Disp: 90 tablet, Rfl: 3    phentermine (ADIPEX-P) 37 5 MG tablet, Take 1 tablet (37 5 mg total) by mouth daily with breakfast, Disp: 30 tablet, Rfl: 0    simvastatin (ZOCOR) 10 mg tablet, TAKE 1 TABLET AT BEDTIME, Disp: 90 tablet, Rfl: 4    SYNTHROID 200 MCG tablet, TAKE 1 TABLETSON MONDAY THROUGH SATURDAY AND 2 TABLETS ON SUNDAY, Disp: 120 tablet, Rfl: 4    Review of Systems   Constitutional: Negative for activity change, appetite change and fatigue  HENT: Negative for sore throat, trouble swallowing and voice change  Eyes: Negative for visual disturbance  Respiratory: Negative for choking, chest tightness and shortness of breath  Cardiovascular: Negative for chest pain, palpitations and leg swelling  Gastrointestinal: Negative for abdominal pain, constipation and diarrhea  Endocrine: Negative for cold intolerance, heat intolerance, polydipsia, polyphagia and polyuria  Genitourinary: Negative for frequency  Musculoskeletal: Negative for arthralgias and myalgias  Skin: Negative for rash  Neurological: Positive for numbness (from prior back injury)  Negative for dizziness and syncope  Hematological: Negative for adenopathy  Psychiatric/Behavioral: Negative for sleep disturbance  All other systems reviewed and are negative  Physical Exam:  Body mass index is 42 67 kg/m²    /80 (BP Location: Left arm, Patient Position: Sitting, Cuff Size: Large)   Pulse 61   Ht 6' 1" (1 854 m)   Wt (!) 147 kg (323 lb 6 4 oz)   BMI 42 67 kg/m²    Wt Readings from Last 3 Encounters:   08/12/20 (!) 147 kg (323 lb 6 4 oz)   07/13/20 (!) 148 kg (326 lb 3 2 oz)   06/15/20 (!) 153 kg (338 lb)       Physical Exam  Vitals signs reviewed  Constitutional:       General: He is not in acute distress  Appearance: He is well-developed  HENT:      Head: Normocephalic and atraumatic  Eyes:      Conjunctiva/sclera: Conjunctivae normal       Pupils: Pupils are equal, round, and reactive to light  Neck:      Musculoskeletal: Normal range of motion and neck supple  Thyroid: No thyromegaly  Cardiovascular:      Rate and Rhythm: Normal rate and regular rhythm  Pulses: no weak pulses          Dorsalis pedis pulses are 2+ on the right side and 2+ on the left side  Posterior tibial pulses are 2+ on the right side and 2+ on the left side  Heart sounds: Normal heart sounds  No murmur  Pulmonary:      Effort: Pulmonary effort is normal  No respiratory distress  Breath sounds: Normal breath sounds  No wheezing or rales  Abdominal:      General: Bowel sounds are normal  There is no distension  Palpations: Abdomen is soft  Tenderness: There is no abdominal tenderness  Musculoskeletal: Normal range of motion  Feet:      Right foot:      Skin integrity: No ulcer, skin breakdown, erythema, warmth, callus or dry skin  Left foot:      Skin integrity: No ulcer, skin breakdown, erythema, warmth, callus or dry skin  Lymphadenopathy:      Cervical: No cervical adenopathy  Skin:     General: Skin is warm and dry  Neurological:      Mental Status: He is alert and oriented to person, place, and time  Diabetic Foot Exam    Patient's shoes and socks removed  Right Foot/Ankle   Right Foot Inspection  Skin Exam: skin normal and skin intact no dry skin, no warmth, no callus, no erythema, no maceration, no abnormal color, no pre-ulcer, no ulcer and no callus                          Toe Exam: ROM and strength within normal limitsno swelling, no tenderness, erythema and  no right toe deformity  Sensory       Monofilament testing: diminished  Vascular  Capillary refills: < 3 seconds  The right DP pulse is 2+  The right PT pulse is 2+  Left Foot/Ankle  Left Foot Inspection  Skin Exam: skin normal and skin intactno dry skin, no warmth, no erythema, no maceration, normal color, no pre-ulcer, no ulcer and no callus                         Toe Exam: ROM and strength within normal limitsno swelling, no tenderness, no erythema and no left toe deformity                   Sensory       Monofilament: diminished  Vascular  Capillary refills: < 3 seconds  The left DP pulse is 2+  The left PT pulse is 2+  Assign Risk Category:  No deformity present; No loss of protective sensation;  No weak pulses       Risk: 0         Labs:   Component      Latest Ref Rng & Units 3/7/2020 3/7/2020 3/7/2020 3/7/2020           7:50 AM  7:50 AM  7:50 AM  7:50 AM   Sodium      136 - 145 mmol/L 141      Potassium      3 5 - 5 3 mmol/L 4 0      Chloride      100 - 108 mmol/L 104      CO2      21 - 32 mmol/L 29      Anion Gap      4 - 13 mmol/L 8      BUN      5 - 25 mg/dL 30 (H)      Creatinine      0 60 - 1 30 mg/dL 0 87      GLUCOSE FASTING      65 - 99 mg/dL 102 (H)      Calcium      8 3 - 10 1 mg/dL 7 8 (L)      AST      5 - 45 U/L 20      ALT      12 - 78 U/L 37      Alkaline Phosphatase      46 - 116 U/L 47      Total Protein      6 4 - 8 2 g/dL 7 3      Albumin      3 5 - 5 0 g/dL 3 8      TOTAL BILIRUBIN      0 20 - 1 00 mg/dL 0 51      eGFR      ml/min/1 73sq m 104      Glucose, Random      65 - 140 mg/dL       Cholesterol      50 - 200 mg/dL  143     Triglycerides      <=150 mg/dL  75     HDL      >=40 mg/dL  44     LDL Calculated      0 - 100 mg/dL  84     EXT Creatinine Urine      mg/dL       MICROALBUM ,U,RANDOM      0 0 - 20 0 mg/L       MICROALBUMIN/CREATININE RATIO      0 - 30 mg/g creatinine       Hemoglobin A1C      6 5       eAG, EST AVG Glucose      mg/dl       Free T4      0 76 - 1 46 ng/dL       TSH 3RD GENERATON      0 358 - 3 740 uIU/mL THYROGLOBULIN AB      0 0 - 0 9 IU/mL       Vit D, 25-Hydroxy      30 0 - 100 0 ng/mL    65 3   Phosphorus      2 7 - 4 5 mg/dL   4 7 (H)    Thyroglobulin-VERONICA      1 4 - 29 2 ng/mL         Component      Latest Ref Rng & Units 3/7/2020 3/7/2020 3/7/2020 3/7/2020           7:50 AM  7:50 AM  7:50 AM  7:50 AM   Sodium      136 - 145 mmol/L       Potassium      3 5 - 5 3 mmol/L       Chloride      100 - 108 mmol/L       CO2      21 - 32 mmol/L       Anion Gap      4 - 13 mmol/L       BUN      5 - 25 mg/dL       Creatinine      0 60 - 1 30 mg/dL       GLUCOSE FASTING      65 - 99 mg/dL       Calcium      8 3 - 10 1 mg/dL       AST      5 - 45 U/L       ALT      12 - 78 U/L       Alkaline Phosphatase      46 - 116 U/L       Total Protein      6 4 - 8 2 g/dL       Albumin      3 5 - 5 0 g/dL       TOTAL BILIRUBIN      0 20 - 1 00 mg/dL       eGFR      ml/min/1 73sq m       Glucose, Random      65 - 140 mg/dL       Cholesterol      50 - 200 mg/dL       Triglycerides      <=150 mg/dL       HDL      >=40 mg/dL       LDL Calculated      0 - 100 mg/dL       EXT Creatinine Urine      mg/dL   78 1    MICROALBUM ,U,RANDOM      0 0 - 20 0 mg/L   64 9 (H)    MICROALBUMIN/CREATININE RATIO      0 - 30 mg/g creatinine   83 (H)    Hemoglobin A1C      6 5    5 0   eAG, EST AVG Glucose      mg/dl    97   Free T4      0 76 - 1 46 ng/dL 1 30      TSH 3RD GENERATON      0 358 - 3 740 uIU/mL  1 330     THYROGLOBULIN AB      0 0 - 0 9 IU/mL       Vit D, 25-Hydroxy      30 0 - 100 0 ng/mL       Phosphorus      2 7 - 4 5 mg/dL       Thyroglobulin-VERONICA      1 4 - 29 2 ng/mL         Component      Latest Ref Rng & Units 3/7/2020 3/7/2020 6/22/2020           7:50 AM  7:50 AM  2:57 PM   Sodium      136 - 145 mmol/L   141   Potassium      3 5 - 5 3 mmol/L   3 7   Chloride      100 - 108 mmol/L   105   CO2      21 - 32 mmol/L   31   Anion Gap      4 - 13 mmol/L   5   BUN      5 - 25 mg/dL   29 (H)   Creatinine      0 60 - 1 30 mg/dL   1 44 (H) GLUCOSE FASTING      65 - 99 mg/dL      Calcium      8 3 - 10 1 mg/dL   9 0   AST      5 - 45 U/L   29   ALT      12 - 78 U/L   55   Alkaline Phosphatase      46 - 116 U/L   56   Total Protein      6 4 - 8 2 g/dL   8 0   Albumin      3 5 - 5 0 g/dL   4 3   TOTAL BILIRUBIN      0 20 - 1 00 mg/dL   0 52   eGFR      ml/min/1 73sq m   58   Glucose, Random      65 - 140 mg/dL   114   Cholesterol      50 - 200 mg/dL      Triglycerides      <=150 mg/dL      HDL      >=40 mg/dL      LDL Calculated      0 - 100 mg/dL      EXT Creatinine Urine      mg/dL      MICROALBUM ,U,RANDOM      0 0 - 20 0 mg/L      MICROALBUMIN/CREATININE RATIO      0 - 30 mg/g creatinine      Hemoglobin A1C      6 5      eAG, EST AVG Glucose      mg/dl      Free T4      0 76 - 1 46 ng/dL      TSH 3RD GENERATON      0 358 - 3 740 uIU/mL      THYROGLOBULIN AB      0 0 - 0 9 IU/mL <1 0     Vit D, 25-Hydroxy      30 0 - 100 0 ng/mL      Phosphorus      2 7 - 4 5 mg/dL      Thyroglobulin-VERONICA      1 4 - 29 2 ng/mL  3 5      Component      Latest Ref Rng & Units 6/22/2020 8/12/2020           2:57 PM  9:32 AM   Sodium      136 - 145 mmol/L     Potassium      3 5 - 5 3 mmol/L     Chloride      100 - 108 mmol/L     CO2      21 - 32 mmol/L     Anion Gap      4 - 13 mmol/L     BUN      5 - 25 mg/dL     Creatinine      0 60 - 1 30 mg/dL     GLUCOSE FASTING      65 - 99 mg/dL     Calcium      8 3 - 10 1 mg/dL     AST      5 - 45 U/L     ALT      12 - 78 U/L     Alkaline Phosphatase      46 - 116 U/L     Total Protein      6 4 - 8 2 g/dL     Albumin      3 5 - 5 0 g/dL     TOTAL BILIRUBIN      0 20 - 1 00 mg/dL     eGFR      ml/min/1 73sq m     Glucose, Random      65 - 140 mg/dL     Cholesterol      50 - 200 mg/dL     Triglycerides      <=150 mg/dL     HDL      >=40 mg/dL     LDL Calculated      0 - 100 mg/dL     EXT Creatinine Urine      mg/dL     MICROALBUM ,U,RANDOM      0 0 - 20 0 mg/L     MICROALBUMIN/CREATININE RATIO      0 - 30 mg/g creatinine Hemoglobin A1C      6 5  4 9   eAG, EST AVG Glucose      mg/dl     Free T4      0 76 - 1 46 ng/dL     TSH 3RD GENERATON      0 358 - 3 740 uIU/mL 0 694    THYROGLOBULIN AB      0 0 - 0 9 IU/mL     Vit D, 25-Hydroxy      30 0 - 100 0 ng/mL     Phosphorus      2 7 - 4 5 mg/dL     Thyroglobulin-VERONICA      1 4 - 29 2 ng/mL       Impression & Plan:    Problem List Items Addressed This Visit        Endocrine    Hypoparathyroidism ([facility] )     Continue Calcium and Calcitriol  Ordered 24-hour urine calcium to be done before next visit  Relevant Orders    Comprehensive metabolic panel    Calcium, urine, 24 hour Lab Collect    Hypothyroidism    Relevant Orders    TSH, 3rd generation    T4, free    Type 2 diabetes mellitus without complication, without long-term current use of insulin ([facility] ) - Primary     Remains under good control with A1C 4 9, off medication for a full year  Following with low carb diet, has been taking phentermine and follow with family physician  Lab Results   Component Value Date    HGBA1C 4 9 08/12/2020            Relevant Orders    POCT hemoglobin A1c (Completed)    Hemoglobin A1C    Microalbumin / creatinine urine ratio    Thyroid cancer (HCC)     Thyroglobulin stable  Will continue to monitor  Advised him to complete the ultrasound of the neck that was ordered at last visit  Relevant Orders    Thyroglobulin       Other    Hypocalcemia     Calcium levels normal on recent lab testing  Vitamin D deficiency     Continue supplements  Orders Placed This Encounter   Procedures    TSH, 3rd generation     This is a patient instruction: This test is non-fasting  Please drink two glasses of water morning of bloodwork          Standing Status:   Future     Standing Expiration Date:   8/12/2021    Thyroglobulin     Standing Status:   Future     Standing Expiration Date:   8/12/2021    T4, free     Standing Status:   Future     Standing Expiration Date:   8/12/2021   Ty[facility] Hoover Comprehensive metabolic panel     This is a patient instruction: Patient fasting for 8 hours or longer recommended  Standing Status:   Future     Standing Expiration Date:   8/12/2021    Hemoglobin A1C     Standing Status:   Future     Standing Expiration Date:   8/12/2021    Calcium, urine, 24 hour Lab Collect     Standing Status:   Future     Standing Expiration Date:   8/12/2021    Microalbumin / creatinine urine ratio     Standing Status:   Future     Standing Expiration Date:   8/12/2021    POCT hemoglobin A1c       There are no Patient Instructions on file for this visit  Discussed with the patient and all questioned fully answered  He will call me if any problems arise  Follow-up appointment in 6 months       Counseled patient on diagnostic results, prognosis, risk and benefit of treatment options, instruction for management, importance of treatment compliance, Risk  factor reduction and impressions      Lucila Bautista PA-C

## 2020-08-12 NOTE — ASSESSMENT & PLAN NOTE
Remains under good control with A1C 4 9, off medication for a full year  Following with low carb diet, has been taking phentermine and follow with family physician     Lab Results   Component Value Date    HGBA1C 4 9 08/12/2020

## 2020-08-12 NOTE — ASSESSMENT & PLAN NOTE
Thyroglobulin stable  Will continue to monitor  Advised him to complete the ultrasound of the neck that was ordered at last visit

## 2020-08-24 DIAGNOSIS — E03.9 HYPOTHYROIDISM, UNSPECIFIED TYPE: ICD-10-CM

## 2020-08-24 RX ORDER — LEVOTHYROXINE SODIUM 200 MCG
TABLET ORAL
Qty: 120 TABLET | Refills: 4 | Status: CANCELLED | OUTPATIENT
Start: 2020-08-24

## 2020-08-26 DIAGNOSIS — E03.9 HYPOTHYROIDISM, UNSPECIFIED TYPE: ICD-10-CM

## 2020-08-26 RX ORDER — LEVOTHYROXINE SODIUM 200 MCG
TABLET ORAL
Qty: 120 TABLET | Refills: 4 | Status: SHIPPED | OUTPATIENT
Start: 2020-08-26 | End: 2020-12-04 | Stop reason: SDUPTHER

## 2020-09-21 ENCOUNTER — OFFICE VISIT (OUTPATIENT)
Dept: FAMILY MEDICINE CLINIC | Facility: CLINIC | Age: 45
End: 2020-09-21
Payer: COMMERCIAL

## 2020-09-21 VITALS
WEIGHT: 315 LBS | HEART RATE: 78 BPM | BODY MASS INDEX: 41.75 KG/M2 | DIASTOLIC BLOOD PRESSURE: 82 MMHG | SYSTOLIC BLOOD PRESSURE: 134 MMHG | RESPIRATION RATE: 18 BRPM | TEMPERATURE: 96.1 F | HEIGHT: 73 IN | OXYGEN SATURATION: 97 %

## 2020-09-21 PROCEDURE — 99213 OFFICE O/P EST LOW 20 MIN: CPT | Performed by: NURSE PRACTITIONER

## 2020-09-21 PROCEDURE — 1036F TOBACCO NON-USER: CPT | Performed by: NURSE PRACTITIONER

## 2020-09-21 RX ORDER — BLOOD SUGAR DIAGNOSTIC
100 STRIP MISCELLANEOUS DAILY
Qty: 100 EACH | Refills: 0 | Status: SHIPPED | OUTPATIENT
Start: 2020-09-21 | End: 2020-12-02

## 2020-09-21 NOTE — PROGRESS NOTES
Assessment/Plan:           Problem List Items Addressed This Visit        Other    BMI 40 0-44 9, adult ([facility] ) - Primary     Discussed with patient options will start [address] and discussed how to use          Relevant Medications    liraglutide (SAXENDA) injection    Insulin Pen Needle (Pen Needles) 32G X 6 MM MISC            Subjective:      Patient ID: April Antonio  is a 54 y o  male  Patient here today for his check up and reports that he has been back to work and very active and also watching his calorie intake  Patient reports that he is frustrated in the fact that he has just not lost any weight  Patient has had the Phentermine and has been off for the past month and just not losing weight  The following portions of the patient's history were reviewed and updated as appropriate:   He  has a past medical history of Asthma, Diabetes mellitus ([facility] ), Diabetes type 2, uncontrolled ([facility] ) (5/6/2014), Diverticulitis, Hyperlipidemia, Hypertension, Immunity to measles determined by serologic test (7/2/2019), Thyroid cancer ([facility] ), and Type 2 diabetes mellitus ([facility] ) (7/17/2013)    He   Patient Active Problem List    Diagnosis Date Noted    BMI 40 0-44 9, adult ([facility] ) 06/15/2020    Type 2 diabetes mellitus without complication, without long-term current use of insulin ([facility] ) 02/12/2020    Thyroid cancer ([facility] ) 02/12/2020    Mass on back 01/08/2020    Annual physical exam 07/02/2019    Encounter for follow-up surveillance of thyroid cancer 04/24/2019    Lumbar disc herniation 01/03/2019    Sciatic nerve disease, left 05/21/2018    Chronic bilateral low back pain with left-sided sciatica 05/16/2017    Chronic pain disorder 01/31/2017    Lumbar radiculopathy 01/31/2017    Myofascial pain syndrome 01/31/2017    History of thyroid cancer 10/11/2016    Morbid obesity due to excess calories ([facility] ) 10/11/2016    Vitamin D deficiency 05/03/2016    Gait difficulty 10/19/2015    Hypoparathyroidism ([facility] ) 12/17/2013    Microalbuminuria 07/17/2013    Hypothyroidism 03/15/2013    Benign essential hypertension 10/04/2012    Hypercholesterolemia 08/09/2012    Hypocalcemia 08/09/2012     He  has a past surgical history that includes Thyroidectomy; Colostomy; pr knee scope,med/lat menisectomy (Left, 8/17/2017); BIOPSY CORE NEEDLE; and Chest wall biopsy (N/A, 2/7/2020)  His family history includes Diabetes in his mother; Hyperlipidemia in his father; Hypertension in his father and mother; Thyroid disease unspecified in his sister  He  reports that he quit smoking about 18 years ago  His smoking use included cigarettes  He smoked 1 00 pack per day  He has never used smokeless tobacco  He reports current alcohol use  He reports that he does not use drugs    Current Outpatient Medications   Medication Sig Dispense Refill    aspirin (ECOTRIN LOW STRENGTH) 81 mg EC tablet Take 81 mg by mouth daily      calcitriol (ROCALTROL) 0 25 mcg capsule TAKE 1 CAPSULE TWICE A  capsule 4    Calcium Carb-Cholecalciferol (CALCIUM 600 + D PO) Take 600 mg by mouth 4 (four) times a day        diclofenac-misoprostol (ARTHROTEC 75) 75-0 2 MG per tablet TAKE 1 TABLET TWICE A  tablet 4    fluticasone (FLONASE) 50 mcg/act nasal spray 1 spray into each nostril daily 16 g 1    losartan-hydrochlorothiazide (HYZAAR) 100-25 MG per tablet Take 1 tablet by mouth daily 90 tablet 3    phentermine (ADIPEX-P) 37 5 MG tablet Take 1 tablet (37 5 mg total) by mouth daily with breakfast 30 tablet 0    simvastatin (ZOCOR) 10 mg tablet TAKE 1 TABLET AT BEDTIME 90 tablet 4    Synthroid 200 MCG tablet TAKE 1 TABLETSON MONDAY THROUGH SATURDAY AND 2 TABLETS ON SUNDAY 120 tablet 4    Insulin Pen Needle (Pen Needles) 32G X 6 MM MISC 100 each by Does not apply route daily Use with Saxenda injection 100 each 0    liraglutide (SAXENDA) injection Inject 0 1 mL (0 6 mg total) under the skin daily for 7 days, THEN 0 2 mL (1 2 mg total) daily for 7 days, THEN 0 3 mL (1 8 mg total) daily for 7 days, THEN 0 4 mL (2 4 mg total) daily for 7 days  7 mL 0     No current facility-administered medications for this visit  Current Outpatient Medications on File Prior to Visit   Medication Sig    aspirin (ECOTRIN LOW STRENGTH) 81 mg EC tablet Take 81 mg by mouth daily    calcitriol (ROCALTROL) 0 25 mcg capsule TAKE 1 CAPSULE TWICE A DAY    Calcium Carb-Cholecalciferol (CALCIUM 600 + D PO) Take 600 mg by mouth 4 (four) times a day      diclofenac-misoprostol (ARTHROTEC 75) 75-0 2 MG per tablet TAKE 1 TABLET TWICE A DAY    fluticasone (FLONASE) 50 mcg/act nasal spray 1 spray into each nostril daily    losartan-hydrochlorothiazide (HYZAAR) 100-25 MG per tablet Take 1 tablet by mouth daily    phentermine (ADIPEX-P) 37 5 MG tablet Take 1 tablet (37 5 mg total) by mouth daily with breakfast    simvastatin (ZOCOR) 10 mg tablet TAKE 1 TABLET AT BEDTIME    Synthroid 200 MCG tablet TAKE 1 TABLETSON MONDAY THROUGH SATURDAY AND 2 TABLETS ON SUNDAY     No current facility-administered medications on file prior to visit  He has No Known Allergies       Review of Systems   Constitutional: Negative for activity change, appetite change, chills, diaphoresis, fatigue, fever and unexpected weight change  HENT: Negative for congestion, ear pain, hearing loss, postnasal drip, sinus pressure, sinus pain, sneezing and sore throat  Eyes: Negative for pain, redness and visual disturbance  Respiratory: Negative for cough and shortness of breath  Cardiovascular: Negative for chest pain and leg swelling  Gastrointestinal: Negative for abdominal pain, diarrhea, nausea and vomiting  Endocrine: Negative  Genitourinary: Negative  Musculoskeletal: Negative for arthralgias  Allergic/Immunologic: Negative  Neurological: Negative for dizziness and light-headedness  Hematological: Negative      Psychiatric/Behavioral: Negative for behavioral problems and dysphoric mood          Objective:      /82 (BP Location: Left arm, Patient Position: Sitting, Cuff Size: Adult)   Pulse 78   Temp (!) 96 1 °F (35 6 °C)   Resp 18   Ht 6' 1" (1 854 m)   Wt (!) 149 kg (328 lb 9 6 oz)   SpO2 97%   BMI 43 35 kg/m²          Physical Exam  Vitals signs reviewed  Constitutional:       General: He is not in acute distress  Appearance: He is well-developed  HENT:      Head: Normocephalic and atraumatic  Eyes:      Pupils: Pupils are equal, round, and reactive to light  Neck:      Musculoskeletal: Normal range of motion  Thyroid: No thyromegaly  Cardiovascular:      Rate and Rhythm: Normal rate and regular rhythm  Heart sounds: Normal heart sounds  No murmur  Pulmonary:      Effort: Pulmonary effort is normal  No respiratory distress  Breath sounds: Normal breath sounds  No wheezing  Abdominal:      General: Bowel sounds are normal       Palpations: Abdomen is soft  Musculoskeletal: Normal range of motion  Skin:     General: Skin is warm and dry  Neurological:      Mental Status: He is alert and oriented to person, place, and time

## 2020-10-12 RX ORDER — LIRAGLUTIDE 6 MG/ML
INJECTION, SOLUTION SUBCUTANEOUS
Qty: 3 ML | Refills: 2 | Status: SHIPPED | OUTPATIENT
Start: 2020-10-12 | End: 2020-12-02

## 2020-10-21 ENCOUNTER — OFFICE VISIT (OUTPATIENT)
Dept: FAMILY MEDICINE CLINIC | Facility: CLINIC | Age: 45
End: 2020-10-21
Payer: COMMERCIAL

## 2020-10-21 VITALS
DIASTOLIC BLOOD PRESSURE: 82 MMHG | TEMPERATURE: 98.7 F | HEIGHT: 73 IN | RESPIRATION RATE: 18 BRPM | BODY MASS INDEX: 41.75 KG/M2 | HEART RATE: 80 BPM | SYSTOLIC BLOOD PRESSURE: 128 MMHG | WEIGHT: 315 LBS

## 2020-10-21 DIAGNOSIS — E66.01 MORBID OBESITY DUE TO EXCESS CALORIES (HCC): Primary | ICD-10-CM

## 2020-10-21 PROCEDURE — 99213 OFFICE O/P EST LOW 20 MIN: CPT | Performed by: NURSE PRACTITIONER

## 2020-10-29 DIAGNOSIS — R52 PAIN: ICD-10-CM

## 2020-10-29 RX ORDER — DICLOFENAC SODIUM AND MISOPROSTOL 75; 200 MG/1; UG/1
TABLET, DELAYED RELEASE ORAL
Qty: 180 TABLET | Refills: 3 | Status: SHIPPED | OUTPATIENT
Start: 2020-10-29 | End: 2021-10-25

## 2020-11-09 DIAGNOSIS — E78.5 HYPERLIPIDEMIA, UNSPECIFIED HYPERLIPIDEMIA TYPE: ICD-10-CM

## 2020-11-09 RX ORDER — SIMVASTATIN 10 MG
TABLET ORAL
Qty: 90 TABLET | Refills: 3 | Status: SHIPPED | OUTPATIENT
Start: 2020-11-09 | End: 2021-11-04

## 2020-11-28 DIAGNOSIS — I10 HYPERTENSION, UNSPECIFIED TYPE: ICD-10-CM

## 2020-11-28 RX ORDER — AMLODIPINE BESYLATE 5 MG/1
TABLET ORAL
Qty: 90 TABLET | Refills: 3 | Status: SHIPPED | OUTPATIENT
Start: 2020-11-28 | End: 2021-11-01

## 2020-11-28 RX ORDER — CALCITRIOL 0.25 UG/1
CAPSULE, LIQUID FILLED ORAL
Qty: 180 CAPSULE | Refills: 3 | Status: SHIPPED | OUTPATIENT
Start: 2020-11-28 | End: 2021-11-01

## 2020-12-01 ENCOUNTER — LAB (OUTPATIENT)
Dept: LAB | Facility: CLINIC | Age: 45
End: 2020-12-01
Payer: COMMERCIAL

## 2020-12-01 DIAGNOSIS — N28.9 DECREASED RENAL FUNCTION: ICD-10-CM

## 2020-12-01 DIAGNOSIS — E89.0 POSTOPERATIVE HYPOTHYROIDISM: ICD-10-CM

## 2020-12-01 DIAGNOSIS — E11.9 TYPE 2 DIABETES MELLITUS WITHOUT COMPLICATION, WITHOUT LONG-TERM CURRENT USE OF INSULIN (HCC): ICD-10-CM

## 2020-12-01 DIAGNOSIS — E20.9 HYPOPARATHYROIDISM, UNSPECIFIED HYPOPARATHYROIDISM TYPE (HCC): ICD-10-CM

## 2020-12-01 DIAGNOSIS — C73 THYROID CANCER (HCC): ICD-10-CM

## 2020-12-01 LAB
ALBUMIN SERPL BCP-MCNC: 3.9 G/DL (ref 3.5–5)
ALP SERPL-CCNC: 61 U/L (ref 46–116)
ALT SERPL W P-5'-P-CCNC: 71 U/L (ref 12–78)
ANION GAP SERPL CALCULATED.3IONS-SCNC: 6 MMOL/L (ref 4–13)
AST SERPL W P-5'-P-CCNC: 33 U/L (ref 5–45)
BILIRUB SERPL-MCNC: 0.36 MG/DL (ref 0.2–1)
BUN SERPL-MCNC: 30 MG/DL (ref 5–25)
CALCIUM SERPL-MCNC: 8.4 MG/DL (ref 8.3–10.1)
CHLORIDE SERPL-SCNC: 106 MMOL/L (ref 100–108)
CO2 SERPL-SCNC: 30 MMOL/L (ref 21–32)
CREAT SERPL-MCNC: 0.94 MG/DL (ref 0.6–1.3)
CREAT UR-MCNC: 387 MG/DL
EST. AVERAGE GLUCOSE BLD GHB EST-MCNC: 97 MG/DL
GFR SERPL CREATININE-BSD FRML MDRD: 97 ML/MIN/1.73SQ M
GLUCOSE P FAST SERPL-MCNC: 101 MG/DL (ref 65–99)
HBA1C MFR BLD: 5 %
MICROALBUMIN UR-MCNC: 96.4 MG/L (ref 0–20)
MICROALBUMIN/CREAT 24H UR: 25 MG/G CREATININE (ref 0–30)
POTASSIUM SERPL-SCNC: 3.7 MMOL/L (ref 3.5–5.3)
PROT SERPL-MCNC: 7.4 G/DL (ref 6.4–8.2)
SODIUM SERPL-SCNC: 142 MMOL/L (ref 136–145)
T4 FREE SERPL-MCNC: 1.41 NG/DL (ref 0.76–1.46)
TSH SERPL DL<=0.05 MIU/L-ACNC: 2.58 UIU/ML (ref 0.36–3.74)

## 2020-12-01 PROCEDURE — 3060F POS MICROALBUMINURIA REV: CPT | Performed by: NURSE PRACTITIONER

## 2020-12-01 PROCEDURE — 82043 UR ALBUMIN QUANTITATIVE: CPT

## 2020-12-01 PROCEDURE — 82570 ASSAY OF URINE CREATININE: CPT

## 2020-12-01 PROCEDURE — 80053 COMPREHEN METABOLIC PANEL: CPT

## 2020-12-01 PROCEDURE — 3044F HG A1C LEVEL LT 7.0%: CPT | Performed by: NURSE PRACTITIONER

## 2020-12-01 PROCEDURE — 84432 ASSAY OF THYROGLOBULIN: CPT

## 2020-12-01 PROCEDURE — 84443 ASSAY THYROID STIM HORMONE: CPT

## 2020-12-01 PROCEDURE — 83036 HEMOGLOBIN GLYCOSYLATED A1C: CPT

## 2020-12-01 PROCEDURE — 86800 THYROGLOBULIN ANTIBODY: CPT

## 2020-12-01 PROCEDURE — 84439 ASSAY OF FREE THYROXINE: CPT

## 2020-12-01 PROCEDURE — 36415 COLL VENOUS BLD VENIPUNCTURE: CPT

## 2020-12-02 ENCOUNTER — OFFICE VISIT (OUTPATIENT)
Dept: FAMILY MEDICINE CLINIC | Facility: CLINIC | Age: 45
End: 2020-12-02
Payer: COMMERCIAL

## 2020-12-02 VITALS
WEIGHT: 315 LBS | RESPIRATION RATE: 18 BRPM | HEART RATE: 88 BPM | SYSTOLIC BLOOD PRESSURE: 110 MMHG | BODY MASS INDEX: 41.75 KG/M2 | TEMPERATURE: 98 F | HEIGHT: 73 IN | OXYGEN SATURATION: 97 % | DIASTOLIC BLOOD PRESSURE: 72 MMHG

## 2020-12-02 DIAGNOSIS — E83.51 HYPOCALCEMIA: ICD-10-CM

## 2020-12-02 DIAGNOSIS — E66.01 MORBID OBESITY DUE TO EXCESS CALORIES (HCC): ICD-10-CM

## 2020-12-02 DIAGNOSIS — G89.29 CHRONIC BILATERAL LOW BACK PAIN WITH LEFT-SIDED SCIATICA: ICD-10-CM

## 2020-12-02 DIAGNOSIS — I10 BENIGN ESSENTIAL HYPERTENSION: ICD-10-CM

## 2020-12-02 DIAGNOSIS — E20.9 HYPOPARATHYROIDISM, UNSPECIFIED HYPOPARATHYROIDISM TYPE (HCC): Primary | ICD-10-CM

## 2020-12-02 DIAGNOSIS — E89.0 POSTOPERATIVE HYPOTHYROIDISM: ICD-10-CM

## 2020-12-02 DIAGNOSIS — M54.42 CHRONIC BILATERAL LOW BACK PAIN WITH LEFT-SIDED SCIATICA: ICD-10-CM

## 2020-12-02 DIAGNOSIS — E55.9 VITAMIN D DEFICIENCY: ICD-10-CM

## 2020-12-02 DIAGNOSIS — C73 THYROID CANCER (HCC): ICD-10-CM

## 2020-12-02 PROBLEM — E11.9 TYPE 2 DIABETES MELLITUS WITHOUT COMPLICATION, WITHOUT LONG-TERM CURRENT USE OF INSULIN (HCC): Status: RESOLVED | Noted: 2020-02-12 | Resolved: 2020-12-02

## 2020-12-02 PROBLEM — Z00.00 ANNUAL PHYSICAL EXAM: Status: RESOLVED | Noted: 2019-07-02 | Resolved: 2020-12-02

## 2020-12-02 LAB
THYROGLOB AB SERPL-ACNC: <1 IU/ML (ref 0–0.9)
THYROGLOB SERPL-MCNC: 3.6 NG/ML (ref 1.4–29.2)

## 2020-12-02 PROCEDURE — 99214 OFFICE O/P EST MOD 30 MIN: CPT | Performed by: NURSE PRACTITIONER

## 2020-12-02 PROCEDURE — 1036F TOBACCO NON-USER: CPT | Performed by: NURSE PRACTITIONER

## 2020-12-02 PROCEDURE — 3078F DIAST BP <80 MM HG: CPT | Performed by: NURSE PRACTITIONER

## 2020-12-02 PROCEDURE — 3074F SYST BP LT 130 MM HG: CPT | Performed by: NURSE PRACTITIONER

## 2020-12-02 PROCEDURE — 3008F BODY MASS INDEX DOCD: CPT | Performed by: NURSE PRACTITIONER

## 2020-12-04 ENCOUNTER — TELEPHONE (OUTPATIENT)
Dept: ENDOCRINOLOGY | Facility: CLINIC | Age: 45
End: 2020-12-04

## 2020-12-04 DIAGNOSIS — E03.9 HYPOTHYROIDISM, UNSPECIFIED TYPE: Primary | ICD-10-CM

## 2020-12-04 DIAGNOSIS — E03.9 HYPOTHYROIDISM, UNSPECIFIED TYPE: ICD-10-CM

## 2020-12-04 RX ORDER — LEVOTHYROXINE SODIUM 200 MCG
TABLET ORAL
Qty: 120 TABLET | Refills: 4
Start: 2020-12-04 | End: 2021-06-18 | Stop reason: SDUPTHER

## 2020-12-11 ENCOUNTER — TELEPHONE (OUTPATIENT)
Dept: FAMILY MEDICINE CLINIC | Facility: CLINIC | Age: 45
End: 2020-12-11

## 2020-12-11 DIAGNOSIS — E66.01 MORBID OBESITY DUE TO EXCESS CALORIES (HCC): Primary | ICD-10-CM

## 2021-02-23 ENCOUNTER — OFFICE VISIT (OUTPATIENT)
Dept: ENDOCRINOLOGY | Facility: CLINIC | Age: 46
End: 2021-02-23
Payer: COMMERCIAL

## 2021-02-23 VITALS
SYSTOLIC BLOOD PRESSURE: 134 MMHG | WEIGHT: 315 LBS | BODY MASS INDEX: 41.75 KG/M2 | HEIGHT: 73 IN | DIASTOLIC BLOOD PRESSURE: 100 MMHG | HEART RATE: 65 BPM

## 2021-02-23 DIAGNOSIS — E11.9 TYPE 2 DIABETES MELLITUS WITHOUT COMPLICATION, WITHOUT LONG-TERM CURRENT USE OF INSULIN (HCC): ICD-10-CM

## 2021-02-23 DIAGNOSIS — I10 BENIGN ESSENTIAL HYPERTENSION: ICD-10-CM

## 2021-02-23 DIAGNOSIS — E20.9 HYPOPARATHYROIDISM, UNSPECIFIED HYPOPARATHYROIDISM TYPE (HCC): ICD-10-CM

## 2021-02-23 DIAGNOSIS — E89.0 POSTOPERATIVE HYPOTHYROIDISM: Primary | ICD-10-CM

## 2021-02-23 DIAGNOSIS — C73 THYROID CANCER (HCC): ICD-10-CM

## 2021-02-23 PROCEDURE — 3080F DIAST BP >= 90 MM HG: CPT | Performed by: INTERNAL MEDICINE

## 2021-02-23 PROCEDURE — 3075F SYST BP GE 130 - 139MM HG: CPT | Performed by: INTERNAL MEDICINE

## 2021-02-23 PROCEDURE — 99214 OFFICE O/P EST MOD 30 MIN: CPT | Performed by: INTERNAL MEDICINE

## 2021-02-23 PROCEDURE — 3008F BODY MASS INDEX DOCD: CPT | Performed by: INTERNAL MEDICINE

## 2021-02-23 PROCEDURE — 1036F TOBACCO NON-USER: CPT | Performed by: INTERNAL MEDICINE

## 2021-02-23 NOTE — PROGRESS NOTES
Isabella Linda  54 y o  male MRN: [id number]    Encounter: [id number]      Assessment/Plan     Problem List Items Addressed This Visit        Endocrine    Hypoparathyroidism [facility])     Continue calcium and calcitriol-will check 24 hour urine calcium         Relevant Orders    Calcium, urine, 24 hour Lab Collect    Creatinine, urine, 24 hour Lab Collect    PTH, intact Lab Collect Lab Collect    Phosphorus Lab Collect    Vitamin D 25 hydroxy Lab Collect    Hypothyroidism - Primary     Dose was adjusted 6 weeks back-will repeat thyroid function tests         Relevant Orders    TSH, 3rd generation Lab Collect    T4, free Lab Collect    Thyroid cancer ([address] )     Will repeat thyroglobulin level-encouraged to up neck ultrasound done         Relevant Orders    Thyroglobulin w/ab Lab Collect    US head neck lymph node mapping    Type 2 diabetes mellitus without complication, without long-term current use of insulin ([facility])       Lab Results   Component Value Date    HGBA1C 5 0 12/01/2020   Diet control-continue dietary and lifestyle modifications            Cardiovascular and Mediastinum    Benign essential hypertension    Relevant Orders    Comprehensive metabolic panel Lab Collect        CC:   Thyroid cancer     History of Present Illness      HPI:  65-year-old male with history of papillary thyroid cancer, postsurgical hypothyroidism hypoparathyroidism, type 2 diabetes here for follow-up  Was on saxenda and stopped after 3 months as weight didn't change   For postsurgical hypoparathyroidism is currently taking  Calcitriol 0 25 mcg twice daily   Ca+D - calcium [phone]   No numbness and tingling     Hypothyroidism is currently on Synthroid 1 tab mon-Friday 2 on sat and Sunday   And has been taking it regularly and properly  Review of Systems   Constitutional: Negative for fatigue and unexpected weight change  Eyes: Negative for visual disturbance  Respiratory: Negative for cough and shortness of breath  Cardiovascular: Negative for palpitations and leg swelling  Gastrointestinal: Negative for constipation, diarrhea, nausea and vomiting  Endocrine: Negative for polydipsia and polyuria  Musculoskeletal: Negative for arthralgias, gait problem and myalgias  Skin: Negative for wound  Neurological: Negative for numbness  Psychiatric/Behavioral: Negative for sleep disturbance  All other systems reviewed and are negative        Historical Information   Past Medical History:   Diagnosis Date    Asthma     Diabetes mellitus ([place] [address] )     Diabetes type 2, uncontrolled ([place] [address] ) 2014    Diverticulitis     Hyperlipidemia     Hypertension     Immunity to measles determined by serologic test 2019    Thyroid cancer ([place] [address] )     radioactive iodine     Type 2 diabetes mellitus ([place] [address] ) 2013     Past Surgical History:   Procedure Laterality Date    BIOPSY CORE NEEDLE      Thyroid    CHEST WALL BIOPSY N/A 2020    Procedure: EXCISION  BIOPSY LESION/MASS from back;  Surgeon: Isa Siemens, MD;  Location: [facility];  Service: General    COLOSTOMY      with reversal    MT KNEE SCOPE,MED/LAT MENISECTOMY Left 2017    Procedure: KNEE ARTHROSCOPIC PARTIAL LATERAL MENISCECTOMY ; PATELLO Yousuf Cherie;  Surgeon: Amelia Flores MD;  Location:  MAIN OR;  Service: Orthopedics    THYROIDECTOMY       Social History   Social History     Substance and Sexual Activity   Alcohol Use Yes    Frequency: Monthly or less    Drinks per session: 1 or 2    Binge frequency: Never    Comment: 1 drink every 2 weeks     Social History     Substance and Sexual Activity   Drug Use No     Social History     Tobacco Use   Smoking Status Former Smoker    Packs/day: 1 00    Types: Cigarettes    Quit date:     Years since quittin 1   Smokeless Tobacco Never Used     Family History:   Family History   Problem Relation Age of Onset    Diabetes Mother     Hypertension Mother     Hypertension Father    Ade Carmona Hyperlipidemia Father     Thyroid disease unspecified Sister        Meds/Allergies   Current Outpatient Medications   Medication Sig Dispense Refill    amLODIPine (NORVASC) 5 mg tablet TAKE 1 TABLET DAILY 90 tablet 3    aspirin (ECOTRIN LOW STRENGTH) 81 mg EC tablet Take 81 mg by mouth daily      calcitriol (ROCALTROL) 0 25 mcg capsule TAKE 1 CAPSULE TWICE A  capsule 3    Calcium Carb-Cholecalciferol (CALCIUM 600 + D PO) Take 600 mg by mouth 4 (four) times a day        diclofenac-misoprostol (ARTHROTEC 75) 75-0 2 MG per tablet TAKE 1 TABLET TWICE A  tablet 3    fluticasone (FLONASE) 50 mcg/act nasal spray 1 spray into each nostril daily 16 g 1    losartan-hydrochlorothiazide (HYZAAR) 100-25 MG per tablet Take 1 tablet by mouth daily 90 tablet 3    simvastatin (ZOCOR) 10 mg tablet TAKE 1 TABLET AT BEDTIME 90 tablet 3    Synthroid 200 MCG tablet TAKE 1 TAB Monday-Friday AND 2 TABS Saturday and Sunday 120 tablet 4     No current facility-administered medications for this visit  No Known Allergies    Objective   Vitals: Blood pressure 134/100, pulse 65, height 6' 1" (1 854 m), weight (!) 153 kg (337 lb 6 4 oz)  Physical Exam  Vitals signs reviewed  Constitutional:       Appearance: Normal appearance  He is obese  He is not ill-appearing or diaphoretic  HENT:      Head: Normocephalic and atraumatic  Eyes:      General: No scleral icterus  Extraocular Movements: Extraocular movements intact  Neck:      Musculoskeletal: Neck supple  Cardiovascular:      Rate and Rhythm: Regular rhythm  Heart sounds: Normal heart sounds  No murmur  Pulmonary:      Effort: Pulmonary effort is normal  No respiratory distress  Breath sounds: Normal breath sounds  No wheezing or rales  Abdominal:      General: There is no distension  Palpations: Abdomen is soft  Tenderness: There is no abdominal tenderness  There is no guarding     Musculoskeletal:      Right lower leg: No edema  Left lower leg: No edema  Lymphadenopathy:      Cervical: No cervical adenopathy  Skin:     General: Skin is warm and dry  Neurological:      General: No focal deficit present  Mental Status: He is alert and oriented to person, place, and time  Psychiatric:         Mood and Affect: Mood normal          Behavior: Behavior normal          Thought Content: Thought content normal          Judgment: Judgment normal          The history was obtained from the review of the chart, patient  Lab Results:   Lab Results   Component Value Date/Time    TSH 3RD GENERATON 2 580 12/01/2020 07:11 AM    TSH 3RD GENERATON 0 694 06/22/2020 02:57 PM    TSH 3RD GENERATON 1 330 03/07/2020 07:50 AM    Free T4 1 41 12/01/2020 07:11 AM    Free T4 1 30 03/07/2020 07:50 AM    Thyroglobulin Ab <1 0 12/01/2020 07:11 AM    Thyroglobulin Ab <1 0 03/07/2020 07:50 AM     Imaging Studies:  Results for orders placed during the hospital encounter of 03/28/19   US head neck lymph node mapping    Impression No evidence of recurrent or metastatic disease  Workstation performed: [id number]            I have personally reviewed pertinent reports  Portions of the record may have been created with voice recognition software  Occasional wrong word or "sound a like" substitutions may have occurred due to the inherent limitations of voice recognition software  Read the chart carefully and recognize, using context, where substitutions have occurred

## 2021-03-01 NOTE — ASSESSMENT & PLAN NOTE
Lab Results   Component Value Date    HGBA1C 5 0 12/01/2020   Diet control-continue dietary and lifestyle modifications

## 2021-04-04 DIAGNOSIS — I10 HYPERTENSION, UNSPECIFIED TYPE: ICD-10-CM

## 2021-04-04 RX ORDER — LOSARTAN POTASSIUM AND HYDROCHLOROTHIAZIDE 25; 100 MG/1; MG/1
TABLET ORAL
Qty: 90 TABLET | Refills: 3 | Status: SHIPPED | OUTPATIENT
Start: 2021-04-04 | End: 2022-03-30

## 2021-06-03 ENCOUNTER — OFFICE VISIT (OUTPATIENT)
Dept: FAMILY MEDICINE CLINIC | Facility: CLINIC | Age: 46
End: 2021-06-03
Payer: COMMERCIAL

## 2021-06-03 VITALS
SYSTOLIC BLOOD PRESSURE: 128 MMHG | OXYGEN SATURATION: 94 % | DIASTOLIC BLOOD PRESSURE: 84 MMHG | HEIGHT: 73 IN | WEIGHT: 315 LBS | TEMPERATURE: 97.6 F | HEART RATE: 71 BPM | BODY MASS INDEX: 41.75 KG/M2

## 2021-06-03 DIAGNOSIS — E20.9 HYPOPARATHYROIDISM, UNSPECIFIED HYPOPARATHYROIDISM TYPE (HCC): ICD-10-CM

## 2021-06-03 DIAGNOSIS — E89.0 POSTOPERATIVE HYPOTHYROIDISM: ICD-10-CM

## 2021-06-03 DIAGNOSIS — I10 BENIGN ESSENTIAL HYPERTENSION: Primary | ICD-10-CM

## 2021-06-03 DIAGNOSIS — J06.9 ACUTE URI: ICD-10-CM

## 2021-06-03 PROBLEM — E11.9 TYPE 2 DIABETES MELLITUS WITHOUT COMPLICATION, WITHOUT LONG-TERM CURRENT USE OF INSULIN (HCC): Status: RESOLVED | Noted: 2020-02-12 | Resolved: 2021-06-03

## 2021-06-03 PROBLEM — Z08 ENCOUNTER FOR FOLLOW-UP SURVEILLANCE OF THYROID CANCER: Status: RESOLVED | Noted: 2019-04-24 | Resolved: 2021-06-03

## 2021-06-03 PROBLEM — Z85.850 ENCOUNTER FOR FOLLOW-UP SURVEILLANCE OF THYROID CANCER: Status: RESOLVED | Noted: 2019-04-24 | Resolved: 2021-06-03

## 2021-06-03 PROCEDURE — 3008F BODY MASS INDEX DOCD: CPT | Performed by: NURSE PRACTITIONER

## 2021-06-03 PROCEDURE — 3079F DIAST BP 80-89 MM HG: CPT | Performed by: NURSE PRACTITIONER

## 2021-06-03 PROCEDURE — 3074F SYST BP LT 130 MM HG: CPT | Performed by: NURSE PRACTITIONER

## 2021-06-03 PROCEDURE — 99213 OFFICE O/P EST LOW 20 MIN: CPT | Performed by: NURSE PRACTITIONER

## 2021-06-03 PROCEDURE — 1036F TOBACCO NON-USER: CPT | Performed by: NURSE PRACTITIONER

## 2021-06-03 RX ORDER — FLUTICASONE PROPIONATE 50 MCG
1 SPRAY, SUSPENSION (ML) NASAL DAILY
Qty: 16 G | Refills: 0 | Status: SHIPPED | OUTPATIENT
Start: 2021-06-03 | End: 2021-07-15

## 2021-06-03 NOTE — PROGRESS NOTES
Assessment/Plan:           Problem List Items Addressed This Visit        Endocrine    Hypoparathyroidism ([facility] )    Hypothyroidism     Patient following with endocrine and has labs ordered patient in the Synthroid 200 mcg daily            Cardiovascular and Mediastinum    Benign essential hypertension - Primary     Patient doing well on his amlodipine losartan and HCTZ            Other    BMI 40 0-44 9, adult ([facility] )      Other Visit Diagnoses     Acute URI        Relevant Medications    fluticasone (FLONASE) 50 mcg/act nasal spray            Subjective:      Patient ID: Rachid Ashley  is a 54 y o  male  Patient here for his six month check up and 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 [employer]  He has no present issues to report  The following portions of the patient's history were reviewed and updated as appropriate:   He  has a past medical history of Asthma, Diabetes mellitus ([facility] ), Diabetes type 2, uncontrolled ([facility] ) (5/6/2014), Diverticulitis, Hyperlipidemia, Hypertension, Immunity to measles determined by serologic test (7/2/2019), Thyroid cancer ([facility] ), and Type 2 diabetes mellitus ([facility] ) (7/17/2013)    He   Patient Active Problem List    Diagnosis Date Noted    BMI 40 0-44 9, adult ([facility] ) 06/15/2020    Thyroid cancer ([facility] ) 02/12/2020    Mass on back 01/08/2020    Lumbar disc herniation 01/03/2019    Sciatic nerve disease, left 05/21/2018    Chronic bilateral low back pain with left-sided sciatica 05/16/2017    Chronic pain disorder 01/31/2017    Lumbar radiculopathy 01/31/2017    Myofascial pain syndrome 01/31/2017    History of thyroid cancer 10/11/2016    Vitamin D deficiency 05/03/2016    Gait difficulty 10/19/2015    Hypoparathyroidism ([facility] ) 12/17/2013    Hypothyroidism 03/15/2013    Benign essential hypertension 10/04/2012    Hypercholesterolemia 08/09/2012    Hypocalcemia 08/09/2012     He  has a past surgical history that includes Thyroidectomy; Colostomy; pr knee scope,med/lat menisectomy (Left, 8/17/2017); BIOPSY CORE NEEDLE; and Chest wall biopsy (N/A, 2/7/2020)  His family history includes Diabetes in his mother; Hyperlipidemia in his father; Hypertension in his father and mother; Thyroid disease unspecified in his sister  He  reports that he quit smoking about 19 years ago  His smoking use included cigarettes  He smoked 1 00 pack per day  He has never used smokeless tobacco  He reports current alcohol use  He reports that he does not use drugs  Current Outpatient Medications   Medication Sig Dispense Refill    amLODIPine (NORVASC) 5 mg tablet TAKE 1 TABLET DAILY 90 tablet 3    aspirin (ECOTRIN LOW STRENGTH) 81 mg EC tablet Take 81 mg by mouth daily      calcitriol (ROCALTROL) 0 25 mcg capsule TAKE 1 CAPSULE TWICE A  capsule 3    Calcium Carb-Cholecalciferol (CALCIUM 600 + D PO) Take 600 mg by mouth 4 (four) times a day        diclofenac-misoprostol (ARTHROTEC 75) 75-0 2 MG per tablet TAKE 1 TABLET TWICE A  tablet 3    losartan-hydrochlorothiazide (HYZAAR) 100-25 MG per tablet TAKE 1 TABLET DAILY 90 tablet 3    simvastatin (ZOCOR) 10 mg tablet TAKE 1 TABLET AT BEDTIME 90 tablet 3    Synthroid 200 MCG tablet TAKE 1 TAB Monday-Friday AND 2 TABS Saturday and Sunday 120 tablet 4    fluticasone (FLONASE) 50 mcg/act nasal spray 1 spray into each nostril daily 16 g 0     No current facility-administered medications for this visit  He has No Known Allergies       Review of Systems   Constitutional: Negative  HENT: Negative  Eyes: Negative  Wearing contacts    Respiratory: Negative  Cardiovascular: Negative  Gastrointestinal: Negative  Endocrine: Negative  Genitourinary: Negative  Musculoskeletal: Negative  Skin: Negative  Allergic/Immunologic: Negative  Neurological: Negative  Hematological: Negative  Psychiatric/Behavioral: Negative  All other systems reviewed and are negative          Objective:      /84 Pulse 71   Temp 97 6 °F (36 4 °C) (Temporal)   Ht 6' 1" (1 854 m)   Wt (!) 153 kg (337 lb 12 8 oz)   SpO2 94%   BMI 44 57 kg/m²          Physical Exam  Vitals signs and nursing note reviewed  Constitutional:       General: He is not in acute distress  Appearance: He is well-developed  HENT:      Head: Normocephalic and atraumatic  Eyes:      Pupils: Pupils are equal, round, and reactive to light  Neck:      Musculoskeletal: Normal range of motion  Thyroid: No thyromegaly  Cardiovascular:      Rate and Rhythm: Normal rate and regular rhythm  Heart sounds: Normal heart sounds  No murmur  Pulmonary:      Effort: Pulmonary effort is normal  No respiratory distress  Breath sounds: Normal breath sounds  No wheezing  Abdominal:      General: Bowel sounds are normal       Palpations: Abdomen is soft  Musculoskeletal: Normal range of motion  Skin:     General: Skin is warm and dry  Neurological:      Mental Status: He is alert and oriented to person, place, and time

## 2021-06-18 DIAGNOSIS — E03.9 HYPOTHYROIDISM, UNSPECIFIED TYPE: ICD-10-CM

## 2021-06-18 RX ORDER — LEVOTHYROXINE SODIUM 200 MCG
TABLET ORAL
Qty: 18 TABLET | Refills: 0 | Status: SHIPPED | OUTPATIENT
Start: 2021-06-18 | End: 2021-06-21 | Stop reason: SDUPTHER

## 2021-06-18 RX ORDER — LEVOTHYROXINE SODIUM 200 MCG
TABLET ORAL
Qty: 120 TABLET | Refills: 1 | Status: SHIPPED | OUTPATIENT
Start: 2021-06-18 | End: 2021-06-18 | Stop reason: SDUPTHER

## 2021-06-21 DIAGNOSIS — E03.9 HYPOTHYROIDISM, UNSPECIFIED TYPE: ICD-10-CM

## 2021-06-21 RX ORDER — LEVOTHYROXINE SODIUM 0.2 MG/1
TABLET ORAL
Qty: 180 TABLET | Refills: 0 | Status: SHIPPED | OUTPATIENT
Start: 2021-06-21 | End: 2021-06-21 | Stop reason: SDUPTHER

## 2021-06-21 NOTE — TELEPHONE ENCOUNTER
[facility] called wanting to know it the generic is an option for this patient, if so we can remove the DISPENSE AS WRITTEN order

## 2021-06-21 NOTE — TELEPHONE ENCOUNTER
Also called patient to see if he picked up his prescription from the local pharmacy for 18 doses till his home delivery came

## 2021-06-22 RX ORDER — LEVOTHYROXINE SODIUM 0.2 MG/1
TABLET ORAL
Qty: 180 TABLET | Refills: 0 | Status: SHIPPED | OUTPATIENT
Start: 2021-06-22 | End: 2021-09-19

## 2021-07-15 DIAGNOSIS — J06.9 ACUTE URI: ICD-10-CM

## 2021-07-15 RX ORDER — FLUTICASONE PROPIONATE 50 MCG
SPRAY, SUSPENSION (ML) NASAL
Qty: 16 G | Refills: 5 | Status: SHIPPED | OUTPATIENT
Start: 2021-07-15 | End: 2022-07-11

## 2021-08-05 ENCOUNTER — APPOINTMENT (OUTPATIENT)
Dept: RADIOLOGY | Facility: CLINIC | Age: 46
End: 2021-08-05
Payer: COMMERCIAL

## 2021-08-05 ENCOUNTER — OFFICE VISIT (OUTPATIENT)
Dept: FAMILY MEDICINE CLINIC | Facility: CLINIC | Age: 46
End: 2021-08-05
Payer: COMMERCIAL

## 2021-08-05 VITALS
HEIGHT: 73 IN | SYSTOLIC BLOOD PRESSURE: 124 MMHG | DIASTOLIC BLOOD PRESSURE: 82 MMHG | HEART RATE: 74 BPM | OXYGEN SATURATION: 98 % | TEMPERATURE: 98.7 F | BODY MASS INDEX: 41.75 KG/M2 | WEIGHT: 315 LBS

## 2021-08-05 DIAGNOSIS — M25.562 ACUTE PAIN OF LEFT KNEE: Primary | ICD-10-CM

## 2021-08-05 DIAGNOSIS — M17.12 ARTHRITIS OF LEFT KNEE: ICD-10-CM

## 2021-08-05 DIAGNOSIS — M25.562 ACUTE PAIN OF LEFT KNEE: ICD-10-CM

## 2021-08-05 DIAGNOSIS — Z98.890 HISTORY OF LATERAL MENISCUS REPAIR OF LEFT KNEE: ICD-10-CM

## 2021-08-05 PROCEDURE — 3079F DIAST BP 80-89 MM HG: CPT | Performed by: PHYSICIAN ASSISTANT

## 2021-08-05 PROCEDURE — 3074F SYST BP LT 130 MM HG: CPT | Performed by: PHYSICIAN ASSISTANT

## 2021-08-05 PROCEDURE — 1036F TOBACCO NON-USER: CPT | Performed by: PHYSICIAN ASSISTANT

## 2021-08-05 PROCEDURE — 3008F BODY MASS INDEX DOCD: CPT | Performed by: PHYSICIAN ASSISTANT

## 2021-08-05 PROCEDURE — 3725F SCREEN DEPRESSION PERFORMED: CPT | Performed by: PHYSICIAN ASSISTANT

## 2021-08-05 PROCEDURE — 99214 OFFICE O/P EST MOD 30 MIN: CPT | Performed by: PHYSICIAN ASSISTANT

## 2021-08-05 PROCEDURE — 73562 X-RAY EXAM OF KNEE 3: CPT

## 2021-08-05 RX ORDER — PREDNISONE 20 MG/1
40 TABLET ORAL DAILY
Qty: 10 TABLET | Refills: 0 | Status: SHIPPED | OUTPATIENT
Start: 2021-08-05 | End: 2021-08-10

## 2021-08-05 NOTE — PROGRESS NOTES
Assessment/Plan:       Problem List Items Addressed This Visit     None      Visit Diagnoses     Acute pain of left knee    -  Primary    Relevant Medications    predniSONE 20 mg tablet    Other Relevant Orders    XR knee 3 vw left non injury    Ambulatory referral to Orthopedic Surgery    History of lateral meniscus repair of left knee        Relevant Orders    Ambulatory referral to Orthopedic Surgery    Arthritis of left knee        Relevant Orders    Ambulatory referral to Orthopedic Surgery         Will update XR, prednisone burst  Ice, compression, elevation, rest  Refer back to orthopedics at this time  Follow up as needed    Subjective:      Patient ID: Yeni Garcia  is a 54 y o  male  Pt presents with acute on chronic L knee pain  He shares it began 2 days ago when he was sitting at his desk and got up to walk  The knee is swollen and he has pain with weight bearing  He has a long hx of L knee pathology, he has a hx of meniscal repair  A 2017 MRI reads "1  Sequelae of old, nonunified medial patellar pole avulsion and patellofemoral predominant degenerative changes likely due to chronic maltracking  2   Femorotibial degenerative changes are relatively mild though there is associated lateral greater than medial macerative tearing of the menisci  Secondary osteochondral fragments are seen in the joint  3   Increased signal centrally within the ACL could represent ganglion (favored) or partial thickness tearing "  he also has tricompartmental arthritic change  He takes arthrotec for his back and it is not helping his knee  He has been icing and elevating         The following portions of the patient's history were reviewed and updated as appropriate:   He  has a past medical history of Asthma, Diabetes mellitus ([address] ), Diabetes type 2, uncontrolled ([address] ) (5/6/2014), Diverticulitis, Hyperlipidemia, Hypertension, Immunity to measles determined by serologic test (7/2/2019), Thyroid cancer ([address] ), and Type 2 diabetes mellitus ([facility] ) (7/17/2013)  He   Patient Active Problem List    Diagnosis Date Noted    BMI 40 0-44 9, adult ([facility] ) 06/15/2020    Thyroid cancer ([facility] ) 02/12/2020    Mass on back 01/08/2020    Lumbar disc herniation 01/03/2019    Sciatic nerve disease, left 05/21/2018    Chronic bilateral low back pain with left-sided sciatica 05/16/2017    Chronic pain disorder 01/31/2017    Lumbar radiculopathy 01/31/2017    Myofascial pain syndrome 01/31/2017    History of thyroid cancer 10/11/2016    Vitamin D deficiency 05/03/2016    Gait difficulty 10/19/2015    Hypoparathyroidism ([facility] ) 12/17/2013    Hypothyroidism 03/15/2013    Benign essential hypertension 10/04/2012    Hypercholesterolemia 08/09/2012    Hypocalcemia 08/09/2012     He  has a past surgical history that includes Thyroidectomy; Colostomy; pr knee scope,med/lat menisectomy (Left, 8/17/2017); BIOPSY CORE NEEDLE; and Chest wall biopsy (N/A, 2/7/2020)  His family history includes Diabetes in his mother; Hyperlipidemia in his father; Hypertension in his father and mother; Thyroid disease unspecified in his sister  He  reports that he quit smoking about 19 years ago  His smoking use included cigarettes  He smoked 1 00 pack per day  He has never used smokeless tobacco  He reports current alcohol use  He reports that he does not use drugs    Current Outpatient Medications   Medication Sig Dispense Refill    amLODIPine (NORVASC) 5 mg tablet TAKE 1 TABLET DAILY 90 tablet 3    aspirin (ECOTRIN LOW STRENGTH) 81 mg EC tablet Take 81 mg by mouth daily      calcitriol (ROCALTROL) 0 25 mcg capsule TAKE 1 CAPSULE TWICE A  capsule 3    Calcium Carb-Cholecalciferol (CALCIUM 600 + D PO) Take 600 mg by mouth 4 (four) times a day        diclofenac-misoprostol (ARTHROTEC 75) 75-0 2 MG per tablet TAKE 1 TABLET TWICE A  tablet 3    fluticasone (FLONASE) 50 mcg/act nasal spray USE 1 SPRAY IN EACH NOSTRIL DAILY 16 g 5    levothyroxine (Synthroid) 200 mcg tablet TAKE 1 TAB Monday-Friday AND 2 TABS Saturday and Sunday 180 tablet 0    losartan-hydrochlorothiazide (HYZAAR) 100-25 MG per tablet TAKE 1 TABLET DAILY 90 tablet 3    predniSONE 20 mg tablet Take 2 tablets (40 mg total) by mouth daily for 5 days 10 tablet 0    simvastatin (ZOCOR) 10 mg tablet TAKE 1 TABLET AT BEDTIME 90 tablet 3     No current facility-administered medications for this visit  Current Outpatient Medications on File Prior to Visit   Medication Sig    amLODIPine (NORVASC) 5 mg tablet TAKE 1 TABLET DAILY    aspirin (ECOTRIN LOW STRENGTH) 81 mg EC tablet Take 81 mg by mouth daily    calcitriol (ROCALTROL) 0 25 mcg capsule TAKE 1 CAPSULE TWICE A DAY    Calcium Carb-Cholecalciferol (CALCIUM 600 + D PO) Take 600 mg by mouth 4 (four) times a day      diclofenac-misoprostol (ARTHROTEC 75) 75-0 2 MG per tablet TAKE 1 TABLET TWICE A DAY    fluticasone (FLONASE) 50 mcg/act nasal spray USE 1 SPRAY IN EACH NOSTRIL DAILY    levothyroxine (Synthroid) 200 mcg tablet TAKE 1 TAB Monday-Friday AND 2 TABS Saturday and Sunday    losartan-hydrochlorothiazide (HYZAAR) 100-25 MG per tablet TAKE 1 TABLET DAILY    simvastatin (ZOCOR) 10 mg tablet TAKE 1 TABLET AT BEDTIME    [DISCONTINUED] losartan-hydrochlorothiazide (HYZAAR) 100-25 MG per tablet Take 1 tablet by mouth daily     No current facility-administered medications on file prior to visit  He has No Known Allergies       Review of Systems   Constitutional: Negative  Musculoskeletal: Positive for arthralgias, gait problem and joint swelling  Skin: Negative  Neurological: Negative for weakness and numbness  Objective:      /82 (BP Location: Left arm, Patient Position: Sitting, Cuff Size: Standard)   Pulse 74   Temp 98 7 °F (37 1 °C)   Ht 6' 1" (1 854 m)   Wt (!) 155 kg (341 lb)   SpO2 98%   BMI 44 99 kg/m²          Physical Exam  Vitals and nursing note reviewed     Constitutional:       General: He is not in acute distress  Appearance: Normal appearance  He is not ill-appearing  HENT:      Head: Normocephalic and atraumatic  Nose: Nose normal    Eyes:      Conjunctiva/sclera: Conjunctivae normal    Pulmonary:      Effort: Pulmonary effort is normal  No respiratory distress  Musculoskeletal:      Cervical back: Normal range of motion  Left knee: Swelling and effusion present  No deformity, erythema, ecchymosis, bony tenderness or crepitus  Decreased range of motion  Tenderness present over the lateral joint line  No LCL laxity, MCL laxity, ACL laxity or PCL laxity  Skin:     General: Skin is warm and dry  Neurological:      Mental Status: He is alert and oriented to person, place, and time     Psychiatric:         Mood and Affect: Mood normal

## 2021-08-05 NOTE — LETTER
August 5, 2021     Patient: Hobart Cooks  YOB: 1975   Date of Visit: 8/5/2021       To Whom it May Concern:    Paul Mathew is under my professional care  He was seen in my office on 8/5/2021  Please excuse Linden Pedro from 8/4-8/6  He may return to work on 8/9/2021  If you have any questions or concerns, please don't hesitate to call           Sincerely,          Betty Turner PA-C        CC: No Recipients

## 2021-08-07 ENCOUNTER — HOSPITAL ENCOUNTER (EMERGENCY)
Facility: HOSPITAL | Age: 46
Discharge: HOME | End: 2021-08-07
Attending: EMERGENCY MEDICINE | Admitting: EMERGENCY MEDICINE
Payer: COMMERCIAL

## 2021-08-07 VITALS
RESPIRATION RATE: 18 BRPM | WEIGHT: 315 LBS | TEMPERATURE: 98.1 F | SYSTOLIC BLOOD PRESSURE: 138 MMHG | OXYGEN SATURATION: 98 % | HEIGHT: 73 IN | BODY MASS INDEX: 41.75 KG/M2 | DIASTOLIC BLOOD PRESSURE: 85 MMHG | HEART RATE: 73 BPM

## 2021-08-07 DIAGNOSIS — M25.561 ACUTE PAIN OF RIGHT KNEE: Primary | ICD-10-CM

## 2021-08-07 PROCEDURE — 99283 EMERGENCY DEPT VISIT LOW MDM: CPT

## 2021-08-07 PROCEDURE — 99284 EMERGENCY DEPT VISIT MOD MDM: CPT | Performed by: EMERGENCY MEDICINE

## 2021-08-07 RX ORDER — OXYCODONE HYDROCHLORIDE 5 MG/1
5 TABLET ORAL EVERY 6 HOURS PRN
Qty: 10 TABLET | Refills: 0 | Status: SHIPPED | OUTPATIENT
Start: 2021-08-07 | End: 2021-11-04 | Stop reason: ALTCHOICE

## 2021-08-07 NOTE — Clinical Note
Ana Grewal was seen and treated in our emergency department on 8/7/2021  Diagnosis:     [place]    He may return on this date: 08/12/2021         If you have any questions or concerns, please don't hesitate to call        Gale Romano RN    ______________________________           _______________          _______________  Hospital Representative                              Date                                Time

## 2021-08-07 NOTE — DISCHARGE INSTRUCTIONS
Please take your medications as prescribed  Do not operate heavy machinery drive while taking oxycodone  Not mix with mind-altering substances  Weight-bearing as tolerated  Use knee brace/immobilizer  Follow-up with orthopedics  Return to the emergency department for any new or worsening symptoms

## 2021-08-07 NOTE — ED PROVIDER NOTES
Pt Name: Tigist Coppola  MRN: [id number]  Birthdate 1975  Age/Sex: 54 y o  male  Date of evaluation: 8/7/2021  PCP: Nahed Samuel, [address] [place] [address]    Chief Complaint   Patient presents with    Knee Pain     Patient co left knee pain  Patient states "Tuesday my knee started to act up  It locked up and I couldnt move "          HPI    54 y o  male presenting with knee pain  Patient has chronic left knee pain, as arthritis, takes Arthrotec  Has seen Orthopedics for this before  Saw his PCP couple days ago, had an x-ray ordered and was referred to Orthopedics  Pain started this past Tuesday, he stood up and it was worse  He is having worsening swelling of his left knee as well  No nausea or vomiting, no skin changes  He was prescribed prednisone, is on day 3  No fevers or chills  There is pain with weight-bearing  Also has chronic numbness of his left leg due to sciatic issues, it is unchanged from prior  Denies any trauma            Past Medical and Surgical History    Past Medical History:   Diagnosis Date    Asthma     [facility] mellitus [facility] )     Diabetes type 2, uncontrolled ([facility] ) 5/6/2014    Diverticulitis     Hyperlipidemia     Hypertension     Immunity to measles determined by serologic test 7/2/2019    Thyroid cancer ([facility] )     radioactive iodine     Type 2 diabetes mellitus ([facility] ) 7/17/2013       Past Surgical History:   Procedure Laterality Date    BIOPSY CORE NEEDLE      Thyroid    CHEST WALL BIOPSY N/A 2/7/2020    Procedure: EXCISION  BIOPSY LESION/MASS from back;  Surgeon: Niko Sanchez MD;  [facility];  Service: General    COLOSTOMY      with reversal    [place] KNEE SCOPE,MED/LAT MENISECTOMY Left 8/17/2017    Procedure: KNEE ARTHROSCOPIC PARTIAL LATERAL MENISCECTOMY ; PATELLO Nevin Christensen;  Surgeon: Carly Vo MD;  [facility];  Service: Orthopedics    THYROIDECTOMY         Family History   Problem Relation Age of Onset    Diabetes Mother     Hypertension Mother     Hypertension Father     Hyperlipidemia Father     Thyroid disease unspecified Sister        Social History     Tobacco Use    Smoking status: Former Smoker     Packs/day: 1 00     Types: Cigarettes     Quit date:      Years since quittin 6    Smokeless tobacco: Never Used   Vaping Use    Vaping Use: Never used   Substance Use Topics    Alcohol use: Yes     Comment: 1 drink every 2 weeks    Drug use: No           Allergies    No Known Allergies    Home Medications    Prior to Admission medications    Medication Sig Start Date End Date Taking? Authorizing Provider   amLODIPine (NORVASC) 5 mg tablet TAKE 1 TABLET DAILY 20   Nishi Monsalve MD   aspirin (ECOTRIN LOW STRENGTH) 81 mg EC tablet Take 81 mg by mouth daily    Historical Provider, MD   calcitriol (ROCALTROL) 0 25 mcg capsule TAKE 1 CAPSULE TWICE A DAY 20   Nishi Monsalve MD   Calcium Carb-Cholecalciferol (CALCIUM 600 + D PO) Take 600 mg by mouth 4 (four) times a day      Historical Provider, MD   diclofenac-misoprostol (ARTHROTEC 75) 75-0 2 MG per tablet TAKE 1 TABLET TWICE A DAY 10/29/20   ISAÍAS Ribera   fluticasone (FLONASE) 50 mcg/act nasal spray USE 1 SPRAY IN EACH NOSTRIL DAILY 7/15/21   ISAÍAS Jameson   levothyroxine (Synthroid) 200 mcg tablet TAKE 1 TAB Monday-Friday AND 2 TABS Saturday and 21   Nishi Monsalve MD   losartan-hydrochlorothiazide [facility]) 100-25 MG per tablet TAKE 1 TABLET DAILY 21   Nishi Monsalve MD   predniSONE 20 mg tablet Take 2 tablets (40 mg total) by mouth daily for 5 days 8/5/21 8/10/21  Shania Melton PA-C   simvastatin (ZOCOR) 10 mg tablet TAKE 1 TABLET AT BEDTIME 20   Nishi Monsalve MD   losartan-hydrochlorothiazide [facility]) 100-25 MG per tablet Take 1 tablet by mouth daily 20   Nishi Monsalve MD           Review of Systems    Review of Systems   Constitutional: Negative for chills and fever     Respiratory: Negative for cough and shortness of breath  Cardiovascular: Negative for chest pain and palpitations  Gastrointestinal: Negative for nausea and vomiting  Musculoskeletal: Positive for joint swelling  Left knee pain   Skin: Negative for color change, rash and wound  Physical Exam      ED Triage Vitals [08/07/21 1420]   Temperature Pulse Respirations Blood Pressure SpO2   98 1 °F (36 7 °C) 73 18 138/85 98 %      Temp Source Heart Rate Source Patient Position - Orthostatic VS BP Location FiO2 (%)   Oral Monitor Sitting Left arm --      Pain Score       --               Physical Exam  Constitutional:       General: He is not in acute distress  Appearance: He is not ill-appearing or diaphoretic  HENT:      Head: Normocephalic and atraumatic  Nose: Nose normal    Eyes:      Extraocular Movements: Extraocular movements intact  Pupils: Pupils are equal, round, and reactive to light  Cardiovascular:      Rate and Rhythm: Normal rate and regular rhythm  Pulmonary:      Effort: Pulmonary effort is normal  No respiratory distress  Abdominal:      General: There is no distension  Palpations: Abdomen is soft  Musculoskeletal:         General: Swelling present  Cervical back: Normal range of motion and neck supple  Comments: Patient able to flex and extend his left knee, there is suprapatellar effusion, no significant joint tenderness  Skin:     General: Skin is warm  Findings: No erythema  Comments: No left knee erythema, no increased warmth   Neurological:      Mental Status: He is alert and oriented to person, place, and time  Mental status is at baseline                Diagnostic Results      Labs:    Results Reviewed     None          All labs reviewed and utilized in the medical decision making process    Radiology:    No orders to display       All radiology studies independently viewed by me and interpreted by the radiologist     Procedure    Procedures        MDM    Patient overall appears well, vitals reassuring  Not in acute distress  Compartments are soft  "LEFT KNEE     INDICATION:   M25 562: Pain in left knee      COMPARISON:  11/15/2016     VIEWS:  XR KNEE 3 VW LEFT NON INJURY         FINDINGS:     There is no acute fracture or dislocation      Joint effusion visible above the patella with intra-articular loose body in the area and potentially also just below the patella      Patient has developed quite severe osteoarthritis most evident laterally with significant joint narrowing and osteophytic spurring at the joint margin  Lesser amount of degenerative change medially  Patellofemoral joint is somewhat narrowed with   prominent osteophytic spurring medially and laterally as seen on the sunrise view      No lytic or blastic bone lesion seen  Frontal weightbearing view shows mild valgus deformity of the knee      Soft tissues are unremarkable      IMPRESSION:     Interval development of relatively severe osteoarthritis of the knee with intra-articular loose body in joint effusion  Valgus deformity has developed "    Do not think repeat x-ray is beneficial at this time  Not concerned for septic arthritis or gout  No cellulitic changes  Pain likely due to worsening arthritis and effusion  Discussed arthrocentesis, I think the risk of introducing an infection outweighs the benefit at this time  Also the fluid would likely reaccumulate  Placed in knee immobilizer, advised compression, ice, elevation  He does already have crutches  Weightbearing as tolerated  Prescribed diclofenac gel  Also prescribed oxycodone, standard narcotic precautions discussed  Patient's orthopedic appointment isn't until late September, advised to call again on Monday, also provided stat referral given he is unable to work like this  Return precautions were discussed, he verbalized understanding            Medications - No data to display        FINAL IMPRESSION    Final diagnoses:   Acute pain of left knee         DISPOSITION    Time reflects when diagnosis was documented in both MDM as applicable and the Disposition within this note     Time User Action Codes Description Comment    8/7/2021  2:42 PM DavidAfiamarleykylah Bull Acute pain of right knee     8/7/2021  2:51 PM David Luh Sykes Acute pain of left knee       ED Disposition     ED Disposition Condition Date/Time Comment    Discharge Stable Sat Aug 7, 2021  2:42 PM Dicie Denver  discharge to home/self care  Follow-up Information     Follow up With Specialties Details Why Contact Info    Madelyn Gilbert DO Sports Medicine Call on 8/9/2021  [address] [place] [address] [address]  [facility] [address] [address]  [phone]              PATIENT REFERRED TO:    Madelyn Gilbert DO  [address] [facility]  [facility] [address]  [phone]    Call on 8/9/2021        DISCHARGE MEDICATIONS:    Patient's Medications   Discharge Prescriptions    DICLOFENAC SODIUM (VOLTAREN) 1 %    Apply 2 g topically 4 (four) times a day as needed (knee pain)       Start Date: 8/7/2021  End Date: --       Order Dose: 2 g       Quantity: 2 g    Refills: 0    OXYCODONE (ROXICODONE) 5 MG IMMEDIATE RELEASE TABLET    Take 1 tablet (5 mg total) by mouth every 6 (six) hours as needed for severe pain for up to 10 dosesMax Daily Amount: 20 mg       Start Date: 8/7/2021  End Date: --       Order Dose: 5 mg       Quantity: 10 tablet    Refills: 0                Velvet Hernandez DO        This note was partially completed using voice recognition technology, and was scanned for gross errors; however some errors may still exist  Please contact the author with any questions or requests for clarification        Jake Antonio DO  08/07/21 [id number]

## 2021-08-16 ENCOUNTER — OFFICE VISIT (OUTPATIENT)
Dept: OBGYN CLINIC | Facility: HOSPITAL | Age: 46
End: 2021-08-16
Payer: COMMERCIAL

## 2021-08-16 VITALS
SYSTOLIC BLOOD PRESSURE: 129 MMHG | DIASTOLIC BLOOD PRESSURE: 89 MMHG | HEIGHT: 73 IN | BODY MASS INDEX: 41.75 KG/M2 | HEART RATE: 62 BPM | WEIGHT: 315 LBS

## 2021-08-16 DIAGNOSIS — M25.462 EFFUSION OF LEFT KNEE: ICD-10-CM

## 2021-08-16 DIAGNOSIS — M17.12 PRIMARY OSTEOARTHRITIS OF LEFT KNEE: Primary | ICD-10-CM

## 2021-08-16 PROCEDURE — 99203 OFFICE O/P NEW LOW 30 MIN: CPT | Performed by: PHYSICIAN ASSISTANT

## 2021-08-16 PROCEDURE — 20610 DRAIN/INJ JOINT/BURSA W/O US: CPT | Performed by: PHYSICIAN ASSISTANT

## 2021-08-16 RX ORDER — LIDOCAINE HYDROCHLORIDE 10 MG/ML
2 INJECTION, SOLUTION INFILTRATION; PERINEURAL
Status: COMPLETED | OUTPATIENT
Start: 2021-08-16 | End: 2021-08-16

## 2021-08-16 RX ORDER — TRIAMCINOLONE ACETONIDE 40 MG/ML
80 INJECTION, SUSPENSION INTRA-ARTICULAR; INTRAMUSCULAR
Status: COMPLETED | OUTPATIENT
Start: 2021-08-16 | End: 2021-08-16

## 2021-08-16 RX ORDER — BUPIVACAINE HYDROCHLORIDE 5 MG/ML
2 INJECTION, SOLUTION EPIDURAL; INTRACAUDAL
Status: COMPLETED | OUTPATIENT
Start: 2021-08-16 | End: 2021-08-16

## 2021-08-16 RX ADMIN — BUPIVACAINE HYDROCHLORIDE 2 ML: 5 INJECTION, SOLUTION EPIDURAL; INTRACAUDAL at 08:56

## 2021-08-16 RX ADMIN — TRIAMCINOLONE ACETONIDE 80 MG: 40 INJECTION, SUSPENSION INTRA-ARTICULAR; INTRAMUSCULAR at 08:56

## 2021-08-16 RX ADMIN — LIDOCAINE HYDROCHLORIDE 2 ML: 10 INJECTION, SOLUTION INFILTRATION; PERINEURAL at 08:56

## 2021-08-16 NOTE — LETTER
August 16, 2021     Patient: Hetal Tillman  YOB: 1975   Date of Visit: 8/16/2021       To Whom it May Concern:    Claudia Maya is under my professional care  He was seen in my office on 8/16/2021  He may return to work with limitations :sedentary (sitting) duty only  If unable to accommodate this, then remain out of work until f/u visit in 3 weeks       If you have any questions or concerns, please don't hesitate to call  Sincerely,          Micheal Sherwood PA-C        CC: Tyrese Killian

## 2021-08-16 NOTE — PROGRESS NOTES
Assessment/Plan   Diagnoses and all orders for this visit:    Primary osteoarthritis of left knee  Effusion of left knee    - Aspiration and CSI today  - Discontinue immobilizer and work on motion  - Wean off crutches  - Ice, Voltaren gel as needed  - Sedentary duty  - Follow up with Dr Enrique Yoon - already scheduled for 3 weeks        Subjective   Patient ID: Aristeo Stokes  is a 54 y o  male  Vitals:    08/16/21 0813   BP: 129/89   Pulse: 58     44yo male comes in for an evaluation of his left knee  He has a history of osteoarthritis  About two weeks ago, he had an increase of pain and swelling with no specific injury  Xrays in the ER demonstrated a progression in the OA  The pain is dull in character, mild in severity, pain does not radiate and is not associated with numbness  He was treated with an immobilizer and crutches  He works at [employer] and has been out of work        The following portions of the patient's history were reviewed and updated as appropriate: allergies, current medications, past family history, past medical history, past social history, past surgical history and problem list The following portions of the patient's history were reviewed and updated as appropriate: allergies, current medications, past family history, past medical history, past social history, past surgical history and problem list     Review of Systems  Ortho Exam  Past Medical History:   Diagnosis Date    Asthma     [facility]     Diabetes type 2, uncontrolled ([facility] ) 5/6/2014    Diverticulitis     Hyperlipidemia     Hypertension     Immunity to measles determined by serologic test 7/2/2019    Thyroid cancer ([facility] )     radioactive iodine     Type 2 diabetes mellitus ([facility] ) 7/17/2013     Past Surgical History:   Procedure Laterality Date    BIOPSY CORE NEEDLE      Thyroid    CHEST WALL BIOPSY N/A 2/7/2020    Procedure: EXCISION  BIOPSY LESION/MASS from back;  Surgeon: Carroll Cockayne, MD; [facility];  Service: General    COLOSTOMY      with reversal    [place] KNEE SCOPE,MED/LAT MENISECTOMY Left 2017    Procedure: KNEE ARTHROSCOPIC PARTIAL LATERAL MENISCECTOMY ; PATELLO FEMOEAL CHONDROPLASTY;  Surgeon: Natasha Davila MD;  [facility];  Service: Orthopedics    THYROIDECTOMY       Family History   Problem Relation Age of Onset    Diabetes Mother     Hypertension Mother     Hypertension Father     Hyperlipidemia Father     Thyroid disease unspecified Sister      Social History     Occupational History    Not on file   Tobacco Use    Smoking status: Former Smoker     Packs/day: 1 00     Types: Cigarettes     Quit date:      Years since quittin 6    Smokeless tobacco: Never Used   Vaping Use    Vaping Use: Never used   Substance and Sexual Activity    Alcohol use: Yes     Comment: 1 drink every 2 weeks    Drug use: No    Sexual activity: Not on file       Review of Systems   Constitutional: Negative  HENT: Negative  Eyes: Negative  Respiratory: Negative  Cardiovascular: Negative  Gastrointestinal: Negative  Endocrine: Negative  Genitourinary: Negative  Musculoskeletal: As below      Allergic/Immunologic: Negative  Neurological: Negative  Hematological: Negative  Psychiatric/Behavioral: Negative  Objective   Physical Exam      · Constitutional: Awake, Alert, Oriented  · Eyes: EOMI  · Psych: Mood and affect appropriate  · Heart: regular rate and rhythm  · Lungs: No audible wheezing  · Abdomen: soft  · Lymph: no lymphedema   left Knee:  - Appearance   Swelling: moderate, no discoloration, no deformity, no ecchymosis and no erythema  - Effusion   large  - Palpation   + Tenderness medial joint line    No tenderness of the patella, patellar tendon, pes anserine, lateral joint line, MCL, LCL, medial / lateral plateau  - ROM   Extension: 0 and Flexion: 70  - Special Tests   Anterior Drawer Test negative, Posterior Drawer Test negative, Valgus Stress Test negative, Varus Stress Test negative and Patellar apprehension negative  - Motor   normal 5/5 in all planes  - NVI distally    I have personally reviewed pertinent films in PACS and my interpretation is Severe OA with bone on bone joint spaces and osteophyte formation        Large joint arthrocentesis: L knee  Universal Protocol:  Consent: Verbal consent obtained  Risks and benefits: risks, benefits and alternatives were discussed  Consent given by: patient  Time out: Immediately prior to procedure a "time out" was called to verify the correct patient, procedure, equipment, support staff and site/side marked as required    Timeout called at: 8/16/2021 8:55 AM   Patient understanding: patient states understanding of the procedure being performed  Site marked: the operative site was marked  Patient identity confirmed: verbally with patient    Supporting Documentation  Indications: pain   Procedure Details  Location: knee - L knee  Needle size: 22 G  Ultrasound guidance: no  Approach: lateral  Medications administered: 2 mL bupivacaine (PF) 0 5 %; 2 mL lidocaine 1 %; 80 mg triamcinolone acetonide 40 mg/mL    Aspirate amount: 50 mL  Aspirate: clear, yellow and blood-tinged    Patient tolerance: patient tolerated the procedure well with no immediate complications  Dressing:  Sterile dressing applied

## 2021-08-23 ENCOUNTER — TELEPHONE (OUTPATIENT)
Dept: OBGYN CLINIC | Facility: HOSPITAL | Age: 46
End: 2021-08-23

## 2021-08-23 NOTE — TELEPHONE ENCOUNTER
Patient is calling to make us aware that we should be receiving STD paperwork today from the [address] [place] [address] at the [phone] fax number

## 2021-08-27 ENCOUNTER — CONSULT (OUTPATIENT)
Dept: OBGYN CLINIC | Facility: MEDICAL CENTER | Age: 46
End: 2021-08-27
Payer: COMMERCIAL

## 2021-08-27 VITALS
SYSTOLIC BLOOD PRESSURE: 144 MMHG | DIASTOLIC BLOOD PRESSURE: 85 MMHG | HEART RATE: 71 BPM | BODY MASS INDEX: 42.66 KG/M2 | WEIGHT: 315 LBS | HEIGHT: 72 IN

## 2021-08-27 DIAGNOSIS — M17.12 PRIMARY OSTEOARTHRITIS OF LEFT KNEE: Primary | ICD-10-CM

## 2021-08-27 DIAGNOSIS — Z98.890 HISTORY OF LATERAL MENISCUS REPAIR OF LEFT KNEE: ICD-10-CM

## 2021-08-27 DIAGNOSIS — M25.562 ACUTE PAIN OF LEFT KNEE: ICD-10-CM

## 2021-08-27 PROCEDURE — 1036F TOBACCO NON-USER: CPT | Performed by: ORTHOPAEDIC SURGERY

## 2021-08-27 PROCEDURE — 3008F BODY MASS INDEX DOCD: CPT | Performed by: ORTHOPAEDIC SURGERY

## 2021-08-27 PROCEDURE — 3079F DIAST BP 80-89 MM HG: CPT | Performed by: ORTHOPAEDIC SURGERY

## 2021-08-27 PROCEDURE — 99213 OFFICE O/P EST LOW 20 MIN: CPT | Performed by: ORTHOPAEDIC SURGERY

## 2021-08-27 PROCEDURE — 3077F SYST BP >= 140 MM HG: CPT | Performed by: ORTHOPAEDIC SURGERY

## 2021-08-27 NOTE — PROGRESS NOTES
Orthopaedics Office Visit - Established  Patient Visit    ASSESSMENT/PLAN:    Assessment:   Left knee osteoarthritis     Plan:   Pt was advised that the most successful treatment of his left knee OA and significant ongoing paint that has been interfering with his ability to perform his normal activities would be left total knee arthroplasty   Pt was advised that at this time we will order left knee visco supplementation and left knee lateral  brace   Pt was advised that due to network restrictions a pt must reach a BMI of 40 to proceed with left total knee arthroplasty     To Do Next Visit:  Administer 1/3 Orthovisc left knee      _____________________________________________________  CHIEF COMPLAINT:  Chief Complaint   Patient presents with    Left Knee - Pain         SUBJECTIVE:  Tequila Aquino  is a 54 y o  male  With hx of left knee arthroscopic partial lateral meniscectomy and patellofemoral chondroplasty performed by NAYLA Simons 8/17/2017 presents to the office with complaints of left knee pain  Pt was seen in ED 8/7/2021 due to pain and locking of his left knee  Pt was then seen in the office 8/16/2021 by RUPESH AUGUSTIN where aspiration and CSI were performed yielding 50 ml of clear, yellow blood tinged fluid  Pt states that states that since aspiration his left knee is more mobile and he has some continued sensitivity with ambulation and states that his gait is guarded due to concern for hyperextension           PAST MEDICAL HISTORY:  Past Medical History:   Diagnosis Date    Asthma     Diabetes mellitus [facility]     Diabetes type 2, uncontrolled ([facility] ) 5/6/2014    Diverticulitis     Hyperlipidemia     Hypertension     Immunity to measles determined by serologic test 7/2/2019    Thyroid cancer ([facility] )     radioactive iodine     Type 2 diabetes mellitus ([facility] ) 7/17/2013       PAST SURGICAL HISTORY:  Past Surgical History:   Procedure Laterality Date    BIOPSY CORE NEEDLE      Thyroid    CHEST WALL BIOPSY N/A 2020    Procedure: EXCISION  BIOPSY LESION/MASS from back;  Surgeon: Ewa Umanzor MD;  Location: [facility];  Service: General    COLOSTOMY      with reversal    IL KNEE SCOPE,MED/LAT MENISECTOMY Left 2017    Procedure: KNEE ARTHROSCOPIC PARTIAL LATERAL MENISCECTOMY ; PATELLO FEMOEAL CHONDROPLASTY;  Surgeon: Chad Garza MD;  Location: [facility];  Service: Orthopedics    THYROIDECTOMY         FAMILY HISTORY:  Family History   Problem Relation Age of Onset    Diabetes Mother     Hypertension Mother     Hypertension Father     Hyperlipidemia Father     Thyroid disease unspecified Sister        SOCIAL HISTORY:  Social History     Tobacco Use    Smoking status: Former Smoker     Packs/day: 1 00     Types: Cigarettes     Quit date:      Years since quittin 6    Smokeless tobacco: Never Used   Vaping Use    Vaping Use: Never used   Substance Use Topics    Alcohol use: Yes     Comment: 1 drink every 2 weeks    Drug use: No       MEDICATIONS:    Current Outpatient Medications:     amLODIPine (NORVASC) 5 mg tablet, TAKE 1 TABLET DAILY, Disp: 90 tablet, Rfl: 3    aspirin (ECOTRIN LOW STRENGTH) 81 mg EC tablet, Take 81 mg by mouth daily, Disp: , Rfl:     calcitriol (ROCALTROL) 0 25 mcg capsule, TAKE 1 CAPSULE TWICE A DAY, Disp: 180 capsule, Rfl: 3    Calcium Carb-Cholecalciferol (CALCIUM 600 + D PO), Take 600 mg by mouth 4 (four) times a day  , Disp: , Rfl:     Diclofenac Sodium (VOLTAREN) 1 %, Apply 2 g topically 4 (four) times a day as needed (knee pain), Disp: 2 g, Rfl: 0    diclofenac-misoprostol (ARTHROTEC 75) 75-0 2 MG per tablet, TAKE 1 TABLET TWICE A DAY, Disp: 180 tablet, Rfl: 3    fluticasone (FLONASE) 50 mcg/act nasal spray, USE 1 SPRAY IN EACH NOSTRIL DAILY, Disp: 16 g, Rfl: 5    levothyroxine (Synthroid) 200 mcg tablet, TAKE 1 TAB Monday-Friday AND 2 TABS Saturday and , Disp: 180 tablet, Rfl: 0    losartan-hydrochlorothiazide (HYZAAR) 100-25 MG per tablet, TAKE 1 TABLET DAILY, Disp: 90 tablet, Rfl: 3    oxyCODONE (ROXICODONE) 5 mg immediate release tablet, Take 1 tablet (5 mg total) by mouth every 6 (six) hours as needed for severe pain for up to 10 dosesMax Daily Amount: 20 mg, Disp: 10 tablet, Rfl: 0    simvastatin (ZOCOR) 10 mg tablet, TAKE 1 TABLET AT BEDTIME, Disp: 90 tablet, Rfl: 3    ALLERGIES:  No Known Allergies    REVIEW OF SYSTEMS:  MSK: left knee pain  Neuro: WNL  Pertinent items are otherwise noted in HPI  A comprehensive review of systems was otherwise negative  LABS:  HgA1c:   Lab Results   Component Value Date    HGBA1C 5 0 12/01/2020     BMP:   Lab Results   Component Value Date    GLUCOSE 241 (H) 10/03/2015    CALCIUM 8 4 12/01/2020     10/03/2015    K 3 7 12/01/2020    CO2 30 12/01/2020     12/01/2020    BUN 30 (H) 12/01/2020    CREATININE 0 94 12/01/2020     CBC: No components found for: CBC    _____________________________________________________  PHYSICAL EXAMINATION:  Vital signs: /85 (BP Location: Left arm, Patient Position: Sitting, Cuff Size: Standard)   Pulse 71   Ht 6' (1 829 m)   Wt (!) 154 kg (340 lb)   BMI 46 11 kg/m²   General: No acute distress, awake and alert  Psychiatric: Mood and affect appear appropriate  HEENT: Trachea Midline, No torticollis, no apparent facial trauma  Cardiovascular: No audible murmurs;  Extremities appear perfused  Pulmonary: No audible wheezing or stridor  Skin: No open lesions; see further details (if any) below    MUSCULOSKELETAL EXAMINATION:  Extremities:    Left knee   No erythema or ecchymosis   Small effusion  Lateral joint line tenderness   ROM limited   Sensation intact to light tough   Calf soft and supple      _____________________________________________________  STUDIES REVIEWED:  I personally reviewed the images and interpretation is as follows:    xrays of the left knee performed 8/5/2021 show end stage degenerative changes with loss of lateral joint space, large calcific density adjacent to the patella       PROCEDURES PERFORMED:  Procedures    Scribe Attestation    I,:  Yariel Schroeder am acting as a scribe while in the presence of the attending physician :       I,:  Clarke Alexis MD personally performed the services described in this documentation    as scribed in my presence :

## 2021-09-09 ENCOUNTER — TELEPHONE (OUTPATIENT)
Dept: OBGYN CLINIC | Facility: HOSPITAL | Age: 46
End: 2021-09-09

## 2021-09-09 NOTE — TELEPHONE ENCOUNTER
Pt sees Dr Simons    Pt was checking in on his gel injections, we are still waiting for insurance approval

## 2021-09-19 DIAGNOSIS — E03.9 HYPOTHYROIDISM, UNSPECIFIED TYPE: ICD-10-CM

## 2021-09-19 RX ORDER — LEVOTHYROXINE SODIUM 0.2 MG/1
TABLET ORAL
Qty: 180 TABLET | Refills: 3 | Status: SHIPPED | OUTPATIENT
Start: 2021-09-19 | End: 2021-10-05 | Stop reason: SDUPTHER

## 2021-09-21 ENCOUNTER — TELEPHONE (OUTPATIENT)
Dept: OBGYN CLINIC | Facility: HOSPITAL | Age: 46
End: 2021-09-21

## 2021-09-21 NOTE — TELEPHONE ENCOUNTER
Patient is calling to request that the modified duty state the actual dates of light duty that will go through the last scheduled injection on 10-  Please update the paperwork and send it to the [facility]  Please advise patient once this has been completed      Susie Arrieta [phone]

## 2021-09-22 ENCOUNTER — HOSPITAL ENCOUNTER (OUTPATIENT)
Dept: RADIOLOGY | Facility: MEDICAL CENTER | Age: 46
Discharge: HOME | End: 2021-09-22
Payer: COMMERCIAL

## 2021-09-22 DIAGNOSIS — C73 THYROID CANCER (HCC): ICD-10-CM

## 2021-09-22 PROCEDURE — 76536 US EXAM OF HEAD AND NECK: CPT

## 2021-09-23 ENCOUNTER — TELEPHONE (OUTPATIENT)
Dept: OBGYN CLINIC | Facility: HOSPITAL | Age: 46
End: 2021-09-23

## 2021-09-23 NOTE — TELEPHONE ENCOUNTER
Advised patient, that per the dsb log, the first set of paperwork was sent to [address] [place] [address] Ave on 9/16/21  He is asking details of the dates sent of when [facility] dsb goes out to and I'm not seeing anything scanned in Media that's filled out  Please call him with info      Callback RG#[phone]

## 2021-10-01 ENCOUNTER — LAB (OUTPATIENT)
Dept: LAB | Facility: CLINIC | Age: 46
End: 2021-10-01
Payer: COMMERCIAL

## 2021-10-01 DIAGNOSIS — G89.29 CHRONIC BILATERAL LOW BACK PAIN WITH LEFT-SIDED SCIATICA: ICD-10-CM

## 2021-10-01 DIAGNOSIS — E66.01 MORBID OBESITY DUE TO EXCESS CALORIES (HCC): ICD-10-CM

## 2021-10-01 DIAGNOSIS — E83.51 HYPOCALCEMIA: ICD-10-CM

## 2021-10-01 DIAGNOSIS — C73 THYROID CANCER (HCC): ICD-10-CM

## 2021-10-01 DIAGNOSIS — E20.9 HYPOPARATHYROIDISM, UNSPECIFIED HYPOPARATHYROIDISM TYPE (HCC): ICD-10-CM

## 2021-10-01 DIAGNOSIS — E55.9 VITAMIN D DEFICIENCY: ICD-10-CM

## 2021-10-01 DIAGNOSIS — M54.42 CHRONIC BILATERAL LOW BACK PAIN WITH LEFT-SIDED SCIATICA: ICD-10-CM

## 2021-10-01 DIAGNOSIS — E89.0 POSTOPERATIVE HYPOTHYROIDISM: ICD-10-CM

## 2021-10-01 DIAGNOSIS — I10 BENIGN ESSENTIAL HYPERTENSION: ICD-10-CM

## 2021-10-01 LAB
25(OH)D3 SERPL-MCNC: 43.3 NG/ML (ref 30–100)
ALBUMIN SERPL BCP-MCNC: 3.9 G/DL (ref 3.5–5)
ALP SERPL-CCNC: 54 U/L (ref 46–116)
ALT SERPL W P-5'-P-CCNC: 53 U/L (ref 12–78)
ANION GAP SERPL CALCULATED.3IONS-SCNC: 5 MMOL/L (ref 4–13)
AST SERPL W P-5'-P-CCNC: 24 U/L (ref 5–45)
BILIRUB SERPL-MCNC: 0.6 MG/DL (ref 0.2–1)
BUN SERPL-MCNC: 29 MG/DL (ref 5–25)
CALCIUM 24H UR-MCNC: 372.6 MG/24 HRS (ref 42–353)
CALCIUM SERPL-MCNC: 7.8 MG/DL (ref 8.3–10.1)
CHLORIDE SERPL-SCNC: 104 MMOL/L (ref 100–108)
CHOLEST SERPL-MCNC: 136 MG/DL (ref 50–200)
CO2 SERPL-SCNC: 29 MMOL/L (ref 21–32)
CREAT 24H UR-MRATE: 2.5 G/24HR (ref 0.8–1.8)
CREAT SERPL-MCNC: 0.83 MG/DL (ref 0.6–1.3)
EST. AVERAGE GLUCOSE BLD GHB EST-MCNC: 117 MG/DL
GFR SERPL CREATININE-BSD FRML MDRD: 105 ML/MIN/1.73SQ M
GLUCOSE P FAST SERPL-MCNC: 116 MG/DL (ref 65–99)
HBA1C MFR BLD: 5.7 %
HDLC SERPL-MCNC: 39 MG/DL
LDLC SERPL CALC-MCNC: 70 MG/DL (ref 0–100)
NONHDLC SERPL-MCNC: 97 MG/DL
PHOSPHATE SERPL-MCNC: 4.3 MG/DL (ref 2.7–4.5)
POTASSIUM SERPL-SCNC: 3.6 MMOL/L (ref 3.5–5.3)
PROT SERPL-MCNC: 7.3 G/DL (ref 6.4–8.2)
PTH-INTACT SERPL-MCNC: <6.3 PG/ML (ref 18.4–80.1)
SODIUM SERPL-SCNC: 138 MMOL/L (ref 136–145)
SPECIMEN VOL UR: 2300 ML
SPECIMEN VOL UR: 2300 ML
T3FREE SERPL-MCNC: 1.58 PG/ML (ref 2.3–4.2)
T4 FREE SERPL-MCNC: 1.43 NG/DL (ref 0.76–1.46)
TRIGL SERPL-MCNC: 133 MG/DL
TSH SERPL DL<=0.05 MIU/L-ACNC: 3.56 UIU/ML (ref 0.36–3.74)

## 2021-10-01 PROCEDURE — 82570 ASSAY OF URINE CREATININE: CPT

## 2021-10-01 PROCEDURE — 83970 ASSAY OF PARATHORMONE: CPT

## 2021-10-01 PROCEDURE — 82340 ASSAY OF CALCIUM IN URINE: CPT

## 2021-10-01 PROCEDURE — 84100 ASSAY OF PHOSPHORUS: CPT

## 2021-10-01 PROCEDURE — 86800 THYROGLOBULIN ANTIBODY: CPT

## 2021-10-01 PROCEDURE — 84481 FREE ASSAY (FT-3): CPT

## 2021-10-01 PROCEDURE — 84432 ASSAY OF THYROGLOBULIN: CPT

## 2021-10-01 PROCEDURE — 36415 COLL VENOUS BLD VENIPUNCTURE: CPT

## 2021-10-01 PROCEDURE — 80053 COMPREHEN METABOLIC PANEL: CPT

## 2021-10-01 PROCEDURE — 3044F HG A1C LEVEL LT 7.0%: CPT | Performed by: ORTHOPAEDIC SURGERY

## 2021-10-01 PROCEDURE — 82306 VITAMIN D 25 HYDROXY: CPT

## 2021-10-01 PROCEDURE — 83036 HEMOGLOBIN GLYCOSYLATED A1C: CPT

## 2021-10-01 PROCEDURE — 84439 ASSAY OF FREE THYROXINE: CPT

## 2021-10-01 PROCEDURE — 84443 ASSAY THYROID STIM HORMONE: CPT

## 2021-10-01 PROCEDURE — 80061 LIPID PANEL: CPT

## 2021-10-02 LAB
THYROGLOB AB SERPL-ACNC: <1 IU/ML (ref 0–0.9)
THYROGLOB SERPL-MCNC: 4.9 NG/ML (ref 1.4–29.2)

## 2021-10-05 ENCOUNTER — TELEPHONE (OUTPATIENT)
Dept: ENDOCRINOLOGY | Facility: CLINIC | Age: 46
End: 2021-10-05

## 2021-10-05 DIAGNOSIS — E03.9 HYPOTHYROIDISM, UNSPECIFIED TYPE: Primary | ICD-10-CM

## 2021-10-05 DIAGNOSIS — E03.9 HYPOTHYROIDISM, UNSPECIFIED TYPE: ICD-10-CM

## 2021-10-09 RX ORDER — LEVOTHYROXINE SODIUM 0.2 MG/1
TABLET ORAL
Qty: 180 TABLET | Refills: 3
Start: 2021-10-09 | End: 2021-12-27 | Stop reason: SDUPTHER

## 2021-10-15 ENCOUNTER — PROCEDURE VISIT (OUTPATIENT)
Dept: OBGYN CLINIC | Facility: MEDICAL CENTER | Age: 46
End: 2021-10-15
Payer: COMMERCIAL

## 2021-10-15 VITALS
SYSTOLIC BLOOD PRESSURE: 137 MMHG | RESPIRATION RATE: 18 BRPM | WEIGHT: 315 LBS | DIASTOLIC BLOOD PRESSURE: 88 MMHG | HEART RATE: 60 BPM | BODY MASS INDEX: 42.66 KG/M2 | HEIGHT: 72 IN

## 2021-10-15 DIAGNOSIS — M17.12 ARTHRITIS OF LEFT KNEE: ICD-10-CM

## 2021-10-15 DIAGNOSIS — M25.562 CHRONIC PAIN OF LEFT KNEE: Primary | ICD-10-CM

## 2021-10-15 DIAGNOSIS — G89.29 CHRONIC PAIN OF LEFT KNEE: Primary | ICD-10-CM

## 2021-10-15 PROCEDURE — 20610 DRAIN/INJ JOINT/BURSA W/O US: CPT | Performed by: ORTHOPAEDIC SURGERY

## 2021-10-15 PROCEDURE — 99212 OFFICE O/P EST SF 10 MIN: CPT | Performed by: ORTHOPAEDIC SURGERY

## 2021-10-15 PROCEDURE — 1036F TOBACCO NON-USER: CPT | Performed by: ORTHOPAEDIC SURGERY

## 2021-10-15 RX ORDER — HYALURONATE SODIUM 10 MG/ML
20 SYRINGE (ML) INTRAARTICULAR
Status: COMPLETED | OUTPATIENT
Start: 2021-10-15 | End: 2021-10-15

## 2021-10-15 RX ADMIN — Medication 20 MG: at 14:56

## 2021-10-22 ENCOUNTER — PROCEDURE VISIT (OUTPATIENT)
Dept: OBGYN CLINIC | Facility: MEDICAL CENTER | Age: 46
End: 2021-10-22
Payer: COMMERCIAL

## 2021-10-22 VITALS
HEART RATE: 70 BPM | SYSTOLIC BLOOD PRESSURE: 129 MMHG | HEIGHT: 72 IN | WEIGHT: 315 LBS | BODY MASS INDEX: 42.66 KG/M2 | DIASTOLIC BLOOD PRESSURE: 83 MMHG

## 2021-10-22 DIAGNOSIS — M17.12 PRIMARY OSTEOARTHRITIS OF LEFT KNEE: Primary | ICD-10-CM

## 2021-10-22 DIAGNOSIS — G89.29 CHRONIC PAIN OF LEFT KNEE: ICD-10-CM

## 2021-10-22 DIAGNOSIS — M25.562 CHRONIC PAIN OF LEFT KNEE: ICD-10-CM

## 2021-10-22 PROCEDURE — 20610 DRAIN/INJ JOINT/BURSA W/O US: CPT | Performed by: ORTHOPAEDIC SURGERY

## 2021-10-22 RX ORDER — HYALURONATE SODIUM 10 MG/ML
20 SYRINGE (ML) INTRAARTICULAR
Status: COMPLETED | OUTPATIENT
Start: 2021-10-22 | End: 2021-10-22

## 2021-10-22 RX ADMIN — Medication 20 MG: at 14:27

## 2021-10-29 ENCOUNTER — PROCEDURE VISIT (OUTPATIENT)
Dept: OBGYN CLINIC | Facility: MEDICAL CENTER | Age: 46
End: 2021-10-29
Payer: COMMERCIAL

## 2021-10-29 VITALS
HEIGHT: 72 IN | WEIGHT: 315 LBS | HEART RATE: 69 BPM | BODY MASS INDEX: 42.66 KG/M2 | SYSTOLIC BLOOD PRESSURE: 118 MMHG | DIASTOLIC BLOOD PRESSURE: 80 MMHG

## 2021-10-29 DIAGNOSIS — M17.12 PRIMARY OSTEOARTHRITIS OF LEFT KNEE: Primary | ICD-10-CM

## 2021-10-29 DIAGNOSIS — G89.29 CHRONIC PAIN OF LEFT KNEE: ICD-10-CM

## 2021-10-29 DIAGNOSIS — M25.562 CHRONIC PAIN OF LEFT KNEE: ICD-10-CM

## 2021-10-29 PROCEDURE — 3008F BODY MASS INDEX DOCD: CPT | Performed by: ORTHOPAEDIC SURGERY

## 2021-10-29 PROCEDURE — 20610 DRAIN/INJ JOINT/BURSA W/O US: CPT | Performed by: ORTHOPAEDIC SURGERY

## 2021-10-29 RX ORDER — HYALURONATE SODIUM 10 MG/ML
20 SYRINGE (ML) INTRAARTICULAR
Status: COMPLETED | OUTPATIENT
Start: 2021-10-29 | End: 2021-10-29

## 2021-10-29 RX ADMIN — Medication 20 MG: at 14:12

## 2021-11-01 DIAGNOSIS — I10 HYPERTENSION, UNSPECIFIED TYPE: ICD-10-CM

## 2021-11-01 RX ORDER — CALCITRIOL 0.25 UG/1
CAPSULE, LIQUID FILLED ORAL
Qty: 180 CAPSULE | Refills: 3 | Status: SHIPPED | OUTPATIENT
Start: 2021-11-01

## 2021-11-01 RX ORDER — AMLODIPINE BESYLATE 5 MG/1
TABLET ORAL
Qty: 90 TABLET | Refills: 3 | Status: SHIPPED | OUTPATIENT
Start: 2021-11-01

## 2021-11-04 ENCOUNTER — OFFICE VISIT (OUTPATIENT)
Dept: ENDOCRINOLOGY | Facility: CLINIC | Age: 46
End: 2021-11-04
Payer: COMMERCIAL

## 2021-11-04 VITALS
WEIGHT: 315 LBS | DIASTOLIC BLOOD PRESSURE: 80 MMHG | HEIGHT: 72 IN | SYSTOLIC BLOOD PRESSURE: 120 MMHG | HEART RATE: 71 BPM | BODY MASS INDEX: 42.66 KG/M2

## 2021-11-04 DIAGNOSIS — R82.994 HYPERCALCIURIA: ICD-10-CM

## 2021-11-04 DIAGNOSIS — E66.01 CLASS 3 SEVERE OBESITY WITH BODY MASS INDEX (BMI) OF 45.0 TO 49.9 IN ADULT, UNSPECIFIED OBESITY TYPE, UNSPECIFIED WHETHER SERIOUS COMORBIDITY PRESENT (HCC): ICD-10-CM

## 2021-11-04 DIAGNOSIS — E83.51 HYPOCALCEMIA: ICD-10-CM

## 2021-11-04 DIAGNOSIS — R63.5 ABNORMAL WEIGHT GAIN: Primary | ICD-10-CM

## 2021-11-04 DIAGNOSIS — E11.9 TYPE 2 DIABETES MELLITUS WITHOUT COMPLICATION, WITHOUT LONG-TERM CURRENT USE OF INSULIN (HCC): ICD-10-CM

## 2021-11-04 DIAGNOSIS — E55.9 VITAMIN D DEFICIENCY: ICD-10-CM

## 2021-11-04 DIAGNOSIS — E20.9 HYPOPARATHYROIDISM, UNSPECIFIED HYPOPARATHYROIDISM TYPE (HCC): ICD-10-CM

## 2021-11-04 DIAGNOSIS — E78.5 HYPERLIPIDEMIA, UNSPECIFIED HYPERLIPIDEMIA TYPE: ICD-10-CM

## 2021-11-04 DIAGNOSIS — C73 THYROID CANCER (HCC): ICD-10-CM

## 2021-11-04 PROCEDURE — 3008F BODY MASS INDEX DOCD: CPT | Performed by: PHYSICIAN ASSISTANT

## 2021-11-04 PROCEDURE — 99214 OFFICE O/P EST MOD 30 MIN: CPT | Performed by: PHYSICIAN ASSISTANT

## 2021-11-04 PROCEDURE — 3074F SYST BP LT 130 MM HG: CPT | Performed by: PHYSICIAN ASSISTANT

## 2021-11-04 PROCEDURE — 1036F TOBACCO NON-USER: CPT | Performed by: PHYSICIAN ASSISTANT

## 2021-11-04 PROCEDURE — 3079F DIAST BP 80-89 MM HG: CPT | Performed by: PHYSICIAN ASSISTANT

## 2021-11-04 RX ORDER — DEXAMETHASONE 1 MG
TABLET ORAL
Qty: 1 TABLET | Refills: 0 | Status: SHIPPED | OUTPATIENT
Start: 2021-11-04 | End: 2022-01-25 | Stop reason: ALTCHOICE

## 2021-11-04 RX ORDER — SIMVASTATIN 10 MG
TABLET ORAL
Qty: 90 TABLET | Refills: 3 | Status: SHIPPED | OUTPATIENT
Start: 2021-11-04

## 2021-11-06 ENCOUNTER — APPOINTMENT (OUTPATIENT)
Dept: LAB | Facility: CLINIC | Age: 46
End: 2021-11-06
Payer: COMMERCIAL

## 2021-11-06 DIAGNOSIS — R63.5 ABNORMAL WEIGHT GAIN: ICD-10-CM

## 2021-11-06 PROCEDURE — 82533 TOTAL CORTISOL: CPT

## 2021-11-06 PROCEDURE — 36415 COLL VENOUS BLD VENIPUNCTURE: CPT

## 2021-11-07 ENCOUNTER — TELEPHONE (OUTPATIENT)
Dept: OTHER | Facility: OTHER | Age: 46
End: 2021-11-07

## 2021-11-07 LAB — CORTIS AM PEAK SERPL-MCNC: 0.7 UG/DL (ref 4.2–22.4)

## 2021-11-11 ENCOUNTER — TELEPHONE (OUTPATIENT)
Dept: ENDOCRINOLOGY | Facility: CLINIC | Age: 46
End: 2021-11-11

## 2021-11-11 RX ORDER — HYDROCHLOROTHIAZIDE 25 MG/1
TABLET ORAL
Qty: 30 TABLET | Refills: 5 | Status: SHIPPED | OUTPATIENT
Start: 2021-11-11 | End: 2022-02-14 | Stop reason: SDUPTHER

## 2021-11-12 DIAGNOSIS — E11.9 TYPE 2 DIABETES MELLITUS WITHOUT COMPLICATION, WITHOUT LONG-TERM CURRENT USE OF INSULIN (HCC): Primary | ICD-10-CM

## 2021-11-12 DIAGNOSIS — E66.01 MORBID OBESITY DUE TO EXCESS CALORIES (HCC): ICD-10-CM

## 2021-11-12 RX ORDER — PEN NEEDLE, DIABETIC 32 GX 1/4"
NEEDLE, DISPOSABLE MISCELLANEOUS
Qty: 100 EACH | Refills: 1 | Status: SHIPPED | OUTPATIENT
Start: 2021-11-12 | End: 2021-11-12 | Stop reason: ALTCHOICE

## 2021-12-27 DIAGNOSIS — E03.9 HYPOTHYROIDISM, UNSPECIFIED TYPE: ICD-10-CM

## 2021-12-27 RX ORDER — LEVOTHYROXINE SODIUM 0.2 MG/1
TABLET ORAL
Qty: 126 TABLET | Refills: 1 | Status: SHIPPED | OUTPATIENT
Start: 2021-12-27 | End: 2022-06-27 | Stop reason: SDUPTHER

## 2022-01-03 ENCOUNTER — APPOINTMENT (OUTPATIENT)
Dept: LAB | Facility: CLINIC | Age: 47
End: 2022-01-03
Payer: COMMERCIAL

## 2022-01-03 DIAGNOSIS — R82.994 HYPERCALCIURIA: ICD-10-CM

## 2022-01-03 DIAGNOSIS — E83.51 HYPOCALCEMIA: ICD-10-CM

## 2022-01-03 DIAGNOSIS — E20.9 HYPOPARATHYROIDISM, UNSPECIFIED HYPOPARATHYROIDISM TYPE (HCC): ICD-10-CM

## 2022-01-03 LAB
ALBUMIN SERPL BCP-MCNC: 4 G/DL (ref 3.5–5)
ALP SERPL-CCNC: 60 U/L (ref 46–116)
ALT SERPL W P-5'-P-CCNC: 74 U/L (ref 12–78)
ANION GAP SERPL CALCULATED.3IONS-SCNC: 6 MMOL/L (ref 4–13)
AST SERPL W P-5'-P-CCNC: 33 U/L (ref 5–45)
BILIRUB SERPL-MCNC: 0.51 MG/DL (ref 0.2–1)
BUN SERPL-MCNC: 26 MG/DL (ref 5–25)
CALCIUM 24H UR-MCNC: 367.2 MG/24 HRS (ref 42–353)
CALCIUM SERPL-MCNC: 8.7 MG/DL (ref 8.3–10.1)
CHLORIDE SERPL-SCNC: 102 MMOL/L (ref 100–108)
CO2 SERPL-SCNC: 30 MMOL/L (ref 21–32)
CREAT 24H UR-MRATE: 1.3 G/24HR (ref 0.8–1.8)
CREAT SERPL-MCNC: 0.95 MG/DL (ref 0.6–1.3)
GFR SERPL CREATININE-BSD FRML MDRD: 94 ML/MIN/1.73SQ M
GLUCOSE P FAST SERPL-MCNC: 120 MG/DL (ref 65–99)
PHOSPHATE SERPL-MCNC: 4.8 MG/DL (ref 2.7–4.5)
POTASSIUM SERPL-SCNC: 4 MMOL/L (ref 3.5–5.3)
PROT SERPL-MCNC: 7.5 G/DL (ref 6.4–8.2)
SODIUM SERPL-SCNC: 138 MMOL/L (ref 136–145)
SPECIMEN VOL UR: 2550 ML
SPECIMEN VOL UR: 2550 ML

## 2022-01-03 PROCEDURE — 82570 ASSAY OF URINE CREATININE: CPT

## 2022-01-03 PROCEDURE — 80053 COMPREHEN METABOLIC PANEL: CPT

## 2022-01-03 PROCEDURE — 84100 ASSAY OF PHOSPHORUS: CPT

## 2022-01-03 PROCEDURE — 82340 ASSAY OF CALCIUM IN URINE: CPT

## 2022-01-03 PROCEDURE — 36415 COLL VENOUS BLD VENIPUNCTURE: CPT

## 2022-01-12 ENCOUNTER — AMB VIDEO VISIT (OUTPATIENT)
Dept: OTHER | Facility: HOSPITAL | Age: 47
End: 2022-01-12
Payer: COMMERCIAL

## 2022-01-12 DIAGNOSIS — J01.00 ACUTE MAXILLARY SINUSITIS, RECURRENCE NOT SPECIFIED: Primary | ICD-10-CM

## 2022-01-12 PROCEDURE — ECARE PR SL URGENT CARE VIRTUAL VISIT: Performed by: NURSE PRACTITIONER

## 2022-01-12 RX ORDER — AMOXICILLIN 875 MG/1
875 TABLET, COATED ORAL 2 TIMES DAILY
Qty: 20 TABLET | Refills: 0 | Status: SHIPPED | OUTPATIENT
Start: 2022-01-12 | End: 2022-01-22

## 2022-01-12 NOTE — CARE ANYWHERE EVISITS
Visit Summary for Boris Strong - Gender: Male - Date of Birth: [id number]  Date: [id number] - Duration: 7 minutes  Patient: Boris Strong  Provider: Alesha METZ    Patient Contact Information  Address  [address]; [address] [place] [address]  [phone]    Visit Topics  Earache [Added By: Self - 2022-01-12]  Cold [Added By: Self - 2022-01-12]    Triage Questions   What is your current physical address in the event of a medical emergency? Answer []  Are you allergic to any medications? Answer []  Are you now or could you be pregnant? Answer []  Do you have any immune system compromise or chronic lung   disease? Answer []  Do you have any vulnerable family members in the home (infant, pregnant, cancer, elderly)? Answer []     Conversation Transcripts  [0A][0A] [Notification] You are connected with Kirill Sawyer, Urgent Care Specialist [0A][Notification] Boris Strong is located in [place]  [0A][Notification] Boris Strong has shared health history  [facility]  [0A]    Diagnosis  Acute maxillary sinusitis, unspecified    Procedures  Value: [id number] Code: CPT-4 UNLISTED E&M SERVICE    Medications Prescribed    No prescriptions ordered    Electronically signed by: Kirill Cruz(NPI [id number])

## 2022-01-12 NOTE — PATIENT INSTRUCTIONS
You declined covid testing  Would recommend you stay home for 5 days since onset of symptoms  You may return after that wearing a mask for additonal 5 days  Rest and drink extra fluids  Tylenol as needed for pain or fever  Over-the-counter cough and cold medications as needed  Start vitamin D3 2000 IU po daily, Vitamin C 1 gram PO q 12 hours and multivitamin daily  Follow up with PCP if no improvement, call prior to any doctor visits if COVID testing is not back  Go to the ER with any worsening symptoms, chest pain, shortness of breath, difficulty breathing, lethargy, confusion, dehydration or change in skin color  Will treat for sinus infection, start antibiotic  Take probiotic  [address]    Your healthcare provider and/or public health staff have evaluated you and have determined that you do not need to remain in the hospital at this time  At this time you can be isolated at home where you will be monitored by staff from your local or state health department  You should carefully follow the prevention and isolation steps below until a healthcare provider or local or state health department says that you can return to your normal activities  Stay home except to get medical care    People who are mildly ill with COVID-19 are able to isolate at home during their illness  You should restrict activities outside your home, except for getting medical care  Do not go to work, school, or public areas  Avoid using public transportation, ride-sharing, or taxis  Separate yourself from other people and animals in your home    People: As much as possible, you should stay in a specific room and away from other people in your home  Also, you should use a separate bathroom, if available  Animals: You should restrict contact with pets and other animals while you are sick with COVID-19, just like you would around other people   Although there have not been reports of pets or other animals becoming sick with COVID-19, it is still recommended that people sick with COVID-19 limit contact with animals until more information is known about the virus  When possible, have another member of your household care for your animals while you are sick  If you are sick with COVID-19, avoid contact with your pet, including petting, snuggling, being kissed or licked, and sharing food  If you must care for your pet or be around animals while you are sick, wash your hands before and after you interact with pets and wear a facemask  See COVID-19 and Animals for more information  Call ahead before visiting your doctor    If you have a medical appointment, call the healthcare provider and tell them that you have or may have COVID-19  This will help the healthcare providers office take steps to keep other people from getting infected or exposed  Wear a facemask    You should wear a facemask when you are around other people (e g , sharing a room or vehicle) or pets and before you enter a healthcare providers office  If you are not able to wear a facemask (for example, because it causes trouble breathing), then people who live with you should not stay in the same room with you, or they should wear a facemask if they enter your room  Cover your coughs and sneezes    Cover your mouth and nose with a tissue when you cough or sneeze  Throw used tissues in a lined trash can  Immediately wash your hands with soap and water for at least 20 seconds or, if soap and water are not available, clean your hands with an alcohol-based hand  that contains at least 60% alcohol  Clean your hands often    Wash your hands often with soap and water for at least 20 seconds, especially after blowing your nose, coughing, or sneezing; going to the bathroom; and before eating or preparing food   If soap and water are not readily available, use an alcohol-based hand  with at least 60% alcohol, covering all surfaces of your hands and rubbing them together until they feel dry  Soap and water are the best option if hands are visibly dirty  Avoid touching your eyes, nose, and mouth with unwashed hands  Avoid sharing personal household items    You should not share dishes, drinking glasses, cups, eating utensils, towels, or bedding with other people or pets in your home  After using these items, they should be washed thoroughly with soap and water  Clean all high-touch surfaces everyday    High touch surfaces include counters, tabletops, doorknobs, bathroom fixtures, toilets, phones, keyboards, tablets, and bedside tables  Also, clean any surfaces that may have blood, stool, or body fluids on them  Use a household cleaning spray or wipe, according to the label instructions  Labels contain instructions for safe and effective use of the cleaning product including precautions you should take when applying the product, such as wearing gloves and making sure you have good ventilation during use of the product  Monitor your symptoms    Seek prompt medical attention if your illness is worsening (e g , difficulty breathing)  Before seeking care, call your healthcare provider and tell them that you have, or are being evaluated for, COVID-19  Put on a facemask before you enter the facility  These steps will help the healthcare providers office to keep other people in the office or waiting room from getting infected or exposed  Ask your healthcare provider to call the local or state health department  Persons who are placed under active monitoring or facilitated self-monitoring should follow instructions provided by their local health department or occupational health professionals, as appropriate  If you have a medical emergency and need to call 911, notify the dispatch personnel that you have, or are being evaluated for COVID-19  If possible, put on a facemask before emergency medical services arrive      Discontinuing home isolation    Patients with confirmed COVID-19 should remain under home isolation precautions until the following conditions are met:   - They have had no fever for at least 24 hours (that is one full day of no fever without the use medicine that reduces fevers)  AND  - other symptoms have improved (for example, when their cough or shortness of breath have improved)  AND  - If had mild or moderate illness, at least 10 days have passed since their symptoms first appeared or if severe illness (needed oxygen) or immunosuppressed, at least 20 days have passed since symptoms first appeared  Patients with confirmed COVID-19 should also notify close contacts (including their workplace) and ask that they self-quarantine  Currently, close contact is defined as being within 6 feet for 15 minutes or more from the period 24 hours starting 48 hours before symptom onset to the time at which the patient went into isolation  Close contacts of patients diagnosed with COVID-19 should be instructed by the patient to self-quarantine for 14 days from the last time of their last contact with the patient       Source: [employer]

## 2022-01-12 NOTE — PROGRESS NOTES
Video Visit - Quinn Schroeder  51 y o  male MRN: [id number]    REQUIRED DOCUMENTATION:         1  This [facility] was [facility] via [facility]  2  Provider located at [address] [place], [place] [id number]  3  [facility] provider: ISAÍAS Jean  4  Identify all parties in room with patient during [facility] visit:  Patient  5  After connecting through [employer], patient was identified by name and date of birth  Patient was then informed that this was a Telemedicine visit and that the exam was being conducted confidentially over secure lines  My office door was closed  No one else was in the room  Patient acknowledged consent and understanding of privacy and security of the Telemedicine visit  I informed the patient that I have reviewed their record in Epic and presented the opportunity for them to ask any questions regarding the visit today  The patient agreed to participate  This is a 51year old male here today for video visit  He states he is having a head cold  He states he gets this every years  He has post nasal drip  Sunday during the day he started with congestin, by the evening he had a scratchy throat which he states is caused by post nasal drip  Using OTC medications  He states ears feel clogged  He denies any fever, body aches chills  He has post nasal drip that is causing cough  He denies any sob or chest pain  He denies any loss of taste or smell  He is not covid 19 vaccine  He denies any covid 19 exposure  He denies covid 19 testing  Discussed isolation  Physical Exam  Constitutional:       General: He is not in acute distress  Appearance: Normal appearance  He is normal weight  He is not ill-appearing  Comments: Appears well  HENT:      Ears:      Comments: Maxillary sinus pressure on self palpation  Mouth/Throat:      Pharynx: Posterior oropharyngeal erythema present     Pulmonary:      Effort: Pulmonary effort is normal  No respiratory distress  Comments: No audible wheezing or cough during video visit  No difficulty breathing  Skin:     Comments: No rash on head or neck  Neurological:      Mental Status: He is alert  Diagnoses and all orders for this visit:    Acute maxillary sinusitis, recurrence not specified  -     amoxicillin (AMOXIL) 875 mg tablet; Take 1 tablet (875 mg total) by mouth 2 (two) times a day for 10 days      Patient Instructions   You declined covid testing  Would recommend you stay home for 5 days since onset of symptoms  You may return after that wearing a mask for additonal 5 days  Rest and drink extra fluids  Tylenol as needed for pain or fever  Over-the-counter cough and cold medications as needed  Start vitamin D3 2000 IU po daily, Vitamin C 1 gram PO q 12 hours and multivitamin daily  Follow up with PCP if no improvement, call prior to any doctor visits if COVID testing is not back  Go to the ER with any worsening symptoms, chest pain, shortness of breath, difficulty breathing, lethargy, confusion, dehydration or change in skin color  Will treat for sinus infection, start antibiotic  Take probiotic  [address]    Your healthcare provider and/or public health staff have evaluated you and have determined that you do not need to remain in the hospital at this time  At this time you can be isolated at home where you will be monitored by staff from your local or state health department  You should carefully follow the prevention and isolation steps below until a healthcare provider or local or state health department says that you can return to your normal activities  Stay home except to get medical care    People who are mildly ill with COVID-19 are able to isolate at home during their illness  You should restrict activities outside your home, except for getting medical care  Do not go to work, school, or public areas   Avoid using public transportation, ride-sharing, or taxis  Separate yourself from other people and animals in your home    People: As much as possible, you should stay in a specific room and away from other people in your home  Also, you should use a separate bathroom, if available  Animals: You should restrict contact with pets and other animals while you are sick with COVID-19, just like you would around other people  Although there have not been reports of pets or other animals becoming sick with COVID-19, it is still recommended that people sick with COVID-19 limit contact with animals until more information is known about the virus  When possible, have another member of your household care for your animals while you are sick  If you are sick with COVID-19, avoid contact with your pet, including petting, snuggling, being kissed or licked, and sharing food  If you must care for your pet or be around animals while you are sick, wash your hands before and after you interact with pets and wear a facemask  See COVID-19 and Animals for more information  Call ahead before visiting your doctor    If you have a medical appointment, call the healthcare provider and tell them that you have or may have COVID-19  This will help the healthcare providers office take steps to keep other people from getting infected or exposed  Wear a facemask    You should wear a facemask when you are around other people (e g , sharing a room or vehicle) or pets and before you enter a healthcare providers office  If you are not able to wear a facemask (for example, because it causes trouble breathing), then people who live with you should not stay in the same room with you, or they should wear a facemask if they enter your room  Cover your coughs and sneezes    Cover your mouth and nose with a tissue when you cough or sneeze  Throw used tissues in a lined trash can   Immediately wash your hands with soap and water for at least 20 seconds or, if soap and water are not available, clean your hands with an alcohol-based hand  that contains at least 60% alcohol  Clean your hands often    Wash your hands often with soap and water for at least 20 seconds, especially after blowing your nose, coughing, or sneezing; going to the bathroom; and before eating or preparing food  If soap and water are not readily available, use an alcohol-based hand  with at least 60% alcohol, covering all surfaces of your hands and rubbing them together until they feel dry  Soap and water are the best option if hands are visibly dirty  Avoid touching your eyes, nose, and mouth with unwashed hands  Avoid sharing personal household items    You should not share dishes, drinking glasses, cups, eating utensils, towels, or bedding with other people or pets in your home  After using these items, they should be washed thoroughly with soap and water  Clean all high-touch surfaces everyday    High touch surfaces include counters, tabletops, doorknobs, bathroom fixtures, toilets, phones, keyboards, tablets, and bedside tables  Also, clean any surfaces that may have blood, stool, or body fluids on them  Use a household cleaning spray or wipe, according to the label instructions  Labels contain instructions for safe and effective use of the cleaning product including precautions you should take when applying the product, such as wearing gloves and making sure you have good ventilation during use of the product  Monitor your symptoms    Seek prompt medical attention if your illness is worsening (e g , difficulty breathing)  Before seeking care, call your healthcare provider and tell them that you have, or are being evaluated for, COVID-19  Put on a facemask before you enter the facility  These steps will help the healthcare providers office to keep other people in the office or waiting room from getting infected or exposed   Ask your healthcare provider to call the local or [facility] health department  Persons who are placed under active monitoring or facilitated self-monitoring should follow instructions provided by their local health department or occupational health professionals, as appropriate  If you have a medical emergency and need to call 911, notify the dispatch personnel that you have, or are being evaluated for COVID-19  If possible, put on a facemask before emergency medical services arrive  Discontinuing home isolation    Patients with confirmed COVID-19 should remain under home isolation precautions until the following conditions are met:   - They have had no fever for at least 24 hours (that is one full day of no fever without the use medicine that reduces fevers)  AND  - other symptoms have improved (for example, when their cough or shortness of breath have improved)  AND  - If had mild or moderate illness, at least 10 days have passed since their symptoms first appeared or if severe illness (needed oxygen) or immunosuppressed, at least 20 days have passed since symptoms first appeared  Patients with confirmed COVID-19 should also notify close contacts (including their workplace) and ask that they self-quarantine  Currently, close contact is defined as being within 6 feet for 15 minutes or more from the period 24 hours starting 48 hours before symptom onset to the time at which the patient went into isolation  Close contacts of patients diagnosed with COVID-19 should be instructed by the patient to self-quarantine for 14 days from the last time of their last contact with the patient  Source: [employer]         Follow up with PCP if not improved, if symptoms are worse, go to the ER

## 2022-01-25 ENCOUNTER — OFFICE VISIT (OUTPATIENT)
Dept: FAMILY MEDICINE CLINIC | Facility: CLINIC | Age: 47
End: 2022-01-25
Payer: COMMERCIAL

## 2022-01-25 VITALS
OXYGEN SATURATION: 98 % | SYSTOLIC BLOOD PRESSURE: 142 MMHG | HEART RATE: 76 BPM | TEMPERATURE: 97.7 F | HEIGHT: 72 IN | BODY MASS INDEX: 42.66 KG/M2 | WEIGHT: 315 LBS | DIASTOLIC BLOOD PRESSURE: 84 MMHG

## 2022-01-25 DIAGNOSIS — E89.0 POSTOPERATIVE HYPOTHYROIDISM: Primary | ICD-10-CM

## 2022-01-25 DIAGNOSIS — Z85.850 HISTORY OF THYROID CANCER: ICD-10-CM

## 2022-01-25 DIAGNOSIS — I10 BENIGN ESSENTIAL HYPERTENSION: ICD-10-CM

## 2022-01-25 DIAGNOSIS — G47.30 SLEEP APNEA, UNSPECIFIED TYPE: ICD-10-CM

## 2022-01-25 DIAGNOSIS — E55.9 VITAMIN D DEFICIENCY: ICD-10-CM

## 2022-01-25 DIAGNOSIS — R52 PAIN: ICD-10-CM

## 2022-01-25 PROBLEM — C73 THYROID CANCER (HCC): Status: RESOLVED | Noted: 2020-02-12 | Resolved: 2022-01-25

## 2022-01-25 PROCEDURE — 1036F TOBACCO NON-USER: CPT | Performed by: NURSE PRACTITIONER

## 2022-01-25 PROCEDURE — 99214 OFFICE O/P EST MOD 30 MIN: CPT | Performed by: NURSE PRACTITIONER

## 2022-01-25 PROCEDURE — 3079F DIAST BP 80-89 MM HG: CPT | Performed by: NURSE PRACTITIONER

## 2022-01-25 PROCEDURE — 3008F BODY MASS INDEX DOCD: CPT | Performed by: NURSE PRACTITIONER

## 2022-01-25 PROCEDURE — 3077F SYST BP >= 140 MM HG: CPT | Performed by: NURSE PRACTITIONER

## 2022-01-25 RX ORDER — DICLOFENAC SODIUM AND MISOPROSTOL 75; 200 MG/1; UG/1
1 TABLET, DELAYED RELEASE ORAL 2 TIMES DAILY
Qty: 180 TABLET | Refills: 2 | Status: SHIPPED | OUTPATIENT
Start: 2022-01-25

## 2022-01-25 NOTE — PROGRESS NOTES
Assessment/Plan:           Problem List Items Addressed This Visit        Endocrine    Hypothyroidism - Primary     Managed by endocrine currently on 200 mcg levothyroxine             Cardiovascular and Mediastinum    Benign essential hypertension     Patient taking Amlodipine 5 mg, Losartan/HCTZ well controlled             Other    History of thyroid cancer    Vitamin D deficiency    BMI 40 0-44 9, adult ([facility] )     Has gained additional 12 pounds from 10/2021 referral placed for sleep medicine and also he plans to discuss with endocrine            Other Visit Diagnoses     Pain        Relevant Medications    diclofenac-misoprostol (ARTHROTEC 75) 75-0 2 MG per tablet    Sleep apnea, unspecified type        Relevant Orders    Ambulatory Referral to Sleep Medicine            Subjective:      Patient ID: Lucina Thurston  is a 51 y o  male  Patient here today for his check up and lab review and has an appointment with endocrine next month 2/14/2022  Patient has no acute issues today to report he is frustrated with his weight gain and lack of weight loss despite trying to watch his diet limiting his carbs and calories       The following portions of the patient's history were reviewed and updated as appropriate: BMI Counseling: Body mass index is 49 77 kg/m²  The BMI is above normal  Nutrition recommendations include decreasing portion sizes, encouraging healthy choices of fruits and vegetables, decreasing fast food intake, consuming healthier snacks, limiting drinks that contain sugar, moderation in carbohydrate intake, increasing intake of lean protein, reducing intake of saturated and trans fat and reducing intake of cholesterol  Exercise recommendations include moderate physical activity 150 minutes/week  No pharmacotherapy was ordered  Patient referred to PCP  Rationale for BMI follow-up plan is due to patient being overweight or obese         He  has a past medical history of Asthma, Diabetes mellitus ([facility] ), Diabetes type 2, uncontrolled ([place] [address] ) (5/6/2014), Diverticulitis, Hyperlipidemia, Hypertension, Immunity to measles determined by serologic test (7/2/2019), Thyroid cancer ([place] [address] ), Thyroid cancer ([place] [address] ) (2/12/2020), and Type 2 diabetes mellitus ([place] [address] ) (7/17/2013)  He   Patient Active Problem List    Diagnosis Date Noted    BMI 40 0-44 9, adult ([place] [address] ) 06/15/2020    Mass on back 01/08/2020    Lumbar disc herniation 01/03/2019    Sciatic nerve disease, left 05/21/2018    Chronic bilateral low back pain with left-sided sciatica 05/16/2017    Chronic pain disorder 01/31/2017    Lumbar radiculopathy 01/31/2017    Myofascial pain syndrome 01/31/2017    History of thyroid cancer 10/11/2016    Obesity 10/11/2016    Vitamin D deficiency 05/03/2016    Gait difficulty 10/19/2015    Hypoparathyroidism ([place] [address] ) 12/17/2013    Hypothyroidism 03/15/2013    Benign essential hypertension 10/04/2012    Hypercholesterolemia 08/09/2012     He  has a past surgical history that includes Thyroidectomy; Colostomy; pr knee scope,med/lat menisectomy (Left, 8/17/2017); BIOPSY CORE NEEDLE; and Chest wall biopsy (N/A, 2/7/2020)  His family history includes Diabetes in his mother; Hyperlipidemia in his father; Hypertension in his father and mother; Thyroid disease unspecified in his sister  He  reports that he quit smoking about 20 years ago  His smoking use included cigarettes  He smoked 1 00 pack per day  He has never used smokeless tobacco  He reports current alcohol use  He reports that he does not use drugs    Current Outpatient Medications   Medication Sig Dispense Refill    amLODIPine (NORVASC) 5 mg tablet TAKE 1 TABLET DAILY 90 tablet 3    aspirin (ECOTRIN LOW STRENGTH) 81 mg EC tablet Take 81 mg by mouth daily      calcitriol (ROCALTROL) 0 25 mcg capsule TAKE 1 CAPSULE TWICE A  capsule 3    Calcium Carb-Cholecalciferol (CALCIUM 600 + D PO) Take 600 mg by mouth 4 (four) times a day        diclofenac-misoprostol (ARTHROTEC 75) 75-0 2 MG per tablet Take 1 tablet by mouth 2 (two) times a day 180 tablet 2    fluticasone (FLONASE) 50 mcg/act nasal spray USE 1 SPRAY IN EACH NOSTRIL DAILY 16 g 5    hydrochlorothiazide (HYDRODIURIL) 25 mg tablet 1 tab daily (in addition to the losartan HCTZ) 30 tablet 5    levothyroxine 200 mcg tablet TAKE 1 TABLET ON MONDAY THROUGH Thursday AND 2 TABLETS ON Friday/SATURDAY AND SUNDAY 126 tablet 1    losartan-hydrochlorothiazide (HYZAAR) 100-25 MG per tablet TAKE 1 TABLET DAILY 90 tablet 3    simvastatin (ZOCOR) 10 mg tablet TAKE 1 TABLET AT BEDTIME 90 tablet 3     No current facility-administered medications for this visit  He has No Known Allergies       Review of Systems   Constitutional: Positive for fatigue  Negative for activity change, appetite change, chills, diaphoresis, fever and unexpected weight change  HENT: Negative for congestion, ear pain, hearing loss, postnasal drip, sinus pressure, sinus pain, sneezing and sore throat  Eyes: Negative for pain, redness and visual disturbance  Respiratory: Negative for cough and shortness of breath  Cardiovascular: Negative for chest pain and leg swelling  Gastrointestinal: Negative for abdominal pain, diarrhea, nausea and vomiting  Endocrine: Negative  Genitourinary: Negative  Musculoskeletal: Positive for arthralgias, gait problem and joint swelling  Left knee pain and swelling and had gel injections and minimal helpful    Skin: Negative  Allergic/Immunologic: Negative  Neurological: Negative for dizziness and light-headedness  Hematological: Negative  Psychiatric/Behavioral: Negative for behavioral problems and dysphoric mood  Objective:      /84   Pulse 76   Temp 97 7 °F (36 5 °C)   Ht 6' (1 829 m)   Wt (!) 166 kg (367 lb)   SpO2 98%   BMI 49 77 kg/m²          Physical Exam  Vitals and nursing note reviewed  Constitutional:       General: He is not in acute distress  Appearance: Normal appearance  He is well-developed  He is morbidly obese  HENT:      Head: Normocephalic and atraumatic  Right Ear: Hearing and tympanic membrane normal       Left Ear: Hearing and tympanic membrane normal       Nose: Nose normal       Mouth/Throat:      Lips: Pink  Mouth: Mucous membranes are moist    Eyes:      Pupils: Pupils are equal, round, and reactive to light  Neck:      Thyroid: No thyromegaly  Cardiovascular:      Rate and Rhythm: Normal rate and regular rhythm  Heart sounds: Normal heart sounds  No murmur heard  Pulmonary:      Effort: Pulmonary effort is normal  No respiratory distress  Breath sounds: Normal breath sounds  No wheezing  Abdominal:      General: Bowel sounds are normal       Palpations: Abdomen is soft  Musculoskeletal:         General: Normal range of motion  Cervical back: Normal range of motion  Left knee: Swelling and deformity present  Right lower leg: No edema  Left lower leg: No edema  Skin:     General: Skin is warm and dry  Neurological:      Mental Status: He is alert and oriented to person, place, and time  Psychiatric:         Attention and Perception: Attention normal          Mood and Affect: Mood normal          Speech: Speech normal          Behavior: Behavior normal  Behavior is cooperative

## 2022-01-25 NOTE — ASSESSMENT & PLAN NOTE
Has gained additional 12 pounds from 10/2021 referral placed for sleep medicine and also he plans to discuss with endocrine

## 2022-02-14 ENCOUNTER — OFFICE VISIT (OUTPATIENT)
Dept: ENDOCRINOLOGY | Facility: CLINIC | Age: 47
End: 2022-02-14
Payer: COMMERCIAL

## 2022-02-14 VITALS
HEART RATE: 71 BPM | DIASTOLIC BLOOD PRESSURE: 90 MMHG | BODY MASS INDEX: 42.66 KG/M2 | HEIGHT: 72 IN | SYSTOLIC BLOOD PRESSURE: 140 MMHG | WEIGHT: 315 LBS

## 2022-02-14 DIAGNOSIS — C73 THYROID CANCER (HCC): ICD-10-CM

## 2022-02-14 DIAGNOSIS — E20.9 HYPOPARATHYROIDISM, UNSPECIFIED HYPOPARATHYROIDISM TYPE (HCC): Primary | ICD-10-CM

## 2022-02-14 DIAGNOSIS — E55.9 VITAMIN D DEFICIENCY: ICD-10-CM

## 2022-02-14 DIAGNOSIS — E89.0 POSTOPERATIVE HYPOTHYROIDISM: ICD-10-CM

## 2022-02-14 DIAGNOSIS — R82.994 HYPERCALCIURIA: ICD-10-CM

## 2022-02-14 DIAGNOSIS — E11.9 TYPE 2 DIABETES MELLITUS WITHOUT COMPLICATION, WITHOUT LONG-TERM CURRENT USE OF INSULIN (HCC): ICD-10-CM

## 2022-02-14 DIAGNOSIS — E66.01 CLASS 3 SEVERE OBESITY DUE TO EXCESS CALORIES WITH SERIOUS COMORBIDITY AND BODY MASS INDEX (BMI) OF 45.0 TO 49.9 IN ADULT (HCC): ICD-10-CM

## 2022-02-14 DIAGNOSIS — E83.51 HYPOCALCEMIA: ICD-10-CM

## 2022-02-14 PROCEDURE — 3008F BODY MASS INDEX DOCD: CPT | Performed by: INTERNAL MEDICINE

## 2022-02-14 PROCEDURE — 99215 OFFICE O/P EST HI 40 MIN: CPT | Performed by: INTERNAL MEDICINE

## 2022-02-14 PROCEDURE — 3077F SYST BP >= 140 MM HG: CPT | Performed by: INTERNAL MEDICINE

## 2022-02-14 PROCEDURE — 1036F TOBACCO NON-USER: CPT | Performed by: INTERNAL MEDICINE

## 2022-02-14 PROCEDURE — 3080F DIAST BP >= 90 MM HG: CPT | Performed by: INTERNAL MEDICINE

## 2022-02-14 RX ORDER — HYDROCHLOROTHIAZIDE 25 MG/1
TABLET ORAL
Qty: 180 TABLET | Refills: 1 | Status: SHIPPED | OUTPATIENT
Start: 2022-02-14 | End: 2022-07-26

## 2022-02-14 NOTE — PATIENT INSTRUCTIONS
Increase HCTZ to 25 mg twice daily   Take calcium 600 mg 2 tab lunch and dinner and 1 at bedtime   Repeat labs in 6 weeks      Semaglutide (By injection)   Semaglutide ([id number])  Treats type 2 diabetes  Lowers the risk of heart attack, stroke, or death in patients with type 2 diabetes and heart or blood vessel disease  Also used to help lose weight and keep the weight off in patients with obesity caused by certain conditions  Brand Name(s): Ozempic 0 25 MG or 0 5 MG Doses, Ozempic 1 MG Doses, Wegovy   There may be other brand names for this medicine  When This Medicine Should Not Be Used: This medicine is not right for everyone  Do not use it if you had an allergic reaction to semaglutide, or if you have multiple endocrine neoplasia syndrome type 2 (MEN 2) or if you or anyone in your family has had medullary thyroid cancer  How to Use This Medicine:   Injectable  · Your doctor will prescribe your exact dose and tell you how often it should be given  This medicine is given as a shot under your skin  It is given into your stomach, thigh, or upper arm  · You may be taught how to give your medicine at home  Make sure you understand all instructions before giving yourself an injection  Do not use more medicine or use it more often than your doctor tells you to  · If you use insulin in addition to this medicine, do not mix them into the same syringe  You may give the shots in the same area (including your stomach), but do not give the shots right next to each other  · Check the liquid in the pen  It should be clear and colorless  Do not use it if it is cloudy, discolored, or has particles in it  · You will be shown the body areas where this shot can be given  Use a different body area each time you give yourself a shot  Keep track of where you give each shot to make sure you rotate body areas  · Use a new needle and syringe each time you inject your medicine    · Never share medicine pens with others under any circumstances  Sharing needles or pens can result in transmission of infection  · This medicine should come with a Medication Guide  Ask your pharmacist for a copy if you do not have one  · Missed dose:   ? Ozempic®: If you miss a dose of this medicine, use it as soon as possible within 5 days after the missed dose  If you miss a dose for more than 5 days, skip the missed dose and go back to your regular dosing schedule  ? Wegovy: If you miss a dose, and the next scheduled dose is more than 2 days away, use it as soon as possible  If you miss a dos, and the next scheduled dose is less than 2 days away, skip the missed dose and go back to your regular dosing schedule  If you miss a dose of this medicine for more than 2 weeks, use it on the next scheduled dose  Ask your doctor about how to restart your treatment  · Store your new, unused medicine pen in its original carton in the refrigerator  Do not freeze  You may store the opened Ozempic® pen in the refrigerator or at room temperature for 56 days or the opened [facility] pen in the refrigerator or at room temperature for 28 days  Throw away the pen after you use it for 56 days for Ozempic® or 28 days for [facility], even if it still has medicine in it  Drugs and Foods to Avoid:   Ask your doctor or pharmacist before using any other medicine, including over-the-counter medicines, vitamins, and herbal products  · Some medicines may affect how semaglutide works  Tell your doctor if you are using other diabetes medicine (including glimepiride, glipizide, glyburide, or insulin) or any oral medicine  Warnings While Using This Medicine:   · Tell your doctor if you are pregnant or planning to become pregnant  Do not use this medicine for at least 2 months before you plan to become pregnant    · Tell your doctor if you are breastfeeding, or if you have kidney disease, pancreas problems, diabetes, digestion problems, or a history of diabetic retinopathy or depression  · This medicine may cause the following problems:  ? Increased risk of thyroid tumor  ? Pancreatitis (swelling of the pancreas)  ? Gallbladder problems  ? Low blood sugar (when used with other diabetes medicine)  ? Kidney problems  ? Eye or vision problems, including diabetic retinopathy  ? Increased heart rate  ? Increased risk for depression or thoughts of suicide  · Your doctor will do lab tests at regular visits to check on the effects of this medicine  Keep all appointments  · Keep all medicine out of the reach of children  Never share your medicine with anyone  Possible Side Effects While Using This Medicine:   Call your doctor right away if you notice any of these side effects:  · Allergic reaction: Itching or hives, swelling in your face or hands, swelling or tingling in your mouth or throat, chest tightness, trouble breathing  · Blurred vision or any other change in vision  · Change in how much or how often you urinate, lower back or side pain, blood in your urine  · Shaking, trembling, sweating, fast or pounding heartbeat, lightheadedness, hunger, confusion  · Sudden and severe stomach pain, nausea, vomiting, fever, yellow eyes or skin  · Unusual moods or behaviors, depression, thoughts of hurting yourself or others  If you notice these less serious side effects, talk with your doctor:   · Constipation, diarrhea, passing gas, stomach upset or bloating  · Headache, dizziness  · Redness, itching, bump, swelling, or any changes in your skin where the shot was given  · Tiredness  If you notice other side effects that you think are caused by this medicine, tell your doctor  Call your doctor for medical advice about side effects  You may report side effects to FDA at 2-725-OCK-5944    © Copyright [employer] 2021 Information is for End User's use only and may not be sold, redistributed or otherwise used for commercial purposes  The above information is an  only   It is not intended as medical advice for individual conditions or treatments  Talk to your doctor, nurse or pharmacist before following any medical regimen to see if it is safe and effective for you

## 2022-02-14 NOTE — PROGRESS NOTES
Sharmin Recinos  51 y o  male MRN: [id number]    Encounter: [id number]      Assessment/Plan     Problem List Items Addressed This Visit        Endocrine    Hypoparathyroidism ([address] ) - Primary     Urinary calcium high in the past year -so for now  Increase HCTZ to 25 mg twice daily -Take calcium 600 mg 2 tab lunch and dinner and 1 at bedtime   Repeat labs in 6 weeks    Consider switching to calcium citrate          Relevant Orders    Comprehensive metabolic panel Lab Collect    Phosphorus Lab Collect    Hypothyroidism     Repeat thyroid function tests - tsh should be lower          Relevant Orders    T4, free Lab Collect    TSH, 3rd generation Lab Collect    Thyroid cancer (HCC)     TG has trended up in the past year or so -repeat neck ultrasound  will repeat labs and if trending up will need further imaging          Relevant Orders    Thyroglobulin w/ab Lab Collect    US head neck lymph node mapping    Type 2 diabetes mellitus without complication, without long-term current use of insulin (HCC)    Relevant Medications    Semaglutide-Weight Management (WEGOVY) 0 25 MG/0 5ML       Genitourinary    Hypercalciuria    Relevant Medications    hydrochlorothiazide (HYDRODIURIL) 25 mg tablet       Other    Hypocalcemia    Relevant Medications    hydrochlorothiazide (HYDRODIURIL) 25 mg tablet    Obesity    Relevant Medications    Semaglutide-Weight Management (WEGOVY) 0 25 MG/0 5ML    Vitamin D deficiency    Relevant Orders    Vitamin D 25 hydroxy Lab Collect        CC:   hypothyroidism     History of Present Illness      HPI:  79-year-old male with history of thyroid cancer, postsurgical hypothyroidism, postsurgical hypoparathyroidism, vitamin-D deficiency, type 2 diabetes and severe obesity seen in follow-up  For 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 he underwent total thyroidectomy as well as radioactive iodine ablation in 2009-underwent radical neck dissection in 2011       For postsurgical hypoparathyroidism - he is on Calcium +D  600 mg 2 tabs lunch/dinner and bedtime   Calcitriol 0 25 twice a  Day  HCTZ 50 mg daily    For hypothyroidism he is on LT4 1 tab mon-Thursday and 2 Friday,sat and  and has been taking it regularly and properly     Frustrated by weight gain- was on saxenda for 1 month and tolerated ok however stopped as didn't see any changes in weight  Review of Systems    Historical Information   Past Medical History:   Diagnosis Date    Asthma     Diabetes mellitus ([place] [address] )     Diabetes type 2, uncontrolled ([place] [address] ) 2014    Diverticulitis     Hyperlipidemia     Hypertension     Immunity to measles determined by serologic test 2019    Thyroid cancer ([place] [address] )     radioactive iodine     Thyroid cancer ([place] [address] ) 2020    Type 2 diabetes mellitus ([place] [address] ) 2013     Past Surgical History:   Procedure Laterality Date    BIOPSY CORE NEEDLE      Thyroid    CHEST WALL BIOPSY N/A 2020    Procedure: EXCISION  BIOPSY LESION/MASS from back;  Surgeon: Corina Frederick MD;  [facility]: [facility];  Service: General    COLOSTOMY      with reversal    VA KNEE SCOPE,MED/LAT MENISECTOMY Left 2017    Procedure: KNEE ARTHROSCOPIC PARTIAL LATERAL MENISCECTOMY ; PATELLO Wilma Butcher;  Surgeon: Sally Alexandra MD;  Location:  MAIN OR;  Service: Orthopedics    THYROIDECTOMY       Social History   Social History     Substance and Sexual Activity   Alcohol Use Yes    Comment: 1 drink every 2 weeks     Social History     Substance and Sexual Activity   Drug Use No     Social History     Tobacco Use   Smoking Status Former Smoker    Packs/day: 1 00    Types: Cigarettes    Quit date:     Years since quittin 1   Smokeless Tobacco Never Used     Family History:   Family History   Problem Relation Age of Onset    Diabetes Mother     Hypertension Mother     Hypertension Father     Hyperlipidemia Father     Thyroid disease unspecified Sister        Meds/Allergies Current Outpatient Medications   Medication Sig Dispense Refill    amLODIPine (NORVASC) 5 mg tablet TAKE 1 TABLET DAILY 90 tablet 3    aspirin (ECOTRIN LOW STRENGTH) 81 mg EC tablet Take 81 mg by mouth daily      calcitriol (ROCALTROL) 0 25 mcg capsule TAKE 1 CAPSULE TWICE A  capsule 3    Calcium Carb-Cholecalciferol (CALCIUM 600 + D PO) Take 600 mg by mouth 4 (four) times a day        diclofenac-misoprostol (ARTHROTEC 75) 75-0 2 MG per tablet Take 1 tablet by mouth 2 (two) times a day 180 tablet 2    fluticasone (FLONASE) 50 mcg/act nasal spray USE 1 SPRAY IN EACH NOSTRIL DAILY (Patient taking differently: daily as needed  ) 16 g 5    hydrochlorothiazide (HYDRODIURIL) 25 mg tablet 1 tab daily twice daily (in addition to the losartan HCTZ) 180 tablet 1    levothyroxine 200 mcg tablet TAKE 1 TABLET ON MONDAY THROUGH Thursday AND 2 TABLETS ON Friday/SATURDAY AND SUNDAY 126 tablet 1    losartan-hydrochlorothiazide (HYZAAR) 100-25 MG per tablet TAKE 1 TABLET DAILY 90 tablet 3    simvastatin (ZOCOR) 10 mg tablet TAKE 1 TABLET AT BEDTIME 90 tablet 3    Semaglutide-Weight Management (WEGOVY) 0 25 MG/0 5ML 0 25 mg once a week for 4 weeks and then 0 5 mg once a week 2 mL 1     No current facility-administered medications for this visit  No Known Allergies    Objective   Vitals: Blood pressure 140/90, pulse 71, height 6' (1 829 m), weight (!) 164 kg (361 lb)  Physical Exam  Vitals reviewed  Constitutional:       Appearance: Normal appearance  He is obese  He is not ill-appearing or diaphoretic  HENT:      Head: Normocephalic and atraumatic  Eyes:      General: No scleral icterus  Extraocular Movements: Extraocular movements intact  Cardiovascular:      Rate and Rhythm: Normal rate and regular rhythm  Heart sounds: Normal heart sounds  No murmur heard  Pulmonary:      Effort: Pulmonary effort is normal  No respiratory distress  Breath sounds: Normal breath sounds   No wheezing  Abdominal:      General: There is no distension  Palpations: Abdomen is soft  Musculoskeletal:      Cervical back: Neck supple  Right lower leg: No edema  Left lower leg: No edema  Lymphadenopathy:      Cervical: No cervical adenopathy  Skin:     General: Skin is warm and dry  Neurological:      General: No focal deficit present  Mental Status: He is alert and oriented to person, place, and time  Psychiatric:         Mood and Affect: Mood normal          Behavior: Behavior normal          Thought Content: Thought content normal          Judgment: Judgment normal          The history was obtained from the review of the chart, patient  Lab Results:   Lab Results   Component Value Date/Time    TSH 3RD GENERATON 1 470 01/03/2022 07:35 AM    TSH 3RD GENERATON 3 560 10/01/2021 07:20 AM    Free T4 1 60 (H) 01/03/2022 07:35 AM    Free T4 1 43 10/01/2021 07:20 AM    Thyroglobulin Ab <1 0 10/01/2021 07:20 AM     Imaging Studies:  Results for orders placed during the hospital encounter of 09/22/21    US head neck lymph node mapping    Impression  Stable exam   No evidence of recurrent or metastatic disease  Workstation performed: [id number]         I have personally reviewed pertinent reports  Portions of the record may have been created with voice recognition software  Occasional wrong word or "sound a like" substitutions may have occurred due to the inherent limitations of voice recognition software  Read the chart carefully and recognize, using context, where substitutions have occurred

## 2022-02-15 ENCOUNTER — TELEPHONE (OUTPATIENT)
Dept: ENDOCRINOLOGY | Facility: CLINIC | Age: 47
End: 2022-02-15

## 2022-02-15 PROBLEM — E83.51 HYPOCALCEMIA: Status: ACTIVE | Noted: 2022-02-15

## 2022-02-15 PROBLEM — R82.994 HYPERCALCIURIA: Status: ACTIVE | Noted: 2022-02-15

## 2022-02-15 PROBLEM — E11.9 TYPE 2 DIABETES MELLITUS WITHOUT COMPLICATION, WITHOUT LONG-TERM CURRENT USE OF INSULIN (HCC): Status: ACTIVE | Noted: 2022-02-15

## 2022-02-15 NOTE — ASSESSMENT & PLAN NOTE
TG has trended up in the past year or so -repeat neck ultrasound  will repeat labs and if trending up will need further imaging

## 2022-02-15 NOTE — TELEPHONE ENCOUNTER
Umer F[facility] [place])  Rx #: [id number]  [id number] 0 25MG/0 5ML auto-injectors       Form  [facility] Electronic PA Form ([id number]    CaseId:[id number];Status:Approved; Review Type:Prior Auth; Coverage Start Date:01/16/2022; Coverage End Date:09/13/2022;

## 2022-02-15 NOTE — ASSESSMENT & PLAN NOTE
Urinary calcium high in the past year -so for now  Increase HCTZ to 25 mg twice daily -Take calcium 600 mg 2 tab lunch and dinner and 1 at bedtime   Repeat labs in 6 weeks    Consider switching to calcium citrate

## 2022-03-16 ENCOUNTER — OFFICE VISIT (OUTPATIENT)
Dept: FAMILY MEDICINE CLINIC | Facility: CLINIC | Age: 47
End: 2022-03-16
Payer: COMMERCIAL

## 2022-03-16 VITALS
BODY MASS INDEX: 42.66 KG/M2 | HEART RATE: 81 BPM | WEIGHT: 315 LBS | SYSTOLIC BLOOD PRESSURE: 124 MMHG | DIASTOLIC BLOOD PRESSURE: 82 MMHG | OXYGEN SATURATION: 95 % | TEMPERATURE: 97.8 F | HEIGHT: 72 IN

## 2022-03-16 DIAGNOSIS — H57.89 IRRITATION OF RIGHT EYE: Primary | ICD-10-CM

## 2022-03-16 PROBLEM — E11.9 TYPE 2 DIABETES MELLITUS WITHOUT COMPLICATION, WITHOUT LONG-TERM CURRENT USE OF INSULIN (HCC): Status: RESOLVED | Noted: 2022-02-15 | Resolved: 2022-03-16

## 2022-03-16 PROCEDURE — 99213 OFFICE O/P EST LOW 20 MIN: CPT | Performed by: FAMILY MEDICINE

## 2022-03-16 PROCEDURE — 3079F DIAST BP 80-89 MM HG: CPT | Performed by: FAMILY MEDICINE

## 2022-03-16 PROCEDURE — 3074F SYST BP LT 130 MM HG: CPT | Performed by: FAMILY MEDICINE

## 2022-03-16 NOTE — PROGRESS NOTES
Félix Doe  1975 male MRN: [id number]    Acute Visit        ASSESSMENT/PLAN  Problem List Items Addressed This Visit        Other    Irritation of right eye - Primary     Exam is normal   Advised patient to monitor  Saline eye drops/flush as needed  If symptoms worsen or any visual symptoms occur patient should call or come back in                     Future Appointments   Date Time Provider Tanner Grant   3/22/2022  3:00 PM Diamond Vail MD [facility]   5/26/2022  2:20 PM Clifford Rodriguez MD [facility]        SUBJECTIVE  CC: Eye Problem (right eye watery and itchy)       He has watering and itchiness of the R eye  No redness  Woke up this way this morning  No visual changes  He did flush his eye and it feels better now  Félix Doe  is a 51 y o  male who presented for an acute visit complaining of  Review of Systems   Constitutional: Negative for chills and fever  HENT: Negative for congestion  Eyes: Positive for discharge and itching  Negative for photophobia, pain, redness and visual disturbance  Respiratory: Negative for cough          Historical Information   The patient history was reviewed as follows:  Past Medical History:   Diagnosis Date    Asthma     [facility]     [facility] 5/6/2014    Diverticulitis     Hyperlipidemia     Hypertension     Immunity to measles determined by serologic test 7/2/2019    Thyroid cancer ([facility] )     radioactive iodine     Thyroid cancer ([facility] ) 2/12/2020    Type 2 diabetes mellitus [facility] ) 7/17/2013     Past Surgical History:   Procedure Laterality Date    BIOPSY CORE NEEDLE      Thyroid    CHEST WALL BIOPSY N/A 2/7/2020    Procedure: EXCISION  BIOPSY LESION/MASS from back;  Surgeon: Luisa Ryder MD;  [facility]: [facility];  Service: General    COLOSTOMY      with reversal    [place] KNEE SCOPE,MED/LAT MENISECTOMY Left 8/17/2017    Procedure: KNEE ARTHROSCOPIC PARTIAL LATERAL MENISCECTOMY ; PATELLO FEMOEAL CHONDROPLASTY;  Surgeon: Louisa Caicedo MD;  [facility];  Service: Orthopedics    THYROIDECTOMY       Family History   Problem Relation Age of Onset    Diabetes Mother     Hypertension Mother     Hypertension Father     Hyperlipidemia Father     Thyroid disease unspecified Sister       Social History   Social History     Substance and Sexual Activity   Alcohol Use Yes    Comment: 1 drink every 2 weeks     Social History     Substance and Sexual Activity   Drug Use No     Social History     Tobacco Use   Smoking Status Former Smoker    Packs/day: 1 00    Years: 10     Pack years: 10     Types: Cigarettes    Quit date:     Years since quittin 2   Smokeless Tobacco Never Used       Medications:   Meds/Allergies   Current Outpatient Medications   Medication Sig Dispense Refill    amLODIPine (NORVASC) 5 mg tablet TAKE 1 TABLET DAILY 90 tablet 3    aspirin (ECOTRIN LOW STRENGTH) 81 mg EC tablet Take 81 mg by mouth daily      calcitriol (ROCALTROL) 0 25 mcg capsule TAKE 1 CAPSULE TWICE A  capsule 3    Calcium Carb-Cholecalciferol (CALCIUM 600 + D PO) Take 600 mg by mouth 4 (four) times a day        diclofenac-misoprostol (ARTHROTEC 75) 75-0 2 MG per tablet Take 1 tablet by mouth 2 (two) times a day 180 tablet 2    fluticasone (FLONASE) 50 mcg/act nasal spray USE 1 SPRAY IN EACH NOSTRIL DAILY (Patient taking differently: daily as needed  ) 16 g 5    hydrochlorothiazide (HYDRODIURIL) 25 mg tablet 1 tab daily twice daily (in addition to the losartan HCTZ) 180 tablet 1    levothyroxine 200 mcg tablet TAKE 1 TABLET ON MONDAY THROUGH Thursday AND 2 TABLETS ON Friday/SATURDAY AND  126 tablet 1    losartan-hydrochlorothiazide (HYZAAR) 100-25 MG per tablet TAKE 1 TABLET DAILY 90 tablet 3    Semaglutide-Weight Management (WEGOVY) 0 25 MG/0 5ML 0 25 mg once a week for 4 weeks and then 0 5 mg once a week 2 mL 1    simvastatin (ZOCOR) 10 mg tablet TAKE 1 TABLET AT BEDTIME 90 tablet 3     No current facility-administered medications for this visit  No Known Allergies    OBJECTIVE  Vitals:   Vitals:    03/16/22 1003   BP: 124/82   BP Location: Left arm   Patient Position: Sitting   Pulse: 81   Temp: 97 8 °F (36 6 °C)   TempSrc: Temporal   SpO2: 95%   Weight: (!) 164 kg (361 lb 12 8 oz)   Height: 6' (1 829 m)       Invasive Devices  Report    None                 Physical Exam  Constitutional:       Appearance: He is obese  HENT:      Head: Normocephalic and atraumatic  Eyes:      General: No scleral icterus  Right eye: No discharge  Left eye: No discharge  Extraocular Movements: Extraocular movements intact  Conjunctiva/sclera: Conjunctivae normal       Pupils: Pupils are equal, round, and reactive to light  Comments: fluorescein staining of R eye normal - no corneal defect or scratches  Peripheral vision intact   Neurological:      General: No focal deficit present  Mental Status: Mental status is at baseline  Cranial Nerves: No cranial nerve deficit  Lab:  I have personally reviewed all pertinent results

## 2022-03-16 NOTE — ASSESSMENT & PLAN NOTE
Exam is normal   Advised patient to monitor  Saline eye drops/flush as needed    If symptoms worsen or any visual symptoms occur patient should call or come back in

## 2022-03-16 NOTE — LETTER
March 16, 2022     Patient: Sharmin Recinos  YOB: 1975   Date of Visit: 3/16/2022       To Whom it May Concern:    Jose Cruzhoraciocole Strickland is under my professional care  He was seen in my office on 3/16/2022  He may return to work on 3/17/22  If you have any questions or concerns, please don't hesitate to call           Sincerely,          Marlyn Tang MD        CC: No Recipients

## 2022-03-17 ENCOUNTER — TELEPHONE (OUTPATIENT)
Dept: ENDOCRINOLOGY | Facility: CLINIC | Age: 47
End: 2022-03-17

## 2022-03-17 DIAGNOSIS — E11.9 TYPE 2 DIABETES MELLITUS WITHOUT COMPLICATION, WITHOUT LONG-TERM CURRENT USE OF INSULIN (HCC): Primary | ICD-10-CM

## 2022-03-17 DIAGNOSIS — E66.01 CLASS 3 SEVERE OBESITY DUE TO EXCESS CALORIES WITH SERIOUS COMORBIDITY AND BODY MASS INDEX (BMI) OF 45.0 TO 49.9 IN ADULT (HCC): ICD-10-CM

## 2022-03-17 NOTE — TELEPHONE ENCOUNTER
Patient needs a refill sent to [facility]  The previous instructions say to up it to the 0 5 mg after 4 weeks so I set up the new dose  I believe I did it correctly

## 2022-03-18 DIAGNOSIS — E66.01 CLASS 3 SEVERE OBESITY DUE TO EXCESS CALORIES WITH SERIOUS COMORBIDITY AND BODY MASS INDEX (BMI) OF 45.0 TO 49.9 IN ADULT (HCC): ICD-10-CM

## 2022-03-18 DIAGNOSIS — E11.9 TYPE 2 DIABETES MELLITUS WITHOUT COMPLICATION, WITHOUT LONG-TERM CURRENT USE OF INSULIN (HCC): ICD-10-CM

## 2022-03-22 ENCOUNTER — OFFICE VISIT (OUTPATIENT)
Dept: SLEEP CENTER | Facility: CLINIC | Age: 47
End: 2022-03-22
Payer: COMMERCIAL

## 2022-03-22 VITALS
WEIGHT: 315 LBS | SYSTOLIC BLOOD PRESSURE: 153 MMHG | HEIGHT: 72 IN | BODY MASS INDEX: 42.66 KG/M2 | DIASTOLIC BLOOD PRESSURE: 78 MMHG | HEART RATE: 79 BPM

## 2022-03-22 DIAGNOSIS — G47.33 OSA (OBSTRUCTIVE SLEEP APNEA): ICD-10-CM

## 2022-03-22 DIAGNOSIS — Z91.89 AT RISK FOR OBSTRUCTIVE SLEEP APNEA: Primary | ICD-10-CM

## 2022-03-22 DIAGNOSIS — Z72.820 SLEEP DEPRIVATION: ICD-10-CM

## 2022-03-22 DIAGNOSIS — G47.30 SLEEP APNEA, UNSPECIFIED TYPE: ICD-10-CM

## 2022-03-22 PROCEDURE — 1036F TOBACCO NON-USER: CPT | Performed by: PSYCHIATRY & NEUROLOGY

## 2022-03-22 PROCEDURE — 99203 OFFICE O/P NEW LOW 30 MIN: CPT | Performed by: PSYCHIATRY & NEUROLOGY

## 2022-03-22 PROCEDURE — 3008F BODY MASS INDEX DOCD: CPT | Performed by: PSYCHIATRY & NEUROLOGY

## 2022-03-22 NOTE — PROGRESS NOTES
Review of Systems      Genitourinary none   Cardiology none   Gastrointestinal none   Neurology need to move extremities   Constitutional weight change   Integumentary none   Psychiatry none   Musculoskeletal none   Pulmonary none   ENT none   Endocrine none   Hematological none

## 2022-03-22 NOTE — PROGRESS NOTES
Assessment/Plan:      1  At risk for obstructive sleep apnea  Assessment & Plan:   Although he does not describe symptoms to suggest obstructive sleep apnea, he has risk factors including his body mass index, neck circumference, male gender, and hypertension  He does not describe feeling tired or sleepy although of note he has a high [place] score  His airway does not seem particularly carotid although he does have a large uvula  We will plan for a home sleep test to assess  I will then see him back in a follow-up visit to review the test and, with the treatment plan  Orders:  -     Home Study; Future; Expected date: 03/22/2022    2  Sleep deprivation  Assessment & Plan:    Based on an early start time for work, he only sleeps 4  Hours a night  Sleep deprivation can be a factor in weight gain  3  Sleep apnea, unspecified type  -     Ambulatory Referral to Sleep Medicine    4  GREGG (obstructive sleep apnea)  -     Home Study; Future; Expected date: 03/22/2022         Subjective:      Patient ID: Eugenio Camargo  is a 51 y o  male  Juarez Dhillon as a new patient - he is a 39-year-old male referred for possible obstructive sleep apnea and to investigate if his sleep is a factor in recent weight gain  He notes he has had very significant weight loss but more recently, has had unexplained weight gain despite caloric restriction and a healthy diet  He has a medical history including thyroid cancer, hypertension, lumbar disc herniations  He generally starts work at 6:30 a m     As a result of this, he has to awaken early  On a typical work night, he goes to bed at 10:00 p m , is asleep by 11 and is awake at 4:00 a m   On weekends he is able to sleep until 7:15 a m  He generally sleeps through the night  Despite this, he is not sleepy during the day  He does not take naps  His [place] Sleepiness Score was elevated at 16 but he does not doze  He has not had drowsy driving   He notes he is very active in busy with a son in [place] and guiding others  He has not been told he snores, he does not wake gasping or choking, and has not had witnessed apnea  No sleepwalking, no restless legs  He drinks 20 oz coffee in the AM and Red Bull on  breaks         [place] Sleepiness Scale:     Sitting and reading: Would never doze  Watching TV: Slight chance of dozing  Sitting, inactive in a public place (e g  a theatre or a meeting): High chance of dozing  As a passenger in a car for an hour without a break: Slight chance of dozing  Lying down to rest in the afternoon when circumstances permit: Moderate chance of dozing  Sitting and talking to someone: High chance of dozing  Sitting quietly after a lunch without alcohol: High chance of dozing  In a car, while stopped for a few minutes in traffic: High chance of dozing  Total score: 16     The following portions of the patient's history were reviewed and updated as appropriate: allergies, current medications, past family history, past medical history, past social history, past surgical history, and problem list     Review of Systems      Objective:  Neck Circumference: 21 inches      /78 (BP Location: Left arm, Patient Position: Sitting, Cuff Size: Large)   Pulse 79   Ht 6' (1 829 m)   Wt (!) 165 kg (364 lb)   BMI 49 37 kg/m²          Physical Exam  Constitutional:       Appearance: He is obese  HENT:      Mouth/Throat:      Comments: mallampati 2-3 airway, elongated uvula   Cardiovascular:      Rate and Rhythm: Normal rate and regular rhythm  Pulses: Normal pulses  Heart sounds: Normal heart sounds  Pulmonary:      Effort: Pulmonary effort is normal  No respiratory distress  Breath sounds: Normal breath sounds  Musculoskeletal:      Right lower leg: No edema  Left lower leg: No edema  Neurological:      Mental Status: He is alert     Psychiatric:         Mood and Affect: Mood normal          Behavior: Behavior normal          Thought Content:  Thought content normal          Judgment: Judgment normal

## 2022-03-22 NOTE — ASSESSMENT & PLAN NOTE
Based on an early start time for work, he only sleeps 4  Hours a night  Sleep deprivation can be a factor in weight gain

## 2022-03-22 NOTE — ASSESSMENT & PLAN NOTE
Although he does not describe symptoms to suggest obstructive sleep apnea, he has risk factors including his body mass index, neck circumference, male gender, and hypertension  He does not describe feeling tired or sleepy although of note he has a high [place] score  His airway does not seem particularly carotid although he does have a large uvula  We will plan for a home sleep test to assess  I will then see him back in a follow-up visit to review the test and, with the treatment plan

## 2022-03-30 DIAGNOSIS — I10 HYPERTENSION, UNSPECIFIED TYPE: ICD-10-CM

## 2022-03-30 RX ORDER — LOSARTAN POTASSIUM AND HYDROCHLOROTHIAZIDE 25; 100 MG/1; MG/1
TABLET ORAL
Qty: 90 TABLET | Refills: 3 | Status: SHIPPED | OUTPATIENT
Start: 2022-03-30

## 2022-05-27 DIAGNOSIS — E66.01 CLASS 3 SEVERE OBESITY DUE TO EXCESS CALORIES WITH SERIOUS COMORBIDITY AND BODY MASS INDEX (BMI) OF 45.0 TO 49.9 IN ADULT (HCC): Primary | ICD-10-CM

## 2022-06-01 ENCOUNTER — TELEPHONE (OUTPATIENT)
Dept: ENDOCRINOLOGY | Facility: CLINIC | Age: 47
End: 2022-06-01

## 2022-06-01 NOTE — TELEPHONE ENCOUNTER
Wegove pen Inj 0 5ml is currente unavailable , also 0 25mg is unavailable [facility] requesting alternative therapy   Pharmacy phone # [phone]

## 2022-06-01 NOTE — TELEPHONE ENCOUNTER
There is note from 5/27 - he had been tolerating 0 5 mg weekly so dose was increased to 1 mg weekly   Should have been sent to pharmacy

## 2022-06-02 ENCOUNTER — HOSPITAL ENCOUNTER (OUTPATIENT)
Dept: RADIOLOGY | Facility: MEDICAL CENTER | Age: 47
Discharge: HOME | End: 2022-06-02
Payer: COMMERCIAL

## 2022-06-02 DIAGNOSIS — C73 THYROID CANCER (HCC): ICD-10-CM

## 2022-06-02 PROCEDURE — 76536 US EXAM OF HEAD AND NECK: CPT

## 2022-06-07 ENCOUNTER — TELEPHONE (OUTPATIENT)
Dept: ENDOCRINOLOGY | Facility: CLINIC | Age: 47
End: 2022-06-07

## 2022-06-07 NOTE — TELEPHONE ENCOUNTER
----- Message from Jesus Alberto Ba MD sent at 6/7/2022 11:44 AM EDT -----  Will review on upcoming visit

## 2022-06-26 ENCOUNTER — HOSPITAL ENCOUNTER (OUTPATIENT)
Dept: SLEEP CENTER | Facility: CLINIC | Age: 47
Discharge: HOME | End: 2022-06-26
Payer: COMMERCIAL

## 2022-06-26 DIAGNOSIS — G47.33 OSA (OBSTRUCTIVE SLEEP APNEA): ICD-10-CM

## 2022-06-26 DIAGNOSIS — Z91.89 AT RISK FOR OBSTRUCTIVE SLEEP APNEA: ICD-10-CM

## 2022-06-26 PROCEDURE — G0399 HOME SLEEP TEST/TYPE 3 PORTA: HCPCS

## 2022-06-27 ENCOUNTER — OFFICE VISIT (OUTPATIENT)
Dept: ENDOCRINOLOGY | Facility: CLINIC | Age: 47
End: 2022-06-27
Payer: COMMERCIAL

## 2022-06-27 VITALS
BODY MASS INDEX: 42.66 KG/M2 | DIASTOLIC BLOOD PRESSURE: 90 MMHG | HEART RATE: 70 BPM | WEIGHT: 315 LBS | HEIGHT: 72 IN | SYSTOLIC BLOOD PRESSURE: 130 MMHG

## 2022-06-27 DIAGNOSIS — E55.9 VITAMIN D DEFICIENCY: ICD-10-CM

## 2022-06-27 DIAGNOSIS — Z72.820 SLEEP DEPRIVATION: ICD-10-CM

## 2022-06-27 DIAGNOSIS — E20.9 HYPOPARATHYROIDISM, UNSPECIFIED HYPOPARATHYROIDISM TYPE (HCC): ICD-10-CM

## 2022-06-27 DIAGNOSIS — E11.649 UNCONTROLLED TYPE 2 DIABETES MELLITUS WITH HYPOGLYCEMIA, UNSPECIFIED HYPOGLYCEMIA COMA STATUS (HCC): ICD-10-CM

## 2022-06-27 DIAGNOSIS — E83.51 HYPOCALCEMIA: ICD-10-CM

## 2022-06-27 DIAGNOSIS — C73 THYROID CANCER (HCC): Primary | ICD-10-CM

## 2022-06-27 DIAGNOSIS — E89.0 POSTOPERATIVE HYPOTHYROIDISM: ICD-10-CM

## 2022-06-27 DIAGNOSIS — E66.01 CLASS 3 SEVERE OBESITY DUE TO EXCESS CALORIES WITH SERIOUS COMORBIDITY AND BODY MASS INDEX (BMI) OF 45.0 TO 49.9 IN ADULT (HCC): ICD-10-CM

## 2022-06-27 DIAGNOSIS — E03.9 HYPOTHYROIDISM, UNSPECIFIED TYPE: ICD-10-CM

## 2022-06-27 PROCEDURE — 99214 OFFICE O/P EST MOD 30 MIN: CPT | Performed by: INTERNAL MEDICINE

## 2022-06-27 RX ORDER — LEVOTHYROXINE SODIUM 0.2 MG/1
TABLET ORAL
Qty: 126 TABLET | Refills: 3 | Status: SHIPPED | OUTPATIENT
Start: 2022-06-27

## 2022-06-27 NOTE — PROGRESS NOTES
Home Sleep Study Documentation    HOME STUDY DEVICE: Noxturnal yes                                           Lauren G3 no      Pre-Sleep Home Study:    Set-up and instructions performed by: JEREL Morton    Technician performed demonstration for Patient: yes    Return demonstration performed by Patient: yes    Written instructions provided to Patient: yes    Patient signed consent form: yes        Post-Sleep Home Study:    Additional comments by Patient: None    Home Sleep Study Failed:no:    Failure reason: N/A    Reported or Detected: N/A    Scored by: DEJON Waddell RPSGT

## 2022-06-27 NOTE — PROGRESS NOTES
Shawn Sutton  51 y o  male MRN: [id number]    Encounter: [id number]      Assessment/Plan     Problem List Items Addressed This Visit        Endocrine    Hypoparathyroidism [facility])     Repeat labs including urine calcium-further management will depend on the results           Relevant Orders    Creatinine, urine, 24 hour Lab Collect    Calcium, urine, 24 hour Lab Collect    Hypothyroidism     Repeat thyroid function tests           Relevant Medications    levothyroxine 200 mcg tablet    Thyroid cancer ([facility] ) - Primary     Recent neck ultrasound did not show any abnormal lymph nodes or masses-TG/TG antibody pending           Relevant Medications    levothyroxine 200 mcg tablet       Other    Class 3 severe obesity due to excess calories with serious comorbidity and body mass index (BMI) of 45 0 to 49 9 in adult ([facility] )     Increase wegovy - will check with insurance as need to escalate dosing            Hypocalcemia    Sleep deprivation    Vitamin D deficiency      Other Visit Diagnoses     Uncontrolled type 2 diabetes mellitus with hypoglycemia, unspecified hypoglycemia coma status (HCC)            CC:   Hypothyroidism     History of Present Illness      HPI:  41-year-old male with history of thyroid cancer metastatic to cervical lymph nodes, postsurgical hypothyroidism, postsurgical hypoparathyroidism, hypercalciuria ,vitamin-D deficiency, type 2 diabetes and severe obesity seen in follow-up  Currently on wegovy 0 5 mg weekly tolerating without any GI SE -dose could not be estimated as he received a 90 day supply and insurance would not let him get 1 mg until he is done with the    For postsurgical hypoparathyroidism, hypercalciuria he is on Calcitriol twice daily   Calcium 600 mg 4 times a day  1-2 serving of dairy  Hydrochlorothiazide 75 mg daily    No mylagis , no numbness     Sleeps ok however only 4-6 hours some nights - had a  sleep study last night     For hypothyroidism is currently on levothyroxine 1 tablet Monday to Thursday to  and  and has been taking it regularly and properly        Review of Systems    Historical Information   Past Medical History:   Diagnosis Date    Asthma     Diabetes mellitus ([place] [address] )     Diabetes type 2, uncontrolled 2014    Diverticulitis     Hyperlipidemia     Hypertension     Immunity to measles determined by serologic test 2019    Thyroid cancer ([facility] )     radioactive iodine     Thyroid cancer ([place] [address] ) 2020    Type 2 diabetes mellitus ([place] [address] ) 2013     Past Surgical History:   Procedure Laterality Date    BIOPSY CORE NEEDLE      Thyroid    CHEST WALL BIOPSY N/A 2020    Procedure: EXCISION  BIOPSY LESION/MASS from back;  Surgeon: Carissa Bhatia MD;  Location: [facility];  Service: General    COLOSTOMY      with reversal    MN KNEE SCOPE,MED/LAT MENISECTOMY Left 2017    Procedure: KNEE ARTHROSCOPIC PARTIAL LATERAL MENISCECTOMY ; PATELLO Parthenia Abt;  Surgeon: Brayan Cisneros MD;  Location:  MAIN OR;  Service: Orthopedics    THYROIDECTOMY       Social History   Social History     Substance and Sexual Activity   Alcohol Use Yes    Comment: 1 drink every 2 weeks     Social History     Substance and Sexual Activity   Drug Use No     Social History     Tobacco Use   Smoking Status Former Smoker    Packs/day: 1 00    Years: 10 00    Pack years: 10 00    Types: Cigarettes    Quit date:     Years since quittin 5   Smokeless Tobacco Never Used     Family History:   Family History   Problem Relation Age of Onset    Diabetes Mother     Hypertension Mother     Hypertension Father     Hyperlipidemia Father     Thyroid disease unspecified Sister     Sleep apnea Neg Hx        Meds/Allergies   Current Outpatient Medications   Medication Sig Dispense Refill    amLODIPine (NORVASC) 5 mg tablet TAKE 1 TABLET DAILY 90 tablet 3    aspirin (ECOTRIN LOW STRENGTH) 81 mg EC tablet Take 81 mg by mouth daily      calcitriol (ROCALTROL) 0 25 mcg capsule TAKE 1 CAPSULE TWICE A  capsule 3    Calcium Carb-Cholecalciferol (CALCIUM 600 + D PO) Take 600 mg by mouth 4 (four) times a day        diclofenac-misoprostol (ARTHROTEC 75) 75-0 2 MG per tablet Take 1 tablet by mouth 2 (two) times a day 180 tablet 2    fluticasone (FLONASE) 50 mcg/act nasal spray USE 1 SPRAY IN EACH NOSTRIL DAILY (Patient taking differently: daily as needed) 16 g 5    hydrochlorothiazide (HYDRODIURIL) 25 mg tablet 1 tab daily twice daily (in addition to the losartan HCTZ) 180 tablet 1    levothyroxine 200 mcg tablet TAKE 1 TABLET ON MONDAY THROUGH Thursday AND 2 TABLETS ON Friday/SATURDAY AND SUNDAY 126 tablet 3    losartan-hydrochlorothiazide (HYZAAR) 100-25 MG per tablet TAKE 1 TABLET DAILY 90 tablet 3    Semaglutide-Weight Management (WEGOVY) 1 MG/0 5ML Inject 0 5 mL (1 mg total) under the skin once a week 6 mL 0    simvastatin (ZOCOR) 10 mg tablet TAKE 1 TABLET AT BEDTIME 90 tablet 3     No current facility-administered medications for this visit  No Known Allergies    Objective   Vitals: Blood pressure 130/90, pulse 70, height 6' (1 829 m), weight (!) 163 kg (358 lb 6 4 oz)  Physical Exam  Vitals reviewed  Constitutional:       Appearance: Normal appearance  He is obese  He is not ill-appearing or diaphoretic  HENT:      Head: Normocephalic and atraumatic  Eyes:      General: No scleral icterus  Extraocular Movements: Extraocular movements intact  Cardiovascular:      Rate and Rhythm: Normal rate and regular rhythm  Heart sounds: Normal heart sounds  No murmur heard  Pulmonary:      Effort: Pulmonary effort is normal  No respiratory distress  Breath sounds: Normal breath sounds  No wheezing or rales  Abdominal:      General: There is no distension  Palpations: Abdomen is soft  Tenderness: There is no abdominal tenderness  There is no guarding  Musculoskeletal:      Cervical back: Neck supple  Right lower leg: No edema  Left lower leg: No edema  Lymphadenopathy:      Cervical: No cervical adenopathy  Skin:     General: Skin is warm and dry  Neurological:      General: No focal deficit present  Mental Status: He is alert and oriented to person, place, and time  Psychiatric:         Mood and Affect: Mood normal          Behavior: Behavior normal          Thought Content: Thought content normal          Judgment: Judgment normal          The history was obtained from the review of the chart, patient  Lab Results:   Lab Results   Component Value Date/Time    TSH 3RD GENERATON 1 470 01/03/2022 07:35 AM    TSH 3RD GENERATON 3 560 10/01/2021 07:20 AM    Free T4 1 60 (H) 01/03/2022 07:35 AM    Free T4 1 43 10/01/2021 07:20 AM    Thyroglobulin Ab <1 0 10/01/2021 07:20 AM     Imaging Studies:  Results for orders placed during the hospital encounter of 06/02/22    US head neck lymph node mapping    Impression  No evidence of recurrent or metastatic disease  Workstation performed: [id number]         I have personally reviewed pertinent reports  Portions of the record may have been created with voice recognition software  Occasional wrong word or "sound a like" substitutions may have occurred due to the inherent limitations of voice recognition software  Read the chart carefully and recognize, using context, where substitutions have occurred

## 2022-06-29 ENCOUNTER — OFFICE VISIT (OUTPATIENT)
Dept: FAMILY MEDICINE CLINIC | Facility: CLINIC | Age: 47
End: 2022-06-29
Payer: COMMERCIAL

## 2022-06-29 ENCOUNTER — TELEPHONE (OUTPATIENT)
Dept: ENDOCRINOLOGY | Facility: CLINIC | Age: 47
End: 2022-06-29

## 2022-06-29 VITALS
HEIGHT: 72 IN | SYSTOLIC BLOOD PRESSURE: 126 MMHG | OXYGEN SATURATION: 97 % | HEART RATE: 90 BPM | DIASTOLIC BLOOD PRESSURE: 82 MMHG | TEMPERATURE: 97.9 F | BODY MASS INDEX: 42.66 KG/M2 | WEIGHT: 315 LBS

## 2022-06-29 DIAGNOSIS — G89.29 CHRONIC BILATERAL LOW BACK PAIN WITH LEFT-SIDED SCIATICA: Primary | ICD-10-CM

## 2022-06-29 DIAGNOSIS — M54.42 CHRONIC BILATERAL LOW BACK PAIN WITH LEFT-SIDED SCIATICA: Primary | ICD-10-CM

## 2022-06-29 PROCEDURE — 1036F TOBACCO NON-USER: CPT | Performed by: PHYSICIAN ASSISTANT

## 2022-06-29 PROCEDURE — 96372 THER/PROPH/DIAG INJ SC/IM: CPT | Performed by: PHYSICIAN ASSISTANT

## 2022-06-29 PROCEDURE — 99214 OFFICE O/P EST MOD 30 MIN: CPT | Performed by: PHYSICIAN ASSISTANT

## 2022-06-29 PROCEDURE — 3008F BODY MASS INDEX DOCD: CPT | Performed by: PHYSICIAN ASSISTANT

## 2022-06-29 PROCEDURE — 3074F SYST BP LT 130 MM HG: CPT | Performed by: PHYSICIAN ASSISTANT

## 2022-06-29 RX ORDER — KETOROLAC TROMETHAMINE 30 MG/ML
60 INJECTION, SOLUTION INTRAMUSCULAR; INTRAVENOUS ONCE
Status: COMPLETED | OUTPATIENT
Start: 2022-06-29 | End: 2022-06-29

## 2022-06-29 RX ADMIN — KETOROLAC TROMETHAMINE 60 MG: 30 INJECTION, SOLUTION INTRAMUSCULAR; INTRAVENOUS at 15:39

## 2022-06-29 NOTE — PROGRESS NOTES
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 and washed cars for several hours but denies any direct trauma  Denies saddle anesethsia or loss of bowel or bladder function  Denies fever/chills       The following portions of the patient's history were reviewed and updated as appropriate: allergies, current medications, past medical history, past social history, past surgical history and problem list     Review of Systems   Constitutional: Negative for chills, fatigue and fever  HENT: Negative for congestion, ear pain, sinus pain, sore throat and trouble swallowing  Eyes: Negative for pain, discharge and redness  Respiratory: Negative for cough, chest tightness, shortness of breath and wheezing  Cardiovascular: Negative for chest pain, palpitations and leg swelling  Gastrointestinal: Negative for abdominal pain, diarrhea, nausea and vomiting  Musculoskeletal: Positive for back pain  Negative for arthralgias, joint swelling and myalgias  Skin: Negative for rash  Neurological: Negative for dizziness, weakness, numbness and headaches           Objective:      /82 (BP Location: Left arm, Patient Position: Sitting, Cuff Size: Large)   Pulse 90   Temp 97 9 °F (36 6 °C)   Ht 6' (1 829 m)   Wt (!) 161 kg (354 lb)   SpO2 97%   BMI 48 01 kg/m²          Physical Exam  Constitutional:       General: He is not in acute distress  Appearance: He is well-developed  Cardiovascular:      Rate and Rhythm: Normal rate and regular rhythm  Heart sounds: Normal heart sounds  Pulmonary:      Effort: Pulmonary effort is normal       Breath sounds: Normal breath sounds  Musculoskeletal:      Cervical back: Normal       Thoracic back: Normal       Lumbar back: No tenderness  Decreased range of motion   Negative right straight leg raise test and negative left straight leg raise test

## 2022-06-29 NOTE — TELEPHONE ENCOUNTER
I called [facility] and was told that they can do 30 day supply since patient will be increasing wegovy on a monthly basis and representative was able to process Wegovy 1 mg 30 days supply with out any issue through patient's insurance  Loco Cornelius

## 2022-06-29 NOTE — TELEPHONE ENCOUNTER
----- Message from Tomasa Anderson MD sent at 6/28/2022  9:40 AM EDT -----  So I dont know who to speak to   He has been on wegovy 0 5 for 2 months-plan was to increase to 1 mg after 1 month insurance did not cover it as he had received a 90 day supply 0 [facility]  So we can use the 0 5 2 pens once a week to make it a 1 mg -then he would need a script for the 1 mg  However the dose increases done on monthly basis so 1 mg and after 1 month 1 7 mg and then 2 4 mg    If they keep sending him a 90 day supply of each then dose can not increased in a timely fashion

## 2022-06-30 PROCEDURE — 95806 SLEEP STUDY UNATT&RESP EFFT: CPT | Performed by: PSYCHIATRY & NEUROLOGY

## 2022-07-06 ENCOUNTER — TELEPHONE (OUTPATIENT)
Dept: SLEEP CENTER | Facility: CLINIC | Age: 47
End: 2022-07-06

## 2022-07-06 NOTE — TELEPHONE ENCOUNTER
----- Message from Maria Elena Turner MD sent at 7/1/2022 11:36 AM EDT -----  Test shows mild obstructive sleep apnea as well as hypoxia  He has an appointment scheduled with me next week, I can follow up with him then  Alternatively, if he wishes to proceed with a CPAP study, let me now and I can order that and then follow up with him approximately 1 month after starting treatment    I do not think we talked about CPAP much at his visit

## 2022-07-06 NOTE — TELEPHONE ENCOUNTER
Left message for the patient that sleep study is resulted and Dr Denis Steiner will follow up at appointment on 7/8/2022       Appointment details left in message

## 2022-07-08 ENCOUNTER — OFFICE VISIT (OUTPATIENT)
Dept: SLEEP CENTER | Facility: CLINIC | Age: 47
End: 2022-07-08
Payer: COMMERCIAL

## 2022-07-08 VITALS — HEIGHT: 72 IN | BODY MASS INDEX: 42.66 KG/M2 | WEIGHT: 315 LBS

## 2022-07-08 DIAGNOSIS — R79.81 ELEVATED CO2 LEVEL: ICD-10-CM

## 2022-07-08 DIAGNOSIS — G47.34 SLEEP-RELATED HYPOXIA: ICD-10-CM

## 2022-07-08 DIAGNOSIS — G47.33 OSA (OBSTRUCTIVE SLEEP APNEA): Primary | ICD-10-CM

## 2022-07-08 PROBLEM — Z91.89 AT RISK FOR OBSTRUCTIVE SLEEP APNEA: Status: RESOLVED | Noted: 2022-03-22 | Resolved: 2022-07-08

## 2022-07-08 PROCEDURE — 99213 OFFICE O/P EST LOW 20 MIN: CPT | Performed by: PSYCHIATRY & NEUROLOGY

## 2022-07-08 NOTE — ASSESSMENT & PLAN NOTE
We reviewed his home sleep test in detail-this test shows mild obstructive sleep apnea  We discussed that with hypertension, treatment is recommended for mild obstructive sleep apnea  Does not think he would use a CPAP machine and does not want me to order 1 today  We also discussed an oral appliance as a treatment option  He wishes to work on weight loss and follow-up with me in a few months  We discussed that there are risks of untreated obstructive sleep apnea and more significantly potential risks with obesity hypoventilation syndrome

## 2022-07-08 NOTE — PATIENT INSTRUCTIONS
As we discussed ,you have a mild case of sleep apnea but also low oxygen levels in your sleep  Losing weight is very important  I would recommend CPAP , or if not tolerated, I would recommend using a mouthpiece to treat sleep apnea (but that may not help the oxygen levels)  If you change your mind and want to start CPAP please let me know and I can order you a CPAP machine at home    Weight loss is very important, with weight loss but sleep apnea and oxygen levels in sleep can improve     It is very important to avoid driving while drowsy, this can be very dangerous or even cause serious injury or death  If sleepy, it is not safe to get behind the wheel  If you are driving and feels sleepy, it is very important to pull over right away  Even losing control of the car for a split second can be deadly  If you feel you cannot control when sleepiness occurs and cannot prevented, it is important to not drive at all until this improves  Please let me know if you experience this as it is very important

## 2022-07-08 NOTE — PROGRESS NOTES
Assessment/Plan:      1  GREGG (obstructive sleep apnea)  Assessment & Plan: We reviewed his home sleep test in detail-this test shows mild obstructive sleep apnea  We discussed that with hypertension, treatment is recommended for mild obstructive sleep apnea  Does not think he would use a CPAP machine and does not want me to order 1 today  We also discussed an oral appliance as a treatment option  He wishes to work on weight loss and follow-up with me in a few months  We discussed that there are risks of untreated obstructive sleep apnea and more significantly potential risks with obesity hypoventilation syndrome  2  Sleep-related hypoxia  Assessment & Plan:  He had low oxygen levels when sleeping supine, given this plus an elevated carbon dioxide level on a basic metabolic panel, he is at risk for obesity hypoventilation syndrome  We discussed that this is not optimal, use of CPAP would help with this condition  He understands but wishes to work on weight loss 1st       3  Elevated CO2 level           Subjective:      Patient ID: Jake Alonzo  is a 51 y o  male  [place] Sleepiness Scale:     Sitting and reading: Would never doze  Watching TV: Slight chance of dozing  Sitting, inactive in a public place (e g  a theatre or a meeting): Would never doze  As a passenger in a car for an hour without a break:  Moderate chance of dozing  Lying down to rest in the afternoon when circumstances permit: Slight chance of dozing  Sitting and talking to someone: Would never doze  Sitting quietly after a lunch without alcohol: Would never doze  In a car, while stopped for a few minutes in traffic: Would never doze  Total score: 4     The following portions of the patient's history were reviewed and updated as appropriate: allergies, current medications, past family history, past medical history, past social history, past surgical history, and problem list      Home sleep test report-June 2022-  IMPRESSION:  Mild obstructive sleep apnea, worse in the supine position  ENA 5 9   Hypoxemia noted though artifact cannot be fully excluded        RECOMMENDATION:  Based on this result and symptoms, treatment with CPAP is recommended    Due to an elevated serum CO2 and significant hypoxia on this test, an attended titration is preferred prior to initiation of CPAP at home, to verify correction of hypoxia         Review of Systems    Review of Systems        Genitourinary none   Cardiology none   Gastrointestinal none   Neurology none   Constitutional none   Integumentary none   Psychiatry none   Musculoskeletal none   Pulmonary none   ENT none   Endocrine none   Hematological none        Objective:        Ht 6' (1 829 m)   Wt (!) 158 kg (349 lb)   BMI 47 33 kg/m²      Data reviewed-notes from Primary Care to correlate with history, blood work including carbon dioxide level on comprehensive metabolic panel

## 2022-07-11 DIAGNOSIS — J06.9 ACUTE URI: ICD-10-CM

## 2022-07-11 RX ORDER — FLUTICASONE PROPIONATE 50 MCG
SPRAY, SUSPENSION (ML) NASAL
Qty: 16 G | Refills: 5 | Status: SHIPPED | OUTPATIENT
Start: 2022-07-11

## 2022-07-18 ENCOUNTER — OFFICE VISIT (OUTPATIENT)
Dept: FAMILY MEDICINE CLINIC | Facility: CLINIC | Age: 47
End: 2022-07-18
Payer: COMMERCIAL

## 2022-07-18 VITALS
TEMPERATURE: 97.3 F | HEART RATE: 86 BPM | DIASTOLIC BLOOD PRESSURE: 80 MMHG | SYSTOLIC BLOOD PRESSURE: 136 MMHG | BODY MASS INDEX: 42.66 KG/M2 | WEIGHT: 315 LBS | HEIGHT: 72 IN | OXYGEN SATURATION: 96 %

## 2022-07-18 DIAGNOSIS — Z00.00 ANNUAL PHYSICAL EXAM: Primary | ICD-10-CM

## 2022-07-18 DIAGNOSIS — Z12.11 ENCOUNTER FOR SCREENING COLONOSCOPY: ICD-10-CM

## 2022-07-18 PROBLEM — Z72.820 SLEEP DEPRIVATION: Status: RESOLVED | Noted: 2022-03-22 | Resolved: 2022-07-18

## 2022-07-18 PROBLEM — H57.89 IRRITATION OF RIGHT EYE: Status: RESOLVED | Noted: 2022-03-16 | Resolved: 2022-07-18

## 2022-07-18 PROBLEM — E83.51 HYPOCALCEMIA: Status: RESOLVED | Noted: 2022-02-15 | Resolved: 2022-07-18

## 2022-07-18 PROBLEM — R22.2 MASS ON BACK: Status: RESOLVED | Noted: 2020-01-08 | Resolved: 2022-07-18

## 2022-07-18 PROCEDURE — 99396 PREV VISIT EST AGE 40-64: CPT | Performed by: NURSE PRACTITIONER

## 2022-07-18 PROCEDURE — 3079F DIAST BP 80-89 MM HG: CPT | Performed by: NURSE PRACTITIONER

## 2022-07-18 PROCEDURE — 3725F SCREEN DEPRESSION PERFORMED: CPT | Performed by: NURSE PRACTITIONER

## 2022-07-18 NOTE — PATIENT INSTRUCTIONS

## 2022-07-18 NOTE — PROGRESS NOTES
[facility]    NAME: Cee Maciel  AGE: 51 y o  SEX: male  : 1975     DATE: 2022     Assessment and Plan:     Problem List Items Addressed This Visit    None     Visit Diagnoses     Annual physical exam    -  Primary    Encounter for screening colonoscopy        Relevant Orders    Ambulatory referral for colonoscopy          Immunizations and preventive care screenings were discussed with patient today  Appropriate education was printed on patient's after visit summary  Counseling:  Injury prevention: discussed safety/seat belts, safety helmets, smoke detectors, carbon dioxide detectors, and smoking near bedding or upholstery  Exercise: the importance of regular exercise/physical activity was discussed  Recommend exercise 3-5 times per week for at least 30 minutes  Depression Screening and Follow-up Plan: Patient was screened for depression during today's encounter  They screened negative with a PHQ-2 score of 0  Return in 1 year (on 2023)  Chief Complaint:     Chief Complaint   Patient presents with    Physical Exam     [facility]       History of Present Illness:     Adult Annual Physical   Patient here for a comprehensive physical exam  The patient reports no problems  Diet and Physical Activity  Diet/Nutrition: well balanced diet  Keto diet   Exercise: walking and 5-7 times a week on average  Depression Screening  PHQ-2/9 Depression Screening    Little interest or pleasure in doing things: 0 - not at all  Feeling down, depressed, or hopeless: 0 - not at all  PHQ-2 Score: 0  PHQ-2 Interpretation: Negative depression screen       General Health  Sleep: sleeps well and gets 4-6 hours of sleep on average  Hearing: normal - bilateral   Vision: no vision problems, goes for regular eye exams and wears contacts     Dental: regular dental visits, brushes teeth twice daily and flosses teeth occasionally   Health  Symptoms include: none     Review of Systems:     Review of Systems   Constitutional: Negative for activity change, appetite change, chills, diaphoresis, fatigue, fever and unexpected weight change  HENT: Negative for congestion, ear pain, hearing loss, postnasal drip, sinus pressure, sinus pain, sneezing and sore throat  Eyes: Negative for pain, redness and visual disturbance  Respiratory: Negative for cough and shortness of breath  Cardiovascular: Negative for chest pain and leg swelling  Gastrointestinal: Negative for abdominal pain, diarrhea, nausea and vomiting  Endocrine: Negative  Genitourinary: Negative  Musculoskeletal: Negative for arthralgias  Skin: Negative  Allergic/Immunologic: Negative  Neurological: Negative for dizziness and light-headedness  Hematological: Negative  Psychiatric/Behavioral: Negative for behavioral problems and dysphoric mood        Past Medical History:     Past Medical History:   Diagnosis Date    Asthma     Diabetes mellitus ([place] [address] )     Diabetes type 2, uncontrolled 5/6/2014    Diverticulitis     Hyperlipidemia     Hypertension     Immunity to measles determined by serologic test 7/2/2019    Thyroid cancer ([place] [address] )     radioactive iodine     Thyroid cancer ([place] [address] ) 2/12/2020    Type 2 diabetes mellitus ([place] [address] ) 7/17/2013      Past Surgical History:     Past Surgical History:   Procedure Laterality Date    BIOPSY CORE NEEDLE      Thyroid    CHEST WALL BIOPSY N/A 02/07/2020    Procedure: EXCISION  BIOPSY LESION/MASS from back;  Surgeon: Jessy Lopez MD;  Location: [facility];  Service: General    COLOSTOMY      with reversal    [place] KNEE SCOPE,MED/LAT MENISECTOMY Left 08/17/2017    Procedure: KNEE ARTHROSCOPIC PARTIAL LATERAL MENISCECTOMY ; PATELLO Aleksandr Pereira;  Surgeon: Vania Carpenter MD;  Location:  MAIN OR;  Service: Orthopedics    THYROIDECTOMY        Family History:     Family History Problem Relation Age of Onset    Diabetes Mother     Hypertension Mother     Hypertension Father     Hyperlipidemia Father     Thyroid disease unspecified Sister     Sleep apnea Neg Hx       Social History:     Social History     Socioeconomic History    Marital status: /Civil Union     Spouse name: None    Number of children: None    Years of education: None    Highest education level: None   Occupational History    None   Tobacco Use    Smoking status: Former Smoker     Packs/day: 1 00     Years: 10 00     Pack years: 10 00     Types: Cigarettes     Quit date:      Years since quittin 5    Smokeless tobacco: Never Used   Vaping Use    Vaping Use: Never used   Substance and Sexual Activity    Alcohol use: Yes     Comment: 1 drink every 2 weeks    Drug use: No    Sexual activity: None   Other Topics Concern    None   Social History Narrative    None     Social Determinants of Health     Financial Resource Strain: Not on file   Food Insecurity: Not on file   Transportation Needs: Not on file   Physical Activity: Not on file   Stress: Not on file   Social Connections: Not on file   Intimate Partner Violence: Not on file   Housing Stability: Not on file      Current Medications:     Current Outpatient Medications   Medication Sig Dispense Refill    amLODIPine (NORVASC) 5 mg tablet TAKE 1 TABLET DAILY 90 tablet 3    aspirin (ECOTRIN LOW STRENGTH) 81 mg EC tablet Take 81 mg by mouth daily      calcitriol (ROCALTROL) 0 25 mcg capsule TAKE 1 CAPSULE TWICE A  capsule 3    Calcium Carb-Cholecalciferol (CALCIUM 600 + D PO) Take 600 mg by mouth 4 (four) times a day        diclofenac-misoprostol (ARTHROTEC 75) 75-0 2 MG per tablet Take 1 tablet by mouth 2 (two) times a day 180 tablet 2    fluticasone (FLONASE) 50 mcg/act nasal spray USE 1 SPRAY IN EACH NOSTRIL DAILY 16 g 5    hydrochlorothiazide (HYDRODIURIL) 25 mg tablet 1 tab daily twice daily (in addition to the losartan HCTZ) 180 tablet 1    levothyroxine 200 mcg tablet TAKE 1 TABLET ON MONDAY THROUGH Thursday AND 2 TABLETS ON Friday/SATURDAY AND SUNDAY 126 tablet 3    losartan-hydrochlorothiazide (HYZAAR) 100-25 MG per tablet TAKE 1 TABLET DAILY 90 tablet 3    Semaglutide-Weight Management (WEGOVY) 1 MG/0 5ML Inject 0 5 mL (1 mg total) under the skin once a week 6 mL 0    simvastatin (ZOCOR) 10 mg tablet TAKE 1 TABLET AT BEDTIME 90 tablet 3     No current facility-administered medications for this visit  Allergies:     No Known Allergies   Physical Exam:     /80   Pulse 86   Temp (!) 97 3 °F (36 3 °C)   Ht 6' (1 829 m)   Wt (!) 160 kg (352 lb)   SpO2 96%   BMI 47 74 kg/m²     Physical Exam  Vitals and nursing note reviewed  Constitutional:       Appearance: He is well-developed  HENT:      Head: Normocephalic and atraumatic  Right Ear: Tympanic membrane normal       Left Ear: Tympanic membrane normal       Nose: Nose normal       Mouth/Throat:      Mouth: Mucous membranes are moist    Eyes:      Conjunctiva/sclera: Conjunctivae normal    Cardiovascular:      Rate and Rhythm: Normal rate and regular rhythm  Heart sounds: No murmur heard  Pulmonary:      Effort: Pulmonary effort is normal  No respiratory distress  Breath sounds: Normal breath sounds  Abdominal:      Palpations: Abdomen is soft  Tenderness: There is no abdominal tenderness  Musculoskeletal:         General: Normal range of motion  Cervical back: Neck supple  Skin:     General: Skin is warm and dry  Capillary Refill: Capillary refill takes less than 2 seconds  Neurological:      General: No focal deficit present  Mental Status: He is alert and oriented to person, place, and time  Psychiatric:         Mood and Affect: Mood normal          Behavior: Behavior normal          Thought Content:  Thought content normal          Judgment: Judgment normal           Ubaldo Frankel, CRNP  [facility]

## 2022-07-26 DIAGNOSIS — R82.994 HYPERCALCIURIA: ICD-10-CM

## 2022-07-26 DIAGNOSIS — E83.51 HYPOCALCEMIA: ICD-10-CM

## 2022-07-26 RX ORDER — HYDROCHLOROTHIAZIDE 25 MG/1
TABLET ORAL
Qty: 180 TABLET | Refills: 3 | Status: SHIPPED | OUTPATIENT
Start: 2022-07-26

## 2022-10-07 ENCOUNTER — OFFICE VISIT (OUTPATIENT)
Dept: URGENT CARE | Facility: CLINIC | Age: 47
End: 2022-10-07
Payer: COMMERCIAL

## 2022-10-07 VITALS
TEMPERATURE: 98.1 F | DIASTOLIC BLOOD PRESSURE: 85 MMHG | HEIGHT: 72 IN | BODY MASS INDEX: 42.66 KG/M2 | OXYGEN SATURATION: 96 % | SYSTOLIC BLOOD PRESSURE: 140 MMHG | HEART RATE: 79 BPM | WEIGHT: 315 LBS

## 2022-10-07 DIAGNOSIS — J01.10 ACUTE FRONTAL SINUSITIS, RECURRENCE NOT SPECIFIED: Primary | ICD-10-CM

## 2022-10-07 PROCEDURE — 99213 OFFICE O/P EST LOW 20 MIN: CPT | Performed by: NURSE PRACTITIONER

## 2022-10-07 RX ORDER — AMOXICILLIN 875 MG/1
875 TABLET, COATED ORAL 2 TIMES DAILY
Qty: 20 TABLET | Refills: 0 | Status: SHIPPED | OUTPATIENT
Start: 2022-10-07 | End: 2022-10-17

## 2022-10-07 NOTE — PATIENT INSTRUCTIONS
Take the amoxicillin as ordered until completed  Eat yogurt or take a probiotic to restore good bacteria to your gut; this helps prevent stomach irritation/diarrhea while on an antibiotic  Sinusitis   AMBULATORY CARE:   Sinusitis  is inflammation or infection of your sinuses  Sinusitis is most often caused by a virus  Acute sinusitis may last up to 12 weeks  Chronic sinusitis lasts longer than 12 weeks  Recurrent sinusitis means you have 4 or more infections in 1 year  Common signs and symptoms:   Fever    Pain, pressure, redness, or swelling around the forehead, cheeks, or eyes    Thick yellow or green discharge from your nose    Tenderness when you touch your face over your sinuses    Dry cough that happens mostly at night or when you lie down    Headache and face pain that is worse when you lean forward    Tooth pain, or pain when you chew    Seek care immediately if:   You have trouble breathing or wheezing that is getting worse  You have a stiff neck, a fever, or a bad headache  You cannot open your eye  Your eyeball bulges out or you cannot move your eye  You are more sleepy than normal, or you notice changes in your ability to think, move, or talk  You have swelling of your forehead or scalp  Call your doctor if:   You have vision changes, such as double vision  Your eye and eyelid are red, swollen, and painful  Your symptoms do not improve or go away after 10 days  You have nausea and are vomiting  Your nose is bleeding  You have questions or concerns about your condition or care  Medicines: Your symptoms may go away on their own  Your healthcare provider may recommend watchful waiting for up to 10 days before starting antibiotics  You may need any of the following:  Acetaminophen  decreases pain and fever  It is available without a doctor's order  Ask how much to take and how often to take it  Follow directions   Read the labels of all other medicines you are using to see if they also contain acetaminophen, or ask your doctor or pharmacist  Acetaminophen can cause liver damage if not taken correctly  Do not use more than 4 grams (4,000 milligrams) total of acetaminophen in one day  NSAIDs , such as ibuprofen, help decrease swelling, pain, and fever  This medicine is available with or without a doctor's order  NSAIDs can cause stomach bleeding or kidney problems in certain people  If you take blood thinner medicine, always ask your healthcare provider if NSAIDs are safe for you  Always read the medicine label and follow directions  Nasal steroid sprays  may help decrease inflammation in your nose and sinuses  Decongestants  help reduce swelling and drain mucus in the nose and sinuses  They may help you breathe easier  Antihistamines  help dry mucus in the nose and relieve sneezing  Antibiotics  help treat or prevent a bacterial infection  Self-care:   Rinse your sinuses as directed  Use a sinus rinse device to rinse your nasal passages with a saline (salt water) solution or distilled water  Do not use tap water  This will help thin the mucus in your nose and rinse away pollen and dirt  It will also help reduce swelling so you can breathe normally  Use a humidifier  to increase air moisture in your home  This may make it easier for you to breathe and help decrease your cough  Sleep with your head elevated  Place an extra pillow under your head before you go to sleep to help your sinuses drain  Drink liquids as directed  Ask your healthcare provider how much liquid to drink each day and which liquids are best for you  Liquids will thin the mucus in your nose and help it drain  Avoid drinks that contain alcohol or caffeine  Do not smoke, and avoid secondhand smoke  Nicotine and other chemicals in cigarettes and cigars can make your symptoms worse   Ask your healthcare provider for information if you currently smoke and need help to quit  E-cigarettes or smokeless tobacco still contain nicotine  Talk to your healthcare provider before you use these products  Prevent the spread of germs:   Wash your hands often with soap and water  Wash your hands after you use the bathroom, change a child's diaper, or sneeze  Wash your hands before you prepare or eat food  Stay away from people who are sick  Some germs spread easily and quickly through contact  Follow up with your doctor as directed: You may be referred to an ear, nose, and throat specialist  Write down your questions so you remember to ask them during your visits  © Copyright [employer] 2022 Information is for End User's use only and may not be sold, redistributed or otherwise used for commercial purposes  All illustrations and images included in CareNotes® are the copyrighted property of [employer]  or [employer]ancy Floyd  The above information is an  only  It is not intended as medical advice for individual conditions or treatments  Talk to your doctor, nurse or pharmacist before following any medical regimen to see if it is safe and effective for you

## 2022-10-07 NOTE — PROGRESS NOTES
[facility]        NAME: Radha Mckenzie  is a 49 y o  male  : 1975    MRN: [id number]  DATE: 2022  TIME: 5:46 PM      Assessment and Plan     Acute frontal sinusitis, recurrence not specified [J01 10]  1  Acute frontal sinusitis, recurrence not specified  amoxicillin (AMOXIL) 875 mg tablet         Patient Instructions     Patient Instructions     Take the amoxicillin as ordered until completed  Eat yogurt or take a probiotic to restore good bacteria to your gut; this helps prevent stomach irritation/diarrhea while on an antibiotic  Sinusitis   AMBULATORY CARE:   Sinusitis  is inflammation or infection of your sinuses  Sinusitis is most often caused by a virus  Acute sinusitis may last up to 12 weeks  Chronic sinusitis lasts longer than 12 weeks  Recurrent sinusitis means you have 4 or more infections in 1 year  Common signs and symptoms:   · Fever    · Pain, pressure, redness, or swelling around the forehead, cheeks, or eyes    · Thick yellow or green discharge from your nose    · Tenderness when you touch your face over your sinuses    · Dry cough that happens mostly at night or when you lie down    · Headache and face pain that is worse when you lean forward    · Tooth pain, or pain when you chew    Seek care immediately if:   · You have trouble breathing or wheezing that is getting worse  · You have a stiff neck, a fever, or a bad headache  · You cannot open your eye  · Your eyeball bulges out or you cannot move your eye  · You are more sleepy than normal, or you notice changes in your ability to think, move, or talk  · You have swelling of your forehead or scalp  Call your doctor if:   · You have vision changes, such as double vision  · Your eye and eyelid are red, swollen, and painful  · Your symptoms do not improve or go away after 10 days  · You have nausea and are vomiting  · Your nose is bleeding      · You have questions or concerns about your condition or care  Medicines: Your symptoms may go away on their own  Your healthcare provider may recommend watchful waiting for up to 10 days before starting antibiotics  You may need any of the following:  · Acetaminophen  decreases pain and fever  It is available without a doctor's order  Ask how much to take and how often to take it  Follow directions  Read the labels of all other medicines you are using to see if they also contain acetaminophen, or ask your doctor or pharmacist  Acetaminophen can cause liver damage if not taken correctly  Do not use more than 4 grams (4,000 milligrams) total of acetaminophen in one day  · NSAIDs , such as ibuprofen, help decrease swelling, pain, and fever  This medicine is available with or without a doctor's order  NSAIDs can cause stomach bleeding or kidney problems in certain people  If you take blood thinner medicine, always ask your healthcare provider if NSAIDs are safe for you  Always read the medicine label and follow directions  · Nasal steroid sprays  may help decrease inflammation in your nose and sinuses  · Decongestants  help reduce swelling and drain mucus in the nose and sinuses  They may help you breathe easier  · Antihistamines  help dry mucus in the nose and relieve sneezing  · Antibiotics  help treat or prevent a bacterial infection  Self-care:   · Rinse your sinuses as directed  Use a sinus rinse device to rinse your nasal passages with a saline (salt water) solution or distilled water  Do not use tap water  This will help thin the mucus in your nose and rinse away pollen and dirt  It will also help reduce swelling so you can breathe normally  · Use a humidifier  to increase air moisture in your home  This may make it easier for you to breathe and help decrease your cough  · Sleep with your head elevated  Place an extra pillow under your head before you go to sleep to help your sinuses drain       · Drink liquids as directed  Ask your healthcare provider how much liquid to drink each day and which liquids are best for you  Liquids will thin the mucus in your nose and help it drain  Avoid drinks that contain alcohol or caffeine  · Do not smoke, and avoid secondhand smoke  Nicotine and other chemicals in cigarettes and cigars can make your symptoms worse  Ask your healthcare provider for information if you currently smoke and need help to quit  E-cigarettes or smokeless tobacco still contain nicotine  Talk to your healthcare provider before you use these products  Prevent the spread of germs:   · Wash your hands often with soap and water  Wash your hands after you use the bathroom, change a child's diaper, or sneeze  Wash your hands before you prepare or eat food  · Stay away from people who are sick  Some germs spread easily and quickly through contact  Follow up with your doctor as directed: You may be referred to an ear, nose, and throat specialist  Write down your questions so you remember to ask them during your visits  © Copyright [employer] 2022 Information is for End User's use only and may not be sold, redistributed or otherwise used for commercial purposes  All illustrations and images included in CareNotes® are the copyrighted property of A [employer] A [employer] , Inc  or [facility] [employer]   The above information is an  only  It is not intended as medical advice for individual conditions or treatments  Talk to your doctor, nurse or pharmacist before following any medical regimen to see if it is safe and effective for you  Follow up with PCP in 3-5 days  Proceed to  ER if symptoms worsen  Chief Complaint     Chief Complaint   Patient presents with    Sore Throat     3 days now, sinuses flow backwards and gets trapped into his nasal canal  Cough and Ear aches has slowly begun to linger, patient has been trying to treat with over the counter medication   But as the kept moving along, sinuses began to get worst, and throat began to hurt as well as the ears  History of Present Illness     Onset of sinus s/s on Tuesday, getting worse not better  Mild throat discomfort from PND and mild ear discomfort from sinus pressure  States entire house has been sick and ended needed abx  Patient tested himself for covid Tues night w/ negative results; declines retest today  States other family members have also tested w/ negative results  Notes a hx of sinusitis        Review of Systems     Review of Systems   HENT: Positive for congestion, ear pain, sinus pressure, sinus pain and sore throat  Respiratory: Negative  All other systems reviewed and are negative  Current Medications       Current Outpatient Medications:     amoxicillin (AMOXIL) 875 mg tablet, Take 1 tablet (875 mg total) by mouth 2 (two) times a day for 10 days, Disp: 20 tablet, Rfl: 0    amLODIPine (NORVASC) 5 mg tablet, TAKE 1 TABLET DAILY, Disp: 90 tablet, Rfl: 3    aspirin (ECOTRIN LOW STRENGTH) 81 mg EC tablet, Take 81 mg by mouth daily, Disp: , Rfl:     calcitriol (ROCALTROL) 0 25 mcg capsule, TAKE 1 CAPSULE TWICE A DAY, Disp: 180 capsule, Rfl: 3    Calcium Carb-Cholecalciferol (CALCIUM 600 + D PO), Take 600 mg by mouth 4 (four) times a day  , Disp: , Rfl:     diclofenac-misoprostol (ARTHROTEC 75) 75-0 2 MG per tablet, Take 1 tablet by mouth 2 (two) times a day, Disp: 180 tablet, Rfl: 2    fluticasone (FLONASE) 50 mcg/act nasal spray, USE 1 SPRAY IN EACH NOSTRIL DAILY, Disp: 16 g, Rfl: 5    hydrochlorothiazide (HYDRODIURIL) 25 mg tablet, TAKE 1 TABLET TWICE A DAY    (IN ADDITION TO THE LOSARTAN/HCTZ), Disp: 180 tablet, Rfl: 3    levothyroxine 200 mcg tablet, TAKE 1 TABLET ON MONDAY THROUGH Thursday AND 2 TABLETS ON Otelia Inch AND SUNDAY, Disp: 126 tablet, Rfl: 3    losartan-hydrochlorothiazide (HYZAAR) 100-25 MG per tablet, TAKE 1 TABLET DAILY, Disp: 90 tablet, Rfl: 3    simvastatin (ZOCOR) 10 mg tablet, TAKE 1 TABLET AT BEDTIME, Disp: 90 tablet, Rfl: 3    Current Allergies     Allergies as of 10/07/2022    (No Known Allergies)              The following portions of the patient's history were reviewed and updated as appropriate: allergies, current medications, past family history, past medical history, past social history, past surgical history and problem list      Past Medical History:   Diagnosis Date    Asthma     Diabetes mellitus ([place] [address] )     Diabetes type 2, uncontrolled 5/6/2014    Diverticulitis     Hyperlipidemia     Hypertension     Immunity to measles determined by serologic test 7/2/2019    Thyroid cancer ([place] [address] )     radioactive iodine     Thyroid cancer ([place] [address] ) 2/12/2020    Type 2 diabetes mellitus ([place] [address] ) 7/17/2013       Past Surgical History:   Procedure Laterality Date    BIOPSY CORE NEEDLE      Thyroid    CHEST WALL BIOPSY N/A 02/07/2020    Procedure: EXCISION  BIOPSY LESION/MASS from back;  Surgeon: Forest Davis MD;  Location: [facility];  Service: General    COLOSTOMY      with reversal    HI KNEE SCOPE,MED/LAT MENISECTOMY Left 08/17/2017    Procedure: KNEE ARTHROSCOPIC PARTIAL LATERAL MENISCECTOMY ; PATELLO [employer] [employer];  Surgeon: Britton Ann MD;  Location:  MAIN OR;  Service: Orthopedics    THYROIDECTOMY         Family History   Problem Relation Age of Onset    Diabetes Mother     Hypertension Mother     Hypertension Father     Hyperlipidemia Father     Thyroid disease unspecified Sister     Sleep apnea Neg Hx          Medications have been verified  Objective     /85   Pulse 79   Temp 98 1 °F (36 7 °C)   Ht 6' (1 829 m)   Wt (!) 169 kg (372 lb)   SpO2 96%   BMI 50 45 kg/m²   No LMP for male patient  Physical Exam     Physical Exam  Vitals and nursing note reviewed  Constitutional:       General: He is not in acute distress  Appearance: Normal appearance  He is well-developed and well-groomed   He is not toxic-appearing or diaphoretic  HENT:      Head: Normocephalic and atraumatic  Right Ear: Hearing, tympanic membrane, ear canal and external ear normal       Left Ear: Hearing, tympanic membrane, ear canal and external ear normal       Nose: Mucosal edema and congestion present  Right Sinus: Maxillary sinus tenderness (slight) and frontal sinus tenderness present  Left Sinus: Maxillary sinus tenderness (slight) and frontal sinus tenderness present  Mouth/Throat:      Mouth: Mucous membranes are moist       Pharynx: Oropharynx is clear  Uvula midline  No oropharyngeal exudate or posterior oropharyngeal erythema  Eyes:      Pupils: Pupils are equal, round, and reactive to light  Pulmonary:      Effort: Pulmonary effort is normal  No respiratory distress  Abdominal:      General: There is no distension  Musculoskeletal:         General: Normal range of motion  Cervical back: Normal range of motion and neck supple  Lymphadenopathy:      Cervical: Cervical adenopathy (patient notes many lymph nodes removed from prior thyroid ca; the ones present are swollen) present  Skin:     General: Skin is warm and dry  Capillary Refill: Capillary refill takes less than 2 seconds  Neurological:      General: No focal deficit present  Mental Status: He is alert and oriented to person, place, and time  Psychiatric:         Mood and Affect: Mood normal          Behavior: Behavior normal  Behavior is cooperative  Thought Content:  Thought content normal          Judgment: Judgment normal

## 2022-10-20 DIAGNOSIS — R52 PAIN: ICD-10-CM

## 2022-10-20 RX ORDER — DICLOFENAC SODIUM AND MISOPROSTOL 75; 200 MG/1; UG/1
TABLET, DELAYED RELEASE ORAL
Qty: 180 TABLET | Refills: 3 | Status: SHIPPED | OUTPATIENT
Start: 2022-10-20

## 2022-10-26 DIAGNOSIS — I10 HYPERTENSION, UNSPECIFIED TYPE: ICD-10-CM

## 2022-10-26 RX ORDER — CALCITRIOL 0.25 UG/1
CAPSULE, LIQUID FILLED ORAL
Qty: 180 CAPSULE | Refills: 3 | Status: SHIPPED | OUTPATIENT
Start: 2022-10-26

## 2022-10-26 RX ORDER — AMLODIPINE BESYLATE 5 MG/1
TABLET ORAL
Qty: 90 TABLET | Refills: 3 | Status: SHIPPED | OUTPATIENT
Start: 2022-10-26

## 2022-10-31 DIAGNOSIS — E78.5 HYPERLIPIDEMIA, UNSPECIFIED HYPERLIPIDEMIA TYPE: ICD-10-CM

## 2022-10-31 RX ORDER — SIMVASTATIN 10 MG
TABLET ORAL
Qty: 90 TABLET | Refills: 3 | Status: SHIPPED | OUTPATIENT
Start: 2022-10-31

## 2022-10-31 NOTE — TELEPHONE ENCOUNTER
Requested medication(s) are due for refill today: Yes  Patient has already received a courtesy refill: No  Other reason request has been forwarded to provider:    Total Cholesterol within 360 days    LDL within 360 days    HDL within 360 days    Triglycerides within 360 days

## 2022-11-07 ENCOUNTER — OFFICE VISIT (OUTPATIENT)
Dept: URGENT CARE | Facility: CLINIC | Age: 47
End: 2022-11-07

## 2022-11-07 ENCOUNTER — TELEPHONE (OUTPATIENT)
Dept: FAMILY MEDICINE CLINIC | Facility: CLINIC | Age: 47
End: 2022-11-07

## 2022-11-07 ENCOUNTER — LAB (OUTPATIENT)
Dept: LAB | Facility: CLINIC | Age: 47
End: 2022-11-07

## 2022-11-07 VITALS
DIASTOLIC BLOOD PRESSURE: 83 MMHG | TEMPERATURE: 97.8 F | OXYGEN SATURATION: 97 % | RESPIRATION RATE: 20 BRPM | HEART RATE: 70 BPM | SYSTOLIC BLOOD PRESSURE: 143 MMHG

## 2022-11-07 DIAGNOSIS — E55.9 VITAMIN D DEFICIENCY: ICD-10-CM

## 2022-11-07 DIAGNOSIS — C73 THYROID CANCER (HCC): ICD-10-CM

## 2022-11-07 DIAGNOSIS — G89.29 CHRONIC MIDLINE LOW BACK PAIN WITHOUT SCIATICA: Primary | ICD-10-CM

## 2022-11-07 DIAGNOSIS — M54.50 CHRONIC MIDLINE LOW BACK PAIN WITHOUT SCIATICA: Primary | ICD-10-CM

## 2022-11-07 DIAGNOSIS — E20.9 HYPOPARATHYROIDISM, UNSPECIFIED HYPOPARATHYROIDISM TYPE (HCC): ICD-10-CM

## 2022-11-07 DIAGNOSIS — E89.0 POSTOPERATIVE HYPOTHYROIDISM: ICD-10-CM

## 2022-11-07 LAB
25(OH)D3 SERPL-MCNC: 47.1 NG/ML (ref 30–100)
ALBUMIN SERPL BCP-MCNC: 3.9 G/DL (ref 3.5–5)
ALP SERPL-CCNC: 60 U/L (ref 46–116)
ALT SERPL W P-5'-P-CCNC: 51 U/L (ref 12–78)
ANION GAP SERPL CALCULATED.3IONS-SCNC: 6 MMOL/L (ref 4–13)
AST SERPL W P-5'-P-CCNC: 29 U/L (ref 5–45)
BILIRUB SERPL-MCNC: 0.53 MG/DL (ref 0.2–1)
BUN SERPL-MCNC: 26 MG/DL (ref 5–25)
CALCIUM SERPL-MCNC: 8.7 MG/DL (ref 8.3–10.1)
CHLORIDE SERPL-SCNC: 101 MMOL/L (ref 96–108)
CO2 SERPL-SCNC: 29 MMOL/L (ref 21–32)
CREAT SERPL-MCNC: 0.91 MG/DL (ref 0.6–1.3)
GFR SERPL CREATININE-BSD FRML MDRD: 99 ML/MIN/1.73SQ M
GLUCOSE P FAST SERPL-MCNC: 149 MG/DL (ref 65–99)
PHOSPHATE SERPL-MCNC: 4 MG/DL (ref 2.7–4.5)
POTASSIUM SERPL-SCNC: 3.6 MMOL/L (ref 3.5–5.3)
PROT SERPL-MCNC: 7.6 G/DL (ref 6.4–8.4)
SODIUM SERPL-SCNC: 136 MMOL/L (ref 135–147)
T4 FREE SERPL-MCNC: 1.52 NG/DL (ref 0.76–1.46)
TSH SERPL DL<=0.05 MIU/L-ACNC: 3.03 UIU/ML (ref 0.45–4.5)

## 2022-11-07 RX ORDER — KETOROLAC TROMETHAMINE 30 MG/ML
30 INJECTION, SOLUTION INTRAMUSCULAR; INTRAVENOUS ONCE
Status: COMPLETED | OUTPATIENT
Start: 2022-11-07 | End: 2022-11-07

## 2022-11-07 RX ADMIN — KETOROLAC TROMETHAMINE 30 MG: 30 INJECTION, SOLUTION INTRAMUSCULAR; INTRAVENOUS at 18:40

## 2022-11-07 NOTE — PATIENT INSTRUCTIONS
Toradol injection given today  Do not take other NSAIDS for 8 hours  May take tylenol  Continue ice/heat to area  Continue Tens unit  Follow up with PCP as scheduled  Proceed to the ER with worsening symptoms

## 2022-11-07 NOTE — TELEPHONE ENCOUNTER
Pt is having back pain and is in need of cortisone shot that [facility] gives    Next available appt  isn't until 10/18  Jessie Brizuela is asking what [facility] recommends ? ??

## 2022-11-07 NOTE — PROGRESS NOTES
[facility]        NAME: Karishma Jenkins  is a 49 y o  male  : 1975    MRN: [id number]  DATE: 2022  TIME: 6:37 PM    Assessment and Plan   Chronic midline low back pain without sciatica [M54 50, G89 29]  1  Chronic midline low back pain without sciatica  ketorolac (TORADOL) injection 30 mg         Patient Instructions     Toradol injection given today  Do not take other NSAIDS for 8 hours  May take tylenol  Continue ice/heat to area  Continue Tens unit  Follow up with PCP as scheduled  Proceed to the ER with worsening symptoms  Chief Complaint     Chief Complaint   Patient presents with   • Back Pain     Started beginning of last week  He has a herniated disk and wants a shot in his back  History of Present Illness       The patient presents today with complaints of low back pain x 1 week  He states that he has been doing more tasks at work due to low staffing, and aggravated his chronic back pain  He states the pain is worse with forward and lateral bending  He denies difficulty sleeping, worsening numbness/tingling down his legs, saddle anaesthesia, or bowel/bladder incontinence  He has been taking tylenol as needed, using ice, and a tens unit with minimal relief  Review of Systems   Review of Systems   Constitutional: Negative for chills, fatigue and fever  HENT: Negative for congestion  Eyes: Negative  Respiratory: Negative for cough and shortness of breath  Cardiovascular: Negative for chest pain and palpitations  Gastrointestinal: Negative for abdominal pain, diarrhea, nausea and vomiting  Genitourinary: Negative for difficulty urinating  Musculoskeletal: Positive for back pain (lower back)  Negative for myalgias  Skin: Negative for rash  Allergic/Immunologic: Negative for environmental allergies  Neurological: Negative for dizziness and headaches  Psychiatric/Behavioral: Negative            Current Medications       Current Outpatient Medications:   •  amLODIPine (NORVASC) 5 mg tablet, TAKE 1 TABLET DAILY, Disp: 90 tablet, Rfl: 3  •  aspirin (ECOTRIN LOW STRENGTH) 81 mg EC tablet, Take 81 mg by mouth daily, Disp: , Rfl:   •  calcitriol (ROCALTROL) 0 25 mcg capsule, TAKE 1 CAPSULE TWICE A DAY, Disp: 180 capsule, Rfl: 3  •  Calcium Carb-Cholecalciferol (CALCIUM 600 + D PO), Take 600 mg by mouth 4 (four) times a day  , Disp: , Rfl:   •  diclofenac-misoprostol (ARTHROTEC 75) 75-0 2 MG per tablet, TAKE 1 TABLET TWICE A DAY, Disp: 180 tablet, Rfl: 3  •  fluticasone (FLONASE) 50 mcg/act nasal spray, USE 1 SPRAY IN EACH NOSTRIL DAILY, Disp: 16 g, Rfl: 5  •  hydrochlorothiazide (HYDRODIURIL) 25 mg tablet, TAKE 1 TABLET TWICE A DAY    (IN ADDITION TO THE LOSARTAN/HCTZ), Disp: 180 tablet, Rfl: 3  •  levothyroxine 200 mcg tablet, TAKE 1 TABLET ON MONDAY THROUGH Thursday AND 2 TABLETS ON Sherril Christians AND SUNDAY, Disp: 126 tablet, Rfl: 3  •  losartan-hydrochlorothiazide (HYZAAR) 100-25 MG per tablet, TAKE 1 TABLET DAILY, Disp: 90 tablet, Rfl: 3  •  simvastatin (ZOCOR) 10 mg tablet, TAKE 1 TABLET AT BEDTIME, Disp: 90 tablet, Rfl: 3    Current Facility-Administered Medications:   •  ketorolac (TORADOL) injection 30 mg, 30 mg, Intramuscular, Once, ISAÍAS Hoover    Current Allergies     Allergies as of 11/07/2022   • (No Known Allergies)            The following portions of the patient's history were reviewed and updated as appropriate: allergies, current medications, past family history, past medical history, past social history, past surgical history and problem list      Past Medical History:   Diagnosis Date   • Asthma    • Diabetes mellitus ([facility] )    • Diabetes type 2, uncontrolled 5/6/2014   • Diverticulitis    • Hyperlipidemia    • Hypertension    • Immunity to measles determined by serologic test 7/2/2019   • Thyroid cancer ([facility] )     radioactive iodine    • Thyroid cancer ([place] [address] ) 2/12/2020   • Type 2 diabetes mellitus ([place] [address] ) 7/17/2013 Past Surgical History:   Procedure Laterality Date   • BIOPSY CORE NEEDLE      Thyroid   • CHEST WALL BIOPSY N/A 02/07/2020    Procedure: EXCISION  BIOPSY LESION/MASS from back;  Surgeon: America Glover MD;  Location: [facility];  Service: General   • COLOSTOMY      with reversal   • NM KNEE SCOPE,MED/LAT MENISECTOMY Left 08/17/2017    Procedure: KNEE ARTHROSCOPIC PARTIAL LATERAL MENISCECTOMY ; PATELLO Malinda Duverney;  Surgeon: Maryse Jack MD;  Location:  MAIN OR;  Service: Orthopedics   • THYROIDECTOMY         Family History   Problem Relation Age of Onset   • Diabetes Mother    • Hypertension Mother    • Hypertension Father    • Hyperlipidemia Father    • Thyroid disease unspecified Sister    • Sleep apnea Neg Hx          Medications have been verified  Objective   /83   Pulse 70   Temp 97 8 °F (36 6 °C)   Resp 20   SpO2 97%        Physical Exam     Physical Exam  Vitals and nursing note reviewed  Constitutional:       General: He is not in acute distress  Appearance: Normal appearance  He is obese  He is not ill-appearing  HENT:      Head: Normocephalic and atraumatic  Right Ear: External ear normal       Left Ear: External ear normal       Nose: Nose normal       Mouth/Throat:      Lips: Pink  Mouth: Mucous membranes are moist       Pharynx: Oropharynx is clear  Eyes:      General: Vision grossly intact  Extraocular Movements: Extraocular movements intact  Pupils: Pupils are equal, round, and reactive to light  Cardiovascular:      Rate and Rhythm: Normal rate and regular rhythm  Heart sounds: Normal heart sounds  No murmur heard  Pulmonary:      Effort: Pulmonary effort is normal       Breath sounds: Normal breath sounds  Abdominal:      General: Abdomen is flat  Bowel sounds are normal       Palpations: Abdomen is soft  Musculoskeletal:      Cervical back: Normal range of motion  Lumbar back: Tenderness present   No swelling, edema or spasms  Decreased range of motion  Negative right straight leg raise test and negative left straight leg raise test         Back:    Skin:     General: Skin is warm  Findings: No rash  Neurological:      Mental Status: He is alert and oriented to person, place, and time  Motor: Motor function is intact     Psychiatric:         Attention and Perception: Attention normal          Mood and Affect: Mood normal

## 2022-11-08 LAB
THYROGLOB AB SERPL-ACNC: <1 IU/ML (ref 0–0.9)
THYROGLOB SERPL-MCNC: 4.7 NG/ML (ref 1.4–29.2)

## 2022-11-11 ENCOUNTER — OFFICE VISIT (OUTPATIENT)
Dept: FAMILY MEDICINE CLINIC | Facility: CLINIC | Age: 47
End: 2022-11-11

## 2022-11-11 VITALS
DIASTOLIC BLOOD PRESSURE: 86 MMHG | TEMPERATURE: 97.8 F | SYSTOLIC BLOOD PRESSURE: 148 MMHG | OXYGEN SATURATION: 96 % | BODY MASS INDEX: 42.66 KG/M2 | WEIGHT: 315 LBS | HEART RATE: 82 BPM | HEIGHT: 72 IN

## 2022-11-11 DIAGNOSIS — M51.26 LUMBAR DISC HERNIATION: ICD-10-CM

## 2022-11-11 DIAGNOSIS — M54.42 ACUTE BILATERAL LOW BACK PAIN WITH LEFT-SIDED SCIATICA: Primary | ICD-10-CM

## 2022-11-11 DIAGNOSIS — M54.16 LUMBAR RADICULOPATHY: ICD-10-CM

## 2022-11-11 DIAGNOSIS — G57.02 SCIATIC NERVE DISEASE, LEFT: ICD-10-CM

## 2022-11-11 RX ORDER — KETOROLAC TROMETHAMINE 30 MG/ML
30 INJECTION, SOLUTION INTRAMUSCULAR; INTRAVENOUS ONCE
Status: COMPLETED | OUTPATIENT
Start: 2022-11-11 | End: 2022-11-11

## 2022-11-11 RX ADMIN — KETOROLAC TROMETHAMINE 30 MG: 30 INJECTION, SOLUTION INTRAMUSCULAR; INTRAVENOUS at 17:58

## 2022-11-11 NOTE — PROGRESS NOTES
Assessment/Plan:         Problem List Items Addressed This Visit        Nervous and Auditory    Acute bilateral low back pain with left-sided sciatica - Primary     Will treat with Toradol 30 mg IM and dw patient calling if symptoms not improving          Relevant Medications    ketorolac (TORADOL) injection 30 mg (Start on 11/11/2022  6:00 PM)    Lumbar radiculopathy    Sciatic nerve disease, left    Relevant Medications    ketorolac (TORADOL) injection 30 mg (Start on 11/11/2022  6:00 PM)       Musculoskeletal and Integument    Lumbar disc herniation    Relevant Medications    ketorolac (TORADOL) injection 30 mg (Start on 11/11/2022  6:00 PM)            Subjective:      Patient ID: Anibal Nicholas  is a 49 y o  male  Patient here today for follow up and reports that he has been having a increase in his lower back pain for the past 2 weeks  He has a history of chronic lumbar pain and has been having issues with his back for years  Patient is on his feet and is getting about 13,000 steps a day he is very busy  Patient reports that he is taking his tylenol and ice packs and taking Arthrotec  He is having stiffness and pain traveling to his thigh in his right leg  Not had injections in his back other than toradol  The following portions of the patient's history were reviewed and updated as appropriate:   He  has a past medical history of Asthma, Diabetes mellitus ([facility] ), Diabetes type 2, uncontrolled (5/6/2014), Diverticulitis, Hyperlipidemia, Hypertension, Immunity to measles determined by serologic test (7/2/2019), Thyroid cancer ([place] [address] ), Thyroid cancer ([place] [address] ) (2/12/2020), and Type 2 diabetes mellitus ([place] [address] ) (7/17/2013)    He   Patient Active Problem List    Diagnosis Date Noted   • Sleep-related hypoxia 07/08/2022   • Elevated CO2 level 07/08/2022   • GREGG (obstructive sleep apnea)    • Hypercalciuria 02/15/2022   • BMI 40 0-44 9, adult ([facility] ) 06/15/2020   • Thyroid cancer ([facility] ) 02/12/2020   • Lumbar disc herniation 01/03/2019   • Sciatic nerve disease, left 05/21/2018   • Acute bilateral low back pain with left-sided sciatica 05/16/2017   • Chronic pain disorder 01/31/2017   • Lumbar radiculopathy 01/31/2017   • Myofascial pain syndrome 01/31/2017   • History of thyroid cancer 10/11/2016   • Class 3 severe obesity due to excess calories with serious comorbidity and body mass index (BMI) of 45 0 to 49 9 in adult [facility]) 10/11/2016   • Vitamin D deficiency 05/03/2016   • Hypoparathyroidism ([facility] ) 12/17/2013   • Hypothyroidism 03/15/2013   • Benign essential hypertension 10/04/2012   • Hypercholesterolemia 08/09/2012     He  has a past surgical history that includes Thyroidectomy; Colostomy; pr knee scope,med/lat menisectomy (Left, 08/17/2017); BIOPSY CORE NEEDLE; and Chest wall biopsy (N/A, 02/07/2020)  His family history includes Diabetes in his mother; Hyperlipidemia in his father; Hypertension in his father and mother; Thyroid disease unspecified in his sister  He  reports that he quit smoking about 20 years ago  His smoking use included cigarettes  He has a 10 00 pack-year smoking history  He has never used smokeless tobacco  He reports current alcohol use  He reports that he does not use drugs  Current Outpatient Medications   Medication Sig Dispense Refill   • amLODIPine (NORVASC) 5 mg tablet TAKE 1 TABLET DAILY 90 tablet 3   • aspirin (ECOTRIN LOW STRENGTH) 81 mg EC tablet Take 81 mg by mouth daily     • calcitriol (ROCALTROL) 0 25 mcg capsule TAKE 1 CAPSULE TWICE A  capsule 3   • Calcium Carb-Cholecalciferol (CALCIUM 600 + D PO) Take 600 mg by mouth 4 (four) times a day       • diclofenac-misoprostol (ARTHROTEC 75) 75-0 2 MG per tablet TAKE 1 TABLET TWICE A  tablet 3   • fluticasone (FLONASE) 50 mcg/act nasal spray USE 1 SPRAY IN EACH NOSTRIL DAILY 16 g 5   • hydrochlorothiazide (HYDRODIURIL) 25 mg tablet TAKE 1 TABLET TWICE A DAY    (IN ADDITION TO THE LOSARTAN/HCTZ) 180 tablet 3   • levothyroxine 200 mcg tablet TAKE 1 TABLET ON MONDAY THROUGH Thursday AND 2 TABLETS ON Sherril Christians AND SUNDAY 126 tablet 3   • losartan-hydrochlorothiazide (HYZAAR) 100-25 MG per tablet TAKE 1 TABLET DAILY 90 tablet 3   • simvastatin (ZOCOR) 10 mg tablet TAKE 1 TABLET AT BEDTIME 90 tablet 3     Current Facility-Administered Medications   Medication Dose Route Frequency Provider Last Rate Last Admin   • ketorolac (TORADOL) injection 30 mg  30 mg Intramuscular Once ISAÍAS Hidalgo         He has No Known Allergies       Review of Systems   Constitutional: Negative for chills and fever  HENT: Negative for ear pain and sore throat  Eyes: Negative for pain and visual disturbance  Respiratory: Negative for cough and shortness of breath  Cardiovascular: Negative for chest pain and palpitations  Gastrointestinal: Negative for abdominal pain and vomiting  Genitourinary: Negative for dysuria and hematuria  Musculoskeletal: Positive for back pain  Negative for arthralgias  Skin: Negative for color change and rash  Neurological: Negative for seizures and syncope  All other systems reviewed and are negative  Objective:      /86 (BP Location: Left arm, Patient Position: Sitting)   Pulse 82   Temp 97 8 °F (36 6 °C) (Temporal)   Ht 6' (1 829 m)   Wt (!) 168 kg (370 lb 3 2 oz)   SpO2 96%   BMI 50 21 kg/m²          Physical Exam  Vitals and nursing note reviewed  Constitutional:       General: He is not in acute distress  Appearance: He is well-developed  HENT:      Head: Normocephalic and atraumatic  Eyes:      Pupils: Pupils are equal, round, and reactive to light  Neck:      Thyroid: No thyromegaly  Cardiovascular:      Rate and Rhythm: Normal rate and regular rhythm  Heart sounds: Normal heart sounds  No murmur heard  Pulmonary:      Effort: Pulmonary effort is normal  No respiratory distress  Breath sounds: Normal breath sounds  No wheezing     Abdominal: General: Bowel sounds are normal       Palpations: Abdomen is soft  Musculoskeletal:      Cervical back: Normal range of motion  Lumbar back: Spasms and tenderness present  Decreased range of motion  Back:    Skin:     General: Skin is warm and dry  Neurological:      Mental Status: He is alert and oriented to person, place, and time

## 2022-11-12 ENCOUNTER — LAB (OUTPATIENT)
Dept: LAB | Facility: CLINIC | Age: 47
End: 2022-11-12

## 2022-11-12 DIAGNOSIS — E20.9 HYPOPARATHYROIDISM, UNSPECIFIED HYPOPARATHYROIDISM TYPE (HCC): ICD-10-CM

## 2022-11-12 LAB
CALCIUM 24H UR-MCNC: 452.25 MG/24 HRS (ref 42–353)
CREAT 24H UR-MRATE: 2.3 G/24HR (ref 0.8–1.8)
SPECIMEN VOL UR: 2250 ML
SPECIMEN VOL UR: 2250 ML

## 2022-12-07 ENCOUNTER — OFFICE VISIT (OUTPATIENT)
Dept: URGENT CARE | Facility: CLINIC | Age: 47
End: 2022-12-07

## 2022-12-07 VITALS — RESPIRATION RATE: 18 BRPM | OXYGEN SATURATION: 97 % | TEMPERATURE: 98 F | HEART RATE: 76 BPM

## 2022-12-07 DIAGNOSIS — J01.10 ACUTE FRONTAL SINUSITIS, RECURRENCE NOT SPECIFIED: Primary | ICD-10-CM

## 2022-12-07 RX ORDER — AMOXICILLIN AND CLAVULANATE POTASSIUM 875; 125 MG/1; MG/1
1 TABLET, FILM COATED ORAL EVERY 12 HOURS SCHEDULED
Qty: 14 TABLET | Refills: 0 | Status: SHIPPED | OUTPATIENT
Start: 2022-12-07 | End: 2022-12-14

## 2022-12-07 NOTE — PROGRESS NOTES
[facility]        NAME: Lacey Room  is a 49 y o  male  : 1975    MRN: [id number]  DATE: 2022  TIME: 6:01 PM    Assessment and Plan   Acute frontal sinusitis, recurrence not specified [J01 10]  1  Acute frontal sinusitis, recurrence not specified          Symptoms consistent with sinusitis - start on flonase for congestion symptoms and augmentin antibiotic for symptoms  Educated to complete entire course of antibiotic  Use NSAIDS as needed for pain  Follow up with PCP or ED if symptoms continue  Patient Instructions     Start Flonase nasal for congestion symptoms  Take entire course of antibiotics, do not stop even if feeling better  Drink lots of fluids to hydrate  Take NSAIDS as needed for pain  Follow up with PCP in 3-5 days  Proceed to  ER if symptoms worsen  Chief Complaint     Chief Complaint   Patient presents with   • Sinusitis     States sinus congestion and PND for greater than 3 weeks  Nasal spray with out relief  History of Present Illness       Presents with 3 weeks of sick symptoms including sinus congestion, sinus pressure, post nasal drip  He thought it was allergy related started on nasal spray without relief  Denies fevers, chills, shortness of breath or chest pain  Review of Systems   Review of Systems   Constitutional: Negative for chills and fever  HENT: Positive for congestion, postnasal drip, sinus pressure and sore throat  Negative for ear pain  Respiratory: Positive for cough  Negative for shortness of breath  Cardiovascular: Negative for chest pain and palpitations  Gastrointestinal: Negative for diarrhea, nausea and vomiting  Musculoskeletal: Negative for myalgias  Skin: Negative for color change and rash  Psychiatric/Behavioral: Negative for confusion  All other systems reviewed and are negative          Current Medications       Current Outpatient Medications:   •  amLODIPine (NORVASC) 5 mg tablet, TAKE 1 TABLET DAILY, Disp: 90 tablet, Rfl: 3  •  aspirin (ECOTRIN LOW STRENGTH) 81 mg EC tablet, Take 81 mg by mouth daily, Disp: , Rfl:   •  calcitriol (ROCALTROL) 0 25 mcg capsule, TAKE 1 CAPSULE TWICE A DAY, Disp: 180 capsule, Rfl: 3  •  Calcium Carb-Cholecalciferol (CALCIUM 600 + D PO), Take 600 mg by mouth 4 (four) times a day  , Disp: , Rfl:   •  diclofenac-misoprostol (ARTHROTEC 75) 75-0 2 MG per tablet, TAKE 1 TABLET TWICE A DAY, Disp: 180 tablet, Rfl: 3  •  fluticasone (FLONASE) 50 mcg/act nasal spray, USE 1 SPRAY IN EACH NOSTRIL DAILY, Disp: 16 g, Rfl: 5  •  hydrochlorothiazide (HYDRODIURIL) 25 mg tablet, TAKE 1 TABLET TWICE A DAY    (IN ADDITION TO THE LOSARTAN/HCTZ), Disp: 180 tablet, Rfl: 3  •  levothyroxine 200 mcg tablet, TAKE 1 TABLET ON MONDAY THROUGH Thursday AND 2 TABLETS ON Mckenzie Big AND SUNDAY, Disp: 126 tablet, Rfl: 3  •  losartan-hydrochlorothiazide (HYZAAR) 100-25 MG per tablet, TAKE 1 TABLET DAILY, Disp: 90 tablet, Rfl: 3  •  simvastatin (ZOCOR) 10 mg tablet, TAKE 1 TABLET AT BEDTIME, Disp: 90 tablet, Rfl: 3    Current Allergies     Allergies as of 12/07/2022   • (No Known Allergies)            The following portions of the patient's history were reviewed and updated as appropriate: allergies, current medications, past family history, past medical history, past social history, past surgical history and problem list      Past Medical History:   Diagnosis Date   • Asthma    • Diabetes mellitus ([facility] )    • Diabetes type 2, uncontrolled 5/6/2014   • Diverticulitis    • Hyperlipidemia    • Hypertension    • Immunity to measles determined by serologic test 7/2/2019   • Thyroid cancer ([facility] )     radioactive iodine    • Thyroid cancer ([facility] ) 2/12/2020   • Type 2 diabetes mellitus ([facility] ) 7/17/2013       Past Surgical History:   Procedure Laterality Date   • BIOPSY CORE NEEDLE      Thyroid   • CHEST WALL BIOPSY N/A 02/07/2020    Procedure: EXCISION  BIOPSY LESION/MASS from back;  Surgeon: Joe Magallon Sandra Palomino MD;  Location: [facility];  Service: General   • COLOSTOMY      with reversal   • UT KNEE SCOPE,MED/LAT MENISECTOMY Left 08/17/2017    Procedure: KNEE ARTHROSCOPIC PARTIAL LATERAL MENISCECTOMY ; JOSEPHINE Fay;  Surgeon: Angelo Lomeli MD;  Location:  MAIN OR;  Service: Orthopedics   • THYROIDECTOMY         Family History   Problem Relation Age of Onset   • Diabetes Mother    • Hypertension Mother    • Hypertension Father    • Hyperlipidemia Father    • Thyroid disease unspecified Sister    • Sleep apnea Neg Hx          Medications have been verified  Objective   Pulse 76   Temp 98 °F (36 7 °C)   Resp 18   SpO2 97%        Physical Exam     Physical Exam  Vitals reviewed  Constitutional:       General: He is not in acute distress  Appearance: Normal appearance  HENT:      Right Ear: Tympanic membrane, ear canal and external ear normal       Left Ear: Tympanic membrane, ear canal and external ear normal       Nose: Nose normal       Right Sinus: No maxillary sinus tenderness or frontal sinus tenderness  Left Sinus: No maxillary sinus tenderness or frontal sinus tenderness  Mouth/Throat:      Mouth: Mucous membranes are moist       Pharynx: No posterior oropharyngeal erythema  Eyes:      Conjunctiva/sclera: Conjunctivae normal    Cardiovascular:      Rate and Rhythm: Normal rate and regular rhythm  Pulses: Normal pulses  Heart sounds: Normal heart sounds  No murmur heard  Pulmonary:      Effort: Pulmonary effort is normal  No respiratory distress  Breath sounds: Normal breath sounds  Skin:     General: Skin is warm and dry  Neurological:      General: No focal deficit present  Mental Status: He is alert and oriented to person, place, and time     Psychiatric:         Mood and Affect: Mood normal          Behavior: Behavior normal

## 2022-12-07 NOTE — PATIENT INSTRUCTIONS
Start Flonase nasal for congestion symptoms  Take entire course of antibiotics, do not stop even if feeling better  Drink lots of fluids to hydrate  Take NSAIDS as needed for pain  Follow up with PCP in 3-5 days  Proceed to  ER if symptoms worsen

## 2022-12-08 ENCOUNTER — OFFICE VISIT (OUTPATIENT)
Dept: ENDOCRINOLOGY | Facility: CLINIC | Age: 47
End: 2022-12-08

## 2022-12-08 VITALS
HEART RATE: 81 BPM | HEIGHT: 72 IN | DIASTOLIC BLOOD PRESSURE: 82 MMHG | BODY MASS INDEX: 42.66 KG/M2 | SYSTOLIC BLOOD PRESSURE: 134 MMHG | WEIGHT: 315 LBS | OXYGEN SATURATION: 99 %

## 2022-12-08 DIAGNOSIS — E20.9 HYPOPARATHYROIDISM, UNSPECIFIED HYPOPARATHYROIDISM TYPE (HCC): ICD-10-CM

## 2022-12-08 DIAGNOSIS — Z85.850 HISTORY OF THYROID CANCER: ICD-10-CM

## 2022-12-08 DIAGNOSIS — E83.51 HYPOCALCEMIA: Primary | ICD-10-CM

## 2022-12-08 DIAGNOSIS — E03.9 HYPOTHYROIDISM, UNSPECIFIED TYPE: ICD-10-CM

## 2022-12-08 DIAGNOSIS — C73 THYROID CANCER (HCC): ICD-10-CM

## 2022-12-08 DIAGNOSIS — E55.9 VITAMIN D DEFICIENCY: ICD-10-CM

## 2022-12-08 DIAGNOSIS — R82.994 HYPERCALCIURIA: ICD-10-CM

## 2022-12-08 RX ORDER — LEVOTHYROXINE SODIUM 0.2 MG/1
TABLET ORAL
Qty: 126 TABLET | Refills: 3 | Status: SHIPPED | OUTPATIENT
Start: 2022-12-08

## 2022-12-08 NOTE — PROGRESS NOTES
Established Patient Progress Note      CC: Follow up for thyroid cancer, hypoparathyroidism and hypercalciuria    History of Present Illness:   Melina Martínez  is a 49 y o  male with history of thyroid cancer, hypoparathyroidism and hypercalciuria  For the history of thyroid cancer, he is taking 1 tablet of 200 mcg levothyroxine Mon-Thurs and 2 tablets Fri-Sun  He denies compressive symptoms  His recent US with lymph node mapping was negative for metastasis or recurrence of disease  His last TSH was above goal      For the hypoparathyroidism and hypocalcemia, he is taking 600 mg of calcium 5 times/day for total of 3,000 mg daily  He denies any s/s of hypocalcemia including numbness or tingling around the mouth or cheek  Denies kidney stones  He is also taking calcitriol and 75 mg of HCTZ/day        Patient Active Problem List   Diagnosis   • Benign essential hypertension   • Chronic pain disorder   • Hypercholesterolemia   • Hypoparathyroidism ([facility] )   • Hypothyroidism   • Acute bilateral low back pain with left-sided sciatica   • Lumbar radiculopathy   • History of thyroid cancer   • Class 3 severe obesity due to excess calories with serious comorbidity and body mass index (BMI) of 45 0 to 49 9 in adult [facility])   • Myofascial pain syndrome   • Vitamin D deficiency   • Sciatic nerve disease, left   • Lumbar disc herniation   • BMI 40 0-44 9, adult (HCC)   • Hypercalciuria   • GREGG (obstructive sleep apnea)   • Sleep-related hypoxia   • Elevated CO2 level      Past Medical History:   Diagnosis Date   • Asthma    • Diabetes mellitus ([facility] )    • Diabetes type 2, uncontrolled 5/6/2014   • Diverticulitis    • Hyperlipidemia    • Hypertension    • Immunity to measles determined by serologic test 7/2/2019   • Thyroid cancer ([facility] )     radioactive iodine    • Thyroid cancer ([facility] ) 2/12/2020   • Type 2 diabetes mellitus ([facility] ) 7/17/2013      Past Surgical History:   Procedure Laterality Date   • BIOPSY CORE NEEDLE      Thyroid • CHEST WALL BIOPSY N/A 2020    Procedure: EXCISION  BIOPSY LESION/MASS from back;  Surgeon: Jose Carlos Camarillo MD;  Location: [facility];  Service: General   • COLOSTOMY      with reversal   • CO KNEE SCOPE,MED/LAT MENISECTOMY Left 2017    Procedure: KNEE ARTHROSCOPIC PARTIAL LATERAL MENISCECTOMY ; PATELLO FEMOEAL CHONDROPLASTY;  Surgeon: Anshul Montaño MD;  Location: [facility];  Service: Orthopedics   • THYROIDECTOMY        Family History   Problem Relation Age of Onset   • Diabetes Mother    • Hypertension Mother    • Hypertension Father    • Hyperlipidemia Father    • Thyroid disease unspecified Sister    • Sleep apnea Neg Hx      Social History     Tobacco Use   • Smoking status: Former     Packs/day:  00     Years: 10 00     Pack years: 10 00     Types: Cigarettes     Quit date:      Years since quittin 9   • Smokeless tobacco: Never   Substance Use Topics   • Alcohol use: Yes     Comment: 1 drink every 2 weeks     No Known Allergies      Current Outpatient Medications:   •  amLODIPine (NORVASC) 5 mg tablet, TAKE 1 TABLET DAILY, Disp: 90 tablet, Rfl: 3  •  amoxicillin-clavulanate (AUGMENTIN) 875-125 mg per tablet, Take 1 tablet by mouth every 12 (twelve) hours for 7 days, Disp: 14 tablet, Rfl: 0  •  aspirin (ECOTRIN LOW STRENGTH) 81 mg EC tablet, Take 81 mg by mouth daily, Disp: , Rfl:   •  calcitriol (ROCALTROL) 0 25 mcg capsule, TAKE 1 CAPSULE TWICE A DAY, Disp: 180 capsule, Rfl: 3  •  Calcium Carb-Cholecalciferol (CALCIUM 600 + D PO), Take 600 mg by mouth 4 (four) times a day  , Disp: , Rfl:   •  diclofenac-misoprostol (ARTHROTEC 75) 75-0 2 MG per tablet, TAKE 1 TABLET TWICE A DAY, Disp: 180 tablet, Rfl: 3  •  fluticasone (FLONASE) 50 mcg/act nasal spray, USE 1 SPRAY IN EACH NOSTRIL DAILY, Disp: 16 g, Rfl: 5  •  hydrochlorothiazide (HYDRODIURIL) 25 mg tablet, TAKE 1 TABLET TWICE A DAY    (IN ADDITION TO THE LOSARTAN/HCTZ), Disp: 180 tablet, Rfl: 3  •  levothyroxine 200 mcg tablet, TAKE 1 TABLET ON [facility] THROUGH Wednesday AND 2 TABLETS ON Thursday through Sunday, Disp: 126 tablet, Rfl: 3  •  losartan-hydrochlorothiazide (HYZAAR) 100-25 MG per tablet, TAKE 1 TABLET DAILY, Disp: 90 tablet, Rfl: 3  •  simvastatin (ZOCOR) 10 mg tablet, TAKE 1 TABLET AT BEDTIME, Disp: 90 tablet, Rfl: 3    Review of Systems   Constitutional: Positive for unexpected weight change  Negative for chills and fever  Gaining weight   HENT: Negative for ear pain and sore throat  Eyes: Negative for pain and visual disturbance  Respiratory: Negative for cough and shortness of breath  Cardiovascular: Negative for chest pain and palpitations  Gastrointestinal: Negative for abdominal pain and vomiting  Genitourinary: Negative for dysuria and hematuria  Musculoskeletal: Negative for arthralgias and back pain  Skin: Negative for color change and rash  Neurological: Negative for seizures, syncope and numbness  All other systems reviewed and are negative  Physical Exam:  Body mass index is 51 53 kg/m²  /82 (BP Location: Right arm, Patient Position: Sitting, Cuff Size: Extra-Large)   Pulse 81   Ht 6' 0 01" (1 829 m)   Wt (!) 172 kg (380 lb)   SpO2 99%   BMI 51 53 kg/m²    Wt Readings from Last 3 Encounters:   12/08/22 (!) 172 kg (380 lb)   11/11/22 (!) 168 kg (370 lb 3 2 oz)   10/07/22 (!) 169 kg (372 lb)       Physical Exam  Vitals reviewed  Constitutional:       Appearance: Normal appearance  HENT:      Head: Normocephalic and atraumatic  Neck:      Thyroid: No thyroid mass  Cardiovascular:      Rate and Rhythm: Normal rate and regular rhythm  Heart sounds: Normal heart sounds  Pulmonary:      Effort: Pulmonary effort is normal       Breath sounds: Normal breath sounds  Musculoskeletal:      Cervical back: Neck supple  No tenderness  Lymphadenopathy:      Cervical: No cervical adenopathy     Neurological:      Mental Status: He is alert and oriented to person, place, and time    Psychiatric:         Mood and Affect: Mood normal          Behavior: Behavior normal          Labs:   Lab Results   Component Value Date    HGBA1C 5 7 (H) 10/01/2021    HGBA1C 5 0 12/01/2020    HGBA1C 4 9 08/12/2020     Lab Results   Component Value Date    CREATININE 0 91 11/07/2022    CREATININE 0 95 01/03/2022    CREATININE 0 83 10/01/2021    BUN 26 (H) 11/07/2022     10/03/2015    K 3 6 11/07/2022     11/07/2022    CO2 29 11/07/2022     eGFR   Date Value Ref Range Status   11/07/2022 99 ml/min/1 73sq m Final     Lab Results   Component Value Date    CHOL 129 10/03/2015    HDL 39 (L) 10/01/2021    TRIG 133 10/01/2021     Lab Results   Component Value Date    ALT 51 11/07/2022    AST 29 11/07/2022    ALKPHOS 60 11/07/2022    BILITOT 0 44 10/03/2015     Lab Results   Component Value Date    KQL0KFOXNDYL 3 030 11/07/2022    LQA1VMDXPJFS 1 470 01/03/2022    CJT6PHLCFDGN 3 560 10/01/2021     Lab Results   Component Value Date    FREET4 1 52 (H) 11/07/2022       Impression & Plan:    Problem List Items Addressed This Visit        Endocrine    Hypoparathyroidism ([facility] )     Serum calcium is above goal while patient has increased calcium in urine  Advised patient to decrease calcium intake to 1200 mg b i d  Repeat serum calcium and urine calcium in 2-4 weeks  Relevant Orders    Calcium, urine, 24 hour Lab Collect    Creatinine, urine, 24 hour Lab Collect    Calcium Lab Collect    Albumin Lab Collect    Hypothyroidism     TSH not at goal  Dosage increased  See notes  Also advised patient to check T4 prior to taking medication as this may be the cause of his previously elevated T4 levels            Relevant Medications    levothyroxine 200 mcg tablet    Other Relevant Orders    TSH, 3rd generation Lab Collect    T4, free Lab Collect    RESOLVED: Thyroid cancer (HCC)    Relevant Medications    levothyroxine 200 mcg tablet    Other Relevant Orders    TSH, 3rd generation Lab Collect    T4, free Lab Collect       Genitourinary    Hypercalciuria     Repeat urine calcium and serum calcium levels in 2-4 weeks  Relevant Orders    Calcium, urine, 24 hour Lab Collect    Creatinine, urine, 24 hour Lab Collect    Calcium, ionized       Other    History of thyroid cancer     Increase levothyroxine to 2 tablets Thurs-Sun and continue with 1 tablet Mon-Wed  Goal TSH < 0 5-1  Will recheck TSH and T4 in 6 weeks  Thyroglobulin panel negative  Continue to monitor         Vitamin D deficiency     Continue calcitriol  Recent vitamin D level at goal        Other Visit Diagnoses     Hypocalcemia    -  Primary    Relevant Orders    Calcium, urine, 24 hour Lab Collect    Creatinine, urine, 24 hour Lab Collect    Calcium Lab Collect    Albumin Lab Collect    Calcium, ionized          Orders Placed This Encounter   Procedures   • Calcium, urine, 24 hour Lab Collect     Standing Status:   Future     Standing Expiration Date:   12/8/2023   • Creatinine, urine, 24 hour Lab Collect     Standing Status:   Future     Standing Expiration Date:   12/8/2023   • TSH, 3rd generation Lab Collect     This is a patient instruction: This test is non-fasting  Please drink two glasses of water morning of bloodwork  Standing Status:   Future     Standing Expiration Date:   12/8/2023   • T4, free Lab Collect     Standing Status:   Future     Standing Expiration Date:   12/8/2023   • Calcium Lab Collect     Standing Status:   Future     Standing Expiration Date:   12/8/2023   • Albumin Lab Collect     Standing Status:   Future     Standing Expiration Date:   12/8/2023   • Calcium, ionized     Standing Status:   Future     Standing Expiration Date:   12/8/2023       There are no Patient Instructions on file for this visit  Discussed with the patient and all questioned fully answered  He will call me if any problems arise

## 2022-12-08 NOTE — ASSESSMENT & PLAN NOTE
Impression: S/P CE/Standard IOL SA60AT 13.5 OD - 1 Day. Presence of intraocular lens  Z96.1. Plan: looks good- ok to proceed with left eye.  --Continue Cipro 0.3% 2x a day for 10 days TSH not at goal  Dosage increased  See notes  Also advised patient to check T4 prior to taking medication as this may be the cause of his previously elevated T4 levels

## 2022-12-08 NOTE — ASSESSMENT & PLAN NOTE
Increase levothyroxine to 2 tablets Thurs-Sun and continue with 1 tablet Mon-Wed  Goal TSH < 0 5-1  Will recheck TSH and T4 in 6 weeks  Thyroglobulin panel negative   Continue to monitor

## 2022-12-08 NOTE — ASSESSMENT & PLAN NOTE
Increase levothyroxine to 2 tablets Thurs-Sun and continue with 1 tablet Mon-Wed  Goal TSH < 0 5-1  Will recheck TSH and T4 in 6 weeks

## 2022-12-08 NOTE — ASSESSMENT & PLAN NOTE
Serum calcium is above goal while patient has increased calcium in urine  Advised patient to decrease calcium intake to 1200 mg b i d  Repeat serum calcium and urine calcium in 2-4 weeks

## 2023-01-04 ENCOUNTER — OFFICE VISIT (OUTPATIENT)
Dept: URGENT CARE | Facility: CLINIC | Age: 48
End: 2023-01-04

## 2023-01-04 VITALS — HEIGHT: 74 IN | WEIGHT: 315 LBS | BODY MASS INDEX: 40.43 KG/M2

## 2023-01-04 DIAGNOSIS — H65.192 OTHER NON-RECURRENT ACUTE NONSUPPURATIVE OTITIS MEDIA OF LEFT EAR: ICD-10-CM

## 2023-01-04 DIAGNOSIS — J02.9 SORE THROAT: Primary | ICD-10-CM

## 2023-01-04 LAB — S PYO AG THROAT QL: NEGATIVE

## 2023-01-04 RX ORDER — AMOXICILLIN 875 MG/1
875 TABLET, COATED ORAL 2 TIMES DAILY
Qty: 10 TABLET | Refills: 0 | Status: SHIPPED | OUTPATIENT
Start: 2023-01-04 | End: 2023-01-09

## 2023-01-04 NOTE — PROGRESS NOTES
[facility] Now        NAME: Carina Barreto  is a 49 y o  male  : 1975    MRN: [id number]  DATE: 2023  TIME: 6:06 PM    Assessment and Plan   Sore throat [J02 9]  1  Sore throat  POCT rapid strepA      2  Other non-recurrent acute nonsuppurative otitis media of left ear  amoxicillin (AMOXIL) 875 mg tablet        Rapid strep negative  Patient Instructions     Take amoxicillin as prescribed  Tylenol/Ibuprofen for discomfort   Flonase nasal spray daily  Over the counter decongestants as needed    Follow up with PCP in 3-5 days  Consider follow up when course finished to ensure infection has resolved  Proceed to the ER if symptoms worsen  Chief Complaint     Chief Complaint   Patient presents with   • Sore Throat   • Earache     Started 2 days ago          History of Present Illness       The patient presents today with complaints of L ear pain, and L sided sore throat x 2 days  He denies any other symptoms currently  Review of Systems   Review of Systems   Constitutional: Negative for chills, fatigue and fever  HENT: Positive for ear pain (L ear) and sore throat (L side)  Negative for congestion, postnasal drip, rhinorrhea, sinus pressure and sinus pain  Eyes: Negative  Respiratory: Negative for cough and shortness of breath  Cardiovascular: Negative for chest pain and palpitations  Gastrointestinal: Negative for abdominal pain, diarrhea, nausea and vomiting  Genitourinary: Negative for difficulty urinating  Musculoskeletal: Negative for myalgias  Skin: Negative for rash  Allergic/Immunologic: Negative for environmental allergies  Neurological: Negative for dizziness and headaches  Psychiatric/Behavioral: Negative            Current Medications       Current Outpatient Medications:   •  amoxicillin (AMOXIL) 875 mg tablet, Take 1 tablet (875 mg total) by mouth 2 (two) times a day for 5 days, Disp: 10 tablet, Rfl: 0  •  amLODIPine (NORVASC) 5 mg tablet, TAKE 1 TABLET DAILY, Disp: 90 tablet, Rfl: 3  •  aspirin (ECOTRIN LOW STRENGTH) 81 mg EC tablet, Take 81 mg by mouth daily, Disp: , Rfl:   •  calcitriol (ROCALTROL) 0 25 mcg capsule, TAKE 1 CAPSULE TWICE A DAY, Disp: 180 capsule, Rfl: 3  •  Calcium Carb-Cholecalciferol (CALCIUM 600 + D PO), Take 600 mg by mouth 4 (four) times a day  , Disp: , Rfl:   •  diclofenac-misoprostol (ARTHROTEC 75) 75-0 2 MG per tablet, TAKE 1 TABLET TWICE A DAY, Disp: 180 tablet, Rfl: 3  •  fluticasone (FLONASE) 50 mcg/act nasal spray, USE 1 SPRAY IN EACH NOSTRIL DAILY, Disp: 16 g, Rfl: 5  •  hydrochlorothiazide (HYDRODIURIL) 25 mg tablet, TAKE 1 TABLET TWICE A DAY    (IN ADDITION TO THE LOSARTAN/HCTZ), Disp: 180 tablet, Rfl: 3  •  levothyroxine 200 mcg tablet, TAKE 1 TABLET ON [facility] THROUGH Wednesday AND 2 TABLETS ON Thursday through Sunday, Disp: 126 tablet, Rfl: 3  •  losartan-hydrochlorothiazide (HYZAAR) 100-25 MG per tablet, TAKE 1 TABLET DAILY, Disp: 90 tablet, Rfl: 3  •  simvastatin (ZOCOR) 10 mg tablet, TAKE 1 TABLET AT BEDTIME, Disp: 90 tablet, Rfl: 3    Current Allergies     Allergies as of 01/04/2023   • (No Known Allergies)            The following portions of the patient's history were reviewed and updated as appropriate: allergies, current medications, past family history, past medical history, past social history, past surgical history and problem list      Past Medical History:   Diagnosis Date   • Asthma    • [facility] )    • Diabetes type 2, uncontrolled 5/6/2014   • Diverticulitis    • Hyperlipidemia    • Hypertension    • Immunity to measles determined by serologic test 7/2/2019   • Thyroid cancer ([facility] )     radioactive iodine    • Thyroid cancer ([facility] ) 2/12/2020   • Type 2 diabetes mellitus ([facility] ) 7/17/2013       Past Surgical History:   Procedure Laterality Date   • BIOPSY CORE NEEDLE      Thyroid   • CHEST WALL BIOPSY N/A 02/07/2020    Procedure: EXCISION  BIOPSY LESION/MASS from back;  Surgeon: Trish Freeman MD;  Location: [facility]IN OR;  Service: General   • COLOSTOMY      with reversal   • SC ARTHRS KNE SURG W/MENISCECTOMY MED/LAT W/SHVG Left 08/17/2017    Procedure: KNEE ARTHROSCOPIC PARTIAL LATERAL MENISCECTOMY ; PATELLO Anton Chetan;  Surgeon: Bryanna Schmidt MD;  Location:  MAIN OR;  Service: Orthopedics   • THYROIDECTOMY         Family History   Problem Relation Age of Onset   • Diabetes Mother    • Hypertension Mother    • Hypertension Father    • Hyperlipidemia Father    • Thyroid disease unspecified Sister    • Sleep apnea Neg Hx          Medications have been verified  Objective   Pulse (P) 93   Temp (!) (P) 97 1 °F (36 2 °C) (Temporal)   Resp (P) 18   Ht 6' 2" (1 88 m)   Wt (!) 168 kg (370 lb)   SpO2 (P) 96%   BMI 47 51 kg/m²        Physical Exam     Physical Exam  Vitals and nursing note reviewed  Constitutional:       General: He is not in acute distress  Appearance: Normal appearance  He is not ill-appearing  HENT:      Head: Normocephalic and atraumatic  Right Ear: External ear normal  There is no impacted cerumen  No foreign body  Tympanic membrane is not injected, erythematous or bulging  Left Ear: External ear normal  There is no impacted cerumen  No foreign body  Tympanic membrane is erythematous  Tympanic membrane is not bulging  Nose: Nose normal  No congestion or rhinorrhea  Mouth/Throat:      Lips: Pink  Mouth: Mucous membranes are moist       Pharynx: Oropharynx is clear  Posterior oropharyngeal erythema present  No oropharyngeal exudate  Tonsils: No tonsillar exudate  Eyes:      General: Vision grossly intact  Extraocular Movements: Extraocular movements intact  Pupils: Pupils are equal, round, and reactive to light  Cardiovascular:      Rate and Rhythm: Normal rate and regular rhythm  Heart sounds: Normal heart sounds  No murmur heard    Pulmonary:      Effort: Pulmonary effort is normal       Breath sounds: Normal breath sounds  Abdominal:      General: Abdomen is flat  Bowel sounds are normal       Palpations: Abdomen is soft  Musculoskeletal:         General: Normal range of motion  Cervical back: Normal range of motion  Skin:     General: Skin is warm  Findings: No rash  Neurological:      Mental Status: He is alert and oriented to person, place, and time  Motor: Motor function is intact     Psychiatric:         Attention and Perception: Attention normal          Mood and Affect: Mood normal

## 2023-01-04 NOTE — PATIENT INSTRUCTIONS
Take amoxicillin as prescribed  Tylenol/Ibuprofen for discomfort   Flonase nasal spray daily  Over the counter decongestants as needed    Follow up with PCP in 3-5 days  Consider follow up when course finished to ensure infection has resolved  Proceed to the ER if symptoms worsen  Otitis Media, Ambulatory Care   GENERAL INFORMATION:   Otitis media  is an ear infection  Common symptoms include the following:   Fever or a headache    Ear pain    Trouble hearing    Ear feels plugged or full or you have ringing or buzzing in your ear    Dizziness or you lose your balance    Nausea or vomiting  Seek immediate care for the following symptoms:   Seizure    Fever and a stiff neck  Treatment for otitis media  may include any of the following:  NSAIDs  help decrease swelling and pain or fever  This medicine is available with or without a doctor's order  NSAIDs can cause stomach bleeding or kidney problems in certain people  If you take blood thinner medicine, always ask your healthcare provider if NSAIDs are safe for you  Always read the medicine label and follow directions  Ear drops  to help treat your ear pain  Antibiotics  to help kill the germs that caused your ear infection  Care for otitis media:   Use heat  Place a warm, moist washcloth on your ear to decrease pain  Apply for 15 to 20 minutes, 3 to 4 times a day    Use ice  Ice helps decrease swelling and pain  Use an ice pack or put crushed ice in a plastic bag  Cover the ice pack with a towel and place it on your ear for 15 to 20 minutes, 3 to 4 times a day for 2 days  Prevent otitis media:   Wash your hands often  This will help prevent the spread of germs  Encourage everyone in your house to wash their hands with soap and water after they use the bathroom  Everyone should also wash their hands after they change a child's diaper and before they prepare or eat food  Stay away from people who are ill    Germs are easily and quickly spread through contact  Follow up with your healthcare provider as directed:  Write down your questions so you remember to ask them during your visits  CARE AGREEMENT:   You have the right to help plan your care  Learn about your health condition and how it may be treated  Discuss treatment options with your caregivers to decide what care you want to receive  You always have the right to refuse treatment  The above information is an  only  It is not intended as medical advice for individual conditions or treatments  Talk to your doctor, nurse or pharmacist before following any medical regimen to see if it is safe and effective for you  © 2014 [address] is for End User's use only and may not be sold, redistributed or otherwise used for commercial purposes  All illustrations and images included in CareNotes® are the copyrighted property of A D A M , Inc  or Kolby Beckford

## 2023-02-02 ENCOUNTER — OFFICE VISIT (OUTPATIENT)
Dept: URGENT CARE | Facility: CLINIC | Age: 48
End: 2023-02-02

## 2023-02-02 VITALS
HEART RATE: 80 BPM | WEIGHT: 315 LBS | OXYGEN SATURATION: 97 % | DIASTOLIC BLOOD PRESSURE: 84 MMHG | SYSTOLIC BLOOD PRESSURE: 162 MMHG | TEMPERATURE: 97.4 F | HEIGHT: 73 IN | BODY MASS INDEX: 41.75 KG/M2 | RESPIRATION RATE: 18 BRPM

## 2023-02-02 DIAGNOSIS — M54.50 CHRONIC LEFT-SIDED LOW BACK PAIN WITHOUT SCIATICA: Primary | ICD-10-CM

## 2023-02-02 DIAGNOSIS — G89.29 CHRONIC LEFT-SIDED LOW BACK PAIN WITHOUT SCIATICA: Primary | ICD-10-CM

## 2023-02-02 RX ORDER — KETOROLAC TROMETHAMINE 30 MG/ML
30 INJECTION, SOLUTION INTRAMUSCULAR; INTRAVENOUS ONCE
Status: COMPLETED | OUTPATIENT
Start: 2023-02-02 | End: 2023-02-02

## 2023-02-02 RX ADMIN — KETOROLAC TROMETHAMINE 30 MG: 30 INJECTION, SOLUTION INTRAMUSCULAR; INTRAVENOUS at 17:50

## 2023-02-02 NOTE — PROGRESS NOTES
[facility] Now        NAME: Charisma Acosta  is a 49 y o  male  : 1975    MRN: [id number]  DATE: 2023  TIME: 5:52 PM    Assessment and Plan   Chronic left-sided low back pain without sciatica [M54 50, G89 29]  1  Chronic left-sided low back pain without sciatica  ketorolac (TORADOL) injection 30 mg            Patient Instructions   Patient Instructions   Follow-up with your primary care provider in the next 7-10 days  Any new or worsening symptoms develop get re-evaluated sooner or proceed to the ER  Follow up with PCP in 3-5 days  Proceed to  ER if symptoms worsen  Chief Complaint     Chief Complaint   Patient presents with   • Back Pain     Since last week  Known herniated disks- pain 7/10         History of Present Illness       Patient presents with back pain for about 1 week  Feels like his normal back pain is flaring up  Denies numbness/tingling, fevers, urinary symptoms, incontinence, saddle anesthsias  Says Toradol usually works well to resolve this  Review of Systems   Review of Systems   Constitutional: Negative for fatigue and fever  Genitourinary: Negative for difficulty urinating  Musculoskeletal: Positive for back pain  Negative for joint swelling  Skin: Negative for color change  Neurological: Negative for weakness and numbness           Current Medications       Current Outpatient Medications:   •  amLODIPine (NORVASC) 5 mg tablet, TAKE 1 TABLET DAILY, Disp: 90 tablet, Rfl: 3  •  aspirin (ECOTRIN LOW STRENGTH) 81 mg EC tablet, Take 81 mg by mouth daily, Disp: , Rfl:   •  calcitriol (ROCALTROL) 0 25 mcg capsule, TAKE 1 CAPSULE TWICE A DAY, Disp: 180 capsule, Rfl: 3  •  Calcium Carb-Cholecalciferol (CALCIUM 600 + D PO), Take 600 mg by mouth 4 (four) times a day  , Disp: , Rfl:   •  diclofenac-misoprostol (ARTHROTEC 75) 75-0 2 MG per tablet, TAKE 1 TABLET TWICE A DAY, Disp: 180 tablet, Rfl: 3  •  fluticasone (FLONASE) 50 mcg/act nasal spray, USE 1 SPRAY IN EACH NOSTRIL DAILY, Disp: 16 g, Rfl: 5  •  hydrochlorothiazide (HYDRODIURIL) 25 mg tablet, TAKE 1 TABLET TWICE A DAY  (IN ADDITION TO THE LOSARTAN/HCTZ), Disp: 180 tablet, Rfl: 3  •  levothyroxine 200 mcg tablet, TAKE 1 TABLET ON [facility] THROUGH Wednesday AND 2 TABLETS ON Thursday through Sunday, Disp: 126 tablet, Rfl: 3  •  losartan-hydrochlorothiazide (HYZAAR) 100-25 MG per tablet, TAKE 1 TABLET DAILY, Disp: 90 tablet, Rfl: 3  •  simvastatin (ZOCOR) 10 mg tablet, TAKE 1 TABLET AT BEDTIME, Disp: 90 tablet, Rfl: 3  No current facility-administered medications for this visit      Current Allergies     Allergies as of 02/02/2023   • (No Known Allergies)            The following portions of the patient's history were reviewed and updated as appropriate: allergies, current medications, past family history, past medical history, past social history, past surgical history and problem list      Past Medical History:   Diagnosis Date   • Asthma    • Diabetes mellitus ([facility] )    • Diabetes type 2, uncontrolled 5/6/2014   • Diverticulitis    • Hyperlipidemia    • Hypertension    • Immunity to measles determined by serologic test 7/2/2019   • Thyroid cancer ([facility] )     radioactive iodine    • Thyroid cancer ([facility] ) 2/12/2020   • Type 2 diabetes mellitus ([facility] ) 7/17/2013       Past Surgical History:   Procedure Laterality Date   • BIOPSY CORE NEEDLE      Thyroid   • CHEST WALL BIOPSY N/A 02/07/2020    Procedure: EXCISION  BIOPSY LESION/MASS from back;  Surgeon: Alvaro Saldaña MD;  Location: [facility];  Service: General   • COLOSTOMY      with reversal   • [place] ARTHRS KNE SURG W/MENISCECTOMY MED/LAT W/SHVG Left 08/17/2017    Procedure: KNEE ARTHROSCOPIC PARTIAL LATERAL MENISCECTOMY ; PATELLO Jessy Crumbly;  Surgeon: Lucia Boyd MD;  Location:  MAIN OR;  Service: Orthopedics   • THYROIDECTOMY         Family History   Problem Relation Age of Onset   • Diabetes Mother    • Hypertension Mother    • Hypertension Father    • Hyperlipidemia Father    • Thyroid disease unspecified Sister    • Sleep apnea Neg Hx          Medications have been verified  Objective   /84   Pulse 80   Temp (!) 97 4 °F (36 3 °C)   Resp 18   Ht 6' 1" (1 854 m)   Wt (!) 171 kg (377 lb)   SpO2 97%   BMI 49 74 kg/m²        Physical Exam     Physical Exam  Constitutional:       Appearance: Normal appearance  Musculoskeletal:         General: No tenderness  Normal range of motion  Comments: Full AROM and 5/5 muscle strength of lower extremities and lower back  SLR negative bilaterally  No TTP, deformity, or swelling noted  Skin:     General: Skin is warm  Findings: No bruising  Neurological:      Mental Status: He is alert     Psychiatric:         Mood and Affect: Mood normal          Behavior: Behavior normal

## 2023-02-15 LAB
LEFT EYE DIABETIC RETINOPATHY: NORMAL
RIGHT EYE DIABETIC RETINOPATHY: NORMAL

## 2023-02-19 ENCOUNTER — OFFICE VISIT (OUTPATIENT)
Dept: URGENT CARE | Facility: CLINIC | Age: 48
End: 2023-02-19

## 2023-02-19 ENCOUNTER — APPOINTMENT (OUTPATIENT)
Dept: RADIOLOGY | Facility: CLINIC | Age: 48
End: 2023-02-19

## 2023-02-19 VITALS
SYSTOLIC BLOOD PRESSURE: 145 MMHG | DIASTOLIC BLOOD PRESSURE: 81 MMHG | RESPIRATION RATE: 18 BRPM | HEART RATE: 76 BPM | OXYGEN SATURATION: 97 % | TEMPERATURE: 97.5 F

## 2023-02-19 DIAGNOSIS — S92.354A CLOSED NONDISPLACED FRACTURE OF FIFTH METATARSAL BONE OF RIGHT FOOT, INITIAL ENCOUNTER: Primary | ICD-10-CM

## 2023-02-19 DIAGNOSIS — M79.671 RIGHT FOOT PAIN: ICD-10-CM

## 2023-02-19 NOTE — PATIENT INSTRUCTIONS
Follow-up with your ortho in the next 3-5 days  Any new or worsening symptoms develop get re-evaluated sooner or proceed to the ER  Wear post op shoe and use crutches when ambulating

## 2023-02-19 NOTE — PROGRESS NOTES
[facility] Now        NAME: April Antonio  is a 49 y o  male  : 1975    MRN: [id number]  DATE: 2023  TIME: 1:25 PM    Assessment and Plan   Closed nondisplaced fracture of fifth metatarsal bone of right foot, initial encounter [S92 354A]  1  Closed nondisplaced fracture of fifth metatarsal bone of right foot, initial encounter  XR foot 3+ vw right    Ambulatory referral to Orthopedic Surgery        Place in post op shoe  Patient states he has crutches at home that he will use  Patient Instructions   Patient Instructions   Follow-up with your ortho in the next 3-5 days  Any new or worsening symptoms develop get re-evaluated sooner or proceed to the ER  Wear post op shoe and use crutches when ambulating  Follow up with PCP in 3-5 days  Proceed to  ER if symptoms worsen  Chief Complaint     Chief Complaint   Patient presents with   • Foot Pain     2 days of foot pain on top of foot, but today he heard a pot and it hurts worse today  History of Present Illness       Patient presents with right outside foot pain for the past 2 days  Felt better this morning then while walking felt a pop/snap and pain got worse  Pain rated 7-8/10  Has nerve damage so always numbness/tingling in the foot but no changes to those symptoms  Denies swelling, bruising, any injury/trauma to the area to start the symptoms  Review of Systems   Review of Systems   Musculoskeletal: Positive for arthralgias and gait problem  Negative for joint swelling  Skin: Negative for rash and wound  Neurological: Positive for numbness  Negative for weakness           Current Medications       Current Outpatient Medications:   •  amLODIPine (NORVASC) 5 mg tablet, TAKE 1 TABLET DAILY, Disp: 90 tablet, Rfl: 3  •  aspirin (ECOTRIN LOW STRENGTH) 81 mg EC tablet, Take 81 mg by mouth daily, Disp: , Rfl:   •  calcitriol (ROCALTROL) 0 25 mcg capsule, TAKE 1 CAPSULE TWICE A DAY, Disp: 180 capsule, Rfl: 3  •  Calcium Carb-Cholecalciferol (CALCIUM 600 + D PO), Take 600 mg by mouth 4 (four) times a day  , Disp: , Rfl:   •  diclofenac-misoprostol (ARTHROTEC 75) 75-0 2 MG per tablet, TAKE 1 TABLET TWICE A DAY, Disp: 180 tablet, Rfl: 3  •  fluticasone (FLONASE) 50 mcg/act nasal spray, USE 1 SPRAY IN EACH NOSTRIL DAILY, Disp: 16 g, Rfl: 5  •  hydrochlorothiazide (HYDRODIURIL) 25 mg tablet, TAKE 1 TABLET TWICE A DAY    (IN ADDITION TO THE LOSARTAN/HCTZ), Disp: 180 tablet, Rfl: 3  •  levothyroxine 200 mcg tablet, TAKE 1 TABLET ON [facility] THROUGH Wednesday AND 2 TABLETS ON Thursday through Sunday, Disp: 126 tablet, Rfl: 3  •  losartan-hydrochlorothiazide (HYZAAR) 100-25 MG per tablet, TAKE 1 TABLET DAILY, Disp: 90 tablet, Rfl: 3  •  simvastatin (ZOCOR) 10 mg tablet, TAKE 1 TABLET AT BEDTIME, Disp: 90 tablet, Rfl: 3    Current Allergies     Allergies as of 02/19/2023   • (No Known Allergies)            The following portions of the patient's history were reviewed and updated as appropriate: allergies, current medications, past family history, past medical history, past social history, past surgical history and problem list      Past Medical History:   Diagnosis Date   • Asthma    • Diabetes mellitus ([facility] )    • Diabetes type 2, uncontrolled 5/6/2014   • Diverticulitis    • Hyperlipidemia    • Hypertension    • Immunity to measles determined by serologic test 7/2/2019   • [facility] )     radioactive iodine    • Thyroid cancer ([facility] ) 2/12/2020   • Type 2 diabetes mellitus ([facility] ) 7/17/2013       Past Surgical History:   Procedure Laterality Date   • BIOPSY CORE NEEDLE      Thyroid   • CHEST WALL BIOPSY N/A 02/07/2020    Procedure: EXCISION  BIOPSY LESION/MASS from back;  Surgeon: Waldo Green MD;  Location: [facility];  Service: General   • COLOSTOMY      with reversal   • AR ARTHRS KNE SURG W/MENISCECTOMY MED/LAT W/SHVG Left 08/17/2017    Procedure: KNEE ARTHROSCOPIC PARTIAL LATERAL MENISCECTOMY ; Rafa Mahajan CHONDROPLASTY;  Surgeon: Anshul Montaño MD;  Location: [facility];  Service: Orthopedics   • THYROIDECTOMY         Family History   Problem Relation Age of Onset   • Diabetes Mother    • Hypertension Mother    • Hypertension Father    • Hyperlipidemia Father    • Thyroid disease unspecified Sister    • Sleep apnea Neg Hx          Medications have been verified  Objective   /81   Pulse 76   Temp 97 5 °F (36 4 °C)   Resp 18   SpO2 97%        Physical Exam     Physical Exam  Constitutional:       Appearance: Normal appearance  Cardiovascular:      Pulses: Normal pulses  Musculoskeletal:         General: Tenderness present  No swelling or deformity  Normal range of motion  Comments: TTP along the 5th metatarsal   No ecchymosis, swelling, deformity, or erythema noted  Skin:     General: Skin is warm  Capillary Refill: Capillary refill takes less than 2 seconds  Findings: No bruising or rash  Neurological:      Mental Status: He is alert     Psychiatric:         Mood and Affect: Mood normal          Behavior: Behavior normal

## 2023-02-20 ENCOUNTER — TELEPHONE (OUTPATIENT)
Dept: OBGYN CLINIC | Facility: HOSPITAL | Age: 48
End: 2023-02-20

## 2023-02-20 ENCOUNTER — APPOINTMENT (OUTPATIENT)
Dept: RADIOLOGY | Facility: CLINIC | Age: 48
End: 2023-02-20

## 2023-02-20 ENCOUNTER — PREP FOR PROCEDURE (OUTPATIENT)
Dept: OBGYN CLINIC | Facility: CLINIC | Age: 48
End: 2023-02-20

## 2023-02-20 ENCOUNTER — OFFICE VISIT (OUTPATIENT)
Dept: PODIATRY | Facility: CLINIC | Age: 48
End: 2023-02-20

## 2023-02-20 VITALS
HEIGHT: 73 IN | DIASTOLIC BLOOD PRESSURE: 87 MMHG | SYSTOLIC BLOOD PRESSURE: 140 MMHG | HEART RATE: 76 BPM | WEIGHT: 315 LBS | BODY MASS INDEX: 41.75 KG/M2

## 2023-02-20 DIAGNOSIS — M79.671 RIGHT FOOT PAIN: ICD-10-CM

## 2023-02-20 DIAGNOSIS — S92.354A CLOSED NONDISPLACED FRACTURE OF FIFTH METATARSAL BONE OF RIGHT FOOT, INITIAL ENCOUNTER: ICD-10-CM

## 2023-02-20 DIAGNOSIS — S92.354A CLOSED NONDISPLACED FRACTURE OF FIFTH METATARSAL BONE OF RIGHT FOOT, INITIAL ENCOUNTER: Primary | ICD-10-CM

## 2023-02-20 DIAGNOSIS — Z01.818 PRE-OP TESTING: ICD-10-CM

## 2023-02-20 RX ORDER — CHLORHEXIDINE GLUCONATE 0.12 MG/ML
15 RINSE ORAL ONCE
Status: CANCELLED | OUTPATIENT
Start: 2023-03-02 | End: 2023-02-20

## 2023-02-20 NOTE — PROGRESS NOTES
Assessment/Plan:    No problem-specific Assessment & Plan notes found for this encounter  Diagnoses and all orders for this visit:    Closed nondisplaced fracture of fifth metatarsal bone of right foot, initial encounter  -     Ambulatory referral to Orthopedic Surgery  -     X-ray foot right 3+ views; Future  -     Case request operating room: OPEN REDUCTION W/ INTERNAL FIXATION (ORIF) FOOT; Standing  -     Case request operating room: OPEN REDUCTION W/ INTERNAL FIXATION (ORIF) FOOT    Right foot pain  -     Case request operating room: OPEN REDUCTION W/ INTERNAL FIXATION (ORIF) FOOT; Standing  -     Case request operating room: OPEN REDUCTION W/ INTERNAL FIXATION (ORIF) FOOT    Pre-op testing  -     Comprehensive metabolic panel; Future  -     CBC and Platelet; Future    Other orders  -     Nursing Communication Warmimg Interventions Implemented; Standing  -     Nursing Communication CHG bath, have staff wash entire body (neck down) per pre-op bathing protocol  Routine, evening prior to, and day of surgery ; Standing  -     Nursing Communication Swab both nares with Povidone-Iodine solution, EXCLUDE if patient has shellfish/Iodine allergy, and replace with nasal alcohol swabstick  Routine, day of surgery, on call to OR; Standing  -     chlorhexidine (PERIDEX) 0 12 % oral rinse 15 mL  -     Void on call to OR; Standing  -     Insert peripheral IV; Standing  -     ceFAZolin (ANCEF) 3,000 mg in dextrose 5 % 100 mL IVPB       Plan:      -  Patient was counseled and educated on the condition and the diagnosis  The diagnosis, treatment options and prognosis were discussed in detail    - I personally reviewed right foot x-ray interpreted the results with patient, consistent with a nondisplaced 5th metatarsal base fracture, hieu  - I recommend surgical treatment of 5th metatarsal base fracture given high rate of nonunion if managed conservatively well as patient's activity level and weight    I discussed risks, benefits of both treatment options conservative and surgical   Patient agrees with surgical treatment at this time  We will plan for 5th metatarsal ORIF  He will be nonweightbearing until the incision heals after that weight bear as tolerated in Cam boot 4-6 weeks  - Continue with RICE protocol, Minimal WB in a cam boot for essential activities only   -Will Rx pain medication and aspirin 325 twice daily for 2 weeks after surgery     I was very clear at the beginning of the discussion about alternatives to this surgery including benign neglect, bracing, and second surgical opinions  I spent time to discuss with the patient the surgical procedure(s) as Right 5th metatarsal ORIF, pre-op testing, and post-op course required to properly heal the surgery  I discussed risks as infection, scar, swelling, chronic pain, painful or prominent hardware, possible need to remove hardware, poor healing of incision or bone that could require more surgery, incomplete correction of deformities or recurrence of deformities, change in shape of foot, toe, or walking and function, numbness, neuritis/RSD, blood clots in the leg or lung, and even death from anesthesia complications  No guarantees were given and the possibility of recurrent deformity or incomplete correction were discussed before patient signed the consent form  We also discussed the need for possible anticoagulation  The offloading device necessary after the surgery will be knee scooter and cam boot  The surgery, history and physical and PATS will be scheduled  Subjective:      Patient ID: Chanell Madison  is a 49 y o  male  54-year-old male with past medical history as below presents for an evaluation of right foot pain with duration 3-4 days  Patient reports yesterday while he was walking he felt a pop/snap immediate pain that got worse and inability to apply full weight on the right foot    Patient was evaluated in urgent care where x-rays were obtained consistent with fifth metatarsal fracture  Patient reports he was given a surgical shoe with crutches with he is applying minimal weight on that area  Reports he still has pain but tolerable elevating as needed  Has nerve damage and lower back pain therefore he is always numbness/tingling in the foot but no changes to those symptoms  Denies any other complaints at this time  Denies nausea vomit fever chills shortness of breath  The following portions of the patient's history were reviewed and updated as appropriate: He  has a past medical history of Asthma, Diabetes mellitus ([place] [address] ), Diabetes type 2, uncontrolled (5/6/2014), Diverticulitis, Hyperlipidemia, Hypertension, Immunity to measles determined by serologic test (7/2/2019), Thyroid cancer ([place] [address] ), Thyroid cancer ([place] [address] ) (2/12/2020), and Type 2 diabetes mellitus ([place] [address] ) (7/17/2013)  He   Patient Active Problem List    Diagnosis Date Noted   • Sleep-related hypoxia 07/08/2022   • Elevated CO2 level 07/08/2022   • GREGG (obstructive sleep apnea)    • Hypercalciuria 02/15/2022   • BMI 40 0-44 9, adult ([place] [address] ) 06/15/2020   • Lumbar disc herniation 01/03/2019   • Sciatic nerve disease, left 05/21/2018   • Acute bilateral low back pain with left-sided sciatica 05/16/2017   • Chronic pain disorder 01/31/2017   • Lumbar radiculopathy 01/31/2017   • Myofascial pain syndrome 01/31/2017   • History of thyroid cancer 10/11/2016   • Class 3 severe obesity due to excess calories with serious comorbidity and body mass index (BMI) of 45 0 to 49 9 in adult ([place] [address] ) 10/11/2016   • Vitamin D deficiency 05/03/2016   • Hypoparathyroidism ([place] [address] ) 12/17/2013   • Hypothyroidism 03/15/2013   • Benign essential hypertension 10/04/2012   • Hypercholesterolemia 08/09/2012     He  has a past surgical history that includes Thyroidectomy; Colostomy; pr arthrs kne surg w/meniscectomy med/lat w/shvg (Left, 08/17/2017); BIOPSY CORE NEEDLE; and Chest wall biopsy (N/A, 02/07/2020)       Review of Systems   Constitutional: Negative for chills  Respiratory: Negative for shortness of breath  Gastrointestinal: Negative for vomiting  Musculoskeletal: Positive for arthralgias  Skin: Positive for color change  Neurological: Negative for dizziness  Psychiatric/Behavioral: Negative for agitation  Objective:      /87 (BP Location: Left arm, Patient Position: Sitting, Cuff Size: Large)   Pulse 76   Ht 6' 1" (1 854 m)   Wt (!) 171 kg (377 lb)   BMI 49 74 kg/m²          Physical Exam  Vitals reviewed  Constitutional:       Appearance: He is obese  Cardiovascular:      Rate and Rhythm: Normal rate  Pulses: Normal pulses  Musculoskeletal:         General: Swelling and tenderness present  Right foot: Decreased range of motion  Swelling and tenderness present  Comments: Pain with palpation noted on the right lateral foot at the level of 5th metatarsal proximally  Generalized edema with mild ecchymosis noted lateral rear foot  Neurovascular and musculoskeletal status appeared to be intact but limited due guarding  No pain with palpation along the peroneal tendons  Decreased range of motion of the foot inversion eversion dorsiflexion and plantar flexion due to guarding from pain but it is intact  Palpable pedal pulses  Right foot sensation intact  Skin:     General: Skin is warm  Findings: Bruising present  Neurological:      General: No focal deficit present  Mental Status: He is alert     Psychiatric:         Mood and Affect: Mood normal

## 2023-02-20 NOTE — TELEPHONE ENCOUNTER
Caller: Patient    Doctor: Podiatry    Reason for call: Patient called asking to Speak to  [place] [address] Pod[facility]  Provided number and warm transfer to [place] [address] Podiatry        Call back#: [phone]

## 2023-02-21 PROBLEM — S92.354A CLOSED NONDISPLACED FRACTURE OF FIFTH RIGHT METATARSAL BONE: Status: ACTIVE | Noted: 2023-02-21

## 2023-02-23 ENCOUNTER — OFFICE VISIT (OUTPATIENT)
Dept: FAMILY MEDICINE CLINIC | Facility: CLINIC | Age: 48
End: 2023-02-23

## 2023-02-23 VITALS
TEMPERATURE: 97.8 F | HEART RATE: 88 BPM | DIASTOLIC BLOOD PRESSURE: 82 MMHG | OXYGEN SATURATION: 93 % | WEIGHT: 315 LBS | HEIGHT: 73 IN | SYSTOLIC BLOOD PRESSURE: 124 MMHG | BODY MASS INDEX: 41.75 KG/M2

## 2023-02-23 DIAGNOSIS — S92.354A CLOSED NONDISPLACED FRACTURE OF FIFTH METATARSAL BONE OF RIGHT FOOT, INITIAL ENCOUNTER: Primary | ICD-10-CM

## 2023-02-23 DIAGNOSIS — Z01.818 PREOP EXAM FOR INTERNAL MEDICINE: ICD-10-CM

## 2023-02-23 NOTE — PROGRESS NOTES
Ian Lopez  1975 male MRN: [id number]        ASSESSMENT/PLAN  Problem List Items Addressed This Visit        Musculoskeletal and Integument    Closed nondisplaced fracture of fifth right metatarsal bone - Primary     Patient is cleared for his upcoming surgery scheduled for 3/2/2023 Patient has his labs ordered             Other    Preop exam for internal medicine       High Risk Surgery: no  CAD: no  CHF: no  CVD: no  DM2 on insulin: no  Cr>2: no        Sachin [facility] [employer]  is undergoing a Low Risk surgery  He is at [address] for major adverse cardiac event (MACE)  He may proceed with surgery as planned without further workup  SUBJECTIVE  CC: Pre-op Exam      HPI:  Ian Lopez  is a 49 y o  male who is planning to undergo fifth digit repair  under general by Dr Bruce  on 3/2/2023  Patient has not had complications with anesthesia in the past   Functional status: full    Review of Systems   Constitutional: Negative for activity change, appetite change, chills, diaphoresis, fatigue, fever and unexpected weight change  HENT: Negative for congestion, ear pain, hearing loss, postnasal drip, sinus pressure, sinus pain, sneezing and sore throat  Eyes: Negative for pain, redness and visual disturbance  Respiratory: Negative for cough and shortness of breath  Cardiovascular: Negative for chest pain and leg swelling  Gastrointestinal: Negative for abdominal pain, diarrhea, nausea and vomiting  Endocrine: Negative  Genitourinary: Negative  Musculoskeletal: Positive for arthralgias  Allergic/Immunologic: Negative  Neurological: Negative for dizziness and light-headedness  Hematological: Negative  Psychiatric/Behavioral: Negative for behavioral problems and dysphoric mood           Historical Information   The patient history was reviewed as follows:    Past Medical History:   Diagnosis Date   • Asthma    • Diabetes mellitus ([facility] )    • Diabetes type 2, uncontrolled 5/6/2014   • Diverticulitis    • Hyperlipidemia    • Hypertension    • Immunity to measles determined by serologic test 7/2/2019   • Thyroid cancer ([facility] )     radioactive iodine    • Thyroid cancer ([facility] ) 2/12/2020   • Type 2 diabetes mellitus ([facility] ) 7/17/2013     Past Surgical History:   Procedure Laterality Date   • BIOPSY CORE NEEDLE      Thyroid   • CHEST WALL BIOPSY N/A 02/07/2020    Procedure: EXCISION  BIOPSY LESION/MASS from back;  Surgeon: Laurinda Lundborg, MD;  Location: [facility];  Service: General   • COLOSTOMY      with reversal   • NH ARTHRS KNE SURG W/MENISCECTOMY MED/LAT W/SHVG Left 08/17/2017    Procedure: KNEE ARTHROSCOPIC PARTIAL LATERAL MENISCECTOMY ; PATELLO FEMOEAL CHONDROPLASTY;  Surgeon: Geovany Patricio MD;  Location:  MAIN OR;  Service: Orthopedics   • THYROIDECTOMY       Family History   Problem Relation Age of Onset   • Diabetes Mother    • Hypertension Mother    • Hypertension Father    • Hyperlipidemia Father    • Thyroid disease unspecified Sister    • Sleep apnea Neg Hx       Social History       Medications:     Current Outpatient Medications:   •  amLODIPine (NORVASC) 5 mg tablet, TAKE 1 TABLET DAILY, Disp: 90 tablet, Rfl: 3  •  aspirin (ECOTRIN LOW STRENGTH) 81 mg EC tablet, Take 81 mg by mouth daily, Disp: , Rfl:   •  calcitriol (ROCALTROL) 0 25 mcg capsule, TAKE 1 CAPSULE TWICE A DAY, Disp: 180 capsule, Rfl: 3  •  Calcium Carb-Cholecalciferol (CALCIUM 600 + D PO), Take 600 mg by mouth 4 (four) times a day  , Disp: , Rfl:   •  diclofenac-misoprostol (ARTHROTEC 75) 75-0 2 MG per tablet, TAKE 1 TABLET TWICE A DAY, Disp: 180 tablet, Rfl: 3  •  hydrochlorothiazide (HYDRODIURIL) 25 mg tablet, TAKE 1 TABLET TWICE A DAY    (IN ADDITION TO THE LOSARTAN/HCTZ), Disp: 180 tablet, Rfl: 3  •  levothyroxine 200 mcg tablet, TAKE 1 TABLET ON MONDAY THROUGH Wednesday AND 2 TABLETS ON Thursday through Sunday, Disp: 126 tablet, Rfl: 3  •  losartan-hydrochlorothiazide (HYZAAR) 100-25 MG per tablet, TAKE 1 TABLET DAILY, Disp: 90 tablet, Rfl: 3  •  simvastatin (ZOCOR) 10 mg tablet, TAKE 1 TABLET AT BEDTIME, Disp: 90 tablet, Rfl: 3  No Known Allergies    OBJECTIVE    Vitals:   Vitals:    02/23/23 0759   BP: 124/82   BP Location: Right arm   Patient Position: Sitting   Pulse: 88   Temp: 97 8 °F (36 6 °C)   TempSrc: Temporal   SpO2: 93%   Weight: (!) 171 kg (377 lb)   Height: 6' 1" (1 854 m)           Physical Exam  Vitals and nursing note reviewed  Constitutional:       General: He is not in acute distress  Appearance: He is well-developed  HENT:      Head: Normocephalic and atraumatic  Eyes:      Pupils: Pupils are equal, round, and reactive to light  Neck:      Thyroid: No thyromegaly  Cardiovascular:      Rate and Rhythm: Normal rate and regular rhythm  Heart sounds: Normal heart sounds  No murmur heard  Pulmonary:      Effort: Pulmonary effort is normal  No respiratory distress  Breath sounds: Normal breath sounds  No wheezing  Abdominal:      General: Bowel sounds are normal       Palpations: Abdomen is soft  Musculoskeletal:         General: Normal range of motion  Cervical back: Normal range of motion  Legs:    Skin:     General: Skin is warm and dry  Neurological:      Mental Status: He is alert and oriented to person, place, and time                  ISAÍAS Gomez  [facility]   2/23/2023  8:22 AM

## 2023-02-24 NOTE — PRE-PROCEDURE INSTRUCTIONS
Pre-Surgery Instructions:   Medication Instructions   • amLODIPine (NORVASC) 5 mg tablet Take night before surgery   • aspirin (ECOTRIN LOW STRENGTH) 81 mg EC tablet Stop taking 7 days prior to surgery  • calcitriol (ROCALTROL) 0 25 mcg capsule Take day of surgery  • Calcium Carb-Cholecalciferol (CALCIUM 600 + D PO) Take day of surgery  • diclofenac-misoprostol (ARTHROTEC 75) 75-0 2 MG per tablet Stop taking 3 days prior to surgery  • hydrochlorothiazide (HYDRODIURIL) 25 mg tablet Hold day of surgery  • levothyroxine 200 mcg tablet Take day of surgery  • losartan-hydrochlorothiazide (HYZAAR) 100-25 MG per tablet Hold day of surgery  • simvastatin (ZOCOR) 10 mg tablet Take night before surgery    Pre op instructions per My Surgical Experience booklet,medications per anesthesia guidelines and showering instructions per [facility] protocol reviewed-Patient has CHG  Pt  Verbalized understanding of current visitor restrictions  Instructed to call surgeon with any illness,change in health or covid exposure now till DOS  All medications instructed to take DOS are with sips water only  Patient using crutches and boot  Instructed to avoid all ASA and OTC Vit/Supp 1 week prior to surgery and to avoid NSAIDs 3 days prior to surgery per anesthesia instructions  Tylenol ok to take prn  Pt  Verbalized an understanding of all instructions reviewed and offers no concerns at this time

## 2023-02-27 ENCOUNTER — APPOINTMENT (OUTPATIENT)
Dept: LAB | Facility: CLINIC | Age: 48
End: 2023-02-27

## 2023-02-27 DIAGNOSIS — Z01.818 PRE-OP TESTING: ICD-10-CM

## 2023-02-27 DIAGNOSIS — E20.9 HYPOPARATHYROIDISM, UNSPECIFIED HYPOPARATHYROIDISM TYPE (HCC): ICD-10-CM

## 2023-02-27 DIAGNOSIS — C73 THYROID CANCER (HCC): ICD-10-CM

## 2023-02-27 DIAGNOSIS — E03.9 HYPOTHYROIDISM, UNSPECIFIED TYPE: ICD-10-CM

## 2023-02-27 DIAGNOSIS — R82.994 HYPERCALCIURIA: ICD-10-CM

## 2023-02-27 DIAGNOSIS — E83.51 HYPOCALCEMIA: ICD-10-CM

## 2023-02-27 LAB
ALBUMIN SERPL BCP-MCNC: 3.9 G/DL (ref 3.5–5)
ALP SERPL-CCNC: 63 U/L (ref 46–116)
ALT SERPL W P-5'-P-CCNC: 57 U/L (ref 12–78)
ANION GAP SERPL CALCULATED.3IONS-SCNC: 7 MMOL/L (ref 4–13)
AST SERPL W P-5'-P-CCNC: 23 U/L (ref 5–45)
BILIRUB SERPL-MCNC: 0.38 MG/DL (ref 0.2–1)
BUN SERPL-MCNC: 28 MG/DL (ref 5–25)
CALCIUM SERPL-MCNC: 9.4 MG/DL (ref 8.3–10.1)
CHLORIDE SERPL-SCNC: 101 MMOL/L (ref 96–108)
CO2 SERPL-SCNC: 29 MMOL/L (ref 21–32)
CREAT SERPL-MCNC: 0.86 MG/DL (ref 0.6–1.3)
ERYTHROCYTE [DISTWIDTH] IN BLOOD BY AUTOMATED COUNT: 13.4 % (ref 11.6–15.1)
GFR SERPL CREATININE-BSD FRML MDRD: 102 ML/MIN/1.73SQ M
GLUCOSE P FAST SERPL-MCNC: 165 MG/DL (ref 65–99)
HCT VFR BLD AUTO: 50.2 % (ref 36.5–49.3)
HGB BLD-MCNC: 17.1 G/DL (ref 12–17)
MCH RBC QN AUTO: 28.5 PG (ref 26.8–34.3)
MCHC RBC AUTO-ENTMCNC: 34.1 G/DL (ref 31.4–37.4)
MCV RBC AUTO: 84 FL (ref 82–98)
PLATELET # BLD AUTO: 226 THOUSANDS/UL (ref 149–390)
PMV BLD AUTO: 9.9 FL (ref 8.9–12.7)
POTASSIUM SERPL-SCNC: 3.3 MMOL/L (ref 3.5–5.3)
PROT SERPL-MCNC: 7.8 G/DL (ref 6.4–8.4)
RBC # BLD AUTO: 5.99 MILLION/UL (ref 3.88–5.62)
SODIUM SERPL-SCNC: 137 MMOL/L (ref 135–147)
WBC # BLD AUTO: 8.2 THOUSAND/UL (ref 4.31–10.16)

## 2023-03-02 ENCOUNTER — ANESTHESIA (OUTPATIENT)
Dept: PERIOP | Facility: HOSPITAL | Age: 48
End: 2023-03-02
Payer: COMMERCIAL

## 2023-03-02 ENCOUNTER — HOSPITAL ENCOUNTER (OUTPATIENT)
Facility: HOSPITAL | Age: 48
Setting detail: OUTPATIENT SURGERY
Discharge: HOME | End: 2023-03-02
Attending: STUDENT IN AN ORGANIZED HEALTH CARE EDUCATION/TRAINING PROGRAM | Admitting: STUDENT IN AN ORGANIZED HEALTH CARE EDUCATION/TRAINING PROGRAM

## 2023-03-02 ENCOUNTER — APPOINTMENT (OUTPATIENT)
Dept: RADIOLOGY | Facility: HOSPITAL | Age: 48
End: 2023-03-02

## 2023-03-02 ENCOUNTER — ANESTHESIA EVENT (OUTPATIENT)
Dept: PERIOP | Facility: HOSPITAL | Age: 48
End: 2023-03-02
Payer: COMMERCIAL

## 2023-03-02 VITALS
SYSTOLIC BLOOD PRESSURE: 135 MMHG | DIASTOLIC BLOOD PRESSURE: 78 MMHG | RESPIRATION RATE: 16 BRPM | HEART RATE: 73 BPM | BODY MASS INDEX: 41.75 KG/M2 | OXYGEN SATURATION: 97 % | HEIGHT: 73 IN | WEIGHT: 315 LBS | TEMPERATURE: 98 F

## 2023-03-02 DIAGNOSIS — Z98.890 STATUS POST SURGERY: Primary | ICD-10-CM

## 2023-03-02 LAB — GLUCOSE SERPL-MCNC: 157 MG/DL (ref 65–140)

## 2023-03-02 DEVICE — 2.4 X 16MM VAL SCREW, TI
Type: IMPLANTABLE DEVICE | Site: FOOT | Status: FUNCTIONAL
Brand: ARTHREX®

## 2023-03-02 DEVICE — BB-TAK, THREADED
Type: IMPLANTABLE DEVICE | Site: FOOT | Status: FUNCTIONAL
Brand: ARTHREX®

## 2023-03-02 DEVICE — 5TH METATARSAL HOOK PLATE, LONG, RIGHT
Type: IMPLANTABLE DEVICE | Site: FOOT | Status: FUNCTIONAL
Brand: ARTHREX®

## 2023-03-02 DEVICE — LOW PROFILE SCREW, 2.4 X 12MM, CORTEX
Type: IMPLANTABLE DEVICE | Site: FOOT | Status: FUNCTIONAL
Brand: ARTHREX®

## 2023-03-02 DEVICE — 2.4 X 10MM VAL SCREW, TI
Type: IMPLANTABLE DEVICE | Site: FOOT | Status: FUNCTIONAL
Brand: ARTHREX®

## 2023-03-02 DEVICE — 2.4 X 14MM VAL SCREW, TI
Type: IMPLANTABLE DEVICE | Site: FOOT | Status: FUNCTIONAL
Brand: ARTHREX®

## 2023-03-02 RX ORDER — DEXMEDETOMIDINE HYDROCHLORIDE 100 UG/ML
INJECTION, SOLUTION INTRAVENOUS AS NEEDED
Status: DISCONTINUED | OUTPATIENT
Start: 2023-03-02 | End: 2023-03-02

## 2023-03-02 RX ORDER — ONDANSETRON 2 MG/ML
4 INJECTION INTRAMUSCULAR; INTRAVENOUS ONCE AS NEEDED
Status: DISCONTINUED | OUTPATIENT
Start: 2023-03-02 | End: 2023-03-02 | Stop reason: HOSPADM

## 2023-03-02 RX ORDER — PROPOFOL 10 MG/ML
INJECTION, EMULSION INTRAVENOUS AS NEEDED
Status: DISCONTINUED | OUTPATIENT
Start: 2023-03-02 | End: 2023-03-02

## 2023-03-02 RX ORDER — OXYCODONE HYDROCHLORIDE AND ACETAMINOPHEN 5; 325 MG/1; MG/1
1 TABLET ORAL EVERY 4 HOURS PRN
Qty: 30 TABLET | Refills: 0 | Status: SHIPPED | OUTPATIENT
Start: 2023-03-02 | End: 2023-03-12

## 2023-03-02 RX ORDER — SODIUM CHLORIDE, SODIUM LACTATE, POTASSIUM CHLORIDE, CALCIUM CHLORIDE 600; 310; 30; 20 MG/100ML; MG/100ML; MG/100ML; MG/100ML
100 INJECTION, SOLUTION INTRAVENOUS CONTINUOUS
Status: DISCONTINUED | OUTPATIENT
Start: 2023-03-02 | End: 2023-03-02 | Stop reason: HOSPADM

## 2023-03-02 RX ORDER — PROPOFOL 10 MG/ML
INJECTION, EMULSION INTRAVENOUS CONTINUOUS PRN
Status: DISCONTINUED | OUTPATIENT
Start: 2023-03-02 | End: 2023-03-02

## 2023-03-02 RX ORDER — SODIUM CHLORIDE, SODIUM LACTATE, POTASSIUM CHLORIDE, CALCIUM CHLORIDE 600; 310; 30; 20 MG/100ML; MG/100ML; MG/100ML; MG/100ML
INJECTION, SOLUTION INTRAVENOUS CONTINUOUS PRN
Status: DISCONTINUED | OUTPATIENT
Start: 2023-03-02 | End: 2023-03-02

## 2023-03-02 RX ORDER — OXYCODONE HYDROCHLORIDE AND ACETAMINOPHEN 5; 325 MG/1; MG/1
1 TABLET ORAL EVERY 4 HOURS PRN
Status: DISCONTINUED | OUTPATIENT
Start: 2023-03-02 | End: 2023-03-02 | Stop reason: HOSPADM

## 2023-03-02 RX ORDER — KETOROLAC TROMETHAMINE 30 MG/ML
INJECTION, SOLUTION INTRAMUSCULAR; INTRAVENOUS AS NEEDED
Status: DISCONTINUED | OUTPATIENT
Start: 2023-03-02 | End: 2023-03-02

## 2023-03-02 RX ORDER — CHLORHEXIDINE GLUCONATE 0.12 MG/ML
15 RINSE ORAL ONCE
Status: COMPLETED | OUTPATIENT
Start: 2023-03-02 | End: 2023-03-02

## 2023-03-02 RX ORDER — MIDAZOLAM HYDROCHLORIDE 2 MG/2ML
INJECTION, SOLUTION INTRAMUSCULAR; INTRAVENOUS AS NEEDED
Status: DISCONTINUED | OUTPATIENT
Start: 2023-03-02 | End: 2023-03-02

## 2023-03-02 RX ORDER — SODIUM CHLORIDE, SODIUM LACTATE, POTASSIUM CHLORIDE, CALCIUM CHLORIDE 600; 310; 30; 20 MG/100ML; MG/100ML; MG/100ML; MG/100ML
20 INJECTION, SOLUTION INTRAVENOUS CONTINUOUS
Status: DISCONTINUED | OUTPATIENT
Start: 2023-03-02 | End: 2023-03-02 | Stop reason: HOSPADM

## 2023-03-02 RX ORDER — MAGNESIUM HYDROXIDE 1200 MG/15ML
LIQUID ORAL AS NEEDED
Status: DISCONTINUED | OUTPATIENT
Start: 2023-03-02 | End: 2023-03-02 | Stop reason: HOSPADM

## 2023-03-02 RX ORDER — ONDANSETRON 2 MG/ML
INJECTION INTRAMUSCULAR; INTRAVENOUS AS NEEDED
Status: DISCONTINUED | OUTPATIENT
Start: 2023-03-02 | End: 2023-03-02

## 2023-03-02 RX ORDER — FENTANYL CITRATE/PF 50 MCG/ML
25 SYRINGE (ML) INJECTION
Status: DISCONTINUED | OUTPATIENT
Start: 2023-03-02 | End: 2023-03-02 | Stop reason: HOSPADM

## 2023-03-02 RX ORDER — LIDOCAINE HYDROCHLORIDE 10 MG/ML
INJECTION, SOLUTION EPIDURAL; INFILTRATION; INTRACAUDAL; PERINEURAL AS NEEDED
Status: DISCONTINUED | OUTPATIENT
Start: 2023-03-02 | End: 2023-03-02 | Stop reason: HOSPADM

## 2023-03-02 RX ORDER — FENTANYL CITRATE 50 UG/ML
INJECTION, SOLUTION INTRAMUSCULAR; INTRAVENOUS AS NEEDED
Status: DISCONTINUED | OUTPATIENT
Start: 2023-03-02 | End: 2023-03-02

## 2023-03-02 RX ORDER — BUPIVACAINE HYDROCHLORIDE 2.5 MG/ML
INJECTION, SOLUTION EPIDURAL; INFILTRATION; INTRACAUDAL AS NEEDED
Status: DISCONTINUED | OUTPATIENT
Start: 2023-03-02 | End: 2023-03-02 | Stop reason: HOSPADM

## 2023-03-02 RX ORDER — ASPIRIN 325 MG
325 TABLET ORAL 2 TIMES DAILY
Qty: 60 TABLET | Refills: 0 | Status: SHIPPED | OUTPATIENT
Start: 2023-03-02 | End: 2023-04-01

## 2023-03-02 RX ADMIN — CHLORHEXIDINE GLUCONATE 0.12% ORAL RINSE 15 ML: 1.2 LIQUID ORAL at 12:17

## 2023-03-02 RX ADMIN — ONDANSETRON 4 MG: 2 INJECTION INTRAMUSCULAR; INTRAVENOUS at 14:05

## 2023-03-02 RX ADMIN — FENTANYL CITRATE 25 MCG: 50 INJECTION, SOLUTION INTRAMUSCULAR; INTRAVENOUS at 16:01

## 2023-03-02 RX ADMIN — FENTANYL CITRATE 25 MCG: 50 INJECTION, SOLUTION INTRAMUSCULAR; INTRAVENOUS at 16:19

## 2023-03-02 RX ADMIN — FENTANYL CITRATE 25 MCG: 50 INJECTION, SOLUTION INTRAMUSCULAR; INTRAVENOUS at 14:41

## 2023-03-02 RX ADMIN — CEFAZOLIN 3000 MG: 1 INJECTION, POWDER, FOR SOLUTION INTRAMUSCULAR; INTRAVENOUS at 13:58

## 2023-03-02 RX ADMIN — KETOROLAC TROMETHAMINE 30 MG: 30 INJECTION, SOLUTION INTRAMUSCULAR at 16:22

## 2023-03-02 RX ADMIN — SODIUM CHLORIDE, SODIUM LACTATE, POTASSIUM CHLORIDE, AND CALCIUM CHLORIDE: .6; .31; .03; .02 INJECTION, SOLUTION INTRAVENOUS at 13:58

## 2023-03-02 RX ADMIN — PROPOFOL 50 MG: 10 INJECTION, EMULSION INTRAVENOUS at 14:07

## 2023-03-02 RX ADMIN — SODIUM CHLORIDE, SODIUM LACTATE, POTASSIUM CHLORIDE, AND CALCIUM CHLORIDE 100 ML/HR: .6; .31; .03; .02 INJECTION, SOLUTION INTRAVENOUS at 12:19

## 2023-03-02 RX ADMIN — FENTANYL CITRATE 25 MCG: 50 INJECTION, SOLUTION INTRAMUSCULAR; INTRAVENOUS at 16:12

## 2023-03-02 RX ADMIN — MIDAZOLAM HYDROCHLORIDE 2 MG: 1 INJECTION, SOLUTION INTRAMUSCULAR; INTRAVENOUS at 13:58

## 2023-03-02 RX ADMIN — DEXMEDETOMIDINE HCL 12 MCG: 100 INJECTION INTRAVENOUS at 14:06

## 2023-03-02 RX ADMIN — FENTANYL CITRATE 25 MCG: 50 INJECTION, SOLUTION INTRAMUSCULAR; INTRAVENOUS at 15:48

## 2023-03-02 RX ADMIN — PROPOFOL 140 MCG/KG/MIN: 10 INJECTION, EMULSION INTRAVENOUS at 14:07

## 2023-03-02 RX ADMIN — FENTANYL CITRATE 25 MCG: 50 INJECTION, SOLUTION INTRAMUSCULAR; INTRAVENOUS at 15:03

## 2023-03-02 RX ADMIN — FENTANYL CITRATE 50 MCG: 50 INJECTION, SOLUTION INTRAMUSCULAR; INTRAVENOUS at 14:16

## 2023-03-02 RX ADMIN — SODIUM CHLORIDE, SODIUM LACTATE, POTASSIUM CHLORIDE, AND CALCIUM CHLORIDE: .6; .31; .03; .02 INJECTION, SOLUTION INTRAVENOUS at 15:03

## 2023-03-02 NOTE — DISCHARGE INSTR - AVS FIRST PAGE
Dr Ratna Whiting surgery Instructions    Pain / Swelling  There is expected to be some discomfort, swelling and bruising of the foot  You might see some blood on the bandage  This is not a cause for alarm  However, if there is active or persistent bleeding (blood running out of the bandage while at rest) - call the office at once (or) go to a [facility] ER and ask them to page the podiatry residents  Apply an ice bag to the top of your ankle for 30 minutes for each waking hour, for the first 72 hours  This should be discontinued when sleeping  This will also work through your cast if you have one  Ice must not leak and wet the dressings  Also, using the ice inappropriately can cause permanent nerve damage  Your foot should be elevated as much as possible for the first 72 hours  The foot should be above heart level  If your foot is below heart level, throbbing and pain will increase  When sleeping, elevation can be accomplished by putting a small hard suitcase between the box spring and mattress at the foot of the bed  Walking and standing will increase pain, throbbing and bleeding  Persistent pain despite elevation and your pain meds can many times be relieved by removing the tight brown compression layer (called the ACE wrap) that is over the white gauze dressing  If you are elevating and taking your pain meds and pain is still severe, remove this brown stretchy layer but leave the gauze intact  Wait 30 minutes  If the pain subsides, reapply the ACE so it’s not so tight  If pain doesn’t get better, call your doctor  Dressings / Casts  Do not remove your surgical dressings - they will be changed at your doctor appointment  Do not allow surgical dressings to get wet  Sponge baths should be used until the sutures are removed  Do not try to keep the foot dry using a garbage bag and tape - this rarely works  If you get your dressings or cast wet - call your doctor immediately    If your cast or dressings feel tight - elevate your foot for 30 minutes  If this doesn’t help and you feel tingling or see toe discoloration - call your doctor or go to a [facility] ER and ask them to page the podiatry residents  Do not put things in your cast such as powder, coat hangers to scratch, etc  This can cause skin damage and infections  Infection  If you have a fever at or above 100 degrees, chills, sweats, or see red streaks rising above the dressing or smell odor / see pus (creamy white drainage), call your doctor immediately or go to a [facility] ER and ask them to page the podiatry residents  Constipation  If you have severe constipation after surgery, this can be due to the pain medication  Notify your doctor and special medication will be prescribed to deal with this  Blood Clots  If you had surgery and are in a cast, have an external fixation device, or are non-weightbearing using crutches, a knee scooter, a wheelchair, a walker, or an iWalk device - you need to be on a blood thinner  Your doctor will prescribe one of the regimens below  If you run out of the blood thinner checked off below before you are walking normally on your foot and out of your cast - notify your doctor immediately so you can get a refill  Not doing so can lead to blood clots and serious complications including death  Aspirin 325mg twice per day    Numbness  It is normal for your foot to be numb until about dinner time  If you’ve had a popliteal block procedure, you might be numb until the following day  When you start to feel pins and needles in the foot - this means the block is wearing off  That is the appropriate time to take your pain medication  Pain Medication  Do not supplement your pain medication with over the counter drugs, old leftover pain medications, or extra Tylenol  You must discuss any additional medications with your doctor prior to taking them for pain     Driving  No driving is allowed without discussion with the doctor  Ambulation  Nonweightbearing to surgical foot  If given a flat, stiff shoe / darco wedge shoe, Do not walk at all without it  Use a device (cane, walker, crutches) to take some weight off of the foot when walking  If instructed not to put weight on the surgical foot, use the following:  Knee scooter     Putting weight on the foot will lead to complications

## 2023-03-02 NOTE — H&P
Anesthesia Attending H&P    Seen and examined on 3/2/23  No significant updates from preoperative clearance by family medicine, as documented on 2/23/23   Denies Chest pain, SOB, abdominal pain, URI sx     /83   Pulse 94   Temp (!) 97 °F (36 1 °C) (Temporal)   Resp 18   Ht 6' 1" (1 854 m)   Wt (!) 171 kg (377 lb)   SpO2 98%   BMI 49 74 kg/m²   AAO x 3  RRR  Lungs clear  Abdomen soft  Ext warm  Pulses 2+ throughout, right foot in [address] [facility] MD  Anesthesia Attending

## 2023-03-02 NOTE — ANESTHESIA POSTPROCEDURE EVALUATION
Post-Op Assessment Note    CV Status:  Stable  Pain Score: 0    Pain management: adequate     Mental Status:  Alert and awake   Hydration Status:  Euvolemic   PONV Controlled:  Controlled   Airway Patency:  Patent      Post Op Vitals Reviewed: Yes      Staff: CRNA         No notable events documented      BP   136/68   Temp  97   Pulse  90   Resp   14   SpO2   95 RA

## 2023-03-02 NOTE — ANESTHESIA PREPROCEDURE EVALUATION
Procedure:  OPEN REDUCTION W/ INTERNAL FIXATION (ORIF) FOOT, 5th metatarsal, percutaneous vs open (Right: Foot)    Relevant Problems   ANESTHESIA   (+) PONV (postoperative nausea and vomiting) (resolves with Zofran)      CARDIO   (+) Benign essential hypertension   (+) Hypercholesterolemia      ENDO   (+) Hypoparathyroidism (HCC)   (+) Hypothyroidism      MUSCULOSKELETAL   (+) Acute bilateral low back pain with left-sided sciatica   (+) Myofascial pain syndrome      NEURO/PSYCH   (+) Chronic pain disorder   (+) History of thyroid cancer   (+) Myofascial pain syndrome      PULMONARY   (+) GREGG (obstructive sleep apnea) (mild on testing, no cpap required)      Musculoskeletal and Integument   (+) Closed nondisplaced fracture of fifth metatarsal bone of right foot      Other   (+) Class 3 severe obesity due to excess calories with serious comorbidity and body mass index (BMI) of 45 0 to 49 9 in adult [facility])        Physical Exam    Airway    Mallampati score: I  TM Distance: >3 FB  Neck ROM: full     Dental       Cardiovascular  Rhythm: regular, Rate: normal,     Pulmonary  Breath sounds clear to auscultation,     Other Findings        Anesthesia Plan  ASA Score- 3     Anesthesia Type- IV sedation with anesthesia with ASA Monitors  Additional Monitors:   Airway Plan:     Comment: IV sedation with block placed by podiatrist, GA + LMA as back up      Recent labs personally reviewed:  Lab Results       Component                Value               Date                       WBC                      8 20                02/27/2023                 HGB                      17 1 (H)            02/27/2023                 PLT                      226                 02/27/2023            Lab Results       Component                Value               Date                       NA                       139                 10/03/2015                 K                        3 3 (L)             02/27/2023                 BUN 28 (H)              02/27/2023                 CREATININE               0 86                02/27/2023                 GLUCOSE                  241 (H)             10/03/2015            No results found for: PTT   No results found for: INR    Blood type     I, Zack [place]illela MD, have personally seen and evaluated the patient prior to anesthetic care  I have reviewed the pre-anesthetic record, medical history, allergies, medications and any other medical records if appropriate to the anesthetic care  If a CRNA is involved in the case, I have reviewed the CRNA assessment, if present, and agree  Patient consented for IV Sedation, general anesthesia as back up  Discussed risks of aspiration, IV infiltration, indications for conversion to general anesthesia  All questions and concerns addressed          Plan Factors-Exercise tolerance (METS): >4 METS  Chart reviewed  Imaging results reviewed  Existing labs reviewed  Patient summary reviewed  Patient is not a current smoker  Patient did not smoke on day of surgery  Obstructive sleep apnea risk education given perioperatively  Induction- intravenous  Postoperative Plan-     Informed Consent- Anesthetic plan and risks discussed with patient  I personally reviewed this patient with the CRNA  Discussed and agreed on the Anesthesia Plan with the CRNA  Jen Moreno Alert-The patient is alert, awake and responds to voice. The patient is oriented to time, place, and person. The triage nurse is able to obtain subjective information.

## 2023-03-02 NOTE — DISCHARGE SUMMARY
Discharge Summary Outpatient Procedure Podiatry -   Manju Stewart  49 y o  male MRN: [id number]  Unit/Bed#: OR [place] Encounter: [id number]    Admission Date: 3/2/2023     Admitting Diagnosis: Closed nondisplaced fracture of fifth metatarsal bone of right foot, initial encounter [S92 354A]  Right foot pain [M79 671]    Discharge Diagnosis: same    Procedures Performed: OPEN REDUCTION W/ INTERNAL FIXATION (ORIF) FOOT 5TH METATARSAL: 69559 (CPT®)    Complications: none    Condition at Discharge: stable    Discharge instructions/Information to patient and family:   See after visit summary for information provided to patient and family  Provisions for Follow-Up Care/Important appointments:  See after visit summary for information related to follow-up care and any pertinent home health orders  Discharge Medications:  See after visit summary for reconciled discharge medications provided to patient and family

## 2023-03-02 NOTE — OP NOTE
OPERATIVE REPORT - Podiatry  PATIENT NAME: Hillary Miranda  :  1975  MRN: [id number]  Pt Location: [facility] OR ROOM 01    SURGERY DATE: 3/2/2023    Surgeon(s) and Role:     * Raymond Ramos DPM - Primary    Pre-op Diagnosis:  Closed nondisplaced fracture of fifth metatarsal bone of right foot, initial encounter [S92 354A]  Right foot pain [M79 671]    Post-Op Diagnosis Codes:     * Closed nondisplaced fracture of fifth metatarsal bone of right foot, initial encounter [S92 354A]     * Right foot pain [M79 671]    Procedure(s) (LRB):  OPEN REDUCTION W/ INTERNAL FIXATION (ORIF) FOOT 5TH METATARSAL (Right)    Specimen(s):  * No specimens in log *    Estimated Blood Loss:   Minimal    Drains:  * No LDAs found *    Anesthesia Type:   Choice with 10 ml of 1% Lidocaine and 0 25% Bupivacaine in a 1:1 mixture  Post-op 10 cc of 1% lidocaine plain and 0 25% Marcaine plain  Hemostasis:    Right pneumatic calf tourniquet at 250 mmHg 106 minutes  Materials:  Implant Name Type Inv  Item Serial No   Lot No  LRB No  Used Action   PLATE UNIV 5TH METARSAL HOOK LONG RIGHT - [id number]  PLATE UNIV 5TH METARSAL HOOK LONG RIGHT  [employer]  Right 1 Implanted   SCREW GENO 2 4 X 12MM LOPRFL QUICKFIX - [id number]  SCREW GENO 2 4 X 12MM LOPRFL QUICKFIX  [employer]  Right 1 Implanted   SCREW GENO 2 4 X 10MM LOPFRL VA LCK - [id number]  SCREW GENO 2 4 X 10MM LOPFRL VA LCK  [employer]  Right 1 Implanted   SCREW GENO 2 4 X 14MM LOPFRL VA LCK - [id number]  SCREW GENO 2 4 X 14MM LOPFRL VA LCK  [employer]  Right 2 Implanted   SCREW GENO 2 4 X 16MM LOPFRL VA LCK - [id number]  SCREW GENO 2 4 X 16MM LOPFRL VA [facility]  [employer]  Right 1 Implanted   ANCHOR SUT TATE-BB THRD - [id number]  ANCHOR SUT TATE-BB THRD  [employer]  Right 2 Implanted     2-0, 3-0 Vicryl, 3-0 nylon    Operative Findings:  Consistent with the diagnosis    Given screw foot deformity along with very narrow the metatarsal base tuberosity, cutaneous screw attempt was not successful and therefore open reduction internal fixation hook placed was performed to the fifth metatarsal      Complications:   None    Procedure and Technique:     Under mild sedation, the patient was brought into the operating room and placed on the operating room table in the supine position  IV sedation was achieved by anesthesia team and a universal timeout was performed where all parties are in agreement of correct patient, correct procedure and correct site  A pneumatic tourniquet was then placed over the patient's right lower extremity with ample padding  A local block was performed consisting of 10 ml of 1% Lidocaine and 0 25% Bupivacaine in a 1:1 mixture  The foot was then prepped and draped in the usual aseptic manner  An esmarch bandage was used to exsangunate the foot and the pneumatic tourniquet was then inflated to 250mmHg  Attention was directed to the right lateral foot at the level of fifth metatarsal base  Utilizing C-arm fluoroscopy return of a wire on the fifth metatarsal base was on out  Multiple attempts were made to various C-arm fluoroscopy views to insert medullary wire from fifth metatarsal base percutaneously  Fortunately this was unsuccessful given patient's foot type very narrow fifth metatarsal base  Decision was made to open reduction internal fixation with hook plate  Utilizing a #15 blade approximately 6 cm linear incision was made along the fifth metatarsal extending distally to the midshaft  Patient was deepened down to subcutaneous tissue using sharp and blunt dissection  Care was taken to identify and retract all the neurovascular structures  Nerve was identified and reflected off of the operative field  Incision was then carried down to the level of the ostial layer which was reflected off of the bone exposing the metatarsal fracture  Utilizing many fractures protocol took plate was temporally fixated along the fifth metatarsal BB tacks    The position of the plate was further confirmed utilizing a C arm fluoroscopy which appeared to be appropriate  Plate was fixated permanently to the bone lysing combinations of cortical lock and non-locking screws as stated above  It was good compression noted along the fifth metatarsal base fracture post fixation  No C-arm fluoroscopy images were taken and appeared to be appropriate with fracture reduction as well as placement of hardware  Incision was then rinsed with copious amount of normal sterile saline  Sterile and capsular structures were reapproximated utilizing a 2-0 Vicryl  The subcutaneous tissue was then reapproximated utilizing a 3-0 Vicryl sutures  The skin edges were then reapproximated utilizing a 2-0 nylon sutures horizontal mattress fashion  Foot was then cleansed and dried  The incision site was dressed with Xeroform 4 x 4 gauze, Awais  Posterior splint was applied to right lower leg with compressive GLORIA dressings having foot in 90 degree  The tourniquet was deflated and normal hyperemic response was noted to all digits  The patient tolerated the procedure and anesthesia well without immediate complications and transferred to PACU with vital signs stable  I Was present for the entire procedure  Campos hZeng DPM  DATE: March 2, 2023  TIME: 4:50 PM      Portions of the record may have been created with voice recognition software  Occasional wrong word or "sound a like" substitutions may have occurred due to the inherent limitations of voice recognition software  Read the chart carefully and recognize, using context, where substitutions have occurred

## 2023-03-06 ENCOUNTER — TELEPHONE (OUTPATIENT)
Dept: PODIATRY | Facility: CLINIC | Age: 48
End: 2023-03-06

## 2023-03-06 NOTE — TELEPHONE ENCOUNTER
Caller: Anel [place]    Doctor/Office: Dr Jing Carvajal    #: [phone]    Escalation: Disability forms/[employer] faxed his disability paperwork to the office last week-did you receive it? Please call pt back to advise/let him know how long until completed   Thanks

## 2023-03-10 ENCOUNTER — OFFICE VISIT (OUTPATIENT)
Dept: PODIATRY | Facility: CLINIC | Age: 48
End: 2023-03-10

## 2023-03-10 VITALS
HEIGHT: 73 IN | WEIGHT: 315 LBS | SYSTOLIC BLOOD PRESSURE: 145 MMHG | HEART RATE: 92 BPM | DIASTOLIC BLOOD PRESSURE: 80 MMHG | BODY MASS INDEX: 41.75 KG/M2

## 2023-03-10 DIAGNOSIS — S92.354D CLOSED NONDISPLACED FRACTURE OF FIFTH METATARSAL BONE OF RIGHT FOOT WITH ROUTINE HEALING, SUBSEQUENT ENCOUNTER: Primary | ICD-10-CM

## 2023-03-10 DIAGNOSIS — Z98.890 STATUS POST RIGHT FOOT SURGERY: ICD-10-CM

## 2023-03-11 NOTE — PROGRESS NOTES
PATIENT:  Viola Carey        1975    ASSESSMENT     1  Closed nondisplaced fracture of fifth metatarsal bone of right foot with routine healing, subsequent encounter        2  Status post right foot surgery               PLAN  Patient is doing well post-operatively  Sutures left intact  Incision was cleaned with betadine and DSD applied to be kept C/D/I  Continue post-op care as instructed  Stressed on patient compliance about proper off-loading, staying off of feet, and proper dressing care  Call if any increase in pain, fevers, calf pain, shortness of breath, or general distress is noted  Patient instructed to go to ER if call is not returned immediately   - NWB to the RLE  - compressive dressings applied   - return in 1 week for suture removal       HISTORY OF PRESENT ILLNESS  Patient presents for post-op appointment S/p right 5th metatarsal ORIF DOS: 3/2/3023  Post-op pain is under control and resolving well  The patient is feeling well and in good spirits  Patient reported no post-op concern  Patient relates compliance with surgical shoe, elevation, and keeping dressing clean, dry and intact  REVIEW OF SYSTEMS  GENERAL: No fever or chills  HEART: No chest pain, or palpitation  RESPIRATORY:  No SOB or cough  GI: No Nausea, vomit or diarrhea  NEUROLOGIC: No syncope or acute weakness  MUSCULOSKELETAL: No calf pain or edema  PHYSICAL EXAMINATION  GENERAL  The patient appears in NAD / non-toxic  Afebrile  VSS    VASCULAR EXAM  Pedal pulses and vascular status are intact  No calf pain or edema bilaterally  No cyanosis  DERMATOLOGIC EXAM  Incision is coapted and healing well  No signs of infection  No active drainage  Normal post-op edema and ecchymosis  No necrosis or dehiscence  NEUROLOGIC EXAM  AAO X 3  No focal neurologic deficit  Neurologic status is intact BLE  MUSCULOSKELETAL EXAM  Good surgical correction  Normal post-op findings  ROM intact    No fluctuation or crepitus

## 2023-03-16 ENCOUNTER — TELEPHONE (OUTPATIENT)
Dept: PODIATRY | Facility: CLINIC | Age: 48
End: 2023-03-16

## 2023-03-16 ENCOUNTER — TELEPHONE (OUTPATIENT)
Dept: OBGYN CLINIC | Facility: HOSPITAL | Age: 48
End: 2023-03-16

## 2023-03-16 NOTE — TELEPHONE ENCOUNTER
Caller: [employer]    Doctor/Office: Dr Medeiros/Podiatry    Call regarding :  Attending physician statement     Call was transferred to: Podiatry

## 2023-03-16 NOTE — TELEPHONE ENCOUNTER
Caller: [employer]     Doctor/Office: Waldemar Cabello/[facility]    #: [phone]    Escalation: Disability forms/asking for attending physicians statement to be faxed to the [address] [place] [address] @ [phone] so they may start on the paperwork for patient  Any questions call Bonita Luque back @ number above   Thanks

## 2023-03-21 ENCOUNTER — OFFICE VISIT (OUTPATIENT)
Dept: PODIATRY | Facility: CLINIC | Age: 48
End: 2023-03-21

## 2023-03-21 VITALS
BODY MASS INDEX: 41.75 KG/M2 | HEART RATE: 80 BPM | DIASTOLIC BLOOD PRESSURE: 85 MMHG | HEIGHT: 73 IN | SYSTOLIC BLOOD PRESSURE: 125 MMHG | WEIGHT: 315 LBS

## 2023-03-21 DIAGNOSIS — Z98.890 STATUS POST RIGHT FOOT SURGERY: Primary | ICD-10-CM

## 2023-03-21 NOTE — PROGRESS NOTES
Assessment/Plan:    No problem-specific Assessment & Plan notes found for this encounter  Diagnoses and all orders for this visit:    Status post right foot surgery      Plan:     -  Patient was counseled and educated on the condition and the diagnosis  The diagnosis, treatment options and prognosis were discussed in detail    -Foot incision healed  Steri-Strips applied  recommend covering wound with a dry sterile dressing   -Continue nonweightbearing right lower extremity additional 3 weeks  -return 3 weeks for follow-up, obtain new set of x-rays    Subjective:      Patient ID: Melina Martínez  is a 49 y o  male  Patient presents for post-op appointment S/p right 5th metatarsal ORIF DOS: 3/2/3023  Patient reports he has been maintaining nonweightbearing status  He denies any other complaints related to his foot  Denies nausea vomit fever chills shortness of breath or leg pain  The following portions of the patient's history were reviewed and updated as appropriate:   He  has a past medical history of Asthma, Diabetes mellitus ([facility] ), Diabetes type 2, uncontrolled (05/06/2014), Diverticulitis, Hyperlipidemia, Hypertension, Immunity to measles determined by serologic test (07/02/2019), PONV (postoperative nausea and vomiting), Thyroid cancer ([facility] ), Thyroid cancer ([facility] ) (02/12/2020), and Type 2 diabetes mellitus [facility] ) (07/17/2013)    He   Patient Active Problem List    Diagnosis Date Noted   • PONV (postoperative nausea and vomiting)    • Preop exam for internal medicine 02/23/2023   • Closed nondisplaced fracture of fifth metatarsal bone of right foot 02/21/2023   • Elevated CO2 level 07/08/2022   • RGEGG (obstructive sleep apnea)    • Hypercalciuria 02/15/2022   • Lumbar disc herniation 01/03/2019   • Sciatic nerve disease, 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 ) 10/11/2016   • Vitamin D deficiency 05/03/2016   • Hypoparathyroidism ([facility] ) 12/17/2013   • Hypothyroidism 03/15/2013   • Benign essential hypertension 10/04/2012   • Hypercholesterolemia 08/09/2012     He  has a past surgical history that includes Thyroidectomy; Colostomy; pr arthrs kne surg w/meniscectomy med/lat w/shvg (Left, 08/17/2017); BIOPSY CORE NEEDLE; Chest wall biopsy (N/A, 02/07/2020); Colon surgery; and pr open treatment metatarsal fracture each (Right, 3/2/2023)       Review of Systems   All other systems reviewed and are negative  Objective:      /85 (BP Location: Right arm, Patient Position: Sitting, Cuff Size: Large)   Pulse 80   Ht 6' 1" (1 854 m)   Wt (!) 171 kg (377 lb) Comment: verbal  BMI 49 74 kg/m²          Physical Exam  Vitals reviewed  Feet:      Comments: Right foot incision site healed  No pain with palpation noted throughout the right foot along the fifth metatarsal   Within normal ankle and subtalar joint range of motion  Light touch sensation at baseline

## 2023-03-27 DIAGNOSIS — I10 HYPERTENSION, UNSPECIFIED TYPE: ICD-10-CM

## 2023-03-27 RX ORDER — LOSARTAN POTASSIUM AND HYDROCHLOROTHIAZIDE 25; 100 MG/1; MG/1
TABLET ORAL
Qty: 90 TABLET | Refills: 3 | Status: SHIPPED | OUTPATIENT
Start: 2023-03-27

## 2023-04-26 ENCOUNTER — EVALUATION (OUTPATIENT)
Dept: PHYSICAL THERAPY | Facility: CLINIC | Age: 48
End: 2023-04-26

## 2023-04-26 DIAGNOSIS — Z98.890 STATUS POST RIGHT FOOT SURGERY: Primary | ICD-10-CM

## 2023-04-26 DIAGNOSIS — S92.354D CLOSED NONDISPLACED FRACTURE OF FIFTH METATARSAL BONE OF RIGHT FOOT WITH ROUTINE HEALING, SUBSEQUENT ENCOUNTER: ICD-10-CM

## 2023-04-26 NOTE — PROGRESS NOTES
PT Evaluation     Today's date: 2023  Patient name: Truman Tena  : 1975  MRN: [id number]  Referring provider: Padmini Reilly DPM  Dx:   Encounter Diagnosis     ICD-10-CM    1  Status post right foot surgery  Z98 890 Ambulatory referral to Physical Therapy      2  Closed nondisplaced fracture of fifth metatarsal bone of right foot with routine healing, subsequent encounter  S92 354D Ambulatory referral to Physical Therapy          Start Time: 88  Stop Time:   Total time in clinic (min): 60 minutes    Assessment  Assessment details: Truman Tena  is a a pleasant 49 y o  male who presents today with pain, decreased strength, decreased ROM, decreased joint mobility, joint effusion, ambulatory dysfunction and balance dysfunction  The patient is s/p ORIF of 5th metatarsal on 3/2/23  Ethan Lefort complains of no pain at this time  The patient demonstrates minimally decreased strength during resisted muscle testing  Pt ROM is minimally decreased with no pain  The patient has difficulty with ambulation, transfers, leisure, athletics and work  Patient will benefit from skilled physical therapy, including therapeutic exercise, stretching, balance training, manual therapy and modalities prn to improve their level of function, to increase overall quality of life, and to address his impairments  Dispensed home exercise program and educated patient on proper technique, frequency, and the possibility of DOMS for the next 24 to 48 hours  Patient demonstrated understanding and was advised to call us if he has any further questions  Impairments: abnormal coordination, abnormal gait, abnormal or restricted ROM, activity intolerance, impaired balance, impaired physical strength, lacks appropriate home exercise program, pain with function, safety issue and weight-bearing intolerance  Understanding of Dx/Px/POC: excellent  Goals  ST  Independent with HEP in 2 weeks    2  Pt will have verbal report of improvement in symptoms by >/=25% in 2 weeks  3  Decrease pain to 0/10 at it's worst   4  Increase strength by 1/2 grade in all deficient planes  To be achieved by D/C   LT  Pt will improve FOTO score by >/= goal points in 6 weeks  2  Pt will improve FOTO score to >/= goal score by visit # 12   3  Pt will be able to walk a mile with little to no difficulty  4  Pt will be able to stand for a shift at work with little to no difficulty  5  Pt will be able to perform his usual hobbies including camping and hiking with little to no difficulty  Plan  Patient would benefit from: skilled physical therapy  Planned modality interventions: cryotherapy, TENS and thermotherapy: hydrocollator packs  Planned therapy interventions: home exercise program, gait training, functional ROM exercises, flexibility, fine motor coordination training, balance/weight bearing training, balance, ADL retraining, abdominal trunk stabilization, IADL retraining, joint mobilization, manual therapy, massage, Mejias taping, neuromuscular re-education, patient education, strengthening, stretching, therapeutic activities and therapeutic exercise  Frequency: 2x week  Duration in weeks: 8  Plan of Care beginning date: 2023  Plan of Care expiration date: 2023  Treatment plan discussed with: patient        Subjective Evaluation    History of Present Illness  Mechanism of injury: Shreya Ifrah presents today s/p ORIF on 5th met of the R foot on 3/2/23  Pt had X-ray performed on 4/10 that shows the following: Stable anatomic alignment status post ORIF for a right base of 5th metatarsal fracture without hardware complication  The patient also reports diminished strength, decreased ROM, decreased balance, decreased endurance and difficulty with function  Relevant PMH includes HTN, GREGG    Pain  No pain reported    Patient Goals  Patient goals for therapy: decreased edema, decreased pain, improved balance, increased motion, return to work, return to [employer] activities, independence with ADLs/IADLs and increased strength          Objective     Active Range of Motion   Left Ankle/Foot   Dorsiflexion (ke): 15 degrees   Plantar flexion: 70 degrees   Inversion: 40 degrees   Eversion: 20 degrees     Right Ankle/Foot   Dorsiflexion (ke): 15 degrees   Plantar flexion: 60 degrees   Inversion: 35 degrees   Eversion: 15 degrees     Strength/Myotome Testing     Left Ankle/Foot   Normal strength    Right Ankle/Foot   Dorsiflexion: 4+  Plantar flexion: 4+  Inversion: 4+  Eversion: 4+             Precautions: HTN, GREGG, ORIF R ankle 3/2/23, CAM Boot WBAT until 5/8    Access Code: [email or web address]  URL: [email or web address]  Date: 04/26/2023  Prepared by: Hedy Gamboa    Exercises  - Seated Arch Lifts  - 1 x daily - 7 x weekly - 3 sets - 10 reps - 5 hold  - Towel Scrunches  - 1 x daily - 7 x weekly - 3 sets - 10 reps - 5 hold  - Ankle Inversion Eversion Towel Slide  - 1 x daily - 7 x weekly - 3 sets - 10 reps - 5 hold  - Ankle Inversion Eversion Towel Slide  - 1 x daily - 7 x weekly - 3 sets - 10 reps - 5 hold  - Seated Heel Toe Raises  - 1 x daily - 7 x weekly - 3 sets - 10 reps  - Seated Ankle Pumps  - 1 x daily - 7 x weekly - 3 sets - 10 reps  - Long Sitting Calf Stretch with Strap  - 1 x daily - 7 x weekly - 3 sets - 10 reps  - Supine Bridge  - 1 x daily - 7 x weekly - 3 sets - 10 reps  - Standing Hip Abduction with Counter Support  - 1 x daily - 7 x weekly - 3 sets - 10 reps  - Standing Hip Extension with Counter Support  - 1 x daily - 7 x weekly - 3 sets - 10 reps    Manuals 4/26            AP Talocrural jt mob             Ankle distraction             Ankle PROM             Dorsiflexion MWM traction             Neuro Re-Ed             SLS             MLA Shortening 3x10            Rockerboard             Wobble board             STS walk             Tandem walk             SL clock             TERI             Ther Ex Nustep             Ankle 4 way TB             Heel raises seated 3x15            Toe raises seated 3x15            Bridge HEP            Hip 4 way HEP            Toe curls w/ towel 5x            1st toe Inv/ev 3x30            SLRDL             Ther Activity             TG             FSD             LSD             Lateral Walk             Monster Walk             Gait Training                                       Modalities             CP

## 2023-05-04 ENCOUNTER — OFFICE VISIT (OUTPATIENT)
Dept: PHYSICAL THERAPY | Facility: CLINIC | Age: 48
End: 2023-05-04

## 2023-05-04 DIAGNOSIS — Z98.890 STATUS POST RIGHT FOOT SURGERY: Primary | ICD-10-CM

## 2023-05-04 DIAGNOSIS — S92.354D CLOSED NONDISPLACED FRACTURE OF FIFTH METATARSAL BONE OF RIGHT FOOT WITH ROUTINE HEALING, SUBSEQUENT ENCOUNTER: ICD-10-CM

## 2023-05-04 NOTE — PROGRESS NOTES
Daily Note     Today's date: 2023  Patient name: Peyton Walsh  : 1975  MRN: [id number]  Referring provider: Colby Potts DPM  Dx:   Encounter Diagnosis     ICD-10-CM    1  Status post right foot surgery  Z98 [id number]       2  Closed nondisplaced fracture of fifth metatarsal bone of right foot with routine healing, subsequent encounter  A16 789R                      Subjective: pt noted no pain upon arrival for treatment session  Pt noted that he stopped wearing the cam boot for about 3 days not and has no pain  Pt noted that he has been wearing a procare shoe  Objective: See treatment diary below      Assessment: Continue with progressions with no increase in pain noted  Tolerated treatment well  Patient exhibited good technique with therapeutic exercises and would benefit from continued PT  Pt educated to stay with his MD precautions and wear the cam boot while ambulating at this time  Plan: Continue per plan of care  Precautions: HTN, GREGG, ORIF R ankle 3/2/23, CAM Boot WBAT until     Access Code: [email or web address]  URL: [email or web address]  Date: 2023  Prepared by: Guillaume Adams    Exercises  - Seated Arch Lifts  - 1 x daily - 7 x weekly - 3 sets - 10 reps - 5 hold  - Towel Scrunches  - 1 x daily - 7 x weekly - 3 sets - 10 reps - 5 hold  - Ankle Inversion Eversion Towel Slide  - 1 x daily - 7 x weekly - 3 sets - 10 reps - 5 hold  - Ankle Inversion Eversion Towel Slide  - 1 x daily - 7 x weekly - 3 sets - 10 reps - 5 hold  - Seated Heel Toe Raises  - 1 x daily - 7 x weekly - 3 sets - 10 reps  - Seated Ankle Pumps  - 1 x daily - 7 x weekly - 3 sets - 10 reps  - Long Sitting Calf Stretch with Strap  - 1 x daily - 7 x weekly - 3 sets - 10 reps  - Supine Bridge  - 1 x daily - 7 x weekly - 3 sets - 10 reps  - Standing Hip Abduction with Counter Support  - 1 x daily - 7 x weekly - 3 sets - 10 reps  - Standing Hip Extension with Counter Support  - 1 x daily - 7 x weekly - 3 sets - 10 reps    Manuals 4/26 5/4           AP Talocrural jt mob             Ankle distraction             Ankle PROM             Dorsiflexion MWM traction                                                                                           Neuro Re-Ed             SLS             MLA Shortening 3x10 3x 10            Rockerboard             Wobble board             STS walk             Tandem walk             SL clock             TERI                                                                              Ther Ex             Nustep  10 min L5 no UE            Ankle 4 way TB  GTB 3x 10 ea  Heel raises seated 3x15 seated 3x10            Toe raises seated 3x15 seated 3x 10            Bridge HEP            Hip 4 way HEP            Toe curls w/ towel 5x            1st toe Inv/ev 3x30            SLRDL                                                                 Ther Activity             TG             FSD             LSD             Lateral Walk             Monster Walk             Gait Training                                       Modalities             CP                                 1 on 1 time for 38 minutes on 5/4/23

## 2023-05-08 ENCOUNTER — OFFICE VISIT (OUTPATIENT)
Dept: PODIATRY | Facility: CLINIC | Age: 48
End: 2023-05-08

## 2023-05-08 ENCOUNTER — APPOINTMENT (OUTPATIENT)
Dept: RADIOLOGY | Facility: CLINIC | Age: 48
End: 2023-05-08

## 2023-05-08 VITALS
HEART RATE: 87 BPM | HEIGHT: 73 IN | BODY MASS INDEX: 41.75 KG/M2 | DIASTOLIC BLOOD PRESSURE: 80 MMHG | WEIGHT: 315 LBS | SYSTOLIC BLOOD PRESSURE: 153 MMHG

## 2023-05-08 DIAGNOSIS — Z98.890 STATUS POST RIGHT FOOT SURGERY: ICD-10-CM

## 2023-05-08 DIAGNOSIS — Z98.890 STATUS POST RIGHT FOOT SURGERY: Primary | ICD-10-CM

## 2023-05-08 NOTE — LETTER
May 8, 2023     Patient: Edwardo Smith  YOB: 1975  Date of Visit: 5/8/2023      To Whom it May Concern:    Tay Barry is under my professional care  Candy Recio was seen in my office on 5/8/2023  Candy Recio may return to starting 5/29/2023 with full duty without any restrictions  If you have any questions or concerns, please don't hesitate to call  Sincerely,          Jamie Dang DPM        CC: Cristian Guan

## 2023-05-10 ENCOUNTER — OFFICE VISIT (OUTPATIENT)
Dept: PHYSICAL THERAPY | Facility: CLINIC | Age: 48
End: 2023-05-10

## 2023-05-10 DIAGNOSIS — S92.354D CLOSED NONDISPLACED FRACTURE OF FIFTH METATARSAL BONE OF RIGHT FOOT WITH ROUTINE HEALING, SUBSEQUENT ENCOUNTER: ICD-10-CM

## 2023-05-10 DIAGNOSIS — Z98.890 STATUS POST RIGHT FOOT SURGERY: Primary | ICD-10-CM

## 2023-05-10 NOTE — PROGRESS NOTES
Daily Note    Today's date: 05/10/23  Patient name: Dangelo Anderson  : 1975  MRN: [id number]  Referring provider: Rojelio Dimas DPM  Dx:   Encounter Diagnosis     ICD-10-CM    1  Status post right foot surgery  Z98 890       2  Closed nondisplaced fracture of fifth metatarsal bone of right foot with routine healing, subsequent encounter  S92 354D           Start Time: 1800  Stop Time: 1845  Total time in clinic (min): 45 minutes      Subjective: Severa Sarna reports that he is wearing the sneaker and feels pretty good with it  He has been driving and notes no c/o  Objective: See treatment diary below  Assessment: Wilber tolerated treatment well with consistent cuing throughout  TE's were performed with increased resistance and increased reps  New TE's were demonstrated with proper technique, and tolerated well  Following treatment, the patient demonstrated fatigue post treatment, exhibited good technique with therapeutic exercises and would benefit from continued PT  Plan: Continue per plan of care  Progress treatment as tolerated  Precautions: HTN, GREGG, ORIF R ankle 3/2/23, CAM Boot WBAT until     Access Code: [email or web address]  URL: [email or web address]  Date: 2023  Prepared by: Ling Pavon    Exercises  - Seated Arch Lifts  - 1 x daily - 7 x weekly - 3 sets - 10 reps - 5 hold  - Towel Scrunches  - 1 x daily - 7 x weekly - 3 sets - 10 reps - 5 hold  - Ankle Inversion Eversion Towel Slide  - 1 x daily - 7 x weekly - 3 sets - 10 reps - 5 hold  - Ankle Inversion Eversion Towel Slide  - 1 x daily - 7 x weekly - 3 sets - 10 reps - 5 hold  - Seated Heel Toe Raises  - 1 x daily - 7 x weekly - 3 sets - 10 reps  - Seated Ankle Pumps  - 1 x daily - 7 x weekly - 3 sets - 10 reps  - Long Sitting Calf Stretch with Strap  - 1 x daily - 7 x weekly - 3 sets - 10 reps  - Supine Bridge  - 1 x daily - 7 x weekly - 3 sets - 10 reps  - Standing Hip Abduction with Counter Support  - 1 x daily - 7 "x weekly - 3 sets - 10 reps  - Standing Hip Extension with Counter Support  - 1 x daily - 7 x weekly - 3 sets - 10 reps    Manuals 4/26 5/4 5/10          AP Talocrural jt mob             Ankle distraction             Ankle PROM             Dorsiflexion MWM traction                                                                                           Neuro Re-Ed             SLS             MLA Shortening 3x10 3x 10            Rockerboard   2'/2'          [employer] board             STS walk             Tandem walk             SL clock             TERI                                                                              Ther Ex             Nustep  10 min L5 no UE  10' L5 no UE          Ankle 4 way TB  GTB 3x 10 ea    BTB 3x10 ea          Heel raises seated 3x15 seated 3x10  Stand 3x10          Toe raises seated 3x15 seated 3x 10  Stand 3x10          Bridge HEP            Hip 4 way HEP            Toe curls w/ towel 5x            1st toe Inv/ev 3x30            Calf Str SB   90\"                                                              Ther Activity             TG   L22 3x10          FSD   6\" 3x10          LSD   4\" 3x10          FSU   6\" 3x10          LSU   6\" 3x10          Gait Training                                       Modalities             CP                               William Taylor, PT  5/10/2023,6:54 PM  "

## 2023-05-17 ENCOUNTER — TELEPHONE (OUTPATIENT)
Dept: PODIATRY | Facility: CLINIC | Age: 48
End: 2023-05-17

## 2023-05-17 NOTE — TELEPHONE ENCOUNTER
Caller: Patient    Doctor: Matthieu franklin    Reason for call: Wants to move his back to work date back a week to June 5th      Call back#: [phone]

## 2023-05-18 ENCOUNTER — TELEPHONE (OUTPATIENT)
Dept: PODIATRY | Facility: CLINIC | Age: 48
End: 2023-05-18

## 2023-05-18 ENCOUNTER — OFFICE VISIT (OUTPATIENT)
Dept: PHYSICAL THERAPY | Facility: CLINIC | Age: 48
End: 2023-05-18

## 2023-05-18 DIAGNOSIS — S92.354D CLOSED NONDISPLACED FRACTURE OF FIFTH METATARSAL BONE OF RIGHT FOOT WITH ROUTINE HEALING, SUBSEQUENT ENCOUNTER: ICD-10-CM

## 2023-05-18 DIAGNOSIS — Z98.890 STATUS POST RIGHT FOOT SURGERY: Primary | ICD-10-CM

## 2023-05-18 NOTE — PROGRESS NOTES
Daily Note    Today's date: 23  Patient name: Jan Edwards  : 1975  MRN: [id number]  Referring provider: Amy Michaud DPM  Dx:   Encounter Diagnosis     ICD-10-CM    1  Status post right foot surgery  Z98 890       2  Closed nondisplaced fracture of fifth metatarsal bone of right foot with routine healing, subsequent encounter  S92 354D           Start Time:   Stop Time: 1900  Total time in clinic (min): 40 minutes      Subjective: Kosta Arnold reports some increased soreness in the foot today due to doing a lot of extra walking in his shoes  He notes that his shoes are feeling a bit tight for him and do cause some pressure on the incision site  Objective: See treatment diary below  Assessment: Wilber tolerated treatment well with consistent cuing throughout  TE's were performed with increased resistance and increased reps  New TE's were demonstrated with proper technique, and tolerated well  Following treatment, the patient demonstrated fatigue post treatment, exhibited good technique with therapeutic exercises and would benefit from continued PT  Plan: Continue per plan of care  Progress treatment as tolerated  Precautions: HTN, GREGG, ORIF R ankle 3/2/23, CAM Boot WBAT until     Access Code: [email or web address]  URL: [email or web address]  Date: 2023  Prepared by: Saman Muff    Exercises  - Seated Arch Lifts  - 1 x daily - 7 x weekly - 3 sets - 10 reps - 5 hold  - Towel Scrunches  - 1 x daily - 7 x weekly - 3 sets - 10 reps - 5 hold  - Ankle Inversion Eversion Towel Slide  - 1 x daily - 7 x weekly - 3 sets - 10 reps - 5 hold  - Ankle Inversion Eversion Towel Slide  - 1 x daily - 7 x weekly - 3 sets - 10 reps - 5 hold  - Seated Heel Toe Raises  - 1 x daily - 7 x weekly - 3 sets - 10 reps  - Seated Ankle Pumps  - 1 x daily - 7 x weekly - 3 sets - 10 reps  - Long Sitting Calf Stretch with Strap  - 1 x daily - 7 x weekly - 3 sets - 10 reps  - Supine Bridge  - 1 x daily "- 7 x weekly - 3 sets - 10 reps  - Standing Hip Abduction with Counter Support  - 1 x daily - 7 x weekly - 3 sets - 10 reps  - Standing Hip Extension with Counter Support  - 1 x daily - 7 x weekly - 3 sets - 10 reps    Manuals 4/26 5/4 5/10 5/18         AP Talocrural jt mob    TS         Ankle distraction    TS         Ankle PROM    TS         Dorsiflexion MWM traction    TS         Metatarsal mobilizations    TS                                                                          Neuro Re-Ed             SLS             MLA Shortening 3x10 3x 10            Rockerboard   2'/2'          Wobble board             STS walk             Tandem walk             SL clock             TERI                                                                              Ther Ex             Nustep  10 min L5 no UE  10' L5 no UE 10' L8 no UE         Ankle 4 way TB  GTB 3x 10 ea    BTB 3x10 ea BTB 3x15         Heel raises seated 3x15 seated 3x10  Stand 3x10          Toe raises seated 3x15 seated 3x 10  Stand 3x10          Bridge HEP   GTB 3x10         Hip 4 way HEP   supine 3x10         Clamshell    GTB 3x10         Toe curls w/ towel 5x            1st toe Inv/ev 3x30            Calf Str SB   90\"                                                              Ther Activity             TG   L22 3x10          FSD   6\" 3x10          LSD   4\" 3x10          FSU   6\" 3x10          LSU   6\" 3x10          Gait Training                                       Modalities             CP                               Donte Colby, PT  5/18/2023,7:22 PM  "

## 2023-05-25 ENCOUNTER — OFFICE VISIT (OUTPATIENT)
Dept: PHYSICAL THERAPY | Facility: CLINIC | Age: 48
End: 2023-05-25

## 2023-05-25 DIAGNOSIS — Z98.890 STATUS POST RIGHT FOOT SURGERY: Primary | ICD-10-CM

## 2023-05-25 DIAGNOSIS — S92.354D CLOSED NONDISPLACED FRACTURE OF FIFTH METATARSAL BONE OF RIGHT FOOT WITH ROUTINE HEALING, SUBSEQUENT ENCOUNTER: ICD-10-CM

## 2023-05-25 NOTE — PROGRESS NOTES
Daily Note    Today's date: 23  Patient name: Marlon Johnson  : 1975  MRN: [id number]  Referring provider: Caitlin Moore DPM  Dx:   Encounter Diagnosis     ICD-10-CM    1  Status post right foot surgery  Z98 890       2  Closed nondisplaced fracture of fifth metatarsal bone of right foot with routine healing, subsequent encounter  S92 354D           Start Time:   Stop Time: 1900  Total time in clinic (min): 45 minutes      Subjective: Kori Tong reports no c/o today, notes that his L knee is bothering him still  He notes that his first day of work will be Tuesday  Objective: See treatment diary below  Assessment: Wilber tolerated treatment well with consistent cuing throughout  TE's were performed with increased resistance and increased reps  New TE's were demonstrated with proper technique, and tolerated well  Following treatment, the patient demonstrated fatigue post treatment, exhibited good technique with therapeutic exercises and would benefit from continued PT  Plan: Continue per plan of care  Progress treatment as tolerated  Precautions: HTN, GREGG, ORIF R ankle 3/2/23, CAM Boot WBAT until     Access Code: [email or web address]  URL: [email or web address]  Date: 2023  Prepared by: Rober Melton    Exercises  - Seated Arch Lifts  - 1 x daily - 7 x weekly - 3 sets - 10 reps - 5 hold  - Towel Scrunches  - 1 x daily - 7 x weekly - 3 sets - 10 reps - 5 hold  - Ankle Inversion Eversion Towel Slide  - 1 x daily - 7 x weekly - 3 sets - 10 reps - 5 hold  - Ankle Inversion Eversion Towel Slide  - 1 x daily - 7 x weekly - 3 sets - 10 reps - 5 hold  - Seated Heel Toe Raises  - 1 x daily - 7 x weekly - 3 sets - 10 reps  - Seated Ankle Pumps  - 1 x daily - 7 x weekly - 3 sets - 10 reps  - Long Sitting Calf Stretch with Strap  - 1 x daily - 7 x weekly - 3 sets - 10 reps  - Supine Bridge  - 1 x daily - 7 x weekly - 3 sets - 10 reps  - Standing Hip Abduction with Counter Support  - 1 "x daily - 7 x weekly - 3 sets - 10 reps  - Standing Hip Extension with Counter Support  - 1 x daily - 7 x weekly - 3 sets - 10 reps    Manuals 4/26 5/4 5/10 5/18 5/25        AP Talocrural jt mob    TS         Ankle distraction    TS         Ankle PROM    TS         Dorsiflexion MWM traction    TS         Metatarsal mobilizations    TS                                                                          Neuro Re-Ed             SLS             MLA Shortening 3x10 3x 10            Rockerboard   2'/2'  2'/2'        spigit board     30\" x 3        STS walk             Tandem walk     10x10\"        SL clock             TERI                                                                              Ther Ex             Nustep  10 min L5 no UE  10' L5 no UE 10' L8 no UE 10' L8 no UE        Ankle 4 way TB  GTB 3x 10 ea    BTB 3x10 ea BTB 3x15 Black 2x15        Heel raises seated 3x15 seated 3x10  Stand 3x10  Stand 3x15        Toe raises seated 3x15 seated 3x 10  Stand 3x10  Stand 3x15        Bridge HEP   GTB 3x10         Hip 4 way HEP   supine 3x10         Clamshell    GTB 3x10         Toe curls w/ towel 5x            1st toe Inv/ev 3x30            Calf Str SB   90\"  90\"                                                            Ther Activity             TG   L22 3x10  L22 3x12        FSD   6\" 3x10          LSD   4\" 3x10          FSU   6\" 3x10          LSU   6\" 3x10          Gait Training                                       Modalities             CP                               Arch Carroll, PT  5/25/2023,6:29 PM  "

## 2023-05-31 ENCOUNTER — EVALUATION (OUTPATIENT)
Dept: PHYSICAL THERAPY | Facility: CLINIC | Age: 48
End: 2023-05-31

## 2023-05-31 DIAGNOSIS — Z98.890 STATUS POST RIGHT FOOT SURGERY: Primary | ICD-10-CM

## 2023-05-31 DIAGNOSIS — S92.354D CLOSED NONDISPLACED FRACTURE OF FIFTH METATARSAL BONE OF RIGHT FOOT WITH ROUTINE HEALING, SUBSEQUENT ENCOUNTER: ICD-10-CM

## 2023-05-31 NOTE — PROGRESS NOTES
PT Discharge    Today's date: 2023  Patient name: Alhaji Kumar  : 1975  MRN: [id number]  Referring provider: Dashawn Boothe DPM  Dx:   Encounter Diagnosis     ICD-10-CM    1  Status post right foot surgery  Z98 890       2  Closed nondisplaced fracture of fifth metatarsal bone of right foot with routine healing, subsequent encounter  S92 354D           Start Time: 1700  Stop Time: 1745  Total time in clinic (min): 45 minutes    Assessment  Assessment details: Alhaji Kumar  is a a pleasant 49 y o  male who presents today for re-evaluation for his R foot surgery  he complains of no pain at this time  The patient demonstrates within functional limits strength during resisted muscle testing, which has improved from previous evaluation  Pt ROM is within functional limits with no pain, which has improved from previous evaluation  The patient has improved function since starting therapy and has no difficulty with movement  Staci Lime has met all of his goals at this time  Plan of care will be discontinued, and patient discharged to an independent HEP  Impairments: abnormal coordination, abnormal gait, abnormal or restricted ROM, activity intolerance, impaired balance, impaired physical strength, lacks appropriate home exercise program, pain with function, safety issue and weight-bearing intolerance  Understanding of Dx/Px/POC: excellent  Goals  ST  Independent with HEP in 2 weeks  - GOAL MET  2  Pt will have verbal report of improvement in symptoms by >/=25% in 2 weeks  - GOAL MET  3  Decrease pain to 0/10 at it's worst  - GOAL MET  4  Increase strength by 1/2 grade in all deficient planes  - GOAL MET    To be achieved by D/C   LT  Pt will improve FOTO score by >/= goal points in 6 weeks  - GOAL MET  2  Pt will improve FOTO score to >/= goal score by visit # 12  - GOAL MET  3  Pt will be able to walk a mile with little to no difficulty  - GOAL MET  4   Pt will be able to stand for a shift at work with little to no difficulty  - GOAL MET  5  Pt will be able to perform his usual hobbies including camping and hiking with little to no difficulty  - GOAL MET    Plan  Plan of Care beginning date: 5/31/2023  Plan of Care expiration date: 5/31/2023  Treatment plan discussed with: patient        Subjective Evaluation    History of Present Illness  Mechanism of injury: Nisreen Mike reports that he has not had any pain in the foot recently  He went back to work yesterday and walked about 10k steps  He notes that his foot felt great in the work boot, but he had some pain in the knee so had to switch shoes  Nisreen Mike notes adherence to HEP and is comfortable with being discharged today  Pain  No pain reported    Patient Goals  Patient goals for therapy: decreased edema, decreased pain, improved balance, increased motion, return to work, return to [employer] activities, independence with ADLs/IADLs and increased strength          Objective     Active Range of Motion   Left Ankle/Foot   Dorsiflexion (ke): 15 degrees   Plantar flexion: 70 degrees   Inversion: 40 degrees   Eversion: 20 degrees     Right Ankle/Foot   Dorsiflexion (ke): 15 degrees   Plantar flexion: 70 degrees   Inversion: 40 degrees   Eversion: 20 degrees     Strength/Myotome Testing     Left Ankle/Foot   Normal strength    Right Ankle/Foot   Normal strength  Dorsiflexion: 5  Plantar flexion: 5  Inversion: 5  Eversion: 5      Flowsheet Rows    Flowsheet Row Most Recent Value   PT/OT G-Codes    Current Score 99   Projected Score 66             Precautions: HTN, GREGG, ORIF R ankle 3/2/23, CAM Boot WBAT until 5/8    Access Code: [email or web address]  URL: [email or web address]  Date: 05/31/2023  Prepared by: Kendra Staples    Manuals 4/26 5/4 5/10 5/18 5/25 5/31       AP Talocrural jt mob    TS         Ankle distraction    TS         Ankle PROM    TS         Dorsiflexion MWM traction    TS         Metatarsal mobilizations    TS         Re-eval      TS "                                                  Neuro Re-Ed             SLS             MLA Shortening 3x10 3x 10            Rockerboard   2'/2'  2'/2' 2'/2'       Wobble board     30\" x 3        STS walk             Tandem walk     10x10\"        SL clock             TERI                                                                              Ther Ex             Nustep  10 min L5 no UE  10' L5 no UE 10' L8 no UE 10' L8 no UE 10' L8       Ankle 4 way TB  GTB 3x 10 ea    BTB 3x10 ea BTB 3x15 Black 2x15 Black 2x15       Heel raises seated 3x15 seated 3x10  Stand 3x10  Stand 3x15 Stand 3x15       Toe raises seated 3x15 seated 3x 10  Stand 3x10  Stand 3x15 Stand 3x15       Bridge HEP   GTB 3x10         Hip 4 way HEP   supine 3x10         Clamshell    GTB 3x10         Toe curls w/ towel 5x            1st toe Inv/ev 3x30            Calf Str SB   90\"  90\"                                                            Ther Activity             TG   L22 3x10  L22 3x12        FSD   6\" 3x10          LSD   4\" 3x10          FSU   6\" 3x10          LSU   6\" 3x10          Gait Training                                       Modalities             CP                             "

## 2023-06-12 ENCOUNTER — OFFICE VISIT (OUTPATIENT)
Dept: PODIATRY | Facility: CLINIC | Age: 48
End: 2023-06-12
Payer: COMMERCIAL

## 2023-06-12 ENCOUNTER — APPOINTMENT (OUTPATIENT)
Dept: RADIOLOGY | Facility: CLINIC | Age: 48
End: 2023-06-12
Payer: COMMERCIAL

## 2023-06-12 VITALS
DIASTOLIC BLOOD PRESSURE: 93 MMHG | WEIGHT: 315 LBS | BODY MASS INDEX: 41.75 KG/M2 | SYSTOLIC BLOOD PRESSURE: 144 MMHG | HEART RATE: 67 BPM | HEIGHT: 73 IN

## 2023-06-12 DIAGNOSIS — Z98.890 STATUS POST RIGHT FOOT SURGERY: ICD-10-CM

## 2023-06-12 DIAGNOSIS — Z98.890 STATUS POST RIGHT FOOT SURGERY: Primary | ICD-10-CM

## 2023-06-12 DIAGNOSIS — M21.6X9 CAVUS FOOT, ACQUIRED: ICD-10-CM

## 2023-06-12 PROCEDURE — 99213 OFFICE O/P EST LOW 20 MIN: CPT | Performed by: STUDENT IN AN ORGANIZED HEALTH CARE EDUCATION/TRAINING PROGRAM

## 2023-06-12 PROCEDURE — 73630 X-RAY EXAM OF FOOT: CPT

## 2023-06-12 NOTE — PROGRESS NOTES
Assessment/Plan:    No problem-specific Assessment & Plan notes found for this encounter  Diagnoses and all orders for this visit:    Status post right foot surgery  -     X-ray foot right 3+ views; Future    Cavus foot, acquired      Plan:    -  Patient was counseled and educated on the condition and the diagnosis  The diagnosis, treatment options and prognosis were discussed in detail    - Right foot x-rays obtained, I personally reviewed the x-ray finding with patient which is consistent with healing fifth met base fracture with intact hardware   -Continue weight-bear as tolerated in sneakers, may wear compression stockings for edema control  Patient has returned to full duty work reports no complaints   -Return as needed, addressed all questions and concerns  Subjective:      Patient ID: Soumya Santizo  is a 49 y o  male  Patient presents for post-op appointment S/p right 5th metatarsal ORIF DOS: 3/2/3023  Patient reports he is doing well without any discomfort to his foot  He does complain of swelling at the end of his work shift which subsides in the morning time with feet elevated  He wears compression stockings  He denies any other complaints  The following portions of the patient's history were reviewed and updated as appropriate:   He  has a past medical history of Asthma, Diabetes mellitus ([address] ), Diabetes type 2, uncontrolled (05/06/2014), Diverticulitis, Hyperlipidemia, Hypertension, Immunity to measles determined by serologic test (07/02/2019), PONV (postoperative nausea and vomiting), Thyroid cancer ([address] ), Thyroid cancer ([facility] [address] ) (02/12/2020), and Type 2 diabetes mellitus ([facility] [address] ) (07/17/2013)    He   Patient Active Problem List    Diagnosis Date Noted   • PONV (postoperative nausea and vomiting)    • Preop exam for internal medicine 02/23/2023   • Closed nondisplaced fracture of fifth metatarsal bone of right foot 02/21/2023   • Elevated CO2 level 07/08/2022   • GREGG (obstructive "sleep apnea)    • Hypercalciuria 02/15/2022   • Lumbar disc herniation 01/03/2019   • Sciatic nerve disease, left 05/21/2018   • Acute bilateral low back pain with left-sided sciatica 05/16/2017   • Chronic pain disorder 01/31/2017   • Lumbar radiculopathy 01/31/2017   • Myofascial pain syndrome 01/31/2017   • History of thyroid cancer 10/11/2016   • Class 3 severe obesity due to excess calories with serious comorbidity and body mass index (BMI) of 45 0 to 49 9 in adult [facility]) 10/11/2016   • Vitamin D deficiency 05/03/2016   • Hypoparathyroidism ([facility] [address] ) 12/17/2013   • Hypothyroidism 03/15/2013   • Benign essential hypertension 10/04/2012   • Hypercholesterolemia 08/09/2012     He  has a past surgical history that includes Thyroidectomy; Colostomy; pr arthrs kne surg w/meniscectomy med/lat w/shvg (Left, 08/17/2017); BIOPSY CORE NEEDLE; Chest wall biopsy (N/A, 02/07/2020); Colon surgery; and pr open treatment metatarsal fracture each (Right, 3/2/2023)       Review of Systems   All other systems reviewed and are negative  Objective:      /93 (BP Location: Right arm, Patient Position: Sitting, Cuff Size: Standard) Comment (BP Location): LOWER ARM  Pulse 67   Ht 6' 1\" (1 854 m)   Wt (!) 179 kg (394 lb)   BMI 51 98 kg/m²          Physical Exam  Vitals reviewed  Musculoskeletal:      Right foot: Normal range of motion  No tenderness  Comments: No pain with palpation noted throughout the right foot along the fifth metatarsal   Within normal ankle and subtalar joint range of motion  Light touch sensation at baseline  MSK and NVS Status intact  Cavus foot type deformity           "

## 2023-06-17 ENCOUNTER — APPOINTMENT (OUTPATIENT)
Dept: LAB | Facility: CLINIC | Age: 48
End: 2023-06-17
Payer: COMMERCIAL

## 2023-06-17 LAB
ALBUMIN SERPL BCP-MCNC: 3.8 G/DL (ref 3.5–5)
CA-I BLD-SCNC: 0.93 MMOL/L (ref 1.12–1.32)
CALCIUM 24H UR-MCNC: 335 MG/24 HRS (ref 42–353)
CALCIUM SERPL-MCNC: 7.8 MG/DL (ref 8.3–10.1)
CREAT 24H UR-MRATE: 2 G/24HR (ref 0.8–1.8)
SPECIMEN VOL UR: 2500 ML
SPECIMEN VOL UR: 2500 ML
T4 FREE SERPL-MCNC: 1.13 NG/DL (ref 0.61–1.12)
TSH SERPL DL<=0.05 MIU/L-ACNC: 8.36 UIU/ML (ref 0.45–4.5)

## 2023-06-21 ENCOUNTER — TELEPHONE (OUTPATIENT)
Dept: ENDOCRINOLOGY | Facility: CLINIC | Age: 48
End: 2023-06-21

## 2023-06-21 DIAGNOSIS — E03.9 HYPOTHYROIDISM, UNSPECIFIED TYPE: ICD-10-CM

## 2023-06-21 DIAGNOSIS — E55.9 VITAMIN D DEFICIENCY: Primary | ICD-10-CM

## 2023-06-21 DIAGNOSIS — E83.51 HYPOCALCEMIA: ICD-10-CM

## 2023-06-21 RX ORDER — LEVOTHYROXINE SODIUM 300 MCG
300 TABLET ORAL DAILY
Qty: 30 TABLET | Refills: 2 | Status: SHIPPED | OUTPATIENT
Start: 2023-06-21

## 2023-06-21 RX ORDER — LEVOTHYROXINE SODIUM 0.2 MG/1
TABLET ORAL
Qty: 120 TABLET | Refills: 2 | Status: SHIPPED | OUTPATIENT
Start: 2023-06-21 | End: 2023-06-21 | Stop reason: ALTCHOICE

## 2023-06-21 RX ORDER — LEVOTHYROXINE SODIUM 50 MCG
50 TABLET ORAL DAILY
Qty: 30 TABLET | Refills: 2 | Status: SHIPPED | OUTPATIENT
Start: 2023-06-21

## 2023-06-21 NOTE — TELEPHONE ENCOUNTER
Spoke with patient to discuss labs  He is taking his medications daily and correctly  He reports recent weight gain which may explain his elevated TSH but could also be due to elevated TSH for some time- difficult to say  He is currently taking 2,200 mcg per week  I will increase this to 2,450 mcg per week (350 mcg/day)  He requires TSH suppression given his history of thyroid cancer  His calcium level on recent lab is low  He is taking 2400 mg daily  His urine calcium improved  I would advise patient to increase his calcium intake by 1/2 tablet (he was previously doing 3,000 mg and hypercalciuric)  He should continue with calcitriol  I will also check a vitamin D level at this time  He requires brand Synthroid over generic

## 2023-07-07 ENCOUNTER — AMB VIDEO VISIT (OUTPATIENT)
Dept: OTHER | Facility: HOSPITAL | Age: 48
End: 2023-07-07

## 2023-07-07 DIAGNOSIS — J01.20 ACUTE NON-RECURRENT ETHMOIDAL SINUSITIS: Primary | ICD-10-CM

## 2023-07-07 PROBLEM — Z01.818 PREOP EXAM FOR INTERNAL MEDICINE: Status: RESOLVED | Noted: 2023-02-23 | Resolved: 2023-07-07

## 2023-07-07 PROBLEM — S92.354A CLOSED NONDISPLACED FRACTURE OF FIFTH METATARSAL BONE OF RIGHT FOOT: Status: RESOLVED | Noted: 2023-02-21 | Resolved: 2023-07-07

## 2023-07-07 PROCEDURE — ECARE PR SL URGENT CARE VIRTUAL VISIT: Performed by: PHYSICIAN ASSISTANT

## 2023-07-07 RX ORDER — AMOXICILLIN AND CLAVULANATE POTASSIUM 875; 125 MG/1; MG/1
1 TABLET, FILM COATED ORAL 2 TIMES DAILY
Qty: 20 TABLET | Refills: 0 | Status: SHIPPED | OUTPATIENT
Start: 2023-07-07 | End: 2023-07-17

## 2023-07-07 NOTE — PATIENT INSTRUCTIONS
As discussed, sinus infections are typically viral and will get better on their own in 7-10 days. You should try symptomatic relief in the meantime with OTC (Claritin or Zyrtec), Afrin up to 3 days then switch to Flonase, and Coricidin. You can also try sinus rinses with a neti pot or nasal lavage (be sure to use distilled water.) If your symptoms do not improve after 7-10 days, you may need antibiotics because it is more likely to be bacterial at that point. Follow-up with your primary care physician for recheck in 2-3 business days. Go to the ER for sudden severe headache, high fevers, change in vision, seizure activity or anything else that is concerning.

## 2023-07-07 NOTE — CARE ANYWHERE EVISITS
Visit Summary for Patricia Morris - Gender: Male - Date of Birth: [id number]  Date: [id number] - Duration: 7 minutes  Patient: Patricia Morris  Provider: Claire River PA-C    Patient Contact Information  Address  [address] [place] [address]  [phone]    Visit Topics  Cold [Added By: Self - 2023-07-07]    Triage Questions   What is your current physical address in the event of a medical emergency? Answer []  Are you allergic to any medications? Answer []  Are you now or could you be pregnant? Answer []  Do you have any immune system compromise or chronic lung   disease? Answer []  Do you have any vulnerable family members in the home (infant, pregnant, cancer, elderly)? Answer []     Conversation Transcripts  [0A][0A] [Notification] You are connected with Claire River PA-C, Urgent Care Specialist.[0A][Notification] Patricia Morris is located in [place]. [0A][Notification] Patricia Morris has shared health history. Tato Gonzalez .[0A]    Diagnosis  Acute ethmoidal sinusitis, unspecified    Procedures  Value: [id number] Code: CPT-4 UNLISTED E&M SERVICE    Medications Prescribed    No prescriptions ordered    Electronically signed by: Gina Morton(NPI [id number])

## 2023-07-07 NOTE — PROGRESS NOTES
Video Visit - Zandra Jha. 49 y.o. male MRN: [id number]    REQUIRED DOCUMENTATION:         1. This service was provided via [employer]. 2. Provider located at [facility]  [address] [facility] [address] [phone]. 3. [facility] provider: Severiano Figueroa PA-C.  4. Identify all parties in room with patient during [facility] visit:  No one else  5. After connecting through televideo, patient was identified by name and date of birth. Patient was then informed that this was a Telemedicine visit and that the exam was being conducted confidentially over secure lines. My office door was closed. No one else was in the room. Patient acknowledged consent and understanding of privacy and security of the Telemedicine visit. I informed the patient that I have reviewed their record in Epic and presented the opportunity for them to ask any questions regarding the visit today. The patient agreed to participate. VITALS: Heart Rate: 60 BPM, Respiratory Rate: 12 RPM, Temperature 96.8° F, Blood Pressure Unavailable mmHg, Pulse Ox Unavailable % on RA    HPI  Pt reports sinus infection x 1 week after being exposed to outdoor air pollutant. Reports PND leading to cough, ethmoid sinus pressure. Coricidin, flonase without relief. Denies fevers, CP, SOB  Physical Exam  Constitutional:       General: He is not in acute distress. Appearance: Normal appearance. He is not toxic-appearing. HENT:      Head: Normocephalic and atraumatic. Nose: No rhinorrhea. Mouth/Throat:      Mouth: Mucous membranes are moist.   Eyes:      Conjunctiva/sclera: Conjunctivae normal.   Cardiovascular:      Rate and Rhythm: Normal rate. Pulmonary:      Effort: Pulmonary effort is normal. No respiratory distress. Breath sounds: No wheezing (no gross audible wheeze through computer). Musculoskeletal:      Cervical back: Normal range of motion. Skin:     Findings: No rash (on face or neck).    Neurological: Mental Status: He is alert. Cranial Nerves: No dysarthria or facial asymmetry. Psychiatric:         Mood and Affect: Mood normal.         Behavior: Behavior normal.         Diagnoses and all orders for this visit:    Acute non-recurrent ethmoidal sinusitis  -     amoxicillin-clavulanate (AUGMENTIN) 875-125 mg per tablet; Take 1 tablet by mouth 2 (two) times a day for 10 days      Patient Instructions   As discussed, sinus infections are typically viral and will get better on their own in 7-10 days. You should try symptomatic relief in the meantime with OTC (Claritin or Zyrtec), Afrin up to 3 days then switch to Flonase, and Coricidin. You can also try sinus rinses with a neti pot or nasal lavage (be sure to use distilled water.) If your symptoms do not improve after 7-10 days, you may need antibiotics because it is more likely to be bacterial at that point. Follow-up with your primary care physician for recheck in 2-3 business days. Go to the ER for sudden severe headache, high fevers, change in vision, seizure activity or anything else that is concerning.

## 2023-07-21 DIAGNOSIS — R82.994 HYPERCALCIURIA: ICD-10-CM

## 2023-07-21 DIAGNOSIS — E83.51 HYPOCALCEMIA: ICD-10-CM

## 2023-07-21 RX ORDER — HYDROCHLOROTHIAZIDE 25 MG/1
TABLET ORAL
Qty: 180 TABLET | Refills: 3 | Status: SHIPPED | OUTPATIENT
Start: 2023-07-21

## 2023-08-21 DIAGNOSIS — E03.9 HYPOTHYROIDISM, UNSPECIFIED TYPE: ICD-10-CM

## 2023-08-22 RX ORDER — LEVOTHYROXINE SODIUM 0.2 MG/1
TABLET ORAL
Qty: 126 TABLET | Refills: 0 | Status: SHIPPED | OUTPATIENT
Start: 2023-08-22

## 2023-10-16 DIAGNOSIS — R52 PAIN: ICD-10-CM

## 2023-10-16 RX ORDER — DICLOFENAC SODIUM AND MISOPROSTOL 75; 200 MG/1; UG/1
TABLET, DELAYED RELEASE ORAL
Qty: 180 TABLET | Refills: 3 | Status: SHIPPED | OUTPATIENT
Start: 2023-10-16

## 2023-10-23 DIAGNOSIS — I10 HYPERTENSION, UNSPECIFIED TYPE: ICD-10-CM

## 2023-10-23 RX ORDER — CALCITRIOL 0.25 UG/1
CAPSULE, LIQUID FILLED ORAL
Qty: 180 CAPSULE | Refills: 0 | Status: SHIPPED | OUTPATIENT
Start: 2023-10-23

## 2023-10-23 RX ORDER — AMLODIPINE BESYLATE 5 MG/1
TABLET ORAL
Qty: 90 TABLET | Refills: 0 | Status: SHIPPED | OUTPATIENT
Start: 2023-10-23

## 2023-10-25 DIAGNOSIS — E78.5 HYPERLIPIDEMIA, UNSPECIFIED HYPERLIPIDEMIA TYPE: ICD-10-CM

## 2023-10-25 NOTE — TELEPHONE ENCOUNTER
Requested medication(s) are due for refill today: Yes  Patient has already received a courtesy refill: No  Other reason request has been forwarded to provider: failed guidelines

## 2023-11-01 ENCOUNTER — APPOINTMENT (OUTPATIENT)
Dept: LAB | Facility: CLINIC | Age: 48
End: 2023-11-01
Payer: COMMERCIAL

## 2023-11-01 DIAGNOSIS — E83.51 HYPOCALCEMIA: ICD-10-CM

## 2023-11-01 DIAGNOSIS — E03.9 HYPOTHYROIDISM, UNSPECIFIED TYPE: ICD-10-CM

## 2023-11-01 DIAGNOSIS — E55.9 VITAMIN D DEFICIENCY: ICD-10-CM

## 2023-11-01 LAB
25(OH)D3 SERPL-MCNC: 50.1 NG/ML (ref 30–100)
ALBUMIN SERPL BCP-MCNC: 4.1 G/DL (ref 3.5–5)
CALCIUM SERPL-MCNC: 8.4 MG/DL (ref 8.4–10.2)
T4 FREE SERPL-MCNC: 1.09 NG/DL (ref 0.61–1.12)
TSH SERPL DL<=0.05 MIU/L-ACNC: 3.69 UIU/ML (ref 0.45–4.5)

## 2023-11-01 PROCEDURE — 36415 COLL VENOUS BLD VENIPUNCTURE: CPT

## 2023-11-01 PROCEDURE — 82306 VITAMIN D 25 HYDROXY: CPT

## 2023-11-01 PROCEDURE — 84443 ASSAY THYROID STIM HORMONE: CPT

## 2023-11-01 PROCEDURE — 82310 ASSAY OF CALCIUM: CPT

## 2023-11-01 PROCEDURE — 84439 ASSAY OF FREE THYROXINE: CPT

## 2023-11-01 PROCEDURE — 82040 ASSAY OF SERUM ALBUMIN: CPT

## 2023-11-01 RX ORDER — SIMVASTATIN 10 MG
TABLET ORAL
Qty: 90 TABLET | Refills: 0 | Status: SHIPPED | OUTPATIENT
Start: 2023-11-01

## 2023-11-02 ENCOUNTER — OFFICE VISIT (OUTPATIENT)
Dept: ENDOCRINOLOGY | Facility: CLINIC | Age: 48
End: 2023-11-02
Payer: COMMERCIAL

## 2023-11-02 VITALS
DIASTOLIC BLOOD PRESSURE: 94 MMHG | SYSTOLIC BLOOD PRESSURE: 136 MMHG | HEIGHT: 73 IN | BODY MASS INDEX: 41.75 KG/M2 | HEART RATE: 73 BPM | WEIGHT: 315 LBS

## 2023-11-02 DIAGNOSIS — E11.9 TYPE 2 DIABETES MELLITUS WITHOUT COMPLICATION, WITHOUT LONG-TERM CURRENT USE OF INSULIN (HCC): ICD-10-CM

## 2023-11-02 DIAGNOSIS — E20.9 HYPOPARATHYROIDISM, UNSPECIFIED HYPOPARATHYROIDISM TYPE (HCC): ICD-10-CM

## 2023-11-02 DIAGNOSIS — E55.9 VITAMIN D DEFICIENCY: ICD-10-CM

## 2023-11-02 DIAGNOSIS — Z85.850 HISTORY OF THYROID CANCER: ICD-10-CM

## 2023-11-02 DIAGNOSIS — E11.649 UNCONTROLLED TYPE 2 DIABETES MELLITUS WITH HYPOGLYCEMIA, UNSPECIFIED HYPOGLYCEMIA COMA STATUS (HCC): Primary | ICD-10-CM

## 2023-11-02 DIAGNOSIS — R82.994 HYPERCALCIURIA: ICD-10-CM

## 2023-11-02 DIAGNOSIS — E03.9 HYPOTHYROIDISM, UNSPECIFIED TYPE: ICD-10-CM

## 2023-11-02 PROCEDURE — 99214 OFFICE O/P EST MOD 30 MIN: CPT | Performed by: PHYSICIAN ASSISTANT

## 2023-11-02 RX ORDER — FLUTICASONE PROPIONATE 50 MCG
SPRAY, SUSPENSION (ML) NASAL
COMMUNITY

## 2023-11-02 RX ORDER — LEVOTHYROXINE SODIUM 200 MCG
TABLET ORAL
Qty: 150 TABLET | Refills: 3 | Status: SHIPPED | OUTPATIENT
Start: 2023-11-02

## 2023-11-02 NOTE — ASSESSMENT & PLAN NOTE
He has been off medications for the past few years.   We will check fasting glucose and A1c with next blood test.  Lab Results   Component Value Date    HGBA1C 5.7 (H) 10/01/2021

## 2023-11-02 NOTE — ASSESSMENT & PLAN NOTE
TSH is above target. Increase Synthroid to 200 mcg, 2 tablets Monday through Friday, 1 tablet on Saturday Sunday. Check TSH and free T4 in 6 weeks.

## 2023-11-02 NOTE — PROGRESS NOTES
08/17/22      Dee Root  [address]  [facility] [address]      To Whom It May Concern,,    Dee Root was seen today and requires more assistance at home.  She has currently only been approved for 40 hrs/week and she requires more.  Her daughter who is caring for her cannot manage caring for her the additional 80 hrs/week.  She is suffering from severe caregiver fatigue and needs additional assistance.    Sincerely,         Yashira Terrazas MD  [facility] [place], [address] [place] [address] [address] [place] [id number]  Phone: [phone]               Established Patient Progress Note       Chief Complaint   Patient presents with   • Thyroid Cancer   • Hypothyroidism        Impression & Plan:    Problem List Items Addressed This Visit        Endocrine    Hypoparathyroidism ([address])     Continue current calcium, vitamin D, and calcitriol supplements. Hypothyroidism     TSH is above target. Increase Synthroid to 200 mcg, 2 tablets Monday through Friday, 1 tablet on Saturday Sunday. Check TSH and free T4 in 6 weeks. Relevant Medications    Synthroid 200 MCG tablet    Other Relevant Orders    TSH, 3rd generation    T4, free    Thyroglobulin    RESOLVED: Type 2 diabetes mellitus, without long-term current use of insulin ([address])     He has been off medications for the past few years. We will check fasting glucose and A1c with next blood test.  Lab Results   Component Value Date    HGBA1C 5.7 (H) 10/01/2021            Relevant Orders    Comprehensive metabolic panel    Hemoglobin A1C       Genitourinary    Hypercalciuria     Continue hydrochlorothiazide            Other    History of thyroid cancer     Order thyroglobulin panel. Vitamin D deficiency     Vitamin D level is normal.  Continue supplements. Other Visit Diagnoses     Uncontrolled type 2 diabetes mellitus with hypoglycemia, unspecified hypoglycemia coma status ([address])    -  Primary            Orders Placed This Encounter   Procedures   • TSH, 3rd generation     This is a patient instruction: This test is non-fasting. Please drink two glasses of water morning of bloodwork.         Standing Status:   Future     Standing Expiration Date:   11/2/2024   • T4, free     Standing Status:   Future     Standing Expiration Date:   11/2/2024   • Thyroglobulin     Thyroglobulin Panel should include both Quantitative thyroglobulin and Thyroglobulin antibody     Standing Status:   Future     Standing Expiration Date:   11/2/2024   • Comprehensive metabolic panel     This is a patient instruction: Patient fasting for 8 hours or longer recommended. Standing Status:   Future     Standing Expiration Date:   11/2/2024   • Hemoglobin A1C     Standing Status:   Future     Standing Expiration Date:   11/2/2024         History of Present Illness:     Henna Alvares is a 51 y.o. male with a history of Thyroid Cancer, Post-Surgical Hypothyroidism, hypoparathyroidism, Type 2 Diabetes, Hypertension, and Hyperlipidemia. For the Papillary thyroid Cancer metastatic to cervical lymph nodes, He has had history of Thyroidectomy in 2009, RadioIodine Therapy 2009, and Radical Neck Dissection in 2011. Recent ultrasound6/2022 showed no evidence of recurrent/metastatic disease. Recent thyroglobulin 11/2022 4.7 (stable)     For the Hypothyroidism, he is taking Synthroid 200mcg Mon-Weds, 2 tabs thurs/fri/sat/sun. He takes separately from calcium supplements. Needs brand name synthroid. For the Hypoparathyroidism, he is taking Calcium supplements and calcitriol. He denies kidney stones. Denies symptoms of hypocalcemia. Since last visit he had a 5th metatarsal fracture which occurred while walking across the street. This required treatment with surgery. It was at the site of an old fracture from football injury (right 5th metatarsal) . Current dosing              Calcitriol -0.25mcg twice per day                Calcium Supplement -- 600mg 4x per day                 For the Type 2 Diabetes, he remains off all medication x about 4 years. Remains on low carb diet. Previously treated with metformin and jardiance. Previously treated with phentermine for weight loss. Previously tried saxenda which did not help.               Patient Active Problem List   Diagnosis   • Benign essential hypertension   • Chronic pain disorder   • Hypercholesterolemia   • Hypoparathyroidism ([address])   • Hypothyroidism   • Acute bilateral low back pain with left-sided sciatica   • Lumbar radiculopathy   • History of thyroid cancer   • Class 3 severe obesity due to excess calories with serious comorbidity and body mass index (BMI) of 45.0 to 49.9 in adult [facility])   • Myofascial pain syndrome   • Vitamin D deficiency   • Sciatic nerve disease, left   • Lumbar disc herniation   • Hypercalciuria   • GREGG (obstructive sleep apnea)   • Elevated CO2 level   • PONV (postoperative nausea and vomiting)      Past Medical History:   Diagnosis Date   • Asthma    • Diabetes mellitus ([address])     Diet Controlled   • Diabetes type 2, uncontrolled 05/06/2014   • Diverticulitis    • Hyperlipidemia    • Hypertension    • Immunity to measles determined by serologic test 07/02/2019   • PONV (postoperative nausea and vomiting)    • Thyroid cancer ([address])     radioactive iodine    • Thyroid cancer ([address]) 02/12/2020   • Type 2 diabetes mellitus ([address]) 07/17/2013      Past Surgical History:   Procedure Laterality Date   • BIOPSY CORE NEEDLE      Thyroid   • CHEST WALL BIOPSY N/A 02/07/2020    Procedure: EXCISION  BIOPSY LESION/MASS from back;  Surgeon: Meaghan Pimentel MD;  Location: [facility];  Service: General   • COLON SURGERY     • COLOSTOMY      with reversal   • OR ARTHRS KNE SURG W/MENISCECTOMY MED/LAT W/SHVG Left 08/17/2017    Procedure: KNEE ARTHROSCOPIC PARTIAL LATERAL MENISCECTOMY ; PATELLO Jose C Santamaria;  Surgeon: Miguelina Ojeda MD;  Location:  MAIN OR;  Service: Orthopedics   • OR OPEN TREATMENT METATARSAL FRACTURE EACH Right 3/2/2023    Procedure: OPEN REDUCTION W/ INTERNAL FIXATION (ORIF) FOOT 5TH METATARSAL;  Surgeon: Meeta Peterson DPM;  Location: [facility];  Service: Podiatry   • THYROIDECTOMY      Removal of 3 Parathyroids      Family History   Problem Relation Age of Onset   • Diabetes Mother    • Hypertension Mother    • Hypertension Father    • Hyperlipidemia Father    • Thyroid disease unspecified Sister    • Sleep apnea Neg Hx      Social History     Tobacco Use   • Smoking status: Former     Packs/day: 1.00     Years: 10.00     Total pack years: 10. 00     Types: Cigarettes     Quit date:      Years since quittin.8   • Smokeless tobacco: Never   Substance Use Topics   • Alcohol use: Yes     Comment: 3x a week     No Known Allergies    Current Outpatient Medications:   •  amLODIPine (NORVASC) 5 mg tablet, TAKE 1 TABLET DAILY, Disp: 90 tablet, Rfl: 0  •  aspirin (ECOTRIN LOW STRENGTH) 81 mg EC tablet, Take 81 mg by mouth daily Prophylactic usage, Disp: , Rfl:   •  calcitriol (ROCALTROL) 0.25 mcg capsule, TAKE 1 CAPSULE TWICE A DAY, Disp: 180 capsule, Rfl: 0  •  Calcium Carb-Cholecalciferol (CALCIUM 600 + D PO), Take 600 mg by mouth 4 (four) times a day Does Not have a Parathyroid, Disp: , Rfl:   •  diclofenac-misoprostol (ARTHROTEC 75) 75-0.2 MG per tablet, TAKE 1 TABLET TWICE A DAY, Disp: 180 tablet, Rfl: 3  •  fluticasone (FLONASE) 50 mcg/act nasal spray, , Disp: , Rfl:   •  hydrochlorothiazide (HYDRODIURIL) 25 mg tablet, TAKE 1 TABLET TWICE A DAY. (IN ADDITION TO THE LOSARTAN/HCTZ), Disp: 180 tablet, Rfl: 3  •  losartan-hydrochlorothiazide (HYZAAR) 100-25 MG per tablet, TAKE 1 TABLET DAILY, Disp: 90 tablet, Rfl: 3  •  simvastatin (ZOCOR) 10 mg tablet, TAKE 1 TABLET AT BEDTIME, Disp: 90 tablet, Rfl: 0  •  Synthroid 200 MCG tablet, 2 tabs mon-fri, 1 tab sat/sun, Disp: 150 tablet, Rfl: 3    Review of Systems   Constitutional:  Negative for activity change, appetite change and fatigue. HENT:  Negative for sore throat, trouble swallowing and voice change. Eyes:  Negative for visual disturbance. Respiratory:  Negative for choking, chest tightness and shortness of breath. Cardiovascular:  Negative for chest pain, palpitations and leg swelling. Gastrointestinal:  Negative for abdominal pain, constipation and diarrhea. Endocrine: Negative for cold intolerance, heat intolerance, polydipsia, polyphagia and polyuria. Genitourinary:  Negative for frequency. Musculoskeletal:  Negative for arthralgias and myalgias. Skin:  Negative for rash. Neurological:  Negative for dizziness and syncope. Hematological:  Negative for adenopathy. Psychiatric/Behavioral:  Negative for sleep disturbance. All other systems reviewed and are negative. Physical Exam:  Body mass index is 50.5 kg/m². /94   Pulse 73   Ht 6' 1" (1.854 m)   Wt (!) 174 kg (382 lb 12.8 oz)   BMI 50.50 kg/m²    Wt Readings from Last 3 Encounters:   11/02/23 (!) 174 kg (382 lb 12.8 oz)   06/12/23 (!) 179 kg (394 lb)   05/08/23 (!) 171 kg (377 lb)       Physical Exam  Vitals reviewed. Constitutional:       General: He is not in acute distress. Appearance: He is well-developed. HENT:      Head: Normocephalic and atraumatic. Eyes:      Conjunctiva/sclera: Conjunctivae normal.      Pupils: Pupils are equal, round, and reactive to light. Neck:      Thyroid: No thyromegaly. Cardiovascular:      Rate and Rhythm: Normal rate and regular rhythm. Heart sounds: Normal heart sounds. No murmur heard. Pulmonary:      Effort: Pulmonary effort is normal. No respiratory distress. Breath sounds: Normal breath sounds. No wheezing or rales. Abdominal:      General: Bowel sounds are normal. There is no distension. Palpations: Abdomen is soft. Tenderness: There is no abdominal tenderness. Musculoskeletal:         General: Normal range of motion. Cervical back: Normal range of motion and neck supple. Lymphadenopathy:      Cervical: No cervical adenopathy. Skin:     General: Skin is warm and dry. Neurological:      Mental Status: He is alert and oriented to person, place, and time.          Labs:     Component      Latest Ref Rng 6/17/2023 11/1/2023   24 HR URINE VOLUME      mL 2,500     CALCIUM 24 HOUR URINE      42 - 353 mg/24 hrs 335     CREATININE 24 HOUR UR      0.8 - 1.8 g/24Hr 2.0 (H)     TOTAL URINE VOLUME      ml 2,500     TSH 3RD GENERATON      0.450 - 4.500 uIU/mL 8.362 (H)  3.689    Free T4      0.61 - 1.12 ng/dL 1.13 (H)  1.09 Calcium      8.4 - 10.2 mg/dL 7.8 (L)  8.4    Albumin      3.5 - 5.0 g/dL 3.8  4.1    Calcium, Ionized      1.12 - 1.32 mmol/L 0.93 (L)     Vit D, 25-Hydroxy      30.0 - 100.0 ng/mL  50.1       Legend:  (H) High  (L) Low      There are no Patient Instructions on file for this visit. Discussed with the patient and all questioned fully answered. He will call me if any problems arise. Follow-up appointment in 6 months.      Counseled patient on diagnostic results, prognosis, risk and benefit of treatment options, instruction for management, importance of treatment compliance, Risk  factor reduction and impressions    Ailyn Hendrix PA-C

## 2023-11-06 DIAGNOSIS — E03.9 HYPOTHYROIDISM, UNSPECIFIED TYPE: ICD-10-CM

## 2023-11-06 RX ORDER — LEVOTHYROXINE SODIUM 0.2 MG/1
TABLET ORAL
Qty: 150 TABLET | Refills: 3 | Status: SHIPPED | OUTPATIENT
Start: 2023-11-06

## 2023-11-06 NOTE — TELEPHONE ENCOUNTER
Nargis Munoz from [address] [place] [address] with request for updated rx. WILFREDO designation on rx needs to removed. [facility] dispenses Synthroid as generic with $20 copay for pt. If provider declines to waive removal of WILFREDO designation on rx, it will be and pt will have a $60 copay. If this policy ever changes, pt and office will be notified by letter. Updated rx sent.

## 2023-12-14 ENCOUNTER — OFFICE VISIT (OUTPATIENT)
Dept: URGENT CARE | Facility: CLINIC | Age: 48
End: 2023-12-14
Payer: COMMERCIAL

## 2023-12-14 VITALS
TEMPERATURE: 97.2 F | OXYGEN SATURATION: 96 % | SYSTOLIC BLOOD PRESSURE: 147 MMHG | HEART RATE: 85 BPM | DIASTOLIC BLOOD PRESSURE: 79 MMHG | RESPIRATION RATE: 18 BRPM

## 2023-12-14 DIAGNOSIS — H66.92 LEFT ACUTE OTITIS MEDIA: Primary | ICD-10-CM

## 2023-12-14 DIAGNOSIS — J01.00 ACUTE MAXILLARY SINUSITIS, RECURRENCE NOT SPECIFIED: ICD-10-CM

## 2023-12-14 PROCEDURE — 99213 OFFICE O/P EST LOW 20 MIN: CPT | Performed by: PHYSICIAN ASSISTANT

## 2023-12-14 RX ORDER — AMOXICILLIN AND CLAVULANATE POTASSIUM 875; 125 MG/1; MG/1
1 TABLET, FILM COATED ORAL EVERY 12 HOURS SCHEDULED
Qty: 14 TABLET | Refills: 0 | Status: SHIPPED | OUTPATIENT
Start: 2023-12-14 | End: 2023-12-21

## 2023-12-14 NOTE — LETTER
December 14, 2023     Patient: Fritz Carlos. YOB: 1975   Date of Visit: 12/14/2023       To Whom It May Concern:    Mg Pitt was seen on 12/14/2023. If you have any questions or concerns, please don't hesitate to call.          Sincerely,        Moe Gross PA-C    CC: No Recipients

## 2023-12-14 NOTE — PROGRESS NOTES
[facility]        NAME: Harlan Pham. is a 51 y.o. male  : 1975    MRN: [id number]  DATE: 2023  TIME: 1:27 PM    Assessment and Plan   Left acute otitis media [H66.92]  1. Left acute otitis media  amoxicillin-clavulanate (AUGMENTIN) 875-125 mg per tablet      2. Acute maxillary sinusitis, recurrence not specified  amoxicillin-clavulanate (AUGMENTIN) 875-125 mg per tablet        Declined covid/flu testing  Will treat left AOM and sinusitis at this time    Patient Instructions     Take antibiotics as prescribed. Use nasal saline spray for symptomatic treatment. Stay hydrated with lots of water/fluids. Follow-up with PCP in the next 1-2 days for reexamination and to ensure resolution of symptoms. Go to the ED if any fevers, unable to stay hydrated, abdominal pain, chest pain, shortness of breath, change in voice, pain or difficulty swallowing, new or worsening symptoms or other concerning symptoms. Chief Complaint     Chief Complaint   Patient presents with    sinus pressure     Sinus pressure x2 days. Left ear pain started today. No fevers. History of Present Illness        54-year-old male presents for evaluation of left "ear infection" since today. States over the past few days he has had runny nose, nasal congestion, postnasal drip and sinus pressure. States today started with left ear pain. States it feels like his previous sinus infections and ear infections. States antibiotics always work. He denies any fevers, chills or bodyaches. Denies any cough, sore throat or other cold-like symptoms. States he has tried some over-the-counter medications such as NyQuil with some improvement. States he is eating and drinking normally, staying hydrated. Denies any recent travel or known sick contacts. Declines COVID/flu testing. Denies any chest pain, chest tightness, shortness of breath, wheezing, GI/ symptoms or other complaints.          Review of Systems   Review of Systems   Constitutional:  Negative for activity change, appetite change, chills, fatigue and fever. HENT:  Positive for congestion, ear pain, postnasal drip, rhinorrhea and sinus pressure. Negative for facial swelling, sore throat, trouble swallowing and voice change. Eyes:  Negative for itching and visual disturbance. Respiratory:  Negative for cough, shortness of breath and wheezing. Cardiovascular:  Negative for chest pain and leg swelling. Gastrointestinal:  Negative for abdominal pain, diarrhea, nausea and vomiting. Genitourinary:  Negative for decreased urine volume. Musculoskeletal:  Negative for back pain, joint swelling, neck pain and neck stiffness. Skin:  Negative for rash. Neurological:  Negative for dizziness, seizures, weakness, numbness and headaches. All other systems reviewed and are negative. Current Medications       Current Outpatient Medications:     amLODIPine (NORVASC) 5 mg tablet, TAKE 1 TABLET DAILY, Disp: 90 tablet, Rfl: 0    amoxicillin-clavulanate (AUGMENTIN) 875-125 mg per tablet, Take 1 tablet by mouth every 12 (twelve) hours for 7 days, Disp: 14 tablet, Rfl: 0    aspirin (ECOTRIN LOW STRENGTH) 81 mg EC tablet, Take 81 mg by mouth daily Prophylactic usage, Disp: , Rfl:     calcitriol (ROCALTROL) 0.25 mcg capsule, TAKE 1 CAPSULE TWICE A DAY, Disp: 180 capsule, Rfl: 0    Calcium Carb-Cholecalciferol (CALCIUM 600 + D PO), Take 600 mg by mouth 4 (four) times a day Does Not have a Parathyroid, Disp: , Rfl:     diclofenac-misoprostol (ARTHROTEC 75) 75-0.2 MG per tablet, TAKE 1 TABLET TWICE A DAY, Disp: 180 tablet, Rfl: 3    hydrochlorothiazide (HYDRODIURIL) 25 mg tablet, TAKE 1 TABLET TWICE A DAY.   (IN ADDITION TO THE LOSARTAN/HCTZ), Disp: 180 tablet, Rfl: 3    levothyroxine (Synthroid) 200 mcg tablet, 2 tabs mon-fri, 1 tab sat/sun, Disp: 150 tablet, Rfl: 3    losartan-hydrochlorothiazide (HYZAAR) 100-25 MG per tablet, TAKE 1 TABLET DAILY, Disp: 90 tablet, Rfl: 3    simvastatin (ZOCOR) 10 mg tablet, TAKE 1 TABLET AT BEDTIME, Disp: 90 tablet, Rfl: 0    fluticasone (FLONASE) 50 mcg/act nasal spray, , Disp: , Rfl:     Current Allergies     Allergies as of 12/14/2023    (No Known Allergies)            The following portions of the patient's history were reviewed and updated as appropriate: allergies, current medications, past family history, past medical history, past social history, past surgical history and problem list.     Past Medical History:   Diagnosis Date    Asthma     Diabetes mellitus ([address] [facility])     Diet Controlled    Diabetes type 2, uncontrolled 05/06/2014    Diverticulitis     Hyperlipidemia     Hypertension     Immunity to measles determined by serologic test 07/02/2019    PONV (postoperative nausea and vomiting)     Thyroid cancer ([facility] [address] [facility])     radioactive iodine     Thyroid cancer ([facility] [address] [facility]) 02/12/2020    Type 2 diabetes mellitus ([address]) 07/17/2013       Past Surgical History:   Procedure Laterality Date    BIOPSY CORE NEEDLE      Thyroid    CHEST WALL BIOPSY N/A 02/07/2020    Procedure: EXCISION  BIOPSY LESION/MASS from back;  Surgeon: Carisa Hernandez MD;  Location: [facility];  Service: General    COLON SURGERY      COLOSTOMY      with reversal    TX ARTHRS KNE SURG W/MENISCECTOMY MED/LAT W/SHVG Left 08/17/2017    Procedure: KNEE ARTHROSCOPIC PARTIAL LATERAL MENISCECTOMY ; JOSEPHINE Pal;  Surgeon: Toña Marques MD;  Location:  MAIN OR;  Service: Orthopedics    TX OPEN TREATMENT METATARSAL FRACTURE EACH Right 3/2/2023    Procedure: OPEN REDUCTION W/ INTERNAL FIXATION (ORIF) FOOT 5TH METATARSAL;  Surgeon: Bayron Sarmiento DPM;  Location: [facility];  Service: Podiatry    THYROIDECTOMY      Removal of 3 Parathyroids       Family History   Problem Relation Age of Onset    Diabetes Mother     Hypertension Mother     Hypertension Father     Hyperlipidemia Father     Thyroid disease unspecified Sister     Sleep apnea Neg Hx Medications have been verified. Objective   /79   Pulse 85   Temp (!) 97.2 °F (36.2 °C)   Resp 18   SpO2 96%        Physical Exam     Physical Exam  Vitals and nursing note reviewed. Constitutional:       General: He is not in acute distress. Appearance: Normal appearance. He is well-developed. He is not toxic-appearing. HENT:      Right Ear: Tympanic membrane normal. No mastoid tenderness. Left Ear: No mastoid tenderness. Tympanic membrane is erythematous and bulging. Nose:      Right Sinus: Maxillary sinus tenderness and frontal sinus tenderness present. Left Sinus: Maxillary sinus tenderness and frontal sinus tenderness present. Mouth/Throat:      Mouth: Mucous membranes are moist.      Pharynx: Oropharynx is clear. Uvula midline. No oropharyngeal exudate, posterior oropharyngeal erythema or uvula swelling. Eyes:      Extraocular Movements: Extraocular movements intact. Pupils: Pupils are equal, round, and reactive to light. Cardiovascular:      Rate and Rhythm: Normal rate and regular rhythm. Heart sounds: Normal heart sounds. Pulmonary:      Effort: Pulmonary effort is normal. No respiratory distress. Breath sounds: Normal breath sounds. No wheezing. Musculoskeletal:      Cervical back: Normal range of motion and neck supple. No rigidity. Skin:     Capillary Refill: Capillary refill takes less than 2 seconds. Neurological:      Mental Status: He is alert and oriented to person, place, and time.    Psychiatric:         Behavior: Behavior normal.

## 2023-12-14 NOTE — PATIENT INSTRUCTIONS
Take antibiotics as prescribed. Use nasal saline spray for symptomatic treatment. Stay hydrated with lots of water/fluids. Follow-up with PCP in the next 1-2 days for reexamination and to ensure resolution of symptoms. Go to the ED if any fevers, unable to stay hydrated, abdominal pain, chest pain, shortness of breath, change in voice, pain or difficulty swallowing, new or worsening symptoms or other concerning symptoms.

## 2024-01-16 ENCOUNTER — OFFICE VISIT (OUTPATIENT)
Dept: URGENT CARE | Facility: CLINIC | Age: 49
End: 2024-01-16
Payer: COMMERCIAL

## 2024-01-16 VITALS
HEART RATE: 86 BPM | RESPIRATION RATE: 20 BRPM | SYSTOLIC BLOOD PRESSURE: 130 MMHG | TEMPERATURE: 97.5 F | OXYGEN SATURATION: 95 % | DIASTOLIC BLOOD PRESSURE: 80 MMHG

## 2024-01-16 DIAGNOSIS — M54.50 CHRONIC LEFT-SIDED LOW BACK PAIN WITHOUT SCIATICA: Primary | ICD-10-CM

## 2024-01-16 DIAGNOSIS — G89.29 CHRONIC LEFT-SIDED LOW BACK PAIN WITHOUT SCIATICA: Primary | ICD-10-CM

## 2024-01-16 PROCEDURE — 99213 OFFICE O/P EST LOW 20 MIN: CPT | Performed by: PHYSICAL MEDICINE & REHABILITATION

## 2024-01-16 PROCEDURE — 96372 THER/PROPH/DIAG INJ SC/IM: CPT | Performed by: PHYSICAL MEDICINE & REHABILITATION

## 2024-01-16 RX ORDER — KETOROLAC TROMETHAMINE 30 MG/ML
30 INJECTION, SOLUTION INTRAMUSCULAR; INTRAVENOUS ONCE
Status: COMPLETED | OUTPATIENT
Start: 2024-01-16 | End: 2024-01-16

## 2024-01-16 RX ADMIN — KETOROLAC TROMETHAMINE 30 MG: 30 INJECTION, SOLUTION INTRAMUSCULAR; INTRAVENOUS at 09:55

## 2024-01-16 NOTE — PROGRESS NOTES
[facility]        NAME: Wilber Carey Jr. is a 48 y.o. male  : 1975    MRN: [id number]  DATE: 2024  TIME: 10:01 AM    Assessment and Plan   Chronic left-sided low back pain without sciatica [M54.50, G89.29]  1. Chronic left-sided low back pain without sciatica  Ambulatory referral to Spine & Pain Management    ketorolac (TORADOL) injection 30 mg        Discussed with patient about a new referral to acute pain management. He states he has been seen by them in the past and felt at that time he was only offered PT, which he has done in the past, or surgery. He is willing to be seen again to see if there are any further options to help prevent the pain going forward. He does not wish to do surgery at this time. The PT helped but his complications are when he goes into active spasms due to certain activities. He states normally his regularly scheduled Diclofenac, aleve and ice help with the spasms but at times the spasms persists to the point that he feels he needs a Toradol injection. He is hoping to follow up for a long-term solution outside of PT and/or surgery.   Will give Toradol injection today. Did discuss the risks with taking with Diclofenac. Patient understands and wishes to proceed with the injection.     Patient Instructions     A referral has been made to Acute pain management. Expect a call within 24-48 hours to schedule an appointment.   Follow up with PCP in 3-5 days.  Proceed to  ER if symptoms worsen.    Chief Complaint     Chief Complaint   Patient presents with    Back Pain     Hx of chronic pain. Wants toradol and work note.          History of Present Illness       Patient presenting with chronic pain. Requesting Toradol injection. Currently with back pain and spasms.Friday patient was working on cleaning out his pellet stove when he went into spasms. It resolved temporarily over the weekend but became exacerbated again last night after taking a hot shower. Patient  was taking aleve which helped a little but didn't help a lot. Takes diclofenac twice a day which normally keeps it under control. With sharp shooting pain, ongoing numbness/tingling. Sitting forward/leaning forward relieves pain, sitting back exacerbates the pain. Pain is rated 7/10.     Back Pain  Associated symptoms include numbness. Pertinent negatives include no weakness.       Review of Systems   Review of Systems   Constitutional: Negative.    Respiratory: Negative.     Cardiovascular: Negative.    Musculoskeletal:  Positive for back pain.   Neurological:  Positive for numbness. Negative for weakness.         Current Medications       Current Outpatient Medications:     amLODIPine (NORVASC) 5 mg tablet, TAKE 1 TABLET DAILY, Disp: 90 tablet, Rfl: 0    aspirin (ECOTRIN LOW STRENGTH) 81 mg EC tablet, Take 81 mg by mouth daily Prophylactic usage, Disp: , Rfl:     calcitriol (ROCALTROL) 0.25 mcg capsule, TAKE 1 CAPSULE TWICE A DAY, Disp: 180 capsule, Rfl: 0    Calcium Carb-Cholecalciferol (CALCIUM 600 + D PO), Take 600 mg by mouth 4 (four) times a day Does Not have a Parathyroid, Disp: , Rfl:     diclofenac-misoprostol (ARTHROTEC 75) 75-0.2 MG per tablet, TAKE 1 TABLET TWICE A DAY, Disp: 180 tablet, Rfl: 3    hydrochlorothiazide (HYDRODIURIL) 25 mg tablet, TAKE 1 TABLET TWICE A DAY.  (IN ADDITION TO THE LOSARTAN/HCTZ), Disp: 180 tablet, Rfl: 3    levothyroxine (Synthroid) 200 mcg tablet, 2 tabs mon-fri, 1 tab sat/sun, Disp: 150 tablet, Rfl: 3    losartan-hydrochlorothiazide (HYZAAR) 100-25 MG per tablet, TAKE 1 TABLET DAILY, Disp: 90 tablet, Rfl: 3    simvastatin (ZOCOR) 10 mg tablet, TAKE 1 TABLET AT BEDTIME, Disp: 90 tablet, Rfl: 0    fluticasone (FLONASE) 50 mcg/act nasal spray, , Disp: , Rfl:   No current facility-administered medications for this visit.    Current Allergies     Allergies as of 01/16/2024    (No Known Allergies)            The following portions of the patient's history were reviewed and  updated as appropriate: allergies, current medications, past family history, past medical history, past social history, past surgical history and problem list.     Past Medical History:   Diagnosis Date    Asthma     Diabetes mellitus (HCC)     Diet Controlled    Diabetes type 2, uncontrolled 05/06/2014    Diverticulitis     Hyperlipidemia     Hypertension     Immunity to measles determined by serologic test 07/02/2019    PONV (postoperative nausea and vomiting)     Thyroid cancer (HCC)     radioactive iodine     Thyroid cancer (HCC) 02/12/2020    Type 2 diabetes mellitus (HCC) 07/17/2013       Past Surgical History:   Procedure Laterality Date    BIOPSY CORE NEEDLE      Thyroid    CHEST WALL BIOPSY N/A 02/07/2020    Procedure: EXCISION  BIOPSY LESION/MASS from back;  Surgeon: Olaf Delgadillo MD;  Location: [facility];  Service: General    COLON SURGERY      COLOSTOMY      with reversal    RI ARTHRS KNE SURG W/MENISCECTOMY MED/LAT W/SHVG Left 08/17/2017    Procedure: KNEE ARTHROSCOPIC PARTIAL LATERAL MENISCECTOMY ; PATELLO FEMOEAL CHONDROPLASTY;  Surgeon: Jem Simons MD;  [facility];  Service: Orthopedics    RI OPEN TREATMENT METATARSAL FRACTURE EACH Right 3/2/2023    Procedure: OPEN REDUCTION W/ INTERNAL FIXATION (ORIF) FOOT 5TH METATARSAL;  Surgeon: Roxi Medeiros DPM;  [facility]: [facility];  Service: Podiatry    THYROIDECTOMY      Removal of 3 Parathyroids       Family History   Problem Relation Age of Onset    Diabetes Mother     Hypertension Mother     Hypertension Father     Hyperlipidemia Father     Thyroid disease unspecified Sister     Sleep apnea Neg Hx          Medications have been verified.        Objective   /80   Pulse 86   Temp 97.5 °F (36.4 °C)   Resp 20   SpO2 95%        Physical Exam     Physical Exam  Vitals reviewed.   Constitutional:       General: He is not in acute distress.  Cardiovascular:      Rate and Rhythm: Normal rate and regular rhythm.      Pulses: Normal  pulses.      Heart sounds: Normal heart sounds. No murmur heard.  Pulmonary:      Effort: Pulmonary effort is normal. No respiratory distress.      Breath sounds: Normal breath sounds.   Musculoskeletal:         General: No tenderness. Normal range of motion.      Right lower leg: No edema.      Left lower leg: No edema.   Skin:     General: Skin is warm and dry.   Neurological:      General: No focal deficit present.      Mental Status: He is alert.      Sensory: No sensory deficit.      Motor: No weakness.      Coordination: Coordination normal.      Gait: Gait normal.   Psychiatric:         Mood and Affect: Mood normal.         Behavior: Behavior normal.

## 2024-01-16 NOTE — PATIENT INSTRUCTIONS
A referral has been made to Acute pain management. Expect a call within 24-48 hours to schedule an appointment.   Follow up with PCP in 3-5 days.  Proceed to  ER if symptoms worsen.

## 2024-01-16 NOTE — LETTER
January 16, 2024     Patient: Wilber Carey Jr.   YOB: 1975   Date of Visit: 1/16/2024       To Whom it May Concern:    Wilber Carey was seen in my clinic on 1/16/2024. He may return to work on 1/17/24 .    If you have any questions or concerns, please don't hesitate to call.         Sincerely,          Emelina Velez PA-C        CC: No Recipients

## 2024-01-18 ENCOUNTER — TELEPHONE (OUTPATIENT)
Dept: PAIN MEDICINE | Facility: CLINIC | Age: 49
End: 2024-01-18

## 2024-01-19 DIAGNOSIS — I10 HYPERTENSION, UNSPECIFIED TYPE: ICD-10-CM

## 2024-01-19 RX ORDER — AMLODIPINE BESYLATE 5 MG/1
TABLET ORAL
Qty: 90 TABLET | Refills: 3 | Status: SHIPPED | OUTPATIENT
Start: 2024-01-19

## 2024-01-19 RX ORDER — CALCITRIOL 0.25 UG/1
CAPSULE, LIQUID FILLED ORAL
Qty: 180 CAPSULE | Refills: 3 | Status: SHIPPED | OUTPATIENT
Start: 2024-01-19

## 2024-01-23 DIAGNOSIS — E78.5 HYPERLIPIDEMIA, UNSPECIFIED HYPERLIPIDEMIA TYPE: ICD-10-CM

## 2024-01-23 RX ORDER — SIMVASTATIN 10 MG
TABLET ORAL
Qty: 90 TABLET | Refills: 3 | Status: SHIPPED | OUTPATIENT
Start: 2024-01-23

## 2024-01-25 NOTE — PROGRESS NOTES
Assessment:  1. Chronic left-sided low back pain with left-sided sciatica    2. Lumbar radiculopathy        Plan:  Orders Placed This Encounter   Procedures    MRI lumbar spine without contrast     Standing Status:   Future     Standing Expiration Date:   1/29/2028     Scheduling Instructions:      There is no preparation for this test. Please leave your jewelry and valuables at home, wedding rings are the exception. All patients will be required to change into a hospital gown and pants.  Street clothes are not permitted in the MRI.  Magnetic nail polish must be removed prior to arrival for your test. Please bring your insurance cards, a form of photo ID and a list of your medications with you. Arrive 15 minutes prior to your appointment time in order to register. Please bring any prior CT or MRI studies of this area that were not performed at a [facility].            To schedule this appointment, please contact Central Scheduling at [phone].            Prior to your appointment, please make sure you complete the MRI Screening Form when you e-Check in for your appointment. This will be available starting 7 days before your appointment in [facility]. You may receive an e-mail with an activation code if you do not have a [employer] account. If you do not have access to a device, we will complete your screening at your appointment.     Order Specific Question:   What is the patient's sedation requirement? If Medication for Claustrophobia is selected, order medication at this point.     Answer:   No Sedation     Order Specific Question:   Does this procedure require the 3T MRI at [place] or [place]?     Answer:   No     Order Specific Question:   Release to patient through [employer]     Answer:   Immediate     Order Specific Question:   Is order priority selected as STAT?     Answer:   No     Order Specific Question:   Reason for Exam (FREE TEXT)     Answer:   left L5 radiculopathy       No orders of the defined  types were placed in this encounter.      My impressions and treatment recommendations were discussed in detail with the patient, who verbalized understanding and had no further questions.    This is a 48-year-old male who presents to our office by urgent care with acute exacerbation of chronic low back symptoms.  Has pain in the lower back radiating down the left lower extremity in L5 dermatome.  Historically physical therapy has not been helpful for his issues.   I did provide home exercise program for the patient.    Given his pattern of pain down a clear L5 dermatome, I am going to order MRI of the lumbar spine to assess for compressive pathology.  Discussed this would help to determine injection planning which may include epidural steroid injection which he is agreeable to.  Information about epidural was given to the patient today.    [place] Prescription Drug Monitoring Program report was reviewed and was appropriate     Complete risks and benefits including bleeding, infection, tissue reaction, nerve injury and allergic reaction were discussed. The approach was demonstrated using models and literature was provided. Verbal and written consent was obtained.     Discharge instructions were provided. I personally saw and examined the patient and I agree with the above discussed plan of care.    History of Present Illness:    Wilber Carey Jr. is a 48 y.o. male who presents to [facility] for initial evaluation of the above stated pain complaints. The patient has a past medical and chronic pain history as outlined in the assessment section. He was referred by Emelina Velez PA-C  [address] [place] [address] .    Patient is here with chief complaint of back pain with radiation into the left lower extremity.  Chronic issue for 20 years.  He is an .  Undetermined cause.  Moderate to severe over the past month.  3 out of 10.  Nearly constant.  Worse in the  evening.  Described as shooting, numbness, sharp, dull/aching in nature.    Reports his pain was fairly well controlled until recently. Unclear inciting event. He reports a flare of his symptoms. He reports that when he starts to feel it coming , rest improves it but if it gets bad he has to go to  and gets toradol shot which helps a lot. If it continues he gets a steroid dose stu which helps. These flares are typically a couple times  a year.     Pain is increased with lying down, bending, exercise.  Decreased with standing, relaxation.  No change with sitting, walking, coughing, sneezing.    No previous back surgery.  No recent imaging of the lumbar spine.  Next  Has history of hypertension, thyroid disease, diabetes.    Reports no relief with PT, exercise, TENS unit.  Moderate relief with heat/ice therapy    No tobacco or marijuana use.  Not allergic to latex or contrast dye.    Currently using diclofenac, acetaminophen, ibuprofen        Review of Systems:    Review of Systems   Musculoskeletal:  Positive for back pain and myalgias.           Past Medical History:   Diagnosis Date    Asthma     Diabetes mellitus (HCC)     Diet Controlled    Diabetes type 2, uncontrolled 05/06/2014    Diverticulitis     Hyperlipidemia     Hypertension     Immunity to measles determined by serologic test 07/02/2019    PONV (postoperative nausea and vomiting)     Thyroid cancer (HCC)     radioactive iodine     Thyroid cancer (HCC) 02/12/2020    Type 2 diabetes mellitus (HCC) 07/17/2013       Past Surgical History:   Procedure Laterality Date    BIOPSY CORE NEEDLE      Thyroid    CHEST WALL BIOPSY N/A 02/07/2020    Procedure: EXCISION  BIOPSY LESION/MASS from back;  Surgeon: Olaf Delgadillo MD;  [facility];  Service: General    COLON SURGERY      COLOSTOMY      with reversal    HI ARTHRS KNE SURG W/MENISCECTOMY MED/LAT W/SHVG Left 08/17/2017    Procedure: KNEE ARTHROSCOPIC PARTIAL LATERAL MENISCECTOMY ; PATELLO FEMOEAL  CHONDROPLASTY;  Surgeon: Jem Simons MD;  [facility];  Service: Orthopedics    SD OPEN TREATMENT METATARSAL FRACTURE EACH Right 3/2/2023    Procedure: OPEN REDUCTION W/ INTERNAL FIXATION (ORIF) FOOT 5TH METATARSAL;  Surgeon: Roxi Medeiros DPM;  [facility];  Service: Podiatry    THYROIDECTOMY      Removal of 3 Parathyroids       Family History   Problem Relation Age of Onset    Diabetes Mother     Hypertension Mother     Hypertension Father     Hyperlipidemia Father     Thyroid disease unspecified Sister     Sleep apnea Neg Hx        Social History     Occupational History    Not on file   Tobacco Use    Smoking status: Former     Current packs/day: 0.00     Average packs/day: 1 pack/day for 10.0 years (10.0 ttl pk-yrs)     Types: Cigarettes     Start date:      Quit date:      Years since quittin.0    Smokeless tobacco: Never   Vaping Use    Vaping status: Never Used   Substance and Sexual Activity    Alcohol use: Yes     Comment: 3x a week    Drug use: Never    Sexual activity: Not on file         Current Outpatient Medications:     amLODIPine (NORVASC) 5 mg tablet, TAKE 1 TABLET DAILY, Disp: 90 tablet, Rfl: 3    aspirin (ECOTRIN LOW STRENGTH) 81 mg EC tablet, Take 81 mg by mouth daily Prophylactic usage, Disp: , Rfl:     calcitriol (ROCALTROL) 0.25 mcg capsule, TAKE 1 CAPSULE TWICE A DAY, Disp: 180 capsule, Rfl: 3    Calcium Carb-Cholecalciferol (CALCIUM 600 + D PO), Take 600 mg by mouth 4 (four) times a day Does Not have a Parathyroid, Disp: , Rfl:     diclofenac-misoprostol (ARTHROTEC 75) 75-0.2 MG per tablet, TAKE 1 TABLET TWICE A DAY, Disp: 180 tablet, Rfl: 3    hydrochlorothiazide (HYDRODIURIL) 25 mg tablet, TAKE 1 TABLET TWICE A DAY.  (IN ADDITION TO THE LOSARTAN/HCTZ), Disp: 180 tablet, Rfl: 3    levothyroxine (Synthroid) 200 mcg tablet, 2 tabs mon-fri, 1 tab sat/sun, Disp: 150 tablet, Rfl: 3    losartan-hydrochlorothiazide (HYZAAR) 100-25 MG per tablet, TAKE 1 TABLET  "DAILY, Disp: 90 tablet, Rfl: 3    simvastatin (ZOCOR) 10 mg tablet, TAKE 1 TABLET AT BEDTIME, Disp: 90 tablet, Rfl: 3    fluticasone (FLONASE) 50 mcg/act nasal spray, , Disp: , Rfl:     No Known Allergies    Physical Exam:    /89   Pulse 75   Ht 6' 1\" (1.854 m)   Wt (!) 171 kg (376 lb)   BMI 49.61 kg/m²     Constitutional: normal, well developed, well nourished, alert, in no distress and non-toxic and no overt pain behavior.  Eyes: anicteric  HEENT: grossly intact  Neck: supple, symmetric, trachea midline and no masses   Pulmonary:even and unlabored  Cardiovascular:No edema or pitting edema present  Skin:Normal without rashes or lesions and well hydrated  Psychiatric:Mood and affect appropriate  Neurologic:Cranial Nerves II-XII grossly intact  Musculoskeletal:normal    Lumbar Spine Exam    Appearance:  Normal lordosis  Palpation/Tenderness:  no tenderness or spasm  Sensory:  no sensory deficits noted except diminished sensation on the left lateral calf.   Motor Strength:  Left hip flexion:  5/5  Left hip extension:  5/5  Right hip flexion:  5/5  Right hip extension:  5/5  Left knee flexion:  5/5  Left knee extension:  5/5  Right knee flexion:  5/5  Right knee extension:  5/5  Left foot dorsiflexion:  5/5  Left foot plantar flexion:  5/5  Right foot dorsiflexion:  5/5  Right foot plantar flexion:  5/5  Reflexes:  Left Patellar:  2+   Right Patellar:  2+   Left Achilles:  2+   Right Achilles:  2+       Imaging  MRI lumbar spine without contrast    (Results Pending)       Orders Placed This Encounter   Procedures    MRI lumbar spine without contrast     "

## 2024-01-29 ENCOUNTER — CONSULT (OUTPATIENT)
Dept: PAIN MEDICINE | Facility: CLINIC | Age: 49
End: 2024-01-29
Payer: COMMERCIAL

## 2024-01-29 VITALS
DIASTOLIC BLOOD PRESSURE: 89 MMHG | HEIGHT: 73 IN | BODY MASS INDEX: 41.75 KG/M2 | HEART RATE: 75 BPM | SYSTOLIC BLOOD PRESSURE: 138 MMHG | WEIGHT: 315 LBS

## 2024-01-29 DIAGNOSIS — M54.16 LUMBAR RADICULOPATHY: ICD-10-CM

## 2024-01-29 DIAGNOSIS — M54.42 CHRONIC LEFT-SIDED LOW BACK PAIN WITH LEFT-SIDED SCIATICA: Primary | ICD-10-CM

## 2024-01-29 DIAGNOSIS — G89.29 CHRONIC LEFT-SIDED LOW BACK PAIN WITH LEFT-SIDED SCIATICA: Primary | ICD-10-CM

## 2024-01-29 PROCEDURE — 3075F SYST BP GE 130 - 139MM HG: CPT | Performed by: STUDENT IN AN ORGANIZED HEALTH CARE EDUCATION/TRAINING PROGRAM

## 2024-01-29 PROCEDURE — 3079F DIAST BP 80-89 MM HG: CPT | Performed by: STUDENT IN AN ORGANIZED HEALTH CARE EDUCATION/TRAINING PROGRAM

## 2024-01-29 PROCEDURE — 99244 OFF/OP CNSLTJ NEW/EST MOD 40: CPT | Performed by: STUDENT IN AN ORGANIZED HEALTH CARE EDUCATION/TRAINING PROGRAM

## 2024-02-04 ENCOUNTER — HOSPITAL ENCOUNTER (OUTPATIENT)
Dept: MRI IMAGING | Facility: HOSPITAL | Age: 49
Discharge: HOME | End: 2024-02-04
Attending: STUDENT IN AN ORGANIZED HEALTH CARE EDUCATION/TRAINING PROGRAM
Payer: COMMERCIAL

## 2024-02-04 DIAGNOSIS — M54.42 CHRONIC LEFT-SIDED LOW BACK PAIN WITH LEFT-SIDED SCIATICA: ICD-10-CM

## 2024-02-04 DIAGNOSIS — M54.16 LUMBAR RADICULOPATHY: ICD-10-CM

## 2024-02-04 DIAGNOSIS — G89.29 CHRONIC LEFT-SIDED LOW BACK PAIN WITH LEFT-SIDED SCIATICA: ICD-10-CM

## 2024-02-04 PROCEDURE — 72148 MRI LUMBAR SPINE W/O DYE: CPT

## 2024-02-09 ENCOUNTER — TELEPHONE (OUTPATIENT)
Dept: RADIOLOGY | Facility: CLINIC | Age: 49
End: 2024-02-09

## 2024-02-09 NOTE — TELEPHONE ENCOUNTER
Caller: Wilber    Doctor: dr payne    Reason for call: pt retuning nurses call to discuss results    Call back#: [phone]

## 2024-02-09 NOTE — TELEPHONE ENCOUNTER
----- Message from Juancho Crawford MD sent at 2/9/2024 12:56 PM EST -----  Patient has chronic degenerative changes in his back but has quite a large herniated disc at his L4-5 level which is causing considerable narrowing with the nerves are traveling in the back.  I would recommend LESI to help with his symptoms.  Would also recommend course of aquatic therapy if he is agreeable.  However the patient declined when he was here.  Ultimately if injection treatments are not helpful then he may need to see spine surgery, especially if he is having weakness that is worsening

## 2024-02-12 DIAGNOSIS — G89.29 CHRONIC LEFT-SIDED LOW BACK PAIN WITH LEFT-SIDED SCIATICA: ICD-10-CM

## 2024-02-12 DIAGNOSIS — M54.16 LUMBAR RADICULOPATHY: Primary | ICD-10-CM

## 2024-02-12 DIAGNOSIS — M54.42 CHRONIC LEFT-SIDED LOW BACK PAIN WITH LEFT-SIDED SCIATICA: ICD-10-CM

## 2024-03-14 ENCOUNTER — HOSPITAL ENCOUNTER (OUTPATIENT)
Dept: RADIOLOGY | Facility: CLINIC | Age: 49
Discharge: HOME | End: 2024-03-14
Payer: COMMERCIAL

## 2024-03-14 VITALS
DIASTOLIC BLOOD PRESSURE: 90 MMHG | RESPIRATION RATE: 20 BRPM | HEART RATE: 70 BPM | OXYGEN SATURATION: 96 % | SYSTOLIC BLOOD PRESSURE: 139 MMHG

## 2024-03-14 DIAGNOSIS — M54.42 CHRONIC LEFT-SIDED LOW BACK PAIN WITH LEFT-SIDED SCIATICA: ICD-10-CM

## 2024-03-14 DIAGNOSIS — G89.29 CHRONIC LEFT-SIDED LOW BACK PAIN WITH LEFT-SIDED SCIATICA: ICD-10-CM

## 2024-03-14 DIAGNOSIS — M54.16 LUMBAR RADICULOPATHY: ICD-10-CM

## 2024-03-14 PROCEDURE — 62323 NJX INTERLAMINAR LMBR/SAC: CPT | Performed by: STUDENT IN AN ORGANIZED HEALTH CARE EDUCATION/TRAINING PROGRAM

## 2024-03-14 RX ORDER — METHYLPREDNISOLONE ACETATE 80 MG/ML
80 INJECTION, SUSPENSION INTRA-ARTICULAR; INTRALESIONAL; INTRAMUSCULAR; PARENTERAL; SOFT TISSUE ONCE
Status: COMPLETED | OUTPATIENT
Start: 2024-03-14 | End: 2024-03-14

## 2024-03-14 RX ADMIN — METHYLPREDNISOLONE ACETATE 80 MG: 80 INJECTION, SUSPENSION INTRA-ARTICULAR; INTRALESIONAL; INTRAMUSCULAR; PARENTERAL; SOFT TISSUE at 11:59

## 2024-03-14 RX ADMIN — IOHEXOL 1 ML: 300 INJECTION, SOLUTION INTRAVENOUS at 11:59

## 2024-03-14 NOTE — DISCHARGE INSTR - LAB
Epidural Steroid Injection   WHAT YOU NEED TO KNOW:   An epidural steroid injection (LINETTE) is a procedure to inject steroid medicine into the epidural space. The epidural space is between your spinal cord and vertebrae. Steroids reduce inflammation and fluid buildup in your spine that may be causing pain. You may be given pain medicine along with the steroids.          ACTIVITY  Do not drive or operate machinery today.  No strenuous activity today - bending, lifting, etc.  You may resume normal activites starting tomorrow - start slowly and as tolerated.  You may shower today, but no tub baths or hot tubs.  You may have numbness for several hours from the local anesthetic. Please use caution and common sense, especially with weight-bearing activities.    CARE OF THE INJECTION SITE  If you have soreness or pain, apply ice to the area today (20 minutes on/20 minutes off).  Starting tomorrow, you may use warm, moist heat or ice if needed.  You may have an increase or change in your discomfort for 36-48 hours after your treatment.  Apply ice and continue with any pain medication you have been prescribed.  Notify the [facility] if you have any of the following: redness, drainage, swelling, headache, stiff neck or fever above 100°F.    SPECIAL INSTRUCTIONS  Our office will contact you in approximately 7 days for a progress report.    MEDICATIONS  Continue to take all routine medications.  Our office may have instructed you to hold some medications.    As no general anesthesia was used in today's procedure, you should not experience any side effects related to anesthesia.     If you are diabetic, the steroids used in today's injection may temporarily increase your blood sugar levels after the first few days after your injection. Please keep a close eye on your sugars and alert the doctor who manages your diabetes if your sugars are significantly high from your baseline or you are symptomatic.     If you have a  problem specifically related to your procedure, please call our office at [phone].  Problems not related to your procedure should be directed to your primary care physician.

## 2024-03-14 NOTE — H&P
History of Present Illness: The patient is a 48 y.o. male who presents with complaints of back and left leg pain    Past Medical History:   Diagnosis Date    Asthma     Diabetes mellitus (HCC)     Diet Controlled    Diabetes type 2, uncontrolled 05/06/2014    Diverticulitis     Hyperlipidemia     Hypertension     Immunity to measles determined by serologic test 07/02/2019    PONV (postoperative nausea and vomiting)     Thyroid cancer (HCC)     radioactive iodine     Thyroid cancer (HCC) 02/12/2020    Type 2 diabetes mellitus (HCC) 07/17/2013       Past Surgical History:   Procedure Laterality Date    BIOPSY CORE NEEDLE      Thyroid    CHEST WALL BIOPSY N/A 02/07/2020    Procedure: EXCISION  BIOPSY LESION/MASS from back;  Surgeon: Olaf Delgadillo MD;  [facility];  Service: General    COLON SURGERY      COLOSTOMY      with reversal    [place] ARTHRS KNE SURG W/MENISCECTOMY MED/LAT W/SHVG Left 08/17/2017    Procedure: KNEE ARTHROSCOPIC PARTIAL LATERAL MENISCECTOMY ; PATELLO FEMOEAL CHONDROPLASTY;  Surgeon: Jem Simons MD;  [facility];  Service: Orthopedics    VT OPEN TREATMENT METATARSAL FRACTURE EACH Right 3/2/2023    Procedure: OPEN REDUCTION W/ INTERNAL FIXATION (ORIF) FOOT 5TH METATARSAL;  Surgeon: Roxi Medeiros DPM;  [facility];  Service: Podiatry    THYROIDECTOMY      Removal of 3 Parathyroids         Current Outpatient Medications:     amLODIPine (NORVASC) 5 mg tablet, TAKE 1 TABLET DAILY, Disp: 90 tablet, Rfl: 3    aspirin (ECOTRIN LOW STRENGTH) 81 mg EC tablet, Take 81 mg by mouth daily Prophylactic usage, Disp: , Rfl:     calcitriol (ROCALTROL) 0.25 mcg capsule, TAKE 1 CAPSULE TWICE A DAY, Disp: 180 capsule, Rfl: 3    Calcium Carb-Cholecalciferol (CALCIUM 600 + D PO), Take 600 mg by mouth 4 (four) times a day Does Not have a Parathyroid, Disp: , Rfl:     diclofenac-misoprostol (ARTHROTEC 75) 75-0.2 MG per tablet, TAKE 1 TABLET TWICE A DAY, Disp: 180 tablet, Rfl: 3    fluticasone  (FLONASE) 50 mcg/act nasal spray, , Disp: , Rfl:     hydrochlorothiazide (HYDRODIURIL) 25 mg tablet, TAKE 1 TABLET TWICE A DAY.  (IN ADDITION TO THE LOSARTAN/HCTZ), Disp: 180 tablet, Rfl: 3    levothyroxine (Synthroid) 200 mcg tablet, 2 tabs mon-fri, 1 tab sat/sun, Disp: 150 tablet, Rfl: 3    losartan-hydrochlorothiazide (HYZAAR) 100-25 MG per tablet, TAKE 1 TABLET DAILY, Disp: 90 tablet, Rfl: 3    simvastatin (ZOCOR) 10 mg tablet, TAKE 1 TABLET AT BEDTIME, Disp: 90 tablet, Rfl: 3    No Known Allergies    Physical Exam:   Vitals:    03/14/24 1142   BP: 135/79   Pulse:    Resp:    SpO2:      General: Awake, Alert, Oriented x 3, Mood and affect appropriate  Respiratory: Respirations even and unlabored  Cardiovascular: Peripheral pulses intact; no edema  Musculoskeletal Exam: back non erythematous no lesions    ASA Score: 3    Patient/Chart Verification  Patient ID Verified: Verbal  ID Band Applied: No  Consents Confirmed: To be obtained in the Pre-Procedure area  H&P( within 30 days) Verified: To be obtained in the Pre-Procedure area  Interval H&P(within 24 hr) Complete (required for Outpatients and Surgery Admit only): To be obtained in the Pre-Procedure area  Allergies Reviewed: Yes  Anticoag/NSAID held?: NA  Currently on antibiotics?: No  Pregnancy denied?: NA    Assessment:   1. Lumbar radiculopathy    2. Chronic left-sided low back pain with left-sided sciatica        Plan: L5-S1 LESI

## 2024-03-19 DIAGNOSIS — I10 HYPERTENSION, UNSPECIFIED TYPE: ICD-10-CM

## 2024-03-19 RX ORDER — LOSARTAN POTASSIUM AND HYDROCHLOROTHIAZIDE 25; 100 MG/1; MG/1
TABLET ORAL
Qty: 90 TABLET | Refills: 1 | Status: SHIPPED | OUTPATIENT
Start: 2024-03-19

## 2024-05-24 ENCOUNTER — OFFICE VISIT (OUTPATIENT)
Dept: FAMILY MEDICINE CLINIC | Facility: CLINIC | Age: 49
End: 2024-05-24
Payer: COMMERCIAL

## 2024-05-24 VITALS
TEMPERATURE: 97.8 F | HEIGHT: 73 IN | HEART RATE: 93 BPM | OXYGEN SATURATION: 96 % | DIASTOLIC BLOOD PRESSURE: 88 MMHG | WEIGHT: 315 LBS | SYSTOLIC BLOOD PRESSURE: 140 MMHG | BODY MASS INDEX: 41.75 KG/M2

## 2024-05-24 DIAGNOSIS — E20.9 HYPOPARATHYROIDISM, UNSPECIFIED HYPOPARATHYROIDISM TYPE (HCC): ICD-10-CM

## 2024-05-24 DIAGNOSIS — Z00.00 ANNUAL PHYSICAL EXAM: Primary | ICD-10-CM

## 2024-05-24 DIAGNOSIS — E78.00 HYPERCHOLESTEROLEMIA: ICD-10-CM

## 2024-05-24 DIAGNOSIS — E66.01 MORBID OBESITY WITH BMI OF 45.0-49.9, ADULT (HCC): ICD-10-CM

## 2024-05-24 DIAGNOSIS — E66.01 CLASS 3 SEVERE OBESITY DUE TO EXCESS CALORIES WITH SERIOUS COMORBIDITY AND BODY MASS INDEX (BMI) OF 45.0 TO 49.9 IN ADULT (HCC): ICD-10-CM

## 2024-05-24 DIAGNOSIS — E89.0 POSTOPERATIVE HYPOTHYROIDISM: ICD-10-CM

## 2024-05-24 DIAGNOSIS — I10 BENIGN ESSENTIAL HYPERTENSION: ICD-10-CM

## 2024-05-24 DIAGNOSIS — E11.65 TYPE 2 DIABETES MELLITUS WITH HYPERGLYCEMIA, WITHOUT LONG-TERM CURRENT USE OF INSULIN (HCC): ICD-10-CM

## 2024-05-24 DIAGNOSIS — Z12.11 SCREEN FOR COLON CANCER: ICD-10-CM

## 2024-05-24 LAB — SL AMB POCT HEMOGLOBIN AIC: 11.4 (ref ?–6.5)

## 2024-05-24 PROCEDURE — 99396 PREV VISIT EST AGE 40-64: CPT | Performed by: NURSE PRACTITIONER

## 2024-05-24 PROCEDURE — 83036 HEMOGLOBIN GLYCOSYLATED A1C: CPT | Performed by: NURSE PRACTITIONER

## 2024-05-24 RX ORDER — METFORMIN HYDROCHLORIDE 500 MG/1
1000 TABLET, EXTENDED RELEASE ORAL
Qty: 200 TABLET | Refills: 1 | Status: SHIPPED | OUTPATIENT
Start: 2024-05-24

## 2024-05-24 RX ORDER — BLOOD-GLUCOSE METER
KIT MISCELLANEOUS
Qty: 1 KIT | Refills: 0 | Status: SHIPPED | OUTPATIENT
Start: 2024-05-24

## 2024-05-24 RX ORDER — BLOOD SUGAR DIAGNOSTIC
STRIP MISCELLANEOUS
Qty: 100 EACH | Refills: 3 | Status: SHIPPED | OUTPATIENT
Start: 2024-05-24

## 2024-05-24 RX ORDER — LANCETS 33 GAUGE
EACH MISCELLANEOUS
Qty: 100 EACH | Refills: 3 | Status: SHIPPED | OUTPATIENT
Start: 2024-05-24

## 2024-05-24 NOTE — PROGRESS NOTES
Adult Annual Physical  Name: Wilber Carey Jr.      : 1975      MRN: [id number]  Encounter Provider: ISAÍAS Mendoza  Encounter Date: 2024   Encounter department: [facility]    Assessment & Plan   1. Annual physical exam  2. Morbid obesity with BMI of 45.0-49.9, adult ([facility])  3. Hypoparathyroidism, unspecified hypoparathyroidism type ([facility])  4. Benign essential hypertension  Assessment & Plan:  Blood pressure is well controlled amlodipine 5 mg HCTZ and losartan   5. Postoperative hypothyroidism  Assessment & Plan:  Synthroid 200 mcg  6. Hypercholesterolemia  7. Class 3 severe obesity due to excess calories with serious comorbidity and body mass index (BMI) of 45.0 to 49.9 in adult ([facility])  8. Screen for colon cancer  -     Ambulatory Referral to Gastroenterology; Future  9. Type 2 diabetes mellitus with hyperglycemia, without long-term current use of insulin ([facility])  Assessment & Plan:    Lab Results   Component Value Date    HGBA1C 5.7 (H) 10/01/2021   Recheck on the HA1C was 11.4% referral placed for endocrine and ordered the ozempic and metformin   Orders:  -     metFORMIN (GLUCOPHAGE-XR) 500 mg 24 hr tablet; Take 2 tablets (1,000 mg total) by mouth daily with breakfast  -     semaglutide, 0.25 or 0.5 mg/dose, (Ozempic, 0.25 or 0.5 MG/DOSE,) 2 mg/3 mL injection pen; Inject 0.25 mg under the skin once weekly for 28 days, THEN inject 0.5 mg under the skin once weekly  -     Blood Glucose Monitoring Suppl (OneTouch Verio Reflect) w/Device KIT; Check blood sugars once daily. Please substitute with appropriate alternative as covered by patient's insurance. Dx: E11.65  -     glucose blood (OneTouch Verio) test strip; Check blood sugars once daily. Please substitute with appropriate alternative as covered by patient's insurance. Dx: E11.65  -     OneTouch Delica Lancets 33G MISC; Check blood sugars once daily. Please substitute with appropriate alternative as covered by  patient's insurance. Dx: E11.65  -     Ambulatory Referral to Endocrinology; Future    Immunizations and preventive care screenings were discussed with patient today. Appropriate education was printed on patient's after visit summary.    Discussed risks and benefits of prostate cancer screening. We discussed the controversial history of PSA screening for prostate cancer in the [place] as well as the risk of over detection and over treatment of prostate cancer by way of PSA screening.  The patient understands that PSA blood testing is an imperfect way to screen for prostate cancer and that elevated PSA levels in the blood may also be caused by infection, inflammation, prostatic trauma or manipulation, urological procedures, or by benign prostatic enlargement.    The role of the digital rectal examination in prostate cancer screening was also discussed and I discussed with him that there is large interobserver variability in the findings of digital rectal examination.    Counseling:  Alcohol/drug use: discussed moderation in alcohol intake, the recommendations for healthy alcohol use, and avoidance of illicit drug use.  Dental Health: discussed importance of regular tooth brushing, flossing, and dental visits.  Injury prevention: discussed safety/seat belts, safety helmets, smoke detectors, carbon dioxide detectors, and smoking near bedding or upholstery.  Sexual health: discussed sexually transmitted diseases, partner selection, use of condoms, avoidance of unintended pregnancy, and contraceptive alternatives.  Exercise: the importance of regular exercise/physical activity was discussed. Recommend exercise 3-5 times per week for at least 30 minutes.          History of Present Illness   {Disappearing Hyperlinks I Encounters * My Last Note * Since Last Visit * History :33956}  Adult Annual Physical:  Patient presents for annual physical. Patient here today for his annual physical and reports that about  three-four months ago had an injection and had epidural and was effective for his lower back pain. Patient is also following with endocrine and reports that he had to miss appointment related to work. He is requesting a intermittent leave for his back pain or doctors appointments .     Diet and Physical Activity:  - Diet/Nutrition: well balanced diet.  - Exercise: walking and moderate cardiovascular exercise.    Depression Screening:  - PHQ-2 Score: 0    General Health:  - Sleep: sleeps well.  - Hearing: normal hearing bilateral ears.  - Vision: goes for regular eye exams, wears contacts and wears glasses.  - Dental: brushes teeth twice daily and regular dental visits.     Health:  - History of STDs: no.   - Urinary symptoms: none.     Review of Systems   Constitutional:  Negative for activity change, appetite change, chills, diaphoresis, fatigue, fever and unexpected weight change.   HENT:  Negative for congestion, ear pain, hearing loss, postnasal drip, sinus pressure, sinus pain, sneezing and sore throat.    Eyes:  Negative for pain, redness and visual disturbance.   Respiratory:  Negative for cough and shortness of breath.    Cardiovascular:  Negative for chest pain and leg swelling.   Gastrointestinal:  Negative for abdominal pain, diarrhea, nausea and vomiting.   Endocrine: Negative.    Genitourinary: Negative.    Musculoskeletal:  Negative for arthralgias.   Allergic/Immunologic: Negative.    Neurological:  Negative for dizziness and light-headedness.   Hematological: Negative.    Psychiatric/Behavioral:  Negative for behavioral problems and dysphoric mood.      Pertinent Medical History   Medical History Reviewed by provider this encounter:  Meds  Problems       Past Medical History   Past Medical History:   Diagnosis Date   • Asthma    • Diabetes mellitus (HCC)     Diet Controlled   • Diabetes type 2, uncontrolled 05/06/2014   • Diverticulitis    • Hyperlipidemia    • Hypertension    • Immunity to  measles determined by serologic test 07/02/2019   • PONV (postoperative nausea and vomiting)    • Thyroid cancer (HCC)     radioactive iodine    • Thyroid cancer (HCC) 02/12/2020   • Type 2 diabetes mellitus (HCC) 07/17/2013     Past Surgical History:   Procedure Laterality Date   • BIOPSY CORE NEEDLE      Thyroid   • CHEST WALL BIOPSY N/A 02/07/2020    Procedure: EXCISION  BIOPSY LESION/MASS from back;  Surgeon: lOaf Delgadillo MD;  Location: [facility];  Service: General   • COLON SURGERY     • COLOSTOMY      with reversal   • MO ARTHRS KNE SURG W/MENISCECTOMY MED/LAT W/SHVG Left 08/17/2017    Procedure: KNEE ARTHROSCOPIC PARTIAL LATERAL MENISCECTOMY ; PATELLO FEMOEAL CHONDROPLASTY;  Surgeon: Jem Simons MD;  Location:  MAIN OR;  Service: Orthopedics   • MO OPEN TREATMENT METATARSAL FRACTURE EACH Right 3/2/2023    Procedure: OPEN REDUCTION W/ INTERNAL FIXATION (ORIF) FOOT 5TH METATARSAL;  Surgeon: Roxi Medeiros DPM;  Location: [facility];  Service: Podiatry   • THYROIDECTOMY      Removal of 3 Parathyroids     Family History   Problem Relation Age of Onset   • Diabetes Mother    • Hypertension Mother    • Hypertension Father    • Hyperlipidemia Father    • Thyroid disease unspecified Sister    • Sleep apnea Neg Hx      Current Outpatient Medications on File Prior to Visit   Medication Sig Dispense Refill   • amLODIPine (NORVASC) 5 mg tablet TAKE 1 TABLET DAILY 90 tablet 3   • aspirin (ECOTRIN LOW STRENGTH) 81 mg EC tablet Take 81 mg by mouth daily Prophylactic usage     • calcitriol (ROCALTROL) 0.25 mcg capsule TAKE 1 CAPSULE TWICE A  capsule 3   • Calcium Carb-Cholecalciferol (CALCIUM 600 + D PO) Take 600 mg by mouth 4 (four) times a day Does Not have a Parathyroid     • diclofenac-misoprostol (ARTHROTEC 75) 75-0.2 MG per tablet TAKE 1 TABLET TWICE A  tablet 3   • hydrochlorothiazide (HYDRODIURIL) 25 mg tablet TAKE 1 TABLET TWICE A DAY.  (IN ADDITION TO THE LOSARTAN/HCTZ) 180 tablet 3   •  "levothyroxine (Synthroid) 200 mcg tablet 2 tabs mon-fri, 1 tab sat/sun 150 tablet 3   • losartan-hydrochlorothiazide (HYZAAR) 100-25 MG per tablet TAKE 1 TABLET DAILY 90 tablet 1   • simvastatin (ZOCOR) 10 mg tablet TAKE 1 TABLET AT BEDTIME 90 tablet 3   • [DISCONTINUED] fluticasone (FLONASE) 50 mcg/act nasal spray  (Patient not taking: Reported on 12/14/2023)       No current facility-administered medications on file prior to visit.   No Known Allergies     Objective   {Disappearing Hyperlinks   Review Vitals * Enter New Vitals * Results Review * Labs * Imaging * Cardiology * Procedures * Lung Cancer Screening :15378}  /88 (BP Location: Left arm, Patient Position: Sitting)   Pulse 93   Temp 97.8 °F (36.6 °C) (Temporal)   Ht 6' 1\" (1.854 m)   Wt (!) 169 kg (373 lb 6.4 oz)   SpO2 96%   BMI 49.26 kg/m²     Physical Exam  Vitals and nursing note reviewed.   Constitutional:       General: He is not in acute distress.     Appearance: He is well-developed.   HENT:      Head: Normocephalic and atraumatic.      Right Ear: Tympanic membrane normal.      Left Ear: Tympanic membrane normal.      Nose: Nose normal.      Mouth/Throat:      Mouth: Mucous membranes are moist.   Eyes:      Pupils: Pupils are equal, round, and reactive to light.   Neck:      Thyroid: No thyromegaly.   Cardiovascular:      Rate and Rhythm: Normal rate and regular rhythm.      Pulses: no weak pulses.           Dorsalis pedis pulses are 2+ on the right side and 2+ on the left side.        Posterior tibial pulses are 2+ on the right side and 2+ on the left side.      Heart sounds: Normal heart sounds. No murmur heard.  Pulmonary:      Effort: Pulmonary effort is normal. No respiratory distress.      Breath sounds: Normal breath sounds. No wheezing.   Abdominal:      General: Bowel sounds are normal.      Palpations: Abdomen is soft.   Musculoskeletal:         General: Normal range of motion.      Cervical back: Normal range of motion. "   Feet:      Right foot:      Skin integrity: No ulcer, skin breakdown, erythema, warmth, callus or dry skin.      Left foot:      Skin integrity: No ulcer, skin breakdown, erythema, warmth, callus or dry skin.   Skin:     General: Skin is warm and dry.   Neurological:      Mental Status: He is alert and oriented to person, place, and time.     Patient's shoes and socks removed.    Right Foot/Ankle   Right Foot Inspection  Skin Exam: skin normal and skin intact. No dry skin, no warmth, no callus, no erythema, no maceration, no abnormal color, no pre-ulcer, no ulcer and no callus.     Toe Exam: ROM and strength within normal limits.     Sensory   Vibration: diminished  Proprioception: diminished  Monofilament testing: diminished    Vascular  Capillary refills: < 3 seconds  The right DP pulse is 2+. The right PT pulse is 2+.     Left Foot/Ankle  Left Foot Inspection  Skin Exam: skin normal and skin intact. No dry skin, no warmth, no erythema, no maceration, normal color, no pre-ulcer, no ulcer and no callus.     Toe Exam: ROM and strength within normal limits.     Sensory   Vibration: diminished  Proprioception: diminished  Monofilament testing: diminished    Vascular  Capillary refills: < 3 seconds  The left DP pulse is 2+. The left PT pulse is 2+.     Assign Risk Category  No deformity present  Loss of protective sensation  No weak pulses  Risk: 1     Administrative Statements {Disappearing Hyperlinks I  Level of Service * [facility]als[facility]:[id number]}

## 2024-05-24 NOTE — ASSESSMENT & PLAN NOTE
Lab Results   Component Value Date    HGBA1C 5.7 (H) 10/01/2021   Recheck on the HA1C was 11.4% referral placed for endocrine and ordered the ozempic and metformin

## 2024-06-07 ENCOUNTER — APPOINTMENT (OUTPATIENT)
Dept: LAB | Facility: CLINIC | Age: 49
End: 2024-06-07
Payer: COMMERCIAL

## 2024-06-07 DIAGNOSIS — E03.9 HYPOTHYROIDISM, UNSPECIFIED TYPE: ICD-10-CM

## 2024-06-07 DIAGNOSIS — E11.9 TYPE 2 DIABETES MELLITUS WITHOUT COMPLICATION, WITHOUT LONG-TERM CURRENT USE OF INSULIN (HCC): ICD-10-CM

## 2024-06-07 LAB
ALBUMIN SERPL BCP-MCNC: 4.1 G/DL (ref 3.5–5)
ALP SERPL-CCNC: 66 U/L (ref 34–104)
ALT SERPL W P-5'-P-CCNC: 46 U/L (ref 7–52)
ANION GAP SERPL CALCULATED.3IONS-SCNC: 10 MMOL/L (ref 4–13)
AST SERPL W P-5'-P-CCNC: 27 U/L (ref 13–39)
BILIRUB SERPL-MCNC: 0.81 MG/DL (ref 0.2–1)
BUN SERPL-MCNC: 20 MG/DL (ref 5–25)
CALCIUM SERPL-MCNC: 7.8 MG/DL (ref 8.4–10.2)
CHLORIDE SERPL-SCNC: 96 MMOL/L (ref 96–108)
CO2 SERPL-SCNC: 29 MMOL/L (ref 21–32)
CREAT SERPL-MCNC: 0.73 MG/DL (ref 0.6–1.3)
EST. AVERAGE GLUCOSE BLD GHB EST-MCNC: 266 MG/DL
GFR SERPL CREATININE-BSD FRML MDRD: 108 ML/MIN/1.73SQ M
GLUCOSE P FAST SERPL-MCNC: 256 MG/DL (ref 65–99)
HBA1C MFR BLD: 10.9 %
POTASSIUM SERPL-SCNC: 3.6 MMOL/L (ref 3.5–5.3)
PROT SERPL-MCNC: 6.8 G/DL (ref 6.4–8.4)
SODIUM SERPL-SCNC: 135 MMOL/L (ref 135–147)
T4 FREE SERPL-MCNC: 1.24 NG/DL (ref 0.61–1.12)
TSH SERPL DL<=0.05 MIU/L-ACNC: 1.63 UIU/ML (ref 0.45–4.5)

## 2024-06-07 PROCEDURE — 83036 HEMOGLOBIN GLYCOSYLATED A1C: CPT

## 2024-06-07 PROCEDURE — 80053 COMPREHEN METABOLIC PANEL: CPT

## 2024-06-07 PROCEDURE — 84439 ASSAY OF FREE THYROXINE: CPT

## 2024-06-07 PROCEDURE — 84432 ASSAY OF THYROGLOBULIN: CPT

## 2024-06-07 PROCEDURE — 84443 ASSAY THYROID STIM HORMONE: CPT

## 2024-06-07 PROCEDURE — 36415 COLL VENOUS BLD VENIPUNCTURE: CPT

## 2024-06-07 PROCEDURE — 86800 THYROGLOBULIN ANTIBODY: CPT

## 2024-06-08 LAB
THYROGLOB AB SERPL-ACNC: <1 IU/ML (ref 0–0.9)
THYROGLOB SERPL-MCNC: 7 NG/ML (ref 1.4–29.2)

## 2024-06-10 ENCOUNTER — TELEPHONE (OUTPATIENT)
Dept: ENDOCRINOLOGY | Facility: HOSPITAL | Age: 49
End: 2024-06-10

## 2024-06-10 NOTE — TELEPHONE ENCOUNTER
----- Message from Jolly Lima PA-C sent at 6/10/2024 10:00 AM EDT -----  Calcium low, Glucose high at 256, and A1C is high at 10/9 and thyroglobulin level also slightly higher.   I see that he missed appt last month with Dr. Ordoñez-- would suggest that he reschedule appt ASAP

## 2024-06-11 ENCOUNTER — OFFICE VISIT (OUTPATIENT)
Dept: FAMILY MEDICINE CLINIC | Facility: CLINIC | Age: 49
End: 2024-06-11
Payer: COMMERCIAL

## 2024-06-11 ENCOUNTER — APPOINTMENT (OUTPATIENT)
Dept: LAB | Facility: CLINIC | Age: 49
End: 2024-06-11
Payer: COMMERCIAL

## 2024-06-11 VITALS
WEIGHT: 315 LBS | OXYGEN SATURATION: 98 % | BODY MASS INDEX: 41.75 KG/M2 | DIASTOLIC BLOOD PRESSURE: 84 MMHG | SYSTOLIC BLOOD PRESSURE: 138 MMHG | HEIGHT: 73 IN | TEMPERATURE: 97.6 F | HEART RATE: 80 BPM

## 2024-06-11 DIAGNOSIS — E20.9 HYPOPARATHYROIDISM, UNSPECIFIED HYPOPARATHYROIDISM TYPE (HCC): ICD-10-CM

## 2024-06-11 DIAGNOSIS — I10 BENIGN ESSENTIAL HYPERTENSION: ICD-10-CM

## 2024-06-11 DIAGNOSIS — R42 DIZZINESS: Primary | ICD-10-CM

## 2024-06-11 DIAGNOSIS — E11.65 TYPE 2 DIABETES MELLITUS WITH HYPERGLYCEMIA, WITHOUT LONG-TERM CURRENT USE OF INSULIN (HCC): ICD-10-CM

## 2024-06-11 DIAGNOSIS — R42 DIZZINESS: ICD-10-CM

## 2024-06-11 LAB
ALBUMIN SERPL BCP-MCNC: 4.6 G/DL (ref 3.5–5)
ALP SERPL-CCNC: 71 U/L (ref 34–104)
ALT SERPL W P-5'-P-CCNC: 49 U/L (ref 7–52)
ANION GAP SERPL CALCULATED.3IONS-SCNC: 12 MMOL/L (ref 4–13)
AST SERPL W P-5'-P-CCNC: 28 U/L (ref 13–39)
BASOPHILS # BLD AUTO: 0.09 THOUSANDS/ÂΜL (ref 0–0.1)
BASOPHILS NFR BLD AUTO: 1 % (ref 0–1)
BILIRUB SERPL-MCNC: 0.47 MG/DL (ref 0.2–1)
BUN SERPL-MCNC: 22 MG/DL (ref 5–25)
CALCIUM SERPL-MCNC: 9.1 MG/DL (ref 8.4–10.2)
CHLORIDE SERPL-SCNC: 97 MMOL/L (ref 96–108)
CO2 SERPL-SCNC: 27 MMOL/L (ref 21–32)
CREAT SERPL-MCNC: 0.76 MG/DL (ref 0.6–1.3)
EOSINOPHIL # BLD AUTO: 0.09 THOUSAND/ÂΜL (ref 0–0.61)
EOSINOPHIL NFR BLD AUTO: 1 % (ref 0–6)
ERYTHROCYTE [DISTWIDTH] IN BLOOD BY AUTOMATED COUNT: 12.9 % (ref 11.6–15.1)
GFR SERPL CREATININE-BSD FRML MDRD: 107 ML/MIN/1.73SQ M
GLUCOSE SERPL-MCNC: 253 MG/DL (ref 65–140)
HCT VFR BLD AUTO: 50.2 % (ref 36.5–49.3)
HGB BLD-MCNC: 17 G/DL (ref 12–17)
IMM GRANULOCYTES # BLD AUTO: 0.05 THOUSAND/UL (ref 0–0.2)
IMM GRANULOCYTES NFR BLD AUTO: 1 % (ref 0–2)
LYMPHOCYTES # BLD AUTO: 1.35 THOUSANDS/ÂΜL (ref 0.6–4.47)
LYMPHOCYTES NFR BLD AUTO: 15 % (ref 14–44)
MCH RBC QN AUTO: 29 PG (ref 26.8–34.3)
MCHC RBC AUTO-ENTMCNC: 33.9 G/DL (ref 31.4–37.4)
MCV RBC AUTO: 86 FL (ref 82–98)
MONOCYTES # BLD AUTO: 0.64 THOUSAND/ÂΜL (ref 0.17–1.22)
MONOCYTES NFR BLD AUTO: 7 % (ref 4–12)
NEUTROPHILS # BLD AUTO: 6.87 THOUSANDS/ÂΜL (ref 1.85–7.62)
NEUTS SEG NFR BLD AUTO: 75 % (ref 43–75)
NRBC BLD AUTO-RTO: 0 /100 WBCS
PLATELET # BLD AUTO: 247 THOUSANDS/UL (ref 149–390)
PMV BLD AUTO: 10.4 FL (ref 8.9–12.7)
POTASSIUM SERPL-SCNC: 3.8 MMOL/L (ref 3.5–5.3)
PROT SERPL-MCNC: 7.5 G/DL (ref 6.4–8.4)
RBC # BLD AUTO: 5.87 MILLION/UL (ref 3.88–5.62)
SL AMB POCT HGB: 299
SODIUM SERPL-SCNC: 136 MMOL/L (ref 135–147)
WBC # BLD AUTO: 9.09 THOUSAND/UL (ref 4.31–10.16)

## 2024-06-11 PROCEDURE — 93000 ELECTROCARDIOGRAM COMPLETE: CPT

## 2024-06-11 PROCEDURE — 85025 COMPLETE CBC W/AUTO DIFF WBC: CPT

## 2024-06-11 PROCEDURE — 80053 COMPREHEN METABOLIC PANEL: CPT

## 2024-06-11 PROCEDURE — 99214 OFFICE O/P EST MOD 30 MIN: CPT

## 2024-06-11 PROCEDURE — 36415 COLL VENOUS BLD VENIPUNCTURE: CPT

## 2024-06-11 PROCEDURE — 85018 HEMOGLOBIN: CPT

## 2024-06-11 NOTE — PROGRESS NOTES
"Ambulatory Visit  Name: Wilber Carey Jr.      : 1975      MRN: [id number]  Encounter Provider: Vandana Granados PA-C  Encounter Date: 2024   Encounter department: [facility]    Assessment & Plan   1. Dizziness  Assessment & Plan:  - dizziness this morning that lasted about one hour associated with \"skipped beat\" feeling in his heart; symptoms have since resolved  - full neurological exam completed today which was unremarkable  - EKG completed which showed NSR and possible PVCs   - did recommend a Holter monitor for further evaluation which patient was agreeable to; therefore holter monitor placed today   - ordered CMP/CBC to recheck his electrolytes given his hx and rule out anemia  - TSH was just checked on  and was WNL  - patient does admit to some dehydration; did discuss that this can cause dizziness and recommended drinking more fluids and electrolytes such as pedialyte; pt agreeable  - strict ER precautions given - if dizziness returns, or he experience any headache, lightheadedness, slurred speech, weakness, numbness, chest pain, or sob advised immediate ER - pt agreeable   Orders:  -     CBC and differential; Future  -     Comprehensive metabolic panel; Future  -     POCT ECG  2. Type 2 diabetes mellitus with hyperglycemia, without long-term current use of insulin (HCC)  Assessment & Plan:    Lab Results   Component Value Date    HGBA1C 10.9 (H) 2024     - sugar today 299; patient reports he has been in the 200s for the last few weeks and this is normal for him; therefore low suspicious that his dizziness is related to hyperglycemia  - patient has been on ozempic for the last three weeks; has not had any side effects so far so he feels like the dizziness was not related   - did advise to schedule a follow up with his endocrinologist ASAP  Orders:  -     POCT hemoglobin fingerstick  3. Hypoparathyroidism, unspecified hypoparathyroidism type (HCC)  Assessment & " "Plan:  - follows with endocrinology for management  - calcium on 6/7 was low at 7.8 - endo reviewed and advised he needs a follow up; therefore advised him to call them to schedule an appointment asap   - currently taking calcium, vitamin D, and calcitriol supplements   - ordered repeat CMP to make sure calcium did not further decrease   4. Benign essential hypertension  Assessment & Plan:  - BP WNL today at 138/84 mmHg   - continue current amlodipine, HCTZ, losartan        History of Present Illness     CC: dizziness this morning     Patient with hx of diabetes (A1C on 6/7 was 10.9) presents for evaluation of dizziness and feeling like his heart \"skipped a beat\" this morning. He reports the dizziness lasted about one hour and then subsided and has not come back. He did take his sugar during this and reports it was 270 which has been his normal for the last few weeks. He feels back to normal now. Did eat breakfast this morning.     Patient was seen by PCP on 5/24/24 where he was started on ozempic for his uncontrolled diabetes on top of his metformin 500 mg BID - pt follows with endocrinology for his diabetes which was previously well controlled on the metformin. He has been on the ozempic for the last three weeks; reports no side effects since starting it and does not think the dizziness was related.   Patient also has a hx of hypoparathyroidism and hypothyroidism which he follows with endocrinology for. His CMP on 6/7/24 did show his calcium level to be low at 7.8 - patient is in the process of scheduling a follow up endocrinology apt. He reports a calcium at this level is \"normal for him\".        Review of Systems   Constitutional:  Negative for chills, diaphoresis, fatigue and fever.   Eyes:  Negative for visual disturbance.   Respiratory:  Negative for chest tightness, shortness of breath and wheezing.    Cardiovascular:  Positive for palpitations (this morning; has subsided). Negative for chest pain. "   Gastrointestinal:  Negative for abdominal pain, blood in stool, constipation, diarrhea, nausea and vomiting.   Neurological:  Positive for dizziness. Negative for syncope, facial asymmetry, speech difficulty, weakness, light-headedness, numbness and headaches.     Past Medical History:   Diagnosis Date    Asthma     Diabetes mellitus (HCC)     Diet Controlled    Diabetes type 2, uncontrolled 05/06/2014    Diverticulitis     Hyperlipidemia     Hypertension     Immunity to measles determined by serologic test 07/02/2019    PONV (postoperative nausea and vomiting)     Thyroid cancer (HCC)     radioactive iodine     Thyroid cancer (HCC) 02/12/2020    Type 2 diabetes mellitus (HCC) 07/17/2013     Past Surgical History:   Procedure Laterality Date    BIOPSY CORE NEEDLE      Thyroid    CHEST WALL BIOPSY N/A 02/07/2020    Procedure: EXCISION  BIOPSY LESION/MASS from back;  Surgeon: Olaf Delgadillo MD;  Location: [facility];  Service: General    COLON SURGERY      COLOSTOMY      with reversal    [place] ARTHRS KNE SURG W/MENISCECTOMY MED/LAT W/SHVG Left 08/17/2017    Procedure: KNEE ARTHROSCOPIC PARTIAL LATERAL MENISCECTOMY ; PATELLO FEMOEAL CHONDROPLASTY;  Surgeon: Jem Simons MD;  Location:  MAIN OR;  Service: Orthopedics    [place] OPEN TREATMENT METATARSAL FRACTURE EACH Right 3/2/2023    Procedure: OPEN REDUCTION W/ INTERNAL FIXATION (ORIF) FOOT 5TH METATARSAL;  Surgeon: Roxi Medeiros DPM;  Location: [facility];  Service: Podiatry    THYROIDECTOMY      Removal of 3 Parathyroids     Family History   Problem Relation Age of Onset    Diabetes Mother     Hypertension Mother     Hypertension Father     Hyperlipidemia Father     Thyroid disease unspecified Sister     Sleep apnea Neg Hx      Social History     Tobacco Use    Smoking status: Former     Current packs/day: 0.00     Average packs/day: 1 pack/day for 10.0 years (10.0 ttl pk-yrs)     Types: Cigarettes     Start date: 1992     Quit date: 2002     Years since quitting:  22.4    Smokeless tobacco: Never   Vaping Use    Vaping status: Never Used   Substance and Sexual Activity    Alcohol use: Yes     Comment: 3x a week    Drug use: Never    Sexual activity: Not on file     Current Outpatient Medications on File Prior to Visit   Medication Sig    amLODIPine (NORVASC) 5 mg tablet TAKE 1 TABLET DAILY    aspirin (ECOTRIN LOW STRENGTH) 81 mg EC tablet Take 81 mg by mouth daily Prophylactic usage    Blood Glucose Monitoring Suppl (OneTouch Verio Reflect) w/Device KIT Check blood sugars once daily. Please substitute with appropriate alternative as covered by patient's insurance. Dx: E11.65    calcitriol (ROCALTROL) 0.25 mcg capsule TAKE 1 CAPSULE TWICE A DAY    Calcium Carb-Cholecalciferol (CALCIUM 600 + D PO) Take 600 mg by mouth 4 (four) times a day Does Not have a Parathyroid    diclofenac-misoprostol (ARTHROTEC 75) 75-0.2 MG per tablet TAKE 1 TABLET TWICE A DAY    glucose blood (OneTouch Verio) test strip Check blood sugars once daily. Please substitute with appropriate alternative as covered by patient's insurance. Dx: E11.65    hydrochlorothiazide (HYDRODIURIL) 25 mg tablet TAKE 1 TABLET TWICE A DAY.  (IN ADDITION TO THE LOSARTAN/HCTZ)    levothyroxine (Synthroid) 200 mcg tablet 2 tabs mon-fri, 1 tab sat/sun    losartan-hydrochlorothiazide (HYZAAR) 100-25 MG per tablet TAKE 1 TABLET DAILY    metFORMIN (GLUCOPHAGE-XR) 500 mg 24 hr tablet Take 2 tablets (1,000 mg total) by mouth daily with breakfast    OneTouch Delica Lancets 33G MISC Check blood sugars once daily. Please substitute with appropriate alternative as covered by patient's insurance. Dx: E11.65    semaglutide, 0.25 or 0.5 mg/dose, (Ozempic, 0.25 or 0.5 MG/DOSE,) 2 mg/3 mL injection pen Inject 0.25 mg under the skin once weekly for 28 days, THEN inject 0.5 mg under the skin once weekly    simvastatin (ZOCOR) 10 mg tablet TAKE 1 TABLET AT BEDTIME     No Known Allergies  Immunization History   Administered Date(s) Administered  "   INFLUENZA 11/05/2017    Influenza, injectable, quadrivalent, preservative free 0.5 mL 01/03/2019    Influenza, seasonal, injectable 10/01/2015    Pneumococcal Polysaccharide PPV23 01/03/2019    Tdap 1975, 01/03/2019     Objective     /84   Pulse 80   Temp 97.6 °F (36.4 °C) (Tympanic)   Ht 6' 1\" (1.854 m)   Wt (!) 168 kg (370 lb)   SpO2 98%   BMI 48.82 kg/m²     Physical Exam  Vitals reviewed.   Constitutional:       General: He is not in acute distress.     Appearance: Normal appearance. He is not ill-appearing or diaphoretic.   HENT:      Head: Normocephalic and atraumatic.      Right Ear: External ear normal.      Left Ear: External ear normal.      Nose: Nose normal.      Mouth/Throat:      Mouth: Mucous membranes are moist.   Eyes:      General: No visual field deficit.        Right eye: No discharge.         Left eye: No discharge.      Extraocular Movements: Extraocular movements intact.      Conjunctiva/sclera: Conjunctivae normal.      Pupils: Pupils are equal, round, and reactive to light.   Cardiovascular:      Rate and Rhythm: Normal rate and regular rhythm.      Pulses: Normal pulses.      Heart sounds: Normal heart sounds. No murmur heard.  Pulmonary:      Effort: Pulmonary effort is normal. No respiratory distress.      Breath sounds: Normal breath sounds. No wheezing, rhonchi or rales.   Musculoskeletal:         General: Normal range of motion.      Cervical back: Normal range of motion.      Right lower leg: No edema.      Left lower leg: No edema.   Skin:     General: Skin is warm.   Neurological:      General: No focal deficit present.      Mental Status: He is alert and oriented to person, place, and time.      Cranial Nerves: Cranial nerves 2-12 are intact. No cranial nerve deficit, dysarthria or facial asymmetry.      Sensory: Sensation is intact. No sensory deficit.      Motor: Motor function is intact. No weakness, tremor, abnormal muscle tone, seizure activity or pronator " drift.      Coordination: Coordination is intact. Romberg sign negative. Coordination normal. Finger-Nose-Finger Test and Heel to Shin Test normal.      Gait: Gait is intact. Gait normal.   Psychiatric:         Mood and Affect: Mood normal.       Administrative Statements   I have spent a total time of 30 minutes on 06/11/24 In caring for this patient including Risks and benefits of tx options, Instructions for management, Patient and family education, Documenting in the medical record, Reviewing / ordering tests, medicine, procedures  , and Obtaining or reviewing history  .

## 2024-06-11 NOTE — ASSESSMENT & PLAN NOTE
- follows with endocrinology for management  - calcium on 6/7 was low at 7.8 - endo reviewed and advised he needs a follow up; therefore advised him to call them to schedule an appointment asap   - currently taking calcium, vitamin D, and calcitriol supplements   - ordered repeat CMP to make sure calcium did not further decrease

## 2024-06-11 NOTE — ASSESSMENT & PLAN NOTE
"- dizziness this morning that lasted about one hour associated with \"skipped beat\" feeling in his heart; symptoms have since resolved  - full neurological exam completed today which was unremarkable  - EKG completed which showed NSR and possible PVCs   - did recommend a Holter monitor for further evaluation which patient was agreeable to; therefore holter monitor placed today   - ordered CMP/CBC to recheck his electrolytes given his hx and rule out anemia  - TSH was just checked on 6/7 and was WNL  - patient does admit to some dehydration; did discuss that this can cause dizziness and recommended drinking more fluids and electrolytes such as pedialyte; pt agreeable  - strict ER precautions given - if dizziness returns, or he experience any headache, lightheadedness, slurred speech, weakness, numbness, chest pain, or sob advised immediate ER - pt agreeable   "

## 2024-06-11 NOTE — ASSESSMENT & PLAN NOTE
Lab Results   Component Value Date    HGBA1C 10.9 (H) 06/07/2024     - sugar today 299; patient reports he has been in the 200s for the last few weeks and this is normal for him; therefore low suspicious that his dizziness is related to hyperglycemia  - patient has been on ozempic for the last three weeks; has not had any side effects so far so he feels like the dizziness was not related   - did advise to schedule a follow up with his endocrinologist ASAP

## 2024-06-12 ENCOUNTER — OFFICE VISIT (OUTPATIENT)
Dept: ENDOCRINOLOGY | Facility: CLINIC | Age: 49
End: 2024-06-12
Payer: COMMERCIAL

## 2024-06-12 VITALS
HEART RATE: 89 BPM | DIASTOLIC BLOOD PRESSURE: 90 MMHG | SYSTOLIC BLOOD PRESSURE: 130 MMHG | OXYGEN SATURATION: 96 % | WEIGHT: 315 LBS | HEIGHT: 73 IN | BODY MASS INDEX: 41.75 KG/M2

## 2024-06-12 DIAGNOSIS — E11.65 TYPE 2 DIABETES MELLITUS WITH HYPERGLYCEMIA, WITHOUT LONG-TERM CURRENT USE OF INSULIN (HCC): ICD-10-CM

## 2024-06-12 DIAGNOSIS — E55.9 VITAMIN D DEFICIENCY: ICD-10-CM

## 2024-06-12 DIAGNOSIS — Z85.850 HISTORY OF THYROID CANCER: ICD-10-CM

## 2024-06-12 DIAGNOSIS — E89.0 POSTOPERATIVE HYPOTHYROIDISM: ICD-10-CM

## 2024-06-12 DIAGNOSIS — E20.9 HYPOPARATHYROIDISM, UNSPECIFIED HYPOPARATHYROIDISM TYPE (HCC): ICD-10-CM

## 2024-06-12 DIAGNOSIS — C73 THYROID CANCER (HCC): Primary | ICD-10-CM

## 2024-06-12 DIAGNOSIS — E78.00 HYPERCHOLESTEROLEMIA: ICD-10-CM

## 2024-06-12 PROCEDURE — 99214 OFFICE O/P EST MOD 30 MIN: CPT | Performed by: PHYSICIAN ASSISTANT

## 2024-06-12 RX ORDER — METFORMIN HYDROCHLORIDE 500 MG/1
TABLET, EXTENDED RELEASE ORAL
Qty: 360 TABLET | Refills: 1 | Status: SHIPPED | OUTPATIENT
Start: 2024-06-12

## 2024-06-12 NOTE — PROGRESS NOTES
Established Patient Progress Note    Chief Complaint:  Diabetes follow up visit    Impression & Plan:    Problem List Items Addressed This Visit        Endocrine    Hypoparathyroidism (HCC)    Hypothyroidism     Tsh improved but above target of 0.1 to 0.5. Continue Synthroid-- repeat TSH/Free T4 in 3 months.          Relevant Orders    TSH, 3rd generation    T4, free    Type 2 diabetes mellitus with hyperglycemia (HCC)     A1C very high. He has been off Diabetes medications for years and following low carb diet.  He has weight loss but denies other symptoms of hyperglycemia at this time.   Ozempic 0.25mg weekly started 3 weeks ago and tolerating-- increase to 0.5mg weekly x 4 weeks then if tolerated Ozempic 1mg weekly  Tolerating Metformin ER 500mg- 2 tabs daily. Increase to 3 tabs daily x 1 week then 4 tabs daily x 1 week if tolerating  Previously treated with jardiance but will hold off for now due to severe hyperglycemia  Advised him to check BG 2x per day and send log to office in 1-2 weeks   If no improvement, will add basal insulin.   Lab Results   Component Value Date    HGBA1C 10.9 (H) 06/07/2024          Relevant Medications    semaglutide, 1 mg/dose, (Ozempic, 1 MG/DOSE,) 4 mg/3 mL injection pen    metFORMIN (GLUCOPHAGE-XR) 500 mg 24 hr tablet    Other Relevant Orders    Hemoglobin A1C       Oncology    History of thyroid cancer     Thyroglobulin slightly higher on recent labs- will order ultrasound of the neck with cervical lymph node mapping.             Other    Hypercholesterolemia     Check Lipid Panel.          Relevant Orders    Comprehensive metabolic panel    Lipid Panel with Direct LDL reflex    Vitamin D deficiency     Continue supplement, check Vitamin D level.          Relevant Orders    Vitamin D 25 hydroxy   Other Visit Diagnoses     Thyroid cancer (HCC)    -  Primary    Relevant Orders    US head neck lymph node mapping    Thyroglobulin            History of Present Illness:   Wilber VENTURA  Aurelio Villaseñor is a 48 y.o. male with a history Thyroid Cancer, Post-Surgical Hypothyroidism, hypoparathyroidism, Type 2 Diabetes, Hypertension, and Hyperlipidemia.     For the Papillary thyroid Cancer metastatic to cervical lymph nodes, He has had history of Thyroidectomy in 2009, RadioIodine Therapy 2009, and Radical Neck Dissection in 2011.  Recent ultrasound 6/2022 showed no evidence of recurrent/metastatic disease.   Recent thyroglobulin 6/7/2024 was higher at 7.0 (Previous results in the 3-4 range over the past few years)     For the Hypothyroidism, he is taking Synthroid 200mcg-- 2 tabs mon-fri, 1 tab Sat/Sun.      For the Hypoparathyroidism, he is taking Calcium supplements and calcitriol. He denies kidney stones.  Taking HCTZ for hypercalcuria. Denies symptoms of hypocalcemia.   Last year he had a fracture of 5th metatarsal.  This required treatment with surgery.  It was at the site of an old fracture from football injury (right 5th metatarsal) .      Current dosing              Calcitriol -0.25mcg twice per day                Calcium Supplement -- 600mg 4x per day                 For the Type 2 Diabetes, he was off all meds for about 5 years.     Had health fair at work-- Glucose was high at 380.   Saw PCP 5/24/2024-- started Ozempic and metformin.   Has been on low carb diet for many years  Checking blood sugars at home, now in 200s.   Has been busy  Has had epidural steroid injection in march, no oral steroids.   Saw PCP yesterday felt dizzy/palpitations but better today, trying to stay more hydrated.           Patient Active Problem List   Diagnosis   • Benign essential hypertension   • Chronic pain disorder   • Hypercholesterolemia   • Hypoparathyroidism (HCC)   • Hypothyroidism   • Acute bilateral low back pain with left-sided sciatica   • Lumbar radiculopathy   • History of thyroid cancer   • Class 3 severe obesity due to excess calories with serious comorbidity and body mass index (BMI) of 45.0 to  49.9 in adult (HCC)   • Myofascial pain syndrome   • Vitamin D deficiency   • Sciatic nerve disease, left   • Lumbar disc herniation   • Hypercalciuria   • GREGG (obstructive sleep apnea)   • Elevated CO2 level   • Annual physical exam   • Morbid obesity with BMI of 45.0-49.9, adult (HCC)   • Type 2 diabetes mellitus with hyperglycemia (HCC)   • Screen for colon cancer   • Dizziness      Past Medical History:   Diagnosis Date   • Asthma    • Diabetes mellitus (HCC)     Diet Controlled   • Diabetes type 2, uncontrolled 05/06/2014   • Diverticulitis    • Hyperlipidemia    • Hypertension    • Immunity to measles determined by serologic test 07/02/2019   • PONV (postoperative nausea and vomiting)    • Thyroid cancer (HCC)     radioactive iodine    • Thyroid cancer (HCC) 02/12/2020   • Type 2 diabetes mellitus (HCC) 07/17/2013      Past Surgical History:   Procedure Laterality Date   • BIOPSY CORE NEEDLE      Thyroid   • CHEST WALL BIOPSY N/A 02/07/2020    Procedure: EXCISION  BIOPSY LESION/MASS from back;  Surgeon: Olaf Delgadillo MD;  Location: [facility];  Service: General   • COLON SURGERY     • COLOSTOMY      with reversal   • MO ARTHRS KNE SURG W/MENISCECTOMY MED/LAT W/SHVG Left 08/17/2017    Procedure: KNEE ARTHROSCOPIC PARTIAL LATERAL MENISCECTOMY ; PATELLO FEMOEAL CHONDROPLASTY;  Surgeon: Jem Simons MD;  Location:  MAIN OR;  Service: Orthopedics   • MO OPEN TREATMENT METATARSAL FRACTURE EACH Right 3/2/2023    Procedure: OPEN REDUCTION W/ INTERNAL FIXATION (ORIF) FOOT 5TH METATARSAL;  Surgeon: Roxi Medeiros DPM;  Location: [facility];  Service: Podiatry   • THYROIDECTOMY      Removal of 3 Parathyroids      Family History   Problem Relation Age of Onset   • Diabetes Mother    • Hypertension Mother    • Hypertension Father    • Hyperlipidemia Father    • Thyroid disease unspecified Sister    • Sleep apnea Neg Hx      Social History     Tobacco Use   • Smoking status: Former     Current packs/day: 0.00      Average packs/day: 1 pack/day for 10.0 years (10.0 ttl pk-yrs)     Types: Cigarettes     Start date:      Quit date:      Years since quittin.4   • Smokeless tobacco: Never   Substance Use Topics   • Alcohol use: Yes     Comment: 3x a week     No Known Allergies      Current Outpatient Medications:   •  amLODIPine (NORVASC) 5 mg tablet, TAKE 1 TABLET DAILY, Disp: 90 tablet, Rfl: 3  •  aspirin (ECOTRIN LOW STRENGTH) 81 mg EC tablet, Take 81 mg by mouth daily Prophylactic usage, Disp: , Rfl:   •  Blood Glucose Monitoring Suppl (OneTouch Verio Reflect) w/Device KIT, Check blood sugars once daily. Please substitute with appropriate alternative as covered by patient's insurance. Dx: E11.65, Disp: 1 kit, Rfl: 0  •  calcitriol (ROCALTROL) 0.25 mcg capsule, TAKE 1 CAPSULE TWICE A DAY, Disp: 180 capsule, Rfl: 3  •  Calcium Carb-Cholecalciferol (CALCIUM 600 + D PO), Take 600 mg by mouth 4 (four) times a day Does Not have a Parathyroid, Disp: , Rfl:   •  diclofenac-misoprostol (ARTHROTEC 75) 75-0.2 MG per tablet, TAKE 1 TABLET TWICE A DAY, Disp: 180 tablet, Rfl: 3  •  glucose blood (OneTouch Verio) test strip, Check blood sugars once daily. Please substitute with appropriate alternative as covered by patient's insurance. Dx: E11.65, Disp: 100 each, Rfl: 3  •  hydrochlorothiazide (HYDRODIURIL) 25 mg tablet, TAKE 1 TABLET TWICE A DAY.  (IN ADDITION TO THE LOSARTAN/HCTZ), Disp: 180 tablet, Rfl: 3  •  levothyroxine (Synthroid) 200 mcg tablet, 2 tabs mon-fri, 1 tab sat/sun, Disp: 150 tablet, Rfl: 3  •  losartan-hydrochlorothiazide (HYZAAR) 100-25 MG per tablet, TAKE 1 TABLET DAILY, Disp: 90 tablet, Rfl: 1  •  metFORMIN (GLUCOPHAGE-XR) 500 mg 24 hr tablet, 2 tabs twice per day with food, Disp: 360 tablet, Rfl: 1  •  OneTouch Delica Lancets 33G MISC, Check blood sugars once daily. Please substitute with appropriate alternative as covered by patient's insurance. Dx: E11.65, Disp: 100 each, Rfl: 3  •  semaglutide, 1  "mg/dose, (Ozempic, 1 MG/DOSE,) 4 mg/3 mL injection pen, 1mg weekly, Disp: 9 mL, Rfl: 1  •  simvastatin (ZOCOR) 10 mg tablet, TAKE 1 TABLET AT BEDTIME, Disp: 90 tablet, Rfl: 3    Review of Systems   Constitutional:  Positive for unexpected weight change. Negative for activity change, appetite change and fatigue.   HENT:  Negative for sore throat, trouble swallowing and voice change.    Eyes:  Negative for visual disturbance.   Respiratory:  Negative for choking, chest tightness and shortness of breath.    Cardiovascular:  Negative for chest pain, palpitations and leg swelling.   Gastrointestinal:  Negative for abdominal pain, constipation and diarrhea.   Endocrine: Negative for cold intolerance, heat intolerance, polydipsia, polyphagia and polyuria.   Genitourinary:  Negative for frequency.   Musculoskeletal:  Negative for arthralgias and myalgias.   Skin:  Negative for rash.   Neurological:  Negative for dizziness and syncope.   Hematological:  Negative for adenopathy.   Psychiatric/Behavioral:  Negative for sleep disturbance.    All other systems reviewed and are negative.      Physical Exam:  Body mass index is 47.94 kg/m².  /90   Pulse 89   Ht 6' 1\" (1.854 m)   Wt (!) 165 kg (363 lb 6.4 oz)   SpO2 96%   BMI 47.94 kg/m²    Wt Readings from Last 3 Encounters:   06/12/24 (!) 165 kg (363 lb 6.4 oz)   06/11/24 (!) 168 kg (370 lb)   05/24/24 (!) 169 kg (373 lb 6.4 oz)       Physical Exam  Vitals reviewed.   Constitutional:       General: He is not in acute distress.     Appearance: He is well-developed. He is obese.   HENT:      Head: Normocephalic and atraumatic.   Eyes:      Conjunctiva/sclera: Conjunctivae normal.      Pupils: Pupils are equal, round, and reactive to light.   Neck:      Thyroid: No thyromegaly.   Cardiovascular:      Rate and Rhythm: Normal rate and regular rhythm.      Heart sounds: Normal heart sounds. No murmur heard.  Pulmonary:      Effort: Pulmonary effort is normal. No respiratory " distress.      Breath sounds: Normal breath sounds. No wheezing or rales.   Abdominal:      General: Bowel sounds are normal. There is no distension.      Palpations: Abdomen is soft.      Tenderness: There is no abdominal tenderness.   Musculoskeletal:         General: Normal range of motion.      Cervical back: Normal range of motion and neck supple.   Lymphadenopathy:      Cervical: No cervical adenopathy.   Skin:     General: Skin is warm and dry.   Neurological:      Mental Status: He is alert and oriented to person, place, and time.           Labs:   Lab Results   Component Value Date    HGBA1C 10.9 (H) 06/07/2024    HGBA1C 11.4 (A) 05/24/2024    HGBA1C 5.7 (H) 10/01/2021     Lab Results   Component Value Date    CREATININE 0.76 06/11/2024    CREATININE 0.73 06/07/2024    CREATININE 0.86 02/27/2023    BUN 22 06/11/2024     10/03/2015    K 3.8 06/11/2024    CL 97 06/11/2024    CO2 27 06/11/2024     eGFR   Date Value Ref Range Status   06/11/2024 107 ml/min/1.73sq m Final     Lab Results   Component Value Date    CHOL 129 10/03/2015    HDL 39 (L) 10/01/2021    TRIG 133 10/01/2021     Lab Results   Component Value Date    ALT 49 06/11/2024    AST 28 06/11/2024    ALKPHOS 71 06/11/2024    BILITOT 0.44 10/03/2015     Lab Results   Component Value Date    QAV6KFVQYEZA 1.628 06/07/2024    FML6GLOHUELI 3.689 11/01/2023    QRZ8AALFVWRI 8.362 (H) 06/17/2023     Lab Results   Component Value Date    FREET4 1.24 (H) 06/07/2024       Discussed with the patient and all questioned fully answered. He will call me if any problems arise.    Follow-up appointment in 3 months.     Counseled patient on diagnostic results, prognosis, risk and benefit of treatment options, instruction for management, importance of treatment compliance, Risk  factor reduction and impressions    Jolly Lima PA-C

## 2024-06-12 NOTE — ASSESSMENT & PLAN NOTE
Thyroglobulin slightly higher on recent labs- will order ultrasound of the neck with cervical lymph node mapping.

## 2024-06-12 NOTE — ASSESSMENT & PLAN NOTE
Tsh improved but above target of 0.1 to 0.5. Continue Synthroid-- repeat TSH/Free T4 in 3 months.

## 2024-06-12 NOTE — ASSESSMENT & PLAN NOTE
A1C very high. He has been off Diabetes medications for years and following low carb diet.  He has weight loss but denies other symptoms of hyperglycemia at this time.   Ozempic 0.25mg weekly started 3 weeks ago and tolerating-- increase to 0.5mg weekly x 4 weeks then if tolerated Ozempic 1mg weekly  Tolerating Metformin ER 500mg- 2 tabs daily. Increase to 3 tabs daily x 1 week then 4 tabs daily x 1 week if tolerating  Previously treated with jardiance but will hold off for now due to severe hyperglycemia  Advised him to check BG 2x per day and send log to office in 1-2 weeks   If no improvement, will add basal insulin.   Lab Results   Component Value Date    HGBA1C 10.9 (H) 06/07/2024

## 2024-06-18 ENCOUNTER — TELEPHONE (OUTPATIENT)
Dept: FAMILY MEDICINE CLINIC | Facility: CLINIC | Age: 49
End: 2024-06-18

## 2024-06-18 NOTE — TELEPHONE ENCOUNTER
"Holter showed primarily normal sinus rhythm. However there was a few episodes of \"extra beats\" and \"pauses\". Would recommend seeing cardio for further eval; has he had any further episodes of dizziness or feeling like his heart skips a beat?"

## 2024-06-20 DIAGNOSIS — R00.2 PALPITATIONS: Primary | ICD-10-CM

## 2024-06-20 DIAGNOSIS — I49.3 PVC (PREMATURE VENTRICULAR CONTRACTION): ICD-10-CM

## 2024-06-23 PROBLEM — Z12.11 SCREEN FOR COLON CANCER: Status: RESOLVED | Noted: 2024-05-24 | Resolved: 2024-06-23

## 2024-07-15 DIAGNOSIS — R82.994 HYPERCALCIURIA: ICD-10-CM

## 2024-07-15 DIAGNOSIS — E83.51 HYPOCALCEMIA: ICD-10-CM

## 2024-07-15 RX ORDER — HYDROCHLOROTHIAZIDE 25 MG/1
TABLET ORAL
Qty: 180 TABLET | Refills: 1 | Status: SHIPPED | OUTPATIENT
Start: 2024-07-15

## 2024-07-30 ENCOUNTER — HOSPITAL ENCOUNTER (OUTPATIENT)
Dept: RADIOLOGY | Facility: MEDICAL CENTER | Age: 49
Discharge: HOME | End: 2024-07-30
Payer: COMMERCIAL

## 2024-07-30 DIAGNOSIS — C73 THYROID CANCER (HCC): ICD-10-CM

## 2024-07-30 PROCEDURE — 76536 US EXAM OF HEAD AND NECK: CPT

## 2024-08-01 DIAGNOSIS — Z85.850 HISTORY OF THYROID CANCER: Primary | ICD-10-CM

## 2024-08-06 ENCOUNTER — TELEPHONE (OUTPATIENT)
Age: 49
End: 2024-08-06

## 2024-08-06 NOTE — TELEPHONE ENCOUNTER
Spoke with Wilber.    Relayed Kiana's message regarding results of US.    Ultrasound reviewed.  No evidence of recurrent or metastatic disease.  Recommendation to repeat ultrasound in approximately 3-6 months due to slightly larger thyroid bed nodule, possibly due to measurement differences.  Order placed for US head neck lymph node mapping for 10/30/2024.  Will review further at next appointment.     Will mail US order.

## 2024-09-11 ENCOUNTER — OFFICE VISIT (OUTPATIENT)
Dept: URGENT CARE | Facility: CLINIC | Age: 49
End: 2024-09-11
Payer: COMMERCIAL

## 2024-09-11 VITALS
RESPIRATION RATE: 18 BRPM | SYSTOLIC BLOOD PRESSURE: 140 MMHG | HEART RATE: 77 BPM | DIASTOLIC BLOOD PRESSURE: 75 MMHG | TEMPERATURE: 98.1 F | OXYGEN SATURATION: 97 %

## 2024-09-11 DIAGNOSIS — M54.50 CHRONIC LEFT-SIDED LOW BACK PAIN WITHOUT SCIATICA: Primary | ICD-10-CM

## 2024-09-11 DIAGNOSIS — G89.29 CHRONIC LEFT-SIDED LOW BACK PAIN WITHOUT SCIATICA: Primary | ICD-10-CM

## 2024-09-11 PROBLEM — M17.9 OSTEOARTHRITIS OF KNEE: Status: ACTIVE | Noted: 2023-06-16

## 2024-09-11 PROCEDURE — S9083 URGENT CARE CENTER GLOBAL: HCPCS

## 2024-09-11 PROCEDURE — G0382 LEV 3 HOSP TYPE B ED VISIT: HCPCS

## 2024-09-11 PROCEDURE — 96372 THER/PROPH/DIAG INJ SC/IM: CPT

## 2024-09-11 RX ORDER — KETOROLAC TROMETHAMINE 30 MG/ML
30 INJECTION, SOLUTION INTRAMUSCULAR; INTRAVENOUS ONCE
Status: COMPLETED | OUTPATIENT
Start: 2024-09-11 | End: 2024-09-11

## 2024-09-11 RX ADMIN — KETOROLAC TROMETHAMINE 30 MG: 30 INJECTION, SOLUTION INTRAMUSCULAR; INTRAVENOUS at 17:06

## 2024-09-11 NOTE — PROGRESS NOTES
[facility]    NAME: Wilber Carey Jr. is a 49 y.o. male  : 1975    MRN: [id number]  DATE: 2024  TIME: 4:59 PM    Assessment and Plan   Chronic left-sided low back pain without sciatica [M54.50, G89.29]  1. Chronic left-sided low back pain without sciatica          Noted toradol with use of diclofenac - hold on tonight, can restart tomorrow  Planned to follow up with spine for symptoms early next week.   Follow up with primary care in 3-5 days.  Go to ER if symptoms get worse.     Patient Instructions       Go see your regular family doctor in 3-5 days.  Go to emergency room (ER) if you are getting worse.     Chief Complaint     Chief Complaint   Patient presents with    Back Pain     Chronic, x 1 week acute.   Numbness radiates down left leg. Tues:  spine and pain specialty appt for injection.          History of Present Illness       Presents with acute on chronic back pain that worsened about 1 week ago. Pain is radiating down the left leg.   Previous therapies include PT, TENS unit. Pain running down the left leg.   Seen by pain and spine who did LESI 3/2024. It helped but wore off this week.   MRI in 2024 showing -- Central protrusion type disc herniation superimposed on diffuse annular bulge at L4-5 combined with facet hypertrophy results in severe tricompartmental central stenosis.  Degenerative changes at remaining levels result in moderate left foraminal narrowing at L5-S1.  Planned appointment with spine speciality on Tuesday.           Review of Systems   Review of Systems   Constitutional:  Negative for fever.   Respiratory:  Negative for shortness of breath.    Cardiovascular:  Negative for chest pain.   Musculoskeletal:  Positive for back pain and myalgias.         Current Medications       Current Outpatient Medications:     amLODIPine (NORVASC) 5 mg tablet, TAKE 1 TABLET DAILY, Disp: 90 tablet, Rfl: 3    aspirin (ECOTRIN LOW STRENGTH) 81 mg EC tablet, Take 81 mg  by mouth daily Prophylactic usage, Disp: , Rfl:     calcitriol (ROCALTROL) 0.25 mcg capsule, TAKE 1 CAPSULE TWICE A DAY, Disp: 180 capsule, Rfl: 3    Calcium Carb-Cholecalciferol (CALCIUM 600 + D PO), Take 600 mg by mouth 4 (four) times a day Does Not have a Parathyroid, Disp: , Rfl:     diclofenac-misoprostol (ARTHROTEC 75) 75-0.2 MG per tablet, TAKE 1 TABLET TWICE A DAY, Disp: 180 tablet, Rfl: 3    hydroCHLOROthiazide 25 mg tablet, TAKE 1 TABLET TWICE A DAY.  (IN ADDITION TO THE LOSARTAN/HCTZ), Disp: 180 tablet, Rfl: 1    levothyroxine (Synthroid) 200 mcg tablet, 2 tabs mon-fri, 1 tab sat/sun, Disp: 150 tablet, Rfl: 3    losartan-hydrochlorothiazide (HYZAAR) 100-25 MG per tablet, TAKE 1 TABLET DAILY, Disp: 90 tablet, Rfl: 1    metFORMIN (GLUCOPHAGE-XR) 500 mg 24 hr tablet, 2 tabs twice per day with food, Disp: 360 tablet, Rfl: 1    semaglutide, 1 mg/dose, (Ozempic, 1 MG/DOSE,) 4 mg/3 mL injection pen, 1mg weekly, Disp: 9 mL, Rfl: 1    simvastatin (ZOCOR) 10 mg tablet, TAKE 1 TABLET AT BEDTIME, Disp: 90 tablet, Rfl: 3    Blood Glucose Monitoring Suppl (OneTouch Verio Reflect) w/Device KIT, Check blood sugars once daily. Please substitute with appropriate alternative as covered by patient's insurance. Dx: E11.65, Disp: 1 kit, Rfl: 0    glucose blood (OneTouch Verio) test strip, Check blood sugars once daily. Please substitute with appropriate alternative as covered by patient's insurance. Dx: E11.65, Disp: 100 each, Rfl: 3    OneTouch Delica Lancets 33G MISC, Check blood sugars once daily. Please substitute with appropriate alternative as covered by patient's insurance. Dx: E11.65, Disp: 100 each, Rfl: 3    Current Allergies     Allergies as of 09/11/2024    (No Known Allergies)            The following portions of the patient's history were reviewed and updated as appropriate: allergies, current medications, past family history, past medical history, past social history, past surgical history and problem list.      Past Medical History:   Diagnosis Date    Asthma     Diabetes mellitus (HCC)     Diet Controlled    Diabetes type 2, uncontrolled 05/06/2014    Diverticulitis     Hyperlipidemia     Hypertension     Immunity to measles determined by serologic test 07/02/2019    PONV (postoperative nausea and vomiting)     Thyroid cancer (HCC)     radioactive iodine     Thyroid cancer (HCC) 02/12/2020    Type 2 diabetes mellitus (HCC) 07/17/2013       Past Surgical History:   Procedure Laterality Date    BIOPSY CORE NEEDLE      Thyroid    CHEST WALL BIOPSY N/A 02/07/2020    Procedure: EXCISION  BIOPSY LESION/MASS from back;  Surgeon: Olaf Delgadillo MD;  Location: [facility];  Service: General    COLON SURGERY      COLOSTOMY      with reversal    [place] ARTHRS KNE SURG W/MENISCECTOMY MED/LAT W/SHVG Left 08/17/2017    Procedure: KNEE ARTHROSCOPIC PARTIAL LATERAL MENISCECTOMY ; PATELLO FEMOEAL CHONDROPLASTY;  Surgeon: Jem Simons MD;  Location:  MAIN OR;  Service: Orthopedics    [place] OPEN TREATMENT METATARSAL FRACTURE EACH Right 3/2/2023    Procedure: OPEN REDUCTION W/ INTERNAL FIXATION (ORIF) FOOT 5TH METATARSAL;  Surgeon: Roxi Medeiros DPM;  Location: [facility];  Service: Podiatry    THYROIDECTOMY      Removal of 3 Parathyroids       Family History   Problem Relation Age of Onset    Diabetes Mother     Hypertension Mother     Hypertension Father     Hyperlipidemia Father     Thyroid disease unspecified Sister     Sleep apnea Neg Hx          Medications have been verified.        Objective   /75   Pulse 77   Temp 98.1 °F (36.7 °C)   Resp 18   SpO2 97%        Physical Exam     Physical Exam  Vitals reviewed.   Constitutional:       Appearance: Normal appearance.   Cardiovascular:      Rate and Rhythm: Normal rate and regular rhythm.      Pulses: Normal pulses.      Heart sounds: Normal heart sounds. No murmur heard.  Pulmonary:      Effort: Pulmonary effort is normal. No respiratory distress.      Breath sounds: Normal  breath sounds.   Musculoskeletal:      Thoracic back: Normal.      Lumbar back: No tenderness. Decreased range of motion. Positive left straight leg raise test. Negative right straight leg raise test.   Skin:     General: Skin is warm and dry.      Capillary Refill: Capillary refill takes less than 2 seconds.   Neurological:      General: No focal deficit present.      Mental Status: He is alert and oriented to person, place, and time.   Psychiatric:         Mood and Affect: Mood normal.         Behavior: Behavior normal.

## 2024-09-12 NOTE — PROGRESS NOTES
Pain Medicine Follow-Up Note    Assessment:  1. Chronic pain syndrome    2. Chronic left-sided low back pain with left-sided sciatica    3. Lumbar radiculopathy    4. Lumbar disc herniation        Plan:  Orders Placed This Encounter   Procedures    FL spine and pain procedure     Standing Status:   Future     Standing Expiration Date:   9/17/2028     Order Specific Question:   Reason for Exam:     Answer:   lesi L5-S1     Order Specific Question:   Anticoagulant hold needed?     Answer:   no       My impressions and treatment recommendations were discussed in detail with the patient who verbalized understanding and had no further questions.      Patient returns to the office after having a lumbar epidural steroid injection at L5-S1 on 3/14/2024.  Patient is report an excellent pain relief for greater than 5 months patient states the pain has returned and is creeping up currently 5 out of 10 on the verbal numeric pain scale and would like to have an additional epidural steroid injection.  Patient's pain pattern initiates in his low back and radiates down his left lateral/posterior leg past his knee which correlates with the patient's imaging.  Since he found a lumbar epidural injection so effective I recommend that he have a repeat epidural steroid injection at L5-S1.  Complete risks and benefits including bleeding, infection, tissue reaction, nerve injury and allergic reaction were discussed. The approach was demonstrated using models and literature was provided. Verbal and written consent was obtained.    Also educated the patient that when he is experiencing his excellent pain relief that he should focus on core strengthening and lumbar traction in order to better support his spine.  Patient also advised to try to obtain a healthy weight.    Follow-up is planned in 4 weeks after injection time or sooner as warranted.  Discharge instructions were provided. I personally saw and examined the patient and I agree with  the above discussed plan of care.    History of Present Illness:    Wilber Carey Jr. is a 49 y.o. male who presents to [facility] for interval re-evaluation of the above stated pain complaints. The patient has a past medical and chronic pain history as outlined in the assessment section. He was last seen on 3/14/2024.    At today's visit patient states that their pain symptoms are overall better with a pain score of 5/10 on the verbal numeric pain scale.  The patient's pain is worse in the evening.  The patient's pain is constant in nature.  And the quality of the patient's pain is described as dull-aching, sharp, shooting, and numbness.  The patient's pain is located in the mid low back radiating down his left posterior leg past his knee.  Patient reports that he uses diclofenac tablets for his knee pain, which is prescribed through another provider.    Other than as stated above, the patient denies any interval changes in medications, medical condition, mental condition, symptoms, or allergies since the last office visit.         Review of Systems:    Review of Systems   Respiratory:  Negative for shortness of breath.    Cardiovascular:  Negative for chest pain.   Gastrointestinal:  Negative for constipation, diarrhea, nausea and vomiting.   Musculoskeletal:  Positive for back pain. Negative for arthralgias, gait problem, joint swelling and myalgias.        DROM     Skin:  Negative for rash.   Neurological:  Negative for dizziness, seizures and weakness.   All other systems reviewed and are negative.        Past Medical History:   Diagnosis Date    Asthma     Diabetes mellitus (HCC)     Diet Controlled    Diabetes type 2, uncontrolled 05/06/2014    Diverticulitis     Hyperlipidemia     Hypertension     Immunity to measles determined by serologic test 07/02/2019    PONV (postoperative nausea and vomiting)     Thyroid cancer (HCC)     radioactive iodine     Thyroid cancer (HCC)  2020    Type 2 diabetes mellitus (HCC) 2013       Past Surgical History:   Procedure Laterality Date    BIOPSY CORE NEEDLE      Thyroid    CHEST WALL BIOPSY N/A 2020    Procedure: EXCISION  BIOPSY LESION/MASS from back;  Surgeon: Olaf Delgadillo MD;  Location: [facility];  Service: General    COLON SURGERY      COLOSTOMY      with reversal    [place] ARTHRS KNE SURG W/MENISCECTOMY MED/LAT W/SHVG Left 2017    Procedure: KNEE ARTHROSCOPIC PARTIAL LATERAL MENISCECTOMY ; PATELLO FEMOEAL CHONDROPLASTY;  Surgeon: Jem Simons MD;  Location:  MAIN OR;  Service: Orthopedics    [place] OPEN TREATMENT METATARSAL FRACTURE EACH Right 3/2/2023    Procedure: OPEN REDUCTION W/ INTERNAL FIXATION (ORIF) FOOT 5TH METATARSAL;  Surgeon: Roxi Medeiros DPM;  Location: [facility];  Service: Podiatry    THYROIDECTOMY      Removal of 3 Parathyroids       Family History   Problem Relation Age of Onset    Diabetes Mother     Hypertension Mother     Hypertension Father     Hyperlipidemia Father     Thyroid disease unspecified Sister     Sleep apnea Neg Hx        Social History     Occupational History    Not on file   Tobacco Use    Smoking status: Former     Current packs/day: 0.00     Average packs/day: 1 pack/day for 10.0 years (10.0 ttl pk-yrs)     Types: Cigarettes     Start date:      Quit date:      Years since quittin.7    Smokeless tobacco: Never   Vaping Use    Vaping status: Never Used   Substance and Sexual Activity    Alcohol use: Yes     Comment: 3x a week    Drug use: Never    Sexual activity: Not on file         Current Outpatient Medications:     amLODIPine (NORVASC) 5 mg tablet, TAKE 1 TABLET DAILY, Disp: 90 tablet, Rfl: 3    aspirin (ECOTRIN LOW STRENGTH) 81 mg EC tablet, Take 81 mg by mouth daily Prophylactic usage, Disp: , Rfl:     Blood Glucose Monitoring Suppl (OneTouch Verio Reflect) w/Device KIT, Check blood sugars once daily. Please substitute with appropriate alternative as covered by  "patient's insurance. Dx: E11.65, Disp: 1 kit, Rfl: 0    calcitriol (ROCALTROL) 0.25 mcg capsule, TAKE 1 CAPSULE TWICE A DAY, Disp: 180 capsule, Rfl: 3    Calcium Carb-Cholecalciferol (CALCIUM 600 + D PO), Take 600 mg by mouth 4 (four) times a day Does Not have a Parathyroid, Disp: , Rfl:     diclofenac-misoprostol (ARTHROTEC 75) 75-0.2 MG per tablet, TAKE 1 TABLET TWICE A DAY, Disp: 180 tablet, Rfl: 3    glucose blood (OneTouch Verio) test strip, Check blood sugars once daily. Please substitute with appropriate alternative as covered by patient's insurance. Dx: E11.65, Disp: 100 each, Rfl: 3    hydroCHLOROthiazide 25 mg tablet, TAKE 1 TABLET TWICE A DAY.  (IN ADDITION TO THE LOSARTAN/HCTZ), Disp: 180 tablet, Rfl: 1    levothyroxine (Synthroid) 200 mcg tablet, 2 tabs mon-fri, 1 tab sat/sun, Disp: 150 tablet, Rfl: 3    metFORMIN (GLUCOPHAGE-XR) 500 mg 24 hr tablet, 2 tabs twice per day with food, Disp: 360 tablet, Rfl: 1    OneTouch Delica Lancets 33G MISC, Check blood sugars once daily. Please substitute with appropriate alternative as covered by patient's insurance. Dx: E11.65, Disp: 100 each, Rfl: 3    semaglutide, 1 mg/dose, (Ozempic, 1 MG/DOSE,) 4 mg/3 mL injection pen, 1mg weekly, Disp: 9 mL, Rfl: 1    simvastatin (ZOCOR) 10 mg tablet, TAKE 1 TABLET AT BEDTIME, Disp: 90 tablet, Rfl: 3    losartan-hydrochlorothiazide (HYZAAR) 100-25 MG per tablet, TAKE 1 TABLET DAILY, Disp: 90 tablet, Rfl: 1    No Known Allergies    Physical Exam:    /85   Pulse 84   Ht 6' 1\" (1.854 m)   Wt (!) 165 kg (363 lb 12.1 oz)   BMI 47.99 kg/m²     Constitutional:normal, well developed, well nourished, alert, in no distress and non-toxic and no overt pain behavior. and obese  Eyes:anicteric  HEENT:grossly intact  Neck:supple, symmetric, trachea midline and no masses   Pulmonary:even and unlabored  Cardiovascular:No edema or pitting edema present  Skin:Normal without rashes or lesions and well hydrated  Psychiatric:Mood and " affect appropriate  Neurologic:Cranial Nerves II-XII grossly intact  Musculoskeletal:antalgic gait      Imaging  FL spine and pain procedure    (Results Pending)         Orders Placed This Encounter   Procedures    FL spine and pain procedure       This document was created using speech voice recognition software.   Grammatical errors, random word insertions, pronoun errors, and incomplete sentences are an occasional consequence of this system due to software limitations, ambient noise, and hardware issues.   Any formal questions or concerns about content, text, or information contained within the body of this dictation should be directly addressed to the provider for clarification.

## 2024-09-12 NOTE — H&P (VIEW-ONLY)
Pain Medicine Follow-Up Note    Assessment:  1. Chronic pain syndrome    2. Chronic left-sided low back pain with left-sided sciatica    3. Lumbar radiculopathy    4. Lumbar disc herniation        Plan:  Orders Placed This Encounter   Procedures    FL spine and pain procedure     Standing Status:   Future     Standing Expiration Date:   9/17/2028     Order Specific Question:   Reason for Exam:     Answer:   lesi L5-S1     Order Specific Question:   Anticoagulant hold needed?     Answer:   no       My impressions and treatment recommendations were discussed in detail with the patient who verbalized understanding and had no further questions.      Patient returns to the office after having a lumbar epidural steroid injection at L5-S1 on 3/14/2024.  Patient is report an excellent pain relief for greater than 5 months patient states the pain has returned and is creeping up currently 5 out of 10 on the verbal numeric pain scale and would like to have an additional epidural steroid injection.  Patient's pain pattern initiates in his low back and radiates down his left lateral/posterior leg past his knee which correlates with the patient's imaging.  Since he found a lumbar epidural injection so effective I recommend that he have a repeat epidural steroid injection at L5-S1.  Complete risks and benefits including bleeding, infection, tissue reaction, nerve injury and allergic reaction were discussed. The approach was demonstrated using models and literature was provided. Verbal and written consent was obtained.    Also educated the patient that when he is experiencing his excellent pain relief that he should focus on core strengthening and lumbar traction in order to better support his spine.  Patient also advised to try to obtain a healthy weight.    Follow-up is planned in 4 weeks after injection time or sooner as warranted.  Discharge instructions were provided. I personally saw and examined the patient and I agree with  the above discussed plan of care.    History of Present Illness:    Wilber Carey Jr. is a 49 y.o. male who presents to [facility] for interval re-evaluation of the above stated pain complaints. The patient has a past medical and chronic pain history as outlined in the assessment section. He was last seen on 3/14/2024.    At today's visit patient states that their pain symptoms are overall better with a pain score of 5/10 on the verbal numeric pain scale.  The patient's pain is worse in the evening.  The patient's pain is constant in nature.  And the quality of the patient's pain is described as dull-aching, sharp, shooting, and numbness.  The patient's pain is located in the mid low back radiating down his left posterior leg past his knee.  Patient reports that he uses diclofenac tablets for his knee pain, which is prescribed through another provider.    Other than as stated above, the patient denies any interval changes in medications, medical condition, mental condition, symptoms, or allergies since the last office visit.         Review of Systems:    Review of Systems   Respiratory:  Negative for shortness of breath.    Cardiovascular:  Negative for chest pain.   Gastrointestinal:  Negative for constipation, diarrhea, nausea and vomiting.   Musculoskeletal:  Positive for back pain. Negative for arthralgias, gait problem, joint swelling and myalgias.        DROM     Skin:  Negative for rash.   Neurological:  Negative for dizziness, seizures and weakness.   All other systems reviewed and are negative.        Past Medical History:   Diagnosis Date    Asthma     Diabetes mellitus (HCC)     Diet Controlled    Diabetes type 2, uncontrolled 05/06/2014    Diverticulitis     Hyperlipidemia     Hypertension     Immunity to measles determined by serologic test 07/02/2019    PONV (postoperative nausea and vomiting)     Thyroid cancer (HCC)     radioactive iodine     Thyroid cancer (HCC)  2020    Type 2 diabetes mellitus (HCC) 2013       Past Surgical History:   Procedure Laterality Date    BIOPSY CORE NEEDLE      Thyroid    CHEST WALL BIOPSY N/A 2020    Procedure: EXCISION  BIOPSY LESION/MASS from back;  Surgeon: Olaf Delgadillo MD;  Location: [facility];  Service: General    COLON SURGERY      COLOSTOMY      with reversal    [place] ARTHRS KNE SURG W/MENISCECTOMY MED/LAT W/SHVG Left 2017    Procedure: KNEE ARTHROSCOPIC PARTIAL LATERAL MENISCECTOMY ; PATELLO FEMOEAL CHONDROPLASTY;  Surgeon: Jem Simons MD;  Location:  MAIN OR;  Service: Orthopedics    [place] OPEN TREATMENT METATARSAL FRACTURE EACH Right 3/2/2023    Procedure: OPEN REDUCTION W/ INTERNAL FIXATION (ORIF) FOOT 5TH METATARSAL;  Surgeon: Roxi Medeiros DPM;  Location: [facility];  Service: Podiatry    THYROIDECTOMY      Removal of 3 Parathyroids       Family History   Problem Relation Age of Onset    Diabetes Mother     Hypertension Mother     Hypertension Father     Hyperlipidemia Father     Thyroid disease unspecified Sister     Sleep apnea Neg Hx        Social History     Occupational History    Not on file   Tobacco Use    Smoking status: Former     Current packs/day: 0.00     Average packs/day: 1 pack/day for 10.0 years (10.0 ttl pk-yrs)     Types: Cigarettes     Start date:      Quit date:      Years since quittin.7    Smokeless tobacco: Never   Vaping Use    Vaping status: Never Used   Substance and Sexual Activity    Alcohol use: Yes     Comment: 3x a week    Drug use: Never    Sexual activity: Not on file         Current Outpatient Medications:     amLODIPine (NORVASC) 5 mg tablet, TAKE 1 TABLET DAILY, Disp: 90 tablet, Rfl: 3    aspirin (ECOTRIN LOW STRENGTH) 81 mg EC tablet, Take 81 mg by mouth daily Prophylactic usage, Disp: , Rfl:     Blood Glucose Monitoring Suppl (OneTouch Verio Reflect) w/Device KIT, Check blood sugars once daily. Please substitute with appropriate alternative as covered by  "patient's insurance. Dx: E11.65, Disp: 1 kit, Rfl: 0    calcitriol (ROCALTROL) 0.25 mcg capsule, TAKE 1 CAPSULE TWICE A DAY, Disp: 180 capsule, Rfl: 3    Calcium Carb-Cholecalciferol (CALCIUM 600 + D PO), Take 600 mg by mouth 4 (four) times a day Does Not have a Parathyroid, Disp: , Rfl:     diclofenac-misoprostol (ARTHROTEC 75) 75-0.2 MG per tablet, TAKE 1 TABLET TWICE A DAY, Disp: 180 tablet, Rfl: 3    glucose blood (OneTouch Verio) test strip, Check blood sugars once daily. Please substitute with appropriate alternative as covered by patient's insurance. Dx: E11.65, Disp: 100 each, Rfl: 3    hydroCHLOROthiazide 25 mg tablet, TAKE 1 TABLET TWICE A DAY.  (IN ADDITION TO THE LOSARTAN/HCTZ), Disp: 180 tablet, Rfl: 1    levothyroxine (Synthroid) 200 mcg tablet, 2 tabs mon-fri, 1 tab sat/sun, Disp: 150 tablet, Rfl: 3    metFORMIN (GLUCOPHAGE-XR) 500 mg 24 hr tablet, 2 tabs twice per day with food, Disp: 360 tablet, Rfl: 1    OneTouch Delica Lancets 33G MISC, Check blood sugars once daily. Please substitute with appropriate alternative as covered by patient's insurance. Dx: E11.65, Disp: 100 each, Rfl: 3    semaglutide, 1 mg/dose, (Ozempic, 1 MG/DOSE,) 4 mg/3 mL injection pen, 1mg weekly, Disp: 9 mL, Rfl: 1    simvastatin (ZOCOR) 10 mg tablet, TAKE 1 TABLET AT BEDTIME, Disp: 90 tablet, Rfl: 3    losartan-hydrochlorothiazide (HYZAAR) 100-25 MG per tablet, TAKE 1 TABLET DAILY, Disp: 90 tablet, Rfl: 1    No Known Allergies    Physical Exam:    /85   Pulse 84   Ht 6' 1\" (1.854 m)   Wt (!) 165 kg (363 lb 12.1 oz)   BMI 47.99 kg/m²     Constitutional:normal, well developed, well nourished, alert, in no distress and non-toxic and no overt pain behavior. and obese  Eyes:anicteric  HEENT:grossly intact  Neck:supple, symmetric, trachea midline and no masses   Pulmonary:even and unlabored  Cardiovascular:No edema or pitting edema present  Skin:Normal without rashes or lesions and well hydrated  Psychiatric:Mood and " affect appropriate  Neurologic:Cranial Nerves II-XII grossly intact  Musculoskeletal:antalgic gait      Imaging  FL spine and pain procedure    (Results Pending)         Orders Placed This Encounter   Procedures    FL spine and pain procedure       This document was created using speech voice recognition software.   Grammatical errors, random word insertions, pronoun errors, and incomplete sentences are an occasional consequence of this system due to software limitations, ambient noise, and hardware issues.   Any formal questions or concerns about content, text, or information contained within the body of this dictation should be directly addressed to the provider for clarification.

## 2024-09-16 DIAGNOSIS — I10 HYPERTENSION, UNSPECIFIED TYPE: ICD-10-CM

## 2024-09-17 ENCOUNTER — PATIENT MESSAGE (OUTPATIENT)
Dept: RADIOLOGY | Facility: CLINIC | Age: 49
End: 2024-09-17

## 2024-09-17 ENCOUNTER — OFFICE VISIT (OUTPATIENT)
Dept: PAIN MEDICINE | Facility: CLINIC | Age: 49
End: 2024-09-17
Payer: COMMERCIAL

## 2024-09-17 VITALS
SYSTOLIC BLOOD PRESSURE: 145 MMHG | HEART RATE: 84 BPM | BODY MASS INDEX: 41.75 KG/M2 | HEIGHT: 73 IN | DIASTOLIC BLOOD PRESSURE: 85 MMHG | WEIGHT: 315 LBS

## 2024-09-17 DIAGNOSIS — M51.26 LUMBAR DISC HERNIATION: ICD-10-CM

## 2024-09-17 DIAGNOSIS — G89.4 CHRONIC PAIN SYNDROME: Primary | ICD-10-CM

## 2024-09-17 DIAGNOSIS — M54.16 LUMBAR RADICULOPATHY: ICD-10-CM

## 2024-09-17 DIAGNOSIS — G89.29 CHRONIC LEFT-SIDED LOW BACK PAIN WITH LEFT-SIDED SCIATICA: ICD-10-CM

## 2024-09-17 DIAGNOSIS — M54.42 CHRONIC LEFT-SIDED LOW BACK PAIN WITH LEFT-SIDED SCIATICA: ICD-10-CM

## 2024-09-17 PROCEDURE — 99214 OFFICE O/P EST MOD 30 MIN: CPT

## 2024-09-17 RX ORDER — LOSARTAN POTASSIUM AND HYDROCHLOROTHIAZIDE 25; 100 MG/1; MG/1
TABLET ORAL
Qty: 90 TABLET | Refills: 1 | Status: SHIPPED | OUTPATIENT
Start: 2024-09-17

## 2024-09-17 NOTE — PATIENT COMMUNICATION
Pt is scheduled for LESI with Dr Crawford on 10/9/24    Pt is diabetic but does not wear a glucose monitor    Pt denies taking prescription blood thinners or full dose aspirin (takes 81mg aspirin, which does not require hold for lesi)    Pt given instructions in office and via myc message    Have you completed PT/HEP/Chiro in the past 6 months for dedicated area? Per Dr Crawford's consult note on 1/29/24, pt has done PT and tried a TENS unit for pain relief -- neither provided relief  If yes, how long did you complete?  What was the frequency?  Did it provide relief?  If no, reason therapy was not completed?

## 2024-09-18 NOTE — PATIENT INSTRUCTIONS
Epidural Steroid Injection, Ambulatory Care   GENERAL INFORMATION:   What do I need to know about an epidural steroid injection?  An epidural steroid injection (LINETTE) is a procedure to inject steroid medicine into the epidural space. The epidural space is between your spinal cord and vertebrae. Steroids reduce inflammation and fluid buildup in your spine that may be causing pain. You may be given pain medicine along with the steroids.   How do I prepare for an LINETTE?  Your healthcare provider will talk to you about how to prepare for your procedure. He will tell you what medicines to take or not take on the day of your procedure. You may need to stop taking blood thinners or other medicines several days before your procedure. You may need to adjust any diabetes medicine you take on the day of your procedure. Steroid medicine can increase your blood sugar level.   What will happen during an LINETTE?   You will be given medicine to numb the procedure area. You will be awake for the procedure, but you will not feel pain. You may also be given medicine to help you relax during the procedure. Contrast liquid will be used to help your healthcare provider see the area better. Tell the healthcare provider if you have ever had an allergic reaction to contrast liquid.    Your healthcare provider may place the needle into your neck area, middle of your back, or tailbone area. He may inject the medicine next to the nerves that are causing your pain. He may instead inject the medicine into a larger area of the epidural space. This helps the medicine spread to more nerves. Your healthcare provider will use a fluoroscope to help guide the needle to the right place. A fluoroscope is a type of x-ray. After the procedure, a bandage will be placed over the injection site to prevent infection.  What are the risks of an LINETTE?  You may have temporary or permanent nerve damage or paralysis. You may have bleeding or develop a serious infection,  such as meningitis (swelling of the brain coverings). An abscess may also develop. You may need surgery to fix the abscess. You may have a seizure, anxiety, or trouble sleeping. If you are a man, you may have temporary erectile dysfunction (not able to have an erection).   CARE AGREEMENT:   You have the right to help plan your care. Learn about your health condition and how it may be treated. Discuss treatment options with your caregivers to decide what care you want to receive. You always have the right to refuse treatment. The above information is an  only. It is not intended as medical advice for individual conditions or treatments. Talk to your doctor, nurse or pharmacist before following any medical regimen to see if it is safe and effective for you.  © 2014 [employer]. Information is for End User's use only and may not be sold, redistributed or otherwise used for commercial purposes. All illustrations and images included in CareNotes® are the copyrighted property of [employer] or [employer].     Epidural Steroid Injection, Ambulatory Care   GENERAL INFORMATION:   What do I need to know about an epidural steroid injection?  An epidural steroid injection (LINETTE) is a procedure to inject steroid medicine into the epidural space. The epidural space is between your spinal cord and vertebrae. Steroids reduce inflammation and fluid buildup in your spine that may be causing pain. You may be given pain medicine along with the steroids.   How do I prepare for an LINETTE?  Your healthcare provider will talk to you about how to prepare for your procedure. He will tell you what medicines to take or not take on the day of your procedure. You may need to stop taking blood thinners or other medicines several days before your procedure. You may need to adjust any diabetes medicine you take on the day of your procedure. Steroid medicine can increase your blood sugar level.   What will  happen during an LINETTE?   You will be given medicine to numb the procedure area. You will be awake for the procedure, but you will not feel pain. You may also be given medicine to help you relax during the procedure. Contrast liquid will be used to help your healthcare provider see the area better. Tell the healthcare provider if you have ever had an allergic reaction to contrast liquid.    Your healthcare provider may place the needle into your neck area, middle of your back, or tailbone area. He may inject the medicine next to the nerves that are causing your pain. He may instead inject the medicine into a larger area of the epidural space. This helps the medicine spread to more nerves. Your healthcare provider will use a fluoroscope to help guide the needle to the right place. A fluoroscope is a type of x-ray. After the procedure, a bandage will be placed over the injection site to prevent infection.  What are the risks of an LINETTE?  You may have temporary or permanent nerve damage or paralysis. You may have bleeding or develop a serious infection, such as meningitis (swelling of the brain coverings). An abscess may also develop. You may need surgery to fix the abscess. You may have a seizure, anxiety, or trouble sleeping. If you are a man, you may have temporary erectile dysfunction (not able to have an erection).   CARE AGREEMENT:   You have the right to help plan your care. Learn about your health condition and how it may be treated. Discuss treatment options with your caregivers to decide what care you want to receive. You always have the right to refuse treatment. The above information is an  only. It is not intended as medical advice for individual conditions or treatments. Talk to your doctor, nurse or pharmacist before following any medical regimen to see if it is safe and effective for you.  © 2014 [employer]. Information is for End User's use only and may not be sold,  redistributed or otherwise used for commercial purposes. All illustrations and images included in CareNotes® are the copyrighted property of [employer] or [employer].

## 2024-09-20 ENCOUNTER — CONSULT (OUTPATIENT)
Dept: CARDIOLOGY CLINIC | Facility: CLINIC | Age: 49
End: 2024-09-20
Payer: COMMERCIAL

## 2024-09-20 VITALS
HEIGHT: 73 IN | WEIGHT: 315 LBS | SYSTOLIC BLOOD PRESSURE: 140 MMHG | OXYGEN SATURATION: 96 % | BODY MASS INDEX: 41.75 KG/M2 | DIASTOLIC BLOOD PRESSURE: 88 MMHG | RESPIRATION RATE: 16 BRPM | HEART RATE: 95 BPM

## 2024-09-20 DIAGNOSIS — R00.2 PALPITATIONS: ICD-10-CM

## 2024-09-20 DIAGNOSIS — I49.3 PVC (PREMATURE VENTRICULAR CONTRACTION): ICD-10-CM

## 2024-09-20 DIAGNOSIS — I10 PRIMARY HYPERTENSION: Primary | ICD-10-CM

## 2024-09-20 PROCEDURE — 99243 OFF/OP CNSLTJ NEW/EST LOW 30: CPT | Performed by: INTERNAL MEDICINE

## 2024-09-20 NOTE — PROGRESS NOTES
Cardiology Consultation     Wilber Carey Jr.  [id number]  1975  [id number] [facility]  [facility] [place] [address]    This patient presents for cardiology consultation and evaluation.  Patient does have history of hypertension, hypothyroidism as well as obesity and hyperlipidemia.  Patient had 1 episode where he had skipped beats while he was sitting in his office at work.  Patient was evaluated by primary care physician.  Patient had a 3-day Holter monitor/event monitor.  Patient does not have any history of coronary artery disease or MI in the past.  Patient denies any history of exertional chest pain or shortness of breath.  Patient denies any history of snoring or sleep apnea that he knows of.  Patient also denies any history of smoking.  No history of leg edema orthopnea PND.  No history of syncope.  He states that he has been compliant with all his present medications.  He is trying to lose weight.    1. Primary hypertension  Echo complete w/ contrast if indicated      2. Palpitations  Ambulatory Referral to Cardiology    Echo complete w/ contrast if indicated      3. PVC (premature ventricular contraction)  Ambulatory Referral to Cardiology        Patient Active Problem List   Diagnosis    Benign essential hypertension    Chronic pain disorder    Hypercholesterolemia    Hypoparathyroidism (HCC)    Hypothyroidism    Acute bilateral low back pain with left-sided sciatica    Lumbar radiculopathy    History of thyroid cancer    Class 3 severe obesity due to excess calories with serious comorbidity and body mass index (BMI) of 45.0 to 49.9 in adult ([facility])    Myofascial pain syndrome    Vitamin D deficiency    Sciatic nerve disease, left    Lumbar disc herniation    Hypercalciuria    GREGG (obstructive sleep apnea)    Elevated CO2 level    Annual physical exam    Morbid obesity with BMI of 45.0-49.9, adult ([facility])     Type 2 diabetes mellitus with hyperglycemia (HCC)    Dizziness    Osteoarthritis of knee     Past Medical History:   Diagnosis Date    Asthma     Diabetes mellitus (HCC)     Diet Controlled    Diabetes type 2, uncontrolled 2014    Diverticulitis     Hyperlipidemia     Hypertension     Immunity to measles determined by serologic test 2019    PONV (postoperative nausea and vomiting)     Thyroid cancer (HCC)     radioactive iodine     Thyroid cancer (HCC) 2020    Type 2 diabetes mellitus (HCC) 2013     Social History     Socioeconomic History    Marital status: /Civil Union     Spouse name: Not on file    Number of children: Not on file    Years of education: Not on file    Highest education level: Not on file   Occupational History    Not on file   Tobacco Use    Smoking status: Former     Current packs/day: 0.00     Average packs/day: 1 pack/day for 10.0 years (10.0 ttl pk-yrs)     Types: Cigarettes     Start date:      Quit date:      Years since quittin.7    Smokeless tobacco: Never   Vaping Use    Vaping status: Never Used   Substance and Sexual Activity    Alcohol use: Yes     Comment: 3x a week    Drug use: Never    Sexual activity: Not on file   Other Topics Concern    Not on file   Social History Narrative    Not on file     Social Determinants of Health     Financial Resource Strain: Not on file   Food Insecurity: Not on file   Transportation Needs: Not on file   Physical Activity: Not on file   Stress: Not on file   Social Connections: Not on file   Intimate Partner Violence: Not on file   Housing Stability: Not on file      Family History   Problem Relation Age of Onset    Diabetes Mother     Hypertension Mother     Hypertension Father     Hyperlipidemia Father     Thyroid disease unspecified Sister     Sleep apnea Neg Hx      Past Surgical History:   Procedure Laterality Date    BIOPSY CORE NEEDLE      Thyroid    CHEST WALL BIOPSY N/A 2020    Procedure:  EXCISION  BIOPSY LESION/MASS from back;  Surgeon: Olaf Delgadillo MD;  Location: [facility];  Service: General    COLON SURGERY      COLOSTOMY      with reversal    KS ARTHRS KNE SURG W/MENISCECTOMY MED/LAT W/SHVG Left 08/17/2017    Procedure: KNEE ARTHROSCOPIC PARTIAL LATERAL MENISCECTOMY ; PATELLO FEMOEAL CHONDROPLASTY;  Surgeon: Jem Simons MD;  Location: [facility];  Service: Orthopedics    KS OPEN TREATMENT METATARSAL FRACTURE EACH Right 3/2/2023    Procedure: OPEN REDUCTION W/ INTERNAL FIXATION (ORIF) FOOT 5TH METATARSAL;  Surgeon: Roxi Medeiros DPM;  Location: [facility];  Service: Podiatry    THYROIDECTOMY      Removal of 3 Parathyroids       Current Outpatient Medications:     amLODIPine (NORVASC) 5 mg tablet, TAKE 1 TABLET DAILY, Disp: 90 tablet, Rfl: 3    aspirin (ECOTRIN LOW STRENGTH) 81 mg EC tablet, Take 81 mg by mouth daily Prophylactic usage, Disp: , Rfl:     Blood Glucose Monitoring Suppl (OneTouch Verio Reflect) w/Device KIT, Check blood sugars once daily. Please substitute with appropriate alternative as covered by patient's insurance. Dx: E11.65, Disp: 1 kit, Rfl: 0    calcitriol (ROCALTROL) 0.25 mcg capsule, TAKE 1 CAPSULE TWICE A DAY, Disp: 180 capsule, Rfl: 3    Calcium Carb-Cholecalciferol (CALCIUM 600 + D PO), Take 600 mg by mouth 4 (four) times a day Does Not have a Parathyroid, Disp: , Rfl:     diclofenac-misoprostol (ARTHROTEC 75) 75-0.2 MG per tablet, TAKE 1 TABLET TWICE A DAY, Disp: 180 tablet, Rfl: 3    glucose blood (OneTouch Verio) test strip, Check blood sugars once daily. Please substitute with appropriate alternative as covered by patient's insurance. Dx: E11.65, Disp: 100 each, Rfl: 3    hydroCHLOROthiazide 25 mg tablet, TAKE 1 TABLET TWICE A DAY.  (IN ADDITION TO THE LOSARTAN/HCTZ), Disp: 180 tablet, Rfl: 1    levothyroxine (Synthroid) 200 mcg tablet, 2 tabs mon-fri, 1 tab sat/sun, Disp: 150 tablet, Rfl: 3    losartan-hydrochlorothiazide (HYZAAR) 100-25 MG per tablet, TAKE 1  "TABLET DAILY, Disp: 90 tablet, Rfl: 1    metFORMIN (GLUCOPHAGE-XR) 500 mg 24 hr tablet, 2 tabs twice per day with food (Patient taking differently: Take 500 mg by mouth 2 (two) times a day), Disp: 360 tablet, Rfl: 1    OneTouch Delica Lancets 33G MISC, Check blood sugars once daily. Please substitute with appropriate alternative as covered by patient's insurance. Dx: E11.65, Disp: 100 each, Rfl: 3    semaglutide, 1 mg/dose, (Ozempic, 1 MG/DOSE,) 4 mg/3 mL injection pen, 1mg weekly, Disp: 9 mL, Rfl: 1    simvastatin (ZOCOR) 10 mg tablet, TAKE 1 TABLET AT BEDTIME, Disp: 90 tablet, Rfl: 3  No Known Allergies  Vitals:    09/20/24 1338   BP: 140/88   BP Location: Right arm   Patient Position: Sitting   Cuff Size: Large   Pulse: 95   Resp: 16   SpO2: 96%   Weight: (!) 171 kg (376 lb)   Height: 6' 1\" (1.854 m)       Labs:  No visits with results within 2 Month(s) from this visit.   Latest known visit with results is:   Appointment on 06/11/2024   Component Date Value    WBC 06/11/2024 9.09     RBC 06/11/2024 5.87 (H)     Hemoglobin 06/11/2024 17.0     Hematocrit 06/11/2024 50.2 (H)     MCV 06/11/2024 86     MCH 06/11/2024 29.0     MCHC 06/11/2024 33.9     RDW 06/11/2024 12.9     MPV 06/11/2024 10.4     Platelets 06/11/2024 247     nRBC 06/11/2024 0     Segmented % 06/11/2024 75     Immature Grans % 06/11/2024 1     Lymphocytes % 06/11/2024 15     Monocytes % 06/11/2024 7     Eosinophils Relative 06/11/2024 1     Basophils Relative 06/11/2024 1     Absolute Neutrophils 06/11/2024 6.87     Absolute Immature Grans 06/11/2024 0.05     Absolute Lymphocytes 06/11/2024 1.35     Absolute Monocytes 06/11/2024 0.64     Eosinophils Absolute 06/11/2024 0.09     Basophils Absolute 06/11/2024 0.09     Sodium 06/11/2024 136     Potassium 06/11/2024 3.8     Chloride 06/11/2024 97     CO2 06/11/2024 27     ANION GAP 06/11/2024 12     BUN 06/11/2024 22     Creatinine 06/11/2024 0.76     Glucose 06/11/2024 253 (H)     Calcium 06/11/2024 9.1  "    AST 06/11/2024 28     ALT 06/11/2024 49     Alkaline Phosphatase 06/11/2024 71     Total Protein 06/11/2024 7.5     Albumin 06/11/2024 4.6     Total Bilirubin 06/11/2024 0.47     eGFR 06/11/2024 107      Imaging: No results found.    Review of Systems:  Review of Systems  REVIEW OF SYSTEMS:  Constitutional:  Denies fever or chills   Eyes:  Denies change in visual acuity   HENT:  Denies nasal congestion or sore throat   Respiratory:  Denies cough or shortness of breath   Cardiovascular:  Denies chest pain or edema   GI:  Denies abdominal pain, nausea, vomiting, bloody stools or diarrhea   :  Denies dysuria, frequency, difficulty in micturition and nocturia  Musculoskeletal:  Denies back pain or joint pain   Neurologic:  Denies headache, focal weakness or sensory changes   Endocrine:  Denies polyuria or polydipsia   Lymphatic:  Denies swollen glands   Psychiatric:  Denies depression or anxiety    Physical Exam:  Physical Exam  PHYSICAL EXAM:  General:  Patient is not in acute distress   Head: Normocephalic, Atraumatic.  HEENT:  Both pupils normal-size atraumatic, normocephalic, nonicteric  Neck:  JVP not raised. Trachea central. No carotid bruit  Respiratory:  normal breath sounds no crackles. no rhonchi  Cardiovascular:  Regular rate and rhythm no S3 no murmurs  GI:  Abdomen soft nontender. No organomegaly.   Lymphatic:  No cervical or inguinal lymphadenopathy  Neurologic:  Patient is awake alert, oriented . Grossly nonfocal  Extremities no edema    Monitor which was done through primary care physician showed sinus rhythm with episodes of bradycardia pauses and AV block at night 3 AM.    Discussion/Summary:    This patient has history of longstanding hypertension, diabetes, hyperlipidemia as well as hypothyroidism.  Patient had 1 episode of palpitations with skipped beats of unclear etiology.  Patient had no recurrence of symptoms.  No history of syncope.    Results of cardiac event monitor patient had through  primary care physician reviewed.    If patient has recurrence of symptoms, will consider extended 30-day monitoring.    Patient counseled about Apple Watch with rhythm monitoring as well as [employer] to monitor his symptoms.    Patient will be scheduled for an echocardiogram to evaluate ejection fraction valves and look for any evidence of pulmonary hypertension.    Symptoms to watch out from cardiac standpoint which would indicate the need for further cardiac evaluation discussed with the patient.    Follow-up in 6 months or earlier as needed.  Patient is agreeable with the plan of care.

## 2024-10-09 ENCOUNTER — HOSPITAL ENCOUNTER (OUTPATIENT)
Dept: RADIOLOGY | Facility: CLINIC | Age: 49
Discharge: HOME | End: 2024-10-09
Payer: COMMERCIAL

## 2024-10-09 VITALS
HEART RATE: 80 BPM | OXYGEN SATURATION: 98 % | SYSTOLIC BLOOD PRESSURE: 145 MMHG | RESPIRATION RATE: 20 BRPM | DIASTOLIC BLOOD PRESSURE: 91 MMHG

## 2024-10-09 DIAGNOSIS — G89.29 CHRONIC LEFT-SIDED LOW BACK PAIN WITH LEFT-SIDED SCIATICA: ICD-10-CM

## 2024-10-09 DIAGNOSIS — R52 PAIN: ICD-10-CM

## 2024-10-09 DIAGNOSIS — M54.16 LUMBAR RADICULOPATHY: ICD-10-CM

## 2024-10-09 DIAGNOSIS — M54.42 CHRONIC LEFT-SIDED LOW BACK PAIN WITH LEFT-SIDED SCIATICA: ICD-10-CM

## 2024-10-09 PROCEDURE — 62323 NJX INTERLAMINAR LMBR/SAC: CPT | Performed by: STUDENT IN AN ORGANIZED HEALTH CARE EDUCATION/TRAINING PROGRAM

## 2024-10-09 RX ORDER — DICLOFENAC SODIUM AND MISOPROSTOL 75; 200 MG/1; UG/1
TABLET, DELAYED RELEASE ORAL
Qty: 180 TABLET | Refills: 3 | Status: SHIPPED | OUTPATIENT
Start: 2024-10-09

## 2024-10-09 RX ORDER — METHYLPREDNISOLONE ACETATE 80 MG/ML
80 INJECTION, SUSPENSION INTRA-ARTICULAR; INTRALESIONAL; INTRAMUSCULAR; PARENTERAL; SOFT TISSUE ONCE
Status: COMPLETED | OUTPATIENT
Start: 2024-10-09 | End: 2024-10-09

## 2024-10-09 RX ADMIN — IOHEXOL 1 ML: 300 INJECTION, SOLUTION INTRAVENOUS at 08:20

## 2024-10-09 RX ADMIN — METHYLPREDNISOLONE ACETATE 80 MG: 80 INJECTION, SUSPENSION INTRA-ARTICULAR; INTRALESIONAL; INTRAMUSCULAR; SOFT TISSUE at 08:21

## 2024-10-09 NOTE — INTERVAL H&P NOTE
Update: (This section must be completed if the H&P was completed greater than 24 hrs to procedure or admission)    H&P reviewed. After examining the patient, I find no changed to the H&P since it had been written.    Patient re-evaluated. Accept as history and physical.    Juancho Crawford MD/October 9, 2024/8:10 AM

## 2024-10-09 NOTE — DISCHARGE INSTR - LAB
Epidural Steroid Injection   WHAT YOU NEED TO KNOW:   An epidural steroid injection (LINETTE) is a procedure to inject steroid medicine into the epidural space. The epidural space is between your spinal cord and vertebrae. Steroids reduce inflammation and fluid buildup in your spine that may be causing pain. You may be given pain medicine along with the steroids.          ACTIVITY  Do not drive or operate machinery today.  No strenuous activity today - bending, lifting, etc.  You may resume normal activites starting tomorrow - start slowly and as tolerated.  You may shower today, but no tub baths or hot tubs.  You may have numbness for several hours from the local anesthetic. Please use caution and common sense, especially with weight-bearing activities.    CARE OF THE INJECTION SITE  If you have soreness or pain, apply ice to the area today (20 minutes on/20 minutes off).  Starting tomorrow, you may use warm, moist heat or ice if needed.  You may have an increase or change in your discomfort for 36-48 hours after your treatment.  Apply ice and continue with any pain medication you have been prescribed.  Notify the [facility] if you have any of the following: redness, drainage, swelling, headache, stiff neck or fever above 100°F.    SPECIAL INSTRUCTIONS  Our office will contact you in approximately 14 days for a progress report.    MEDICATIONS  Continue to take all routine medications.  Our office may have instructed you to hold some medications.    As no general anesthesia was used in today's procedure, you should not experience any side effects related to anesthesia.     If you are diabetic, the steroids used in today's injection may temporarily increase your blood sugar levels after the first few days after your injection. Please keep a close eye on your sugars and alert the doctor who manages your diabetes if your sugars are significantly high from your baseline or you are symptomatic.     If you have a  problem specifically related to your procedure, please call our office at [phone].  Problems not related to your procedure should be directed to your primary care physician.

## 2024-10-11 DIAGNOSIS — E03.9 HYPOTHYROIDISM, UNSPECIFIED TYPE: ICD-10-CM

## 2024-10-11 RX ORDER — LEVOTHYROXINE SODIUM 200 UG/1
TABLET ORAL
Qty: 150 TABLET | Refills: 1 | Status: SHIPPED | OUTPATIENT
Start: 2024-10-11

## 2024-10-17 ENCOUNTER — HOSPITAL ENCOUNTER (OUTPATIENT)
Dept: NON INVASIVE DIAGNOSTICS | Facility: CLINIC | Age: 49
Discharge: HOME | End: 2024-10-17
Payer: COMMERCIAL

## 2024-10-17 VITALS
HEIGHT: 73 IN | BODY MASS INDEX: 41.75 KG/M2 | WEIGHT: 315 LBS | HEART RATE: 81 BPM | DIASTOLIC BLOOD PRESSURE: 91 MMHG | SYSTOLIC BLOOD PRESSURE: 145 MMHG

## 2024-10-17 DIAGNOSIS — R00.2 PALPITATIONS: ICD-10-CM

## 2024-10-17 DIAGNOSIS — I10 PRIMARY HYPERTENSION: ICD-10-CM

## 2024-10-17 LAB
AORTIC ROOT: 3.5 CM
APICAL FOUR CHAMBER EJECTION FRACTION: 58 %
ASCENDING AORTA: 2.9 CM
BSA FOR ECHO PROCEDURE: 2.81 M2
E WAVE DECELERATION TIME: 215 MS
E/A RATIO: 1.04
FRACTIONAL SHORTENING: 32 (ref 28–44)
INTERVENTRICULAR SEPTUM IN DIASTOLE (PARASTERNAL SHORT AXIS VIEW): 1.4 CM
INTERVENTRICULAR SEPTUM: 1.4 CM (ref 0.6–1.1)
LAAS-AP2: 19.3 CM2
LAAS-AP4: 20.3 CM2
LEFT ATRIUM SIZE: 4 CM
LEFT ATRIUM VOLUME (MOD BIPLANE): 57 ML
LEFT ATRIUM VOLUME INDEX (MOD BIPLANE): 20.3 ML/M2
LEFT INTERNAL DIMENSION IN SYSTOLE: 3.2 CM (ref 2.1–4)
LEFT VENTRICULAR INTERNAL DIMENSION IN DIASTOLE: 4.7 CM (ref 3.5–6)
LEFT VENTRICULAR POSTERIOR WALL IN END DIASTOLE: 1.5 CM
LEFT VENTRICULAR STROKE VOLUME: 64 ML
LVSV (TEICH): 64 ML
MV E'TISSUE VEL-SEP: 7 CM/S
MV PEAK A VEL: 0.74 M/S
MV PEAK E VEL: 77 CM/S
MV STENOSIS PRESSURE HALF TIME: 62 MS
MV VALVE AREA P 1/2 METHOD: 3.55
RIGHT ATRIUM AREA SYSTOLE A4C: 15.3 CM2
RIGHT VENTRICLE ID DIMENSION: 4.3 CM
SL CV LEFT ATRIUM LENGTH A2C: 5.6 CM
SL CV LV EF: 60
SL CV PED ECHO LEFT VENTRICLE DIASTOLIC VOLUME (MOD BIPLANE) 2D: 103 ML
SL CV PED ECHO LEFT VENTRICLE SYSTOLIC VOLUME (MOD BIPLANE) 2D: 40 ML
TRICUSPID ANNULAR PLANE SYSTOLIC EXCURSION: 2.3 CM

## 2024-10-17 PROCEDURE — 93306 TTE W/DOPPLER COMPLETE: CPT

## 2024-10-17 PROCEDURE — 93306 TTE W/DOPPLER COMPLETE: CPT | Performed by: INTERNAL MEDICINE

## 2024-10-17 RX ADMIN — PERFLUTREN 3 ML/MIN: 6.52 INJECTION, SUSPENSION INTRAVENOUS at 15:28

## 2024-10-18 ENCOUNTER — TELEPHONE (OUTPATIENT)
Dept: CARDIOLOGY CLINIC | Facility: CLINIC | Age: 49
End: 2024-10-18

## 2024-10-18 NOTE — TELEPHONE ENCOUNTER
Called pt and left vm with Dr. Javier's message. Provided call back number if pt has questions/ concerns.  (Consent in chart)    ----- Message from Dolores Javier MD sent at 10/18/2024  2:56 PM EDT -----  Please call the patient.  Echocardiogram shows normal ejection fraction with no significant valvular abnormalities.

## 2024-11-11 ENCOUNTER — APPOINTMENT (OUTPATIENT)
Dept: LAB | Facility: CLINIC | Age: 49
End: 2024-11-11
Payer: COMMERCIAL

## 2024-11-11 DIAGNOSIS — C73 THYROID CANCER (HCC): ICD-10-CM

## 2024-11-11 DIAGNOSIS — E11.65 TYPE 2 DIABETES MELLITUS WITH HYPERGLYCEMIA, WITHOUT LONG-TERM CURRENT USE OF INSULIN (HCC): ICD-10-CM

## 2024-11-11 DIAGNOSIS — E78.00 HYPERCHOLESTEROLEMIA: ICD-10-CM

## 2024-11-11 DIAGNOSIS — E55.9 VITAMIN D DEFICIENCY: ICD-10-CM

## 2024-11-11 DIAGNOSIS — E89.0 POSTOPERATIVE HYPOTHYROIDISM: ICD-10-CM

## 2024-11-11 LAB
25(OH)D3 SERPL-MCNC: 49.8 NG/ML (ref 30–100)
ALBUMIN SERPL BCG-MCNC: 4.1 G/DL (ref 3.5–5)
ALP SERPL-CCNC: 79 U/L (ref 34–104)
ALT SERPL W P-5'-P-CCNC: 60 U/L (ref 7–52)
ANION GAP SERPL CALCULATED.3IONS-SCNC: 9 MMOL/L (ref 4–13)
AST SERPL W P-5'-P-CCNC: 33 U/L (ref 13–39)
BILIRUB SERPL-MCNC: 0.62 MG/DL (ref 0.2–1)
BUN SERPL-MCNC: 18 MG/DL (ref 5–25)
CALCIUM SERPL-MCNC: 8.3 MG/DL (ref 8.4–10.2)
CHLORIDE SERPL-SCNC: 91 MMOL/L (ref 96–108)
CHOLEST SERPL-MCNC: 143 MG/DL
CO2 SERPL-SCNC: 32 MMOL/L (ref 21–32)
CREAT SERPL-MCNC: 0.89 MG/DL (ref 0.6–1.3)
EST. AVERAGE GLUCOSE BLD GHB EST-MCNC: 229 MG/DL
GFR SERPL CREATININE-BSD FRML MDRD: 99 ML/MIN/1.73SQ M
GLUCOSE P FAST SERPL-MCNC: 216 MG/DL (ref 65–99)
HBA1C MFR BLD: 9.6 %
HDLC SERPL-MCNC: 34 MG/DL
LDLC SERPL CALC-MCNC: 61 MG/DL (ref 0–100)
POTASSIUM SERPL-SCNC: 3.3 MMOL/L (ref 3.5–5.3)
PROT SERPL-MCNC: 7.1 G/DL (ref 6.4–8.4)
SODIUM SERPL-SCNC: 132 MMOL/L (ref 135–147)
T4 FREE SERPL-MCNC: 1.54 NG/DL (ref 0.61–1.12)
TRIGL SERPL-MCNC: 238 MG/DL
TSH SERPL DL<=0.05 MIU/L-ACNC: 2.7 UIU/ML (ref 0.45–4.5)

## 2024-11-11 PROCEDURE — 84432 ASSAY OF THYROGLOBULIN: CPT

## 2024-11-11 PROCEDURE — 80053 COMPREHEN METABOLIC PANEL: CPT

## 2024-11-11 PROCEDURE — 84439 ASSAY OF FREE THYROXINE: CPT

## 2024-11-11 PROCEDURE — 83036 HEMOGLOBIN GLYCOSYLATED A1C: CPT

## 2024-11-11 PROCEDURE — 36415 COLL VENOUS BLD VENIPUNCTURE: CPT

## 2024-11-11 PROCEDURE — 86800 THYROGLOBULIN ANTIBODY: CPT

## 2024-11-11 PROCEDURE — 80061 LIPID PANEL: CPT

## 2024-11-11 PROCEDURE — 82306 VITAMIN D 25 HYDROXY: CPT

## 2024-11-11 PROCEDURE — 84443 ASSAY THYROID STIM HORMONE: CPT

## 2024-11-11 RX ORDER — SEMAGLUTIDE 1.34 MG/ML
INJECTION, SOLUTION SUBCUTANEOUS
Qty: 9 ML | Refills: 1 | Status: SHIPPED | OUTPATIENT
Start: 2024-11-11 | End: 2024-11-20

## 2024-11-12 LAB
THYROGLOB AB SERPL-ACNC: <1 IU/ML (ref 0–0.9)
THYROGLOB SERPL-MCNC: 5.5 NG/ML (ref 1.4–29.2)

## 2024-11-13 ENCOUNTER — TELEPHONE (OUTPATIENT)
Age: 49
End: 2024-11-13

## 2024-11-13 ENCOUNTER — RESULTS FOLLOW-UP (OUTPATIENT)
Age: 49
End: 2024-11-13

## 2024-11-19 NOTE — TELEPHONE ENCOUNTER
Called [employer] to find out why patients Ozempic was denied.    Answered questions over the phone.    Ozempic is now APPROVED.

## 2024-11-20 ENCOUNTER — OFFICE VISIT (OUTPATIENT)
Age: 49
End: 2024-11-20
Payer: COMMERCIAL

## 2024-11-20 VITALS
HEART RATE: 86 BPM | BODY MASS INDEX: 41.75 KG/M2 | SYSTOLIC BLOOD PRESSURE: 130 MMHG | WEIGHT: 315 LBS | OXYGEN SATURATION: 97 % | TEMPERATURE: 97.8 F | DIASTOLIC BLOOD PRESSURE: 76 MMHG | HEIGHT: 73 IN

## 2024-11-20 DIAGNOSIS — C73 PAPILLARY THYROID CARCINOMA (HCC): ICD-10-CM

## 2024-11-20 DIAGNOSIS — E78.00 HYPERCHOLESTEROLEMIA: ICD-10-CM

## 2024-11-20 DIAGNOSIS — E89.0 POSTOPERATIVE HYPOTHYROIDISM: ICD-10-CM

## 2024-11-20 DIAGNOSIS — E20.9 HYPOPARATHYROIDISM, UNSPECIFIED HYPOPARATHYROIDISM TYPE (HCC): ICD-10-CM

## 2024-11-20 DIAGNOSIS — E66.01 CLASS 3 SEVERE OBESITY DUE TO EXCESS CALORIES WITH SERIOUS COMORBIDITY AND BODY MASS INDEX (BMI) OF 45.0 TO 49.9 IN ADULT (HCC): ICD-10-CM

## 2024-11-20 DIAGNOSIS — E66.813 CLASS 3 SEVERE OBESITY DUE TO EXCESS CALORIES WITH SERIOUS COMORBIDITY AND BODY MASS INDEX (BMI) OF 45.0 TO 49.9 IN ADULT (HCC): ICD-10-CM

## 2024-11-20 DIAGNOSIS — E11.65 TYPE 2 DIABETES MELLITUS WITH HYPERGLYCEMIA, WITHOUT LONG-TERM CURRENT USE OF INSULIN (HCC): Primary | ICD-10-CM

## 2024-11-20 PROCEDURE — 99214 OFFICE O/P EST MOD 30 MIN: CPT

## 2024-11-20 RX ORDER — ACYCLOVIR 400 MG/1
1 TABLET ORAL SEE ADMIN INSTRUCTIONS
Qty: 9 EACH | Refills: 3 | Status: SHIPPED | OUTPATIENT
Start: 2024-11-20

## 2024-11-20 RX ORDER — LEVOTHYROXINE SODIUM 200 MCG
TABLET ORAL
Qty: 200 TABLET | Refills: 1 | Status: SHIPPED | OUTPATIENT
Start: 2024-11-20 | End: 2024-11-22 | Stop reason: ALTCHOICE

## 2024-11-20 RX ORDER — METFORMIN HYDROCHLORIDE 500 MG/1
1000 TABLET, EXTENDED RELEASE ORAL 2 TIMES DAILY WITH MEALS
Qty: 360 TABLET | Refills: 1 | Status: SHIPPED | OUTPATIENT
Start: 2024-11-20

## 2024-11-20 NOTE — PATIENT INSTRUCTIONS
Increase Ozempic to 2 mg weekly  Increase metformin to 1000 mg twice daily  Continue calcitriol, calcium and vitamin D supplementation, hydrochlorothiazide  Increase Synthroid to 200 mcg 2 tablets Monday-Saturday with 1 tablet on Sunday  Start Dexcom G7 continuous glucose monitor  Create clarity account (separate rob) to share with the office  Use same username/password as Dexcom G7 account  Call the office for blood glucose levels less than 70 mg/dL or persistent patterns over 250 mg/dL.    Low Blood Sugar  Steps to treat low blood sugar.    1. Test blood sugar if you have symptoms of low blood sugar:   Low Blood Sugar Symptoms:  o Sweaty  o Dizzy  o Rapid heartbeat  o Shaky  o Bad mood  o Hungry    2. Treat blood sugar less than 70 with 15 grams of fast-acting carbohydrate:   Examples of 15 grams Fast-Acting Carbohydrate:  o 4 oz juice/ 4 oz regular soda  o 1 tablespoon honey  o 1 pack of fun size skittles (about 15 individual skittles)  o 12 gummy bears  o 4 starburst   o 3-4 hard candies (chew)  o 3-4 glucose tablets (chew)            3.   Wait 15 minutes and test your blood sugar again         4.   If blood sugar is less than 100, repeat steps 2-3.    5.   When your blood sugar is 100 or more, eat a snack if it will be longer than one hour until your next meal. The snack should be 15 grams of carbohydrate and a protein:   Examples of snacks:  o ½ sandwich  o 6 crackers with cheese or with peanut butter  o Piece of fruit with cheese or peanut butter

## 2024-11-20 NOTE — ASSESSMENT & PLAN NOTE
Denies symptoms of hypocalcemia. Denies kidney stones.  Continue calcium supplements and calcitriol.   Orders:    Comprehensive metabolic panel; Future    PTH, intact; Future

## 2024-11-20 NOTE — ASSESSMENT & PLAN NOTE
TSH above goal of 0.1-0.5    Presents clinically and biochemically euthyroid.  Will increase Synthroid to 400 mcg Monday-Saturday and 200 mcg on Sunday.  Reports he takes this medication consistently and correctly.  Repeat labs prior to next visit.  Orders:    TSH, 3rd generation; Future    T4, free; Future    Synthroid 200 MCG tablet; Take 2 tablets by mouth Monday-Saturday and 1 tablet by mouth Sunday

## 2024-11-20 NOTE — ASSESSMENT & PLAN NOTE
Increase Ozempic to 2 mg weekly.    Continue to decrease caloric intake, follow a balanced diet, avoid processed foods and sweetened beverages, and stay well-hydrated.    Increase physical activity as tolerated.

## 2024-11-20 NOTE — PROGRESS NOTES
Name: Wilber Carey Jr.      : 1975      MRN: [id number]  Encounter Provider: ISAÍAS Sweeney  Encounter Date: 2024   Encounter department: [facility]  :  Assessment & Plan  Type 2 diabetes mellitus with hyperglycemia, without long-term current use of insulin (HCC)  A1c improved from previous visit however remains uncontrolled.    Increase Ozempic to 2 mg weekly.  Increase metformin to 1000 mg twice daily.  Start continuous glucose monitor Dexcom G7.    Continue diet lifestyle modification including attention to the amount and type of carbohydrates consumed as well as increased physical activity as tolerated.  Physical activity is limited by left knee pain.    Follow-up in 3 months.  Labs prior to next visit.  Lab Results   Component Value Date    HGBA1C 9.6 (H) 2024       Orders:    semaglutide, 2 mg/dose, (Ozempic, 2 MG/DOSE,) 8 mg/ mL injection pen; Inject 0.75 mL (2 mg total) under the skin every 7 days    metFORMIN (GLUCOPHAGE-XR) 500 mg 24 hr tablet; Take 2 tablets (1,000 mg total) by mouth 2 (two) times a day with meals    Continuous Glucose Sensor (Dexcom G7 Sensor); Use 1 Device see administration instructions Use 1 sensor every 10 days.  Disp 3 sensor for 30 day supply    Hemoglobin A1C; Future    Comprehensive metabolic panel; Future    Hypercholesterolemia  Continue simvastatin 10 mg daily.       Papillary thyroid carcinoma (HCC)  Goal TSH 0.1-0.5.  Thyroglobulin levels are elevated however trending down from previous results. US head neck lymph node mapping from 2024 revealed no evidence of recurrent or metastatic disease but was recommended for repeat ultrasound in 3-6 months. He is overdue for this surveillance ultrasound head neck lymph node mapping.  He will complete this when able.  Orders:    Thyroglobulin; Future    Postoperative hypothyroidism  TSH above goal of 0.1-0.5    Presents clinically and  biochemically euthyroid.  Will increase Synthroid to 400 mcg Monday-Saturday and 200 mcg on Sunday.  Reports he takes this medication consistently and correctly.  Repeat labs prior to next visit.  Orders:    TSH, 3rd generation; Future    T4, free; Future    Synthroid 200 MCG tablet; Take 2 tablets by mouth Monday-Saturday and 1 tablet by mouth Sunday    Hypoparathyroidism, unspecified hypoparathyroidism type (HCC)  Denies symptoms of hypocalcemia. Denies kidney stones.  Continue calcium supplements and calcitriol.   Orders:    Comprehensive metabolic panel; Future    PTH, intact; Future    Class 3 severe obesity due to excess calories with serious comorbidity and body mass index (BMI) of 45.0 to 49.9 in adult (HCC)  Increase Ozempic to 2 mg weekly.    Continue to decrease caloric intake, follow a balanced diet, avoid processed foods and sweetened beverages, and stay well-hydrated.    Increase physical activity as tolerated.           History of Present Illness     HPI  Wilber Carey Jr. is a 49 y.o. male who presents to the office today for follow-up of hyperparathyroidism, papillary thyroid cancer with subsequent postoperative hypothyroidism, and type 2 diabetes, without long term insulin use.     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 Mckeon.  Recent ultrasound 6/2022 showed no evidence of recurrent/metastatic disease.   Recent thyroglobulin 6/7/2024 was higher at 7.0 and has since trended down to 5.5 on 11/11/2024.  (Previous results in the 3-4 range over the past few years) thyroglobulin antibodies remain undetectable.  He completed US head neck lymph node ultrasound mapping on 7/30/2024 which demonstrated no evidence of recurrent or metastatic disease. Thyroid bed nodules with minimal change. One on the left may be slightly larger but may be related to  measurement differences. Correlation with tumor markers is advised.  It was recommended to complete a 3 to 6-month follow-up ultrasound, which has been ordered and not yet completed.  His goal TSH is 0.1-0.5     For hypothyroidism, he is taking Synthroid 200 mcg 2 tabs Monday-Friday and 1 tab Saturday/.    For the Hypoparathyroidism, he is taking Calcium supplements and calcitriol. He denies kidney stones.  Taking HCTZ for hypercalcuria. Denies symptoms of hypocalcemia.   Last year he had a fracture of 5th metatarsal.  This required treatment with surgery.  It was at the site of an old fracture from football injury (right 5th metatarsal)     For type 2 diabetes, he was off medications for approximately 5 years.  This summer he had a health fair at work where his blood glucose level was measured at 380 mg/dL.  He saw his PCP 2024 and was started on Ozempic and metformin.  Complications of diabetes include: HLD.  His most recent A1c is now 9.6%.    Denies recent episodes of hypoglycemia.    Current home glucose monitoring:   Fastin     Current Medication Regimen:   Ozempic 1 mg weekly  Metformin 500 mg twice daily    Hypertension, followed by PCP, taking: Not on ACEi/ARB  Hyperlipidemia, followed by PCP, taking: Simvastatin 10 mg daily, Tolerating well with no myalgias  History of Pancreatitis: No  Diabetes Education: Attended      Component      Latest Ref Rng 2024   Sodium      135 - 147 mmol/L 132 (L)    Potassium      3.5 - 5.3 mmol/L 3.3 (L)    Chloride      96 - 108 mmol/L 91 (L)    Carbon Dioxide      21 - 32 mmol/L 32    ANION GAP      4 - 13 mmol/L 9    BUN      5 - 25 mg/dL 18    Creatinine      0.60 - 1.30 mg/dL 0.89    GLUCOSE, FASTING      65 - 99 mg/dL 216 (H)    Calcium      8.4 - 10.2 mg/dL 8.3 (L)    AST      13 - 39 U/L 33    ALT      7 - 52 U/L 60 (H)    ALK PHOS      34 - 104 U/L 79    Total Protein      6.4 - 8.4 g/dL 7.1    Albumin      3.5 - 5.0 g/dL 4.1    Total Bilirubin       0.20 - 1.00 mg/dL 0.62    GFR, Calculated      ml/min/1.73sq m 99    Cholesterol      See Comment mg/dL 143    Triglycerides      See Comment mg/dL 238 (H)    HDL      >=40 mg/dL 34 (L)    LDL Calculated      0 - 100 mg/dL 61    Hemoglobin A1C      Normal 4.0-5.6%; PreDiabetic 5.7-6.4%; Diabetic >=6.5%; Glycemic control for adults with diabetes <7.0% % 9.6 (H)    eAG, EST AVG Glucose      mg/dl 229    TSH 3RD GENERATON      0.450 - 4.500 uIU/mL 2.700    FREE T4      0.61 - 1.12 ng/dL 1.54 (H)    THYROGLOBULIN AB      0.0 - 0.9 IU/mL <1.0    VITAMIN D, 25-HYDROXY      30.0 - 100.0 ng/mL 49.8    Thyroglobulin-VERONICA      1.4 - 29.2 ng/mL 5.5       Legend:  (L) Low  (H) High    History obtained from: patient    Review of Systems   Constitutional:  Negative for appetite change, fatigue and unexpected weight change.   HENT:  Negative for congestion, hearing loss and sore throat.    Respiratory:  Negative for cough.    Cardiovascular:  Negative for chest pain and palpitations.   Gastrointestinal:  Negative for constipation and diarrhea.   Endocrine: Negative for polydipsia and polyuria.   Musculoskeletal:  Positive for arthralgias and back pain.   Neurological:  Negative for dizziness and tremors.   Psychiatric/Behavioral:  Negative for sleep disturbance.      Current Outpatient Medications on File Prior to Visit   Medication Sig Dispense Refill    amLODIPine (NORVASC) 5 mg tablet TAKE 1 TABLET DAILY 90 tablet 3    aspirin (ECOTRIN LOW STRENGTH) 81 mg EC tablet Take 81 mg by mouth daily Prophylactic usage      Blood Glucose Monitoring Suppl (OneTouch Verio Reflect) w/Device KIT Check blood sugars once daily. Please substitute with appropriate alternative as covered by patient's insurance. Dx: E11.65 1 kit 0    calcitriol (ROCALTROL) 0.25 mcg capsule TAKE 1 CAPSULE TWICE A  capsule 3    Calcium Carb-Cholecalciferol (CALCIUM 600 + D PO) Take 600 mg by mouth 4 (four) times a day Does Not have a Parathyroid       "diclofenac-misoprostol (ARTHROTEC 75) 75-0.2 MG per tablet TAKE 1 TABLET TWICE A  tablet 3    glucose blood (OneTouch Verio) test strip Check blood sugars once daily. Please substitute with appropriate alternative as covered by patient's insurance. Dx: E11.65 100 each 3    hydroCHLOROthiazide 25 mg tablet TAKE 1 TABLET TWICE A DAY.  (IN ADDITION TO THE LOSARTAN/HCTZ) 180 tablet 1    losartan-hydrochlorothiazide (HYZAAR) 100-25 MG per tablet TAKE 1 TABLET DAILY 90 tablet 1    OneTouch Delica Lancets 33G MISC Check blood sugars once daily. Please substitute with appropriate alternative as covered by patient's insurance. Dx: E11.65 100 each 3    simvastatin (ZOCOR) 10 mg tablet TAKE 1 TABLET AT BEDTIME 90 tablet 3    [DISCONTINUED] levothyroxine 200 mcg tablet TAKE 2 TABLETS MONDAY THROUGH FRIDAY, 1 TABLET SATURDAY AND  150 tablet 1    [DISCONTINUED] metFORMIN (GLUCOPHAGE-XR) 500 mg 24 hr tablet 2 tabs twice per day with food 360 tablet 1    [DISCONTINUED] semaglutide, 1 mg/dose, (Ozempic, 1 MG/DOSE,) 4 mg/3 mL injection pen INJECT 1 MG WEEKLY 9 mL 1     No current facility-administered medications on file prior to visit.      Social History     Tobacco Use    Smoking status: Former     Current packs/day: 0.00     Average packs/day: 1 pack/day for 10.0 years (10.0 ttl pk-yrs)     Types: Cigarettes     Start date:      Quit date:      Years since quittin.9    Smokeless tobacco: Never   Vaping Use    Vaping status: Never Used   Substance and Sexual Activity    Alcohol use: Yes     Comment: 3x a week    Drug use: Never    Sexual activity: Not on file        Objective   /76 (BP Location: Right arm, Patient Position: Sitting, Cuff Size: Standard)   Pulse 86   Temp 97.8 °F (36.6 °C)   Ht 6' 1\" (1.854 m)   Wt (!) 170 kg (375 lb)   SpO2 97%   BMI 49.48 kg/m²      Physical Exam  Vitals reviewed.   Constitutional:       General: He is not in acute distress.     Appearance: Normal appearance. " He is well-groomed. He is morbidly obese.   HENT:      Head: Normocephalic and atraumatic.      Mouth/Throat:      Mouth: Mucous membranes are moist.   Eyes:      Conjunctiva/sclera: Conjunctivae normal.   Cardiovascular:      Rate and Rhythm: Normal rate.   Pulmonary:      Effort: Pulmonary effort is normal. No respiratory distress.   Abdominal:      General: There is no distension.      Palpations: Abdomen is soft.   Musculoskeletal:         General: No swelling.      Cervical back: Normal range of motion.   Skin:     General: Skin is warm and dry.   Neurological:      Mental Status: He is alert and oriented to person, place, and time.   Psychiatric:         Mood and Affect: Mood normal.         Behavior: Behavior normal. Behavior is cooperative.         Thought Content: Thought content normal.         Judgment: Judgment normal.         Administrative Statements   I have spent a total time of 39 minutes in caring for this patient on the day of the visit/encounter including Diagnostic results, Prognosis, Risks and benefits of tx options, Instructions for management, Patient and family education, Importance of tx compliance, Risk factor reductions, Impressions, Counseling / Coordination of care, Documenting in the medical record, Reviewing / ordering tests, medicine, procedures  , and Obtaining or reviewing history  .

## 2024-11-20 NOTE — ASSESSMENT & PLAN NOTE
Goal TSH 0.1-0.5.  Thyroglobulin levels are elevated however trending down from previous results. US head neck lymph node mapping from 7/30/2024 revealed no evidence of recurrent or metastatic disease but was recommended for repeat ultrasound in 3-6 months. He is overdue for this surveillance ultrasound head neck lymph node mapping.  He will complete this when able.  Orders:    Thyroglobulin; Future

## 2024-11-20 NOTE — ASSESSMENT & PLAN NOTE
A1c improved from previous visit however remains uncontrolled.    Increase Ozempic to 2 mg weekly.  Increase metformin to 1000 mg twice daily.  Start continuous glucose monitor Dexcom G7.    Continue diet lifestyle modification including attention to the amount and type of carbohydrates consumed as well as increased physical activity as tolerated.  Physical activity is limited by left knee pain.    Follow-up in 3 months.  Labs prior to next visit.  Lab Results   Component Value Date    HGBA1C 9.6 (H) 11/11/2024       Orders:    semaglutide, 2 mg/dose, (Ozempic, 2 MG/DOSE,) 8 mg/ mL injection pen; Inject 0.75 mL (2 mg total) under the skin every 7 days    metFORMIN (GLUCOPHAGE-XR) 500 mg 24 hr tablet; Take 2 tablets (1,000 mg total) by mouth 2 (two) times a day with meals    Continuous Glucose Sensor (Dexcom G7 Sensor); Use 1 Device see administration instructions Use 1 sensor every 10 days.  Disp 3 sensor for 30 day supply    Hemoglobin A1C; Future    Comprehensive metabolic panel; Future

## 2024-11-22 ENCOUNTER — TELEPHONE (OUTPATIENT)
Age: 49
End: 2024-11-22

## 2024-11-22 DIAGNOSIS — E89.0 POSTOPERATIVE HYPOTHYROIDISM: ICD-10-CM

## 2024-11-22 RX ORDER — LEVOTHYROXINE SODIUM 200 UG/1
400 TABLET ORAL DAILY
Qty: 200 TABLET | Refills: 1 | Status: SHIPPED | OUTPATIENT
Start: 2024-11-22

## 2024-11-22 NOTE — TELEPHONE ENCOUNTER
Lissa from [email or web address] calling in, states that patient's plan [employer] snot cover brand Synthroid, but it does cover levothyroxine and at [email or web address] all they dispense is Synthroid so the provider can resend without the WILFREDO and they will still dispense the brand.

## 2024-12-02 ENCOUNTER — TELEPHONE (OUTPATIENT)
Age: 49
End: 2024-12-02

## 2024-12-02 NOTE — TELEPHONE ENCOUNTER
Patient following up on completion of FMLA paperwork which was dropped off in the office last week.    Form needs to be completed and Faxed back no later than 12/6/24.    Please call patient back with an update on this request.

## 2024-12-02 NOTE — TELEPHONE ENCOUNTER
Spoke with patient - needs to find out if his Dexcom G7 was approved.    I called [facility]. Representative did prior auth while I was on the phone.    Was denied due to no insulin inj or hypoglycemic under 54 in the last 6 months or altered mental state, hospitalized. from low blood sugar.    He does have two insurances so I'm not sure which insurance went through [place] so I recommended he check with the other to see if he'd be approved for the Dexcom G7 or Jay 3

## 2024-12-26 DIAGNOSIS — I10 HYPERTENSION, UNSPECIFIED TYPE: ICD-10-CM

## 2024-12-26 RX ORDER — CALCITRIOL 0.25 UG/1
0.25 CAPSULE, LIQUID FILLED ORAL 2 TIMES DAILY
Qty: 180 CAPSULE | Refills: 1 | Status: SHIPPED | OUTPATIENT
Start: 2024-12-26

## 2024-12-26 RX ORDER — AMLODIPINE BESYLATE 5 MG/1
5 TABLET ORAL DAILY
Qty: 90 TABLET | Refills: 1 | Status: SHIPPED | OUTPATIENT
Start: 2024-12-26

## 2025-01-01 DIAGNOSIS — E78.5 HYPERLIPIDEMIA, UNSPECIFIED HYPERLIPIDEMIA TYPE: ICD-10-CM

## 2025-01-02 RX ORDER — SIMVASTATIN 10 MG
10 TABLET ORAL
Qty: 90 TABLET | Refills: 1 | Status: SHIPPED | OUTPATIENT
Start: 2025-01-02

## 2025-01-07 DIAGNOSIS — R82.994 HYPERCALCIURIA: ICD-10-CM

## 2025-01-07 DIAGNOSIS — E83.51 HYPOCALCEMIA: ICD-10-CM

## 2025-01-07 RX ORDER — HYDROCHLOROTHIAZIDE 25 MG/1
TABLET ORAL
Qty: 180 TABLET | Refills: 1 | Status: SHIPPED | OUTPATIENT
Start: 2025-01-07

## 2025-01-24 ENCOUNTER — APPOINTMENT (EMERGENCY)
Dept: CT IMAGING | Facility: HOSPITAL | Age: 50
End: 2025-01-24
Payer: COMMERCIAL

## 2025-01-24 ENCOUNTER — HOSPITAL ENCOUNTER (EMERGENCY)
Facility: HOSPITAL | Age: 50
Discharge: HOME | End: 2025-01-25
Payer: COMMERCIAL

## 2025-01-24 ENCOUNTER — APPOINTMENT (EMERGENCY)
Dept: RADIOLOGY | Facility: HOSPITAL | Age: 50
End: 2025-01-24
Payer: COMMERCIAL

## 2025-01-24 VITALS
OXYGEN SATURATION: 97 % | SYSTOLIC BLOOD PRESSURE: 148 MMHG | RESPIRATION RATE: 18 BRPM | TEMPERATURE: 97.5 F | HEART RATE: 110 BPM | WEIGHT: 315 LBS | BODY MASS INDEX: 50.03 KG/M2 | DIASTOLIC BLOOD PRESSURE: 85 MMHG

## 2025-01-24 DIAGNOSIS — M25.562 KNEE PAIN, LEFT: Primary | ICD-10-CM

## 2025-01-24 LAB
ANION GAP SERPL CALCULATED.3IONS-SCNC: 9 MMOL/L (ref 4–13)
BUN SERPL-MCNC: 22 MG/DL (ref 5–25)
CALCIUM SERPL-MCNC: 7.7 MG/DL (ref 8.4–10.2)
CHLORIDE SERPL-SCNC: 95 MMOL/L (ref 96–108)
CO2 SERPL-SCNC: 30 MMOL/L (ref 21–32)
CREAT SERPL-MCNC: 0.9 MG/DL (ref 0.6–1.3)
GFR SERPL CREATININE-BSD FRML MDRD: 99 ML/MIN/1.73SQ M
GLUCOSE SERPL-MCNC: 367 MG/DL (ref 65–140)
POTASSIUM SERPL-SCNC: 3.4 MMOL/L (ref 3.5–5.3)
SODIUM SERPL-SCNC: 134 MMOL/L (ref 135–147)

## 2025-01-24 PROCEDURE — 99284 EMERGENCY DEPT VISIT MOD MDM: CPT

## 2025-01-24 PROCEDURE — 36415 COLL VENOUS BLD VENIPUNCTURE: CPT

## 2025-01-24 PROCEDURE — 96360 HYDRATION IV INFUSION INIT: CPT

## 2025-01-24 PROCEDURE — 99285 EMERGENCY DEPT VISIT HI MDM: CPT

## 2025-01-24 PROCEDURE — 73701 CT LOWER EXTREMITY W/DYE: CPT

## 2025-01-24 PROCEDURE — 80048 BASIC METABOLIC PNL TOTAL CA: CPT

## 2025-01-24 PROCEDURE — 73564 X-RAY EXAM KNEE 4 OR MORE: CPT

## 2025-01-24 RX ADMIN — IOHEXOL 100 ML: 350 INJECTION, SOLUTION INTRAVENOUS at 22:36

## 2025-01-24 RX ADMIN — SODIUM CHLORIDE 1000 ML: 0.9 INJECTION, SOLUTION INTRAVENOUS at 22:43

## 2025-01-24 RX ADMIN — Medication 2 G: at 21:46

## 2025-01-25 NOTE — ED PROVIDER NOTES
Time reflects when diagnosis was documented in both MDM as applicable and the Disposition within this note       Time User Action Codes Description Comment    1/24/2025  8:58 PM Ian Espinoza Add [M25.562] Knee pain, left           ED Disposition       ED Disposition   Discharge    Condition   Stable    Date/Time   Fri Jan 24, 2025 11:54 PM    Comment   Wilber Carey Jr. discharge to home/self care.                   Assessment & Plan       Medical Decision Making  Patient presents with left knee joint pain. Given history, exam and workup patient likely has arthritis. I have low suspicion for fracture, dislocation, significant ligamentous injury, septic arthritis, gout flare, new autoimmune arthropathy, or gonococcal arthropathy. Patient will RICE, weightbearing as tolerated, crutches if needed, follow up with orthopedics. Xray of the knee showed Irregular linear lucency along the left lateral tibial plateau, possibly nondisplaced fracture versus artifact from the overlapping extensive degenerative change. CT evaluation may be helpful if clinically warranted. CT lower extremity showed No acute fracture or dislocation.  Extensive tricompartmental osteoarthrosis with a large 2.5 cm calcified joint body in the lateral suprapatellar recess.  Small knee joint effusion.  Prior to discharge, discharge instructions were discussed with patient at bedside. Patient was provided both verbal and written instructions. Patient is understanding of the discharge instructions and is agreeable to plan of care. Return precautions were discussed with patient bedside, patient verbalized understanding of signs and symptoms that would necessitate return to the ED. All questions were answered. Patient was comfortable with the plan of care and discharged to home.       Problems Addressed:  Knee pain, left: acute illness or injury    Amount and/or Complexity of Data Reviewed  Labs: ordered.  Radiology: ordered. Decision-making details  documented in ED Course.    Risk  Prescription drug management.        ED Course as of 01/25/25 0521   Fri Jan 24, 2025   2140 XR knee 4+ views LEFT  Irregular linear lucency along the left lateral tibial plateau, possibly nondisplaced fracture versus artifact from the overlapping extensive degenerative change. CT evaluation may be helpful if clinically warranted.   2351 CT lower extremity w contrast left  No acute fracture or dislocation.  Extensive tricompartmental osteoarthrosis with a large 2.5 cm calcified joint body in the lateral suprapatellar recess.  Small knee joint effusion.         Medications   sodium chloride 0.9 % bolus 1,000 mL (0 mL Intravenous Stopped 1/24/25 2353)   iohexol (OMNIPAQUE) 350 MG/ML injection (MULTI-DOSE) 100 mL (100 mL Intravenous Given 1/24/25 2236)       ED Risk Strat Scores                          SBIRT 22yo+      Flowsheet Row Most Recent Value   Initial Alcohol Screen: US AUDIT-C     1. How often do you have a drink containing alcohol? 0 Filed at: 01/24/2025 1933   2. How many drinks containing alcohol do you have on a typical day you are drinking?  0 Filed at: 01/24/2025 1933   3a. Male UNDER 65: How often do you have five or more drinks on one occasion? 0 Filed at: 01/24/2025 1933   Audit-C Score 0 Filed at: 01/24/2025 1933   AGATHA: How many times in the past year have you...    Used an illegal drug or used a prescription medication for non-medical reasons? Never Filed at: 01/24/2025 1933                            History of Present Illness       Chief Complaint   Patient presents with    Knee Pain     Pt c/o left knee pain and swelling that has been worsening, denies any injury        Past Medical History:   Diagnosis Date    Asthma     Diabetes mellitus (HCC)     Diet Controlled    Diabetes type 2, uncontrolled 05/06/2014    Diverticulitis     Hyperlipidemia     Hypertension     Immunity to measles determined by serologic test 07/02/2019    PONV (postoperative nausea and  vomiting)     Thyroid cancer (HCC)     radioactive iodine     Thyroid cancer (HCC) 2020    Type 2 diabetes mellitus (HCC) 2013      Past Surgical History:   Procedure Laterality Date    BIOPSY CORE NEEDLE      Thyroid    CHEST WALL BIOPSY N/A 2020    Procedure: EXCISION  BIOPSY LESION/MASS from back;  Surgeon: Olaf Delgadillo MD;  Location: [facility];  Service: General    COLON SURGERY      COLOSTOMY      with reversal    MA ARTHRS KNE SURG W/MENISCECTOMY MED/LAT W/SHVG Left 2017    Procedure: KNEE ARTHROSCOPIC PARTIAL LATERAL MENISCECTOMY ; PATELLO FEMOEAL CHONDROPLASTY;  Surgeon: Jem Simons MD;  Location:  MAIN OR;  Service: Orthopedics    MA OPEN TREATMENT METATARSAL FRACTURE EACH Right 3/2/2023    Procedure: OPEN REDUCTION W/ INTERNAL FIXATION (ORIF) FOOT 5TH METATARSAL;  Surgeon: Roxi Medeiros DPM;  Location: [facility];  Service: Podiatry    THYROIDECTOMY      Removal of 3 Parathyroids      Family History   Problem Relation Age of Onset    Diabetes Mother     Hypertension Mother     Hypertension Father     Hyperlipidemia Father     Thyroid disease unspecified Sister     Sleep apnea Neg Hx       Social History     Tobacco Use    Smoking status: Former     Current packs/day: 0.00     Average packs/day: 1 pack/day for 10.0 years (10.0 ttl pk-yrs)     Types: Cigarettes     Start date:      Quit date:      Years since quittin.0    Smokeless tobacco: Never   Vaping Use    Vaping status: Never Used   Substance Use Topics    Alcohol use: Yes     Comment: 3x a week    Drug use: Never      E-Cigarette/Vaping    E-Cigarette Use Never User       E-Cigarette/Vaping Substances    Nicotine No     THC No     CBD No     Flavoring No     Other No     Unknown No       I have reviewed and agree with the history as documented.     The patient is a 49 y.o. male with a history of asthma, type 2 diabetes, diverticulitis, hyperlipidemia, hypertension, thyroid cancer, who presents to [place]  [facility] with a chief complaint of left knee pain. Symptoms have been chronic but have worsened over the last week. His pain is currently rated as a 6/10 in severity and described as dull to the lateral side of the left knee without radiation. Associated symptoms include swelling. Symptoms are aggravated with none noted and alleviating factors include none noted. The patient denies fever, chills, night sweats, chest pain, shortness of breath, cough, wheezing, erythema, ecchymosis, numbness, tingling, decreased range of motion, gout, inability to weight bear, gait abnormality, falls, trauma. No other reported symptoms at this time.  Patient denies allergies to anything          History provided by:  Patient   used: No    Knee Pain  Associated symptoms: no back pain and no fever        Review of Systems   Constitutional:  Negative for chills and fever.   HENT:  Negative for ear pain and sore throat.    Eyes:  Negative for pain and visual disturbance.   Respiratory:  Negative for cough and shortness of breath.    Cardiovascular:  Negative for chest pain and palpitations.   Gastrointestinal:  Negative for abdominal pain and vomiting.   Genitourinary:  Negative for dysuria and hematuria.   Musculoskeletal:  Positive for arthralgias. Negative for back pain.   Skin:  Negative for color change and rash.   Neurological:  Negative for dizziness, seizures, syncope, facial asymmetry, light-headedness and headaches.   All other systems reviewed and are negative.          Objective       ED Triage Vitals [01/24/25 1932]   Temperature Pulse Blood Pressure Respirations SpO2 Patient Position - Orthostatic VS   97.5 °F (36.4 °C) (!) 110 148/85 18 97 % Sitting      Temp Source Heart Rate Source BP Location FiO2 (%) Pain Score    Temporal Monitor Left arm -- --      Vitals      Date and Time Temp Pulse SpO2 Resp BP Pain Score FACES Pain Rating User   01/24/25 1932 97.5 °F (36.4 °C) 110 97 % 18 148/85 --  -- BS            Physical Exam  Vitals reviewed.   Constitutional:       General: He is not in acute distress.     Appearance: Normal appearance. He is obese. He is not ill-appearing or toxic-appearing.   HENT:      Head: Normocephalic.   Eyes:      Conjunctiva/sclera: Conjunctivae normal.   Neck:      Vascular: No carotid bruit.   Cardiovascular:      Rate and Rhythm: Normal rate.      Pulses: Normal pulses.   Pulmonary:      Effort: Pulmonary effort is normal. No respiratory distress.      Breath sounds: Normal breath sounds. No stridor. No wheezing, rhonchi or rales.   Musculoskeletal:         General: Tenderness present. No swelling, deformity or signs of injury. Normal range of motion.      Cervical back: Normal range of motion and neck supple. No tenderness.      Right upper leg: Normal.      Left upper leg: Normal.      Right knee: Normal.      Left knee: Swelling present. No deformity, erythema or ecchymosis. Tenderness present.      Right lower leg: Normal. No swelling. No edema.      Left lower leg: Normal. No swelling. No edema.      Right ankle: Normal.      Left ankle: Normal.        Legs:       Comments: No erythema, deformity, ecchymosis, calf tenderness or swelling, pedal pulses 2+, cap refill <2 sec, patient able to fully range the knee, no warmth    Skin:     General: Skin is warm and dry.      Capillary Refill: Capillary refill takes less than 2 seconds.      Coloration: Skin is not jaundiced or pale.      Findings: No bruising, erythema or lesion.   Neurological:      Mental Status: He is alert and oriented to person, place, and time. Mental status is at baseline.         Results Reviewed       Procedure Component Value Units Date/Time    Basic metabolic panel [[id number]]  (Abnormal) Collected: 01/24/25 2157    Lab Status: Final result Specimen: Blood from Arm, Left Updated: 01/24/25 2218     Sodium 134 mmol/L      Potassium 3.4 mmol/L      Chloride 95 mmol/L      CO2 30 mmol/L      ANION GAP 9  mmol/L      BUN 22 mg/dL      Creatinine 0.90 mg/dL      Glucose 367 mg/dL      Calcium 7.7 mg/dL      eGFR 99 ml/min/1.73sq m     Narrative:      National Kid[employer] Foundation guidelines for Chronic Kidney Disease (CKD):     Stage 1 with normal or high GFR (GFR > 90 mL/min/1.73 square meters)    Stage 2 Mild CKD (GFR = 60-89 mL/min/1.73 square meters)    Stage 3A Moderate CKD (GFR = 45-59 mL/min/1.73 square meters)    Stage 3B Moderate CKD (GFR = 30-44 mL/min/1.73 square meters)    Stage 4 Severe CKD (GFR = 15-29 mL/min/1.73 square meters)    Stage 5 End Stage CKD (GFR <15 mL/min/1.73 square meters)  Note: GFR calculation is accurate only with a steady state creatinine            CT lower extremity w contrast left   Final Interpretation by Delvin Harrison DO (01/24 7917)   No acute fracture or dislocation.   Extensive tricompartmental osteoarthrosis with a large 2.5 cm calcified joint body in the lateral suprapatellar recess.   Small knee joint effusion.            Workstation performed: [id number]         XR knee 4+ views LEFT   Final Interpretation by Enoc Kirby MD (01/24 2133)      Irregular linear lucency along the left lateral tibial plateau, possibly nondisplaced fracture versus artifact from the overlapping extensive degenerative change. CT evaluation may be helpful if clinically warranted.         Computerized Assisted Algorithm (CAA) may have been used to analyze all applicable images.         Workstation performed: [id number]             Procedures    ED Medication and Procedure Management   Prior to Admission Medications   Prescriptions Last Dose Informant Patient Reported? Taking?   Blood Glucose Monitoring Suppl (OneTouch Verio Reflect) w/Device KIT  Self No No   Sig: Check blood sugars once daily. Please substitute with appropriate alternative as covered by patient's insurance. Dx: E11.65   Calcium Carb-Cholecalciferol (CALCIUM 600 + D PO)  Self Yes No   Sig: Take 600 mg by mouth 4 (four) times a  day Does Not have a Parathyroid   Continuous Glucose Sensor (Dexcom G7 Sensor)   No No   Sig: Use 1 Device see administration instructions Use 1 sensor every 10 days.  Disp 3 sensor for 30 day supply   OneTouch Delica Lancets 33G MISC  Self No No   Sig: Check blood sugars once daily. Please substitute with appropriate alternative as covered by patient's insurance. Dx: E11.65   amLODIPine (NORVASC) 5 mg tablet   No No   Sig: TAKE 1 TABLET DAILY   aspirin (ECOTRIN LOW STRENGTH) 81 mg EC tablet  Self Yes No   Sig: Take 81 mg by mouth daily Prophylactic usage   calcitriol (ROCALTROL) 0.25 mcg capsule   No No   Sig: TAKE 1 CAPSULE TWICE A DAY   diclofenac-misoprostol (ARTHROTEC 75) 75-0.2 MG per tablet   No No   Sig: TAKE 1 TABLET TWICE A DAY   glucose blood (OneTouch Verio) test strip  Self No No   Sig: Check blood sugars once daily. Please substitute with appropriate alternative as covered by patient's insurance. Dx: E11.65   hydroCHLOROthiazide 25 mg tablet   No No   Sig: TAKE 1 TABLET TWICE A DAY.  (IN ADDITION TO THE LOSARTAN/HCTZ)   levothyroxine (Synthroid) 200 mcg tablet   No No   Sig: Take 2 tablets (400 mcg total) by mouth daily Monday-Saturday.  Take 200 mcg by mouth on Sunday.   losartan-hydrochlorothiazide (HYZAAR) 100-25 MG per tablet  Self No No   Sig: TAKE 1 TABLET DAILY   metFORMIN (GLUCOPHAGE-XR) 500 mg 24 hr tablet   No No   Sig: Take 2 tablets (1,000 mg total) by mouth 2 (two) times a day with meals   semaglutide, 2 mg/dose, (Ozempic, 2 MG/DOSE,) 8 mg/ mL injection pen   No No   Sig: Inject 0.75 mL (2 mg total) under the skin every 7 days   simvastatin (ZOCOR) 10 mg tablet   No No   Sig: TAKE 1 TABLET AT BEDTIME      Facility-Administered Medications: None     Discharge Medication List as of 1/24/2025 11:54 PM        CONTINUE these medications which have NOT CHANGED    Details   amLODIPine (NORVASC) 5 mg tablet TAKE 1 TABLET DAILY, Starting Thu 12/26/2024, Normal      aspirin (ECOTRIN LOW STRENGTH) 81  mg EC tablet Take 81 mg by mouth daily Prophylactic usage, Historical Med      Blood Glucose Monitoring Suppl (OneTouch Verio Reflect) w/Device KIT Check blood sugars once daily. Please substitute with appropriate alternative as covered by patient's insurance. Dx: E11.65, Normal      calcitriol (ROCALTROL) 0.25 mcg capsule TAKE 1 CAPSULE TWICE A DAY, Starting Thu 12/26/2024, Normal      Calcium Carb-Cholecalciferol (CALCIUM 600 + D PO) Take 600 mg by mouth 4 (four) times a day Does Not have a Parathyroid, Historical Med      Continuous Glucose Sensor (Dexcom G7 Sensor) Use 1 Device see administration instructions Use 1 sensor every 10 days.  Disp 3 sensor for 30 day supply, Starting Wed 11/20/2024, Normal      diclofenac-misoprostol (ARTHROTEC 75) 75-0.2 MG per tablet TAKE 1 TABLET TWICE A DAY, Normal      glucose blood (OneTouch Verio) test strip Check blood sugars once daily. Please substitute with appropriate alternative as covered by patient's insurance. Dx: E11.65, Normal      hydroCHLOROthiazide 25 mg tablet TAKE 1 TABLET TWICE A DAY.  (IN ADDITION TO THE LOSARTAN/HCTZ), Normal      levothyroxine (Synthroid) 200 mcg tablet Take 2 tablets (400 mcg total) by mouth daily Monday-Saturday.  Take 200 mcg by mouth on Sunday., Starting Fri 11/22/2024, Normal      losartan-hydrochlorothiazide (HYZAAR) 100-25 MG per tablet TAKE 1 TABLET DAILY, Normal      metFORMIN (GLUCOPHAGE-XR) 500 mg 24 hr tablet Take 2 tablets (1,000 mg total) by mouth 2 (two) times a day with meals, Starting Wed 11/20/2024, Normal      OneTouch Delica Lancets 33G MISC Check blood sugars once daily. Please substitute with appropriate alternative as covered by patient's insurance. Dx: E11.65, Normal      semaglutide, 2 mg/dose, (Ozempic, 2 MG/DOSE,) 8 mg/ mL injection pen Inject 0.75 mL (2 mg total) under the skin every 7 days, Starting Wed 11/20/2024, Normal      simvastatin (ZOCOR) 10 mg tablet TAKE 1 TABLET AT BEDTIME, Starting Thu 1/2/2025,  Normal             ED SEPSIS DOCUMENTATION   Time reflects when diagnosis was documented in both MDM as applicable and the Disposition within this note       Time User Action Codes Description Comment    1/24/2025  8:58 PM Ian Espinoza Add [M25.562] Knee pain, left                  Ian Espinoza PA-C  01/25/25 0521

## 2025-01-25 NOTE — DISCHARGE INSTRUCTIONS
Follow-up with PCP and orthopedics.  Take medicine as needed.  If any symptoms worsen or new symptoms appear return to the ER.  If you develop any redness, inability to move the knee, fever, swelling or pain to the leg return to the ER.

## 2025-02-04 ENCOUNTER — OFFICE VISIT (OUTPATIENT)
Dept: OBGYN CLINIC | Facility: CLINIC | Age: 50
End: 2025-02-04
Payer: COMMERCIAL

## 2025-02-04 VITALS — HEIGHT: 73 IN | WEIGHT: 315 LBS | BODY MASS INDEX: 41.75 KG/M2

## 2025-02-04 DIAGNOSIS — M17.12 PRIMARY OSTEOARTHRITIS OF LEFT KNEE: ICD-10-CM

## 2025-02-04 PROCEDURE — 20610 DRAIN/INJ JOINT/BURSA W/O US: CPT | Performed by: STUDENT IN AN ORGANIZED HEALTH CARE EDUCATION/TRAINING PROGRAM

## 2025-02-04 PROCEDURE — 99203 OFFICE O/P NEW LOW 30 MIN: CPT | Performed by: STUDENT IN AN ORGANIZED HEALTH CARE EDUCATION/TRAINING PROGRAM

## 2025-02-04 RX ADMIN — BUPIVACAINE HYDROCHLORIDE 4 ML: 2.5 INJECTION, SOLUTION INFILTRATION; PERINEURAL at 14:00

## 2025-02-04 RX ADMIN — LIDOCAINE HYDROCHLORIDE 4 ML: 10 INJECTION, SOLUTION INFILTRATION; PERINEURAL at 14:00

## 2025-02-04 NOTE — PROGRESS NOTES
ASSESSMENT/PLAN:    Diagnoses and all orders for this visit:    Primary osteoarthritis of left knee  -     Ambulatory Referral to Orthopedic Surgery  -     Ambulatory Referral to Orthopedic Surgery; Future  -     Durable Medical Equipment    Discussed history, exam, and imaging with patient. Presentation most consistent with left knee osteoarthritis and we will plan for nonoperative management at this time.  Discussed oral/topical medication regimen. Will plan for continued use of over-the-counter medications as needed.  Discussed injections as a pain adjunct.  Corticosteroid and viscosupplementation was discussed today.  Corticosteroid injection was elected for, which patient tolerated well.  Discussed rehabilitation efforts. Will plan for continued activity as tolerated and home exercise program to increase quadricep endurance.  Discussed weight loss as weight loss can allow for symptomatic improvement to presentation.  Further, he would need to demonstrate notable weight loss prior to being a candidate for total knee arthroplasty, which he will likely need at some point in the future.  He is currently an excellent candidate radiographically as it would significantly improve his quality of life, but his current weight and associated medical conditions leave him as a poor candidate.  Discussed a bracing options.  Patient has a DonJoy hinged knee brace that has been falling apart, but when effective, has been good at using his discomfort.  He was told he has a lifetime warrantee and is hoping that he would be able to get a new brace.  Order placed today and contact information provided for our orthotic provider.  Discussed a referral to the arthroplasty team to establish care despite him not currently being an excellent candidate for surgery. He previously had surgery by Dr. Simons, but he states he would prefer an alternate provider if possible.  Follow-up with me as  needed.  _____________________________________________________  CHIEF COMPLAINT:  Chief Complaint   Patient presents with    Left Knee - Pain, Swelling, Clicking, Locking, Numbness, Tingling     XR 1/24/25  CT 1/24/25  Patient state that for many years he has had pain and swelling   Pain does reside on the side of the knee  Does have stiffness          SUBJECTIVE:  Wilber Carey Jr. is a 49 y.o. year old male, PMH 2017 L lateral partial meniscectomy with Dr. Simons, thyroid cancer status post complete thyroidectomy, DM (last A1c 9.6 11/24), who presents for evaluation of left knee pain. The issue began years ago without traumatic onset. He did recently have an acute flare of pain that caused him to go to ED. He presents now for follow-up. Has sensation of clicking and catching to the knee. He currently is still working as a manual . Was able to hike until last year, which he enjoys, and now is unable to do this.     Of note, he states that he is always been on the larger side, but after his thyroid cancer diagnosis in his 20s he did put on more weight.  He notes that relatively recently he had lost a significant amount of weight and was down to under 300 pounds.  He did this via a low-carb diet.  He notes that he had a new endocrinologist who did not agree with his dietary changes and after his medications and diet changed he states that his weight ballooned as did his A1c.  Prior to that change he states his A1c was around 5    PAST MEDICAL HISTORY:  Past Medical History:   Diagnosis Date    Asthma     Diabetes mellitus (HCC)     Diet Controlled    Diabetes type 2, uncontrolled 05/06/2014    Diverticulitis     Hyperlipidemia     Hypertension     Immunity to measles determined by serologic test 07/02/2019    PONV (postoperative nausea and vomiting)     Thyroid cancer (HCC)     radioactive iodine     Thyroid cancer (HCC) 02/12/2020    Type 2 diabetes mellitus (HCC) 07/17/2013       PAST SURGICAL  HISTORY:  Past Surgical History:   Procedure Laterality Date    BIOPSY CORE NEEDLE      Thyroid    CHEST WALL BIOPSY N/A 2020    Procedure: EXCISION  BIOPSY LESION/MASS from back;  Surgeon: Olaf Delgadillo MD;  Location: [facility];  Service: General    COLON SURGERY      COLOSTOMY      with reversal    WY ARTHRS KNE SURG W/MENISCECTOMY MED/LAT W/SHVG Left 2017    Procedure: KNEE ARTHROSCOPIC PARTIAL LATERAL MENISCECTOMY ; PATELLO FEMOEAL CHONDROPLASTY;  Surgeon: Jem Simons MD;  Location:  MAIN OR;  Service: Orthopedics    WY OPEN TREATMENT METATARSAL FRACTURE EACH Right 3/2/2023    Procedure: OPEN REDUCTION W/ INTERNAL FIXATION (ORIF) FOOT 5TH METATARSAL;  Surgeon: Roxi Medeiros DPM;  Location: [facility];  Service: Podiatry    THYROIDECTOMY      Removal of 3 Parathyroids       FAMILY HISTORY:  Family History   Problem Relation Age of Onset    Diabetes Mother     Hypertension Mother     Hypertension Father     Hyperlipidemia Father     Thyroid disease unspecified Sister     Sleep apnea Neg Hx        SOCIAL HISTORY:  Social History     Tobacco Use    Smoking status: Former     Current packs/day: 0.00     Average packs/day: 1 pack/day for 10.0 years (10.0 ttl pk-yrs)     Types: Cigarettes     Start date:      Quit date:      Years since quittin.1    Smokeless tobacco: Never   Vaping Use    Vaping status: Never Used   Substance Use Topics    Alcohol use: Yes     Comment: 3x a week    Drug use: Never       MEDICATIONS:    Current Outpatient Medications:     amLODIPine (NORVASC) 5 mg tablet, TAKE 1 TABLET DAILY, Disp: 90 tablet, Rfl: 1    aspirin (ECOTRIN LOW STRENGTH) 81 mg EC tablet, Take 81 mg by mouth daily Prophylactic usage, Disp: , Rfl:     Blood Glucose Monitoring Suppl (OneTouch Verio Reflect) w/Device KIT, Check blood sugars once daily. Please substitute with appropriate alternative as covered by patient's insurance. Dx: E11.65, Disp: 1 kit, Rfl: 0    calcitriol (ROCALTROL) 0.25  mcg capsule, TAKE 1 CAPSULE TWICE A DAY, Disp: 180 capsule, Rfl: 1    Calcium Carb-Cholecalciferol (CALCIUM 600 + D PO), Take 600 mg by mouth 4 (four) times a day Does Not have a Parathyroid, Disp: , Rfl:     Continuous Glucose Sensor (Dexcom G7 Sensor), Use 1 Device see administration instructions Use 1 sensor every 10 days.  Disp 3 sensor for 30 day supply, Disp: 9 each, Rfl: 3    diclofenac-misoprostol (ARTHROTEC 75) 75-0.2 MG per tablet, TAKE 1 TABLET TWICE A DAY, Disp: 180 tablet, Rfl: 3    glucose blood (OneTouch Verio) test strip, Check blood sugars once daily. Please substitute with appropriate alternative as covered by patient's insurance. Dx: E11.65, Disp: 100 each, Rfl: 3    hydroCHLOROthiazide 25 mg tablet, TAKE 1 TABLET TWICE A DAY.  (IN ADDITION TO THE LOSARTAN/HCTZ), Disp: 180 tablet, Rfl: 1    levothyroxine (Synthroid) 200 mcg tablet, Take 2 tablets (400 mcg total) by mouth daily Monday-Saturday.  Take 200 mcg by mouth on Sunday., Disp: 200 tablet, Rfl: 1    losartan-hydrochlorothiazide (HYZAAR) 100-25 MG per tablet, TAKE 1 TABLET DAILY, Disp: 90 tablet, Rfl: 1    metFORMIN (GLUCOPHAGE-XR) 500 mg 24 hr tablet, Take 2 tablets (1,000 mg total) by mouth 2 (two) times a day with meals, Disp: 360 tablet, Rfl: 1    OneTouch Delica Lancets 33G MISC, Check blood sugars once daily. Please substitute with appropriate alternative as covered by patient's insurance. Dx: E11.65, Disp: 100 each, Rfl: 3    semaglutide, 2 mg/dose, (Ozempic, 2 MG/DOSE,) 8 mg/ mL injection pen, Inject 0.75 mL (2 mg total) under the skin every 7 days, Disp: 9 mL, Rfl: 1    simvastatin (ZOCOR) 10 mg tablet, TAKE 1 TABLET AT BEDTIME, Disp: 90 tablet, Rfl: 1    ALLERGIES:  No Known Allergies    Review of systems:   Constitutional: Negative for fatigue, fever or loss of apetite.   HENT: Negative.    Respiratory: Negative for shortness of breath, dyspnea.    Cardiovascular: Negative for chest pain/tightness.   Gastrointestinal: Negative for  "abdominal pain, N/V.   Endocrine: Negative for cold/heat intolerance, unexplained weight loss/gain.   Genitourinary: Negative for flank pain, dysuria, hematuria.   Musculoskeletal: As in HPI   Skin: Negative for rash.    Neurological: Negative for numbness tingling  Psychiatric/Behavioral: Negative for agitation.  _____________________________________________________  PHYSICAL EXAMINATION:    Height 6' 1\" (1.854 m), weight (!) 172 kg (379 lb 3.1 oz).    General: well developed and well nourished, alert, oriented times 3, and appears comfortable  HEENT: Benign, normocephalic, atraumatic  Cardiovascular: regular rate    Pulmonary: No wheezing or stridor  Abdomen: Soft, Nontender  Skin: No masses, erythema, lacerations, fluctation, ulcerations  Neurovascular: as per MSK exam below    MUSCULOSKELETAL EXAMINATION:    Left knee exam  No bruising, swelling, deformity  Passive ROM 0 - 115 with pain at terminal flexion and palpable crepitus  TTP lateral joint line   4+/5 quad/hamstring  SILT all distal distributions  Limb WWP    _____________________________________________________  STUDIES REVIEWED:  Images personally reviewed by me today     XR from 1/24 demonstrates severe valgus pattern arthritis with subchondral sclerosis, osteophyte formation and large loose body to suprapatellar pouch    Large joint arthrocentesis: L knee  Universal Protocol:  procedure performed by consultantConsent: Verbal consent obtained.  Risks and benefits: risks, benefits and alternatives were discussed  Consent given by: patient  Time out: Immediately prior to procedure a \"time out\" was called to verify the correct patient, procedure, equipment, support staff and site/side marked as required.  Patient understanding: patient states understanding of the procedure being performed  Relevant documents: relevant documents present and verified  Site marked: the operative site was not marked  Radiology Images displayed and confirmed. If images not " available, report reviewed: imaging studies available  Patient identity confirmed: verbally with patient  Supporting Documentation  Indications: pain and diagnostic evaluation   Procedure Details  Location: knee - L knee  Preparation: Patient was prepped and draped in the usual sterile fashion  Needle size: 22 G  Ultrasound guidance: no  Approach: anterolateral  Medications administered: 4 mL lidocaine 1 %; 20 mg triamcinolone acetonide 10 mg/mL; 4 mL bupivacaine 0.25 %    Patient tolerance: patient tolerated the procedure well with no immediate complications  Dressing:  Sterile dressing applied            Jhon Tellez MD

## 2025-02-06 RX ORDER — BUPIVACAINE HYDROCHLORIDE 2.5 MG/ML
4 INJECTION, SOLUTION INFILTRATION; PERINEURAL
Status: COMPLETED | OUTPATIENT
Start: 2025-02-04 | End: 2025-02-04

## 2025-02-06 RX ORDER — LIDOCAINE HYDROCHLORIDE 10 MG/ML
4 INJECTION, SOLUTION INFILTRATION; PERINEURAL
Status: COMPLETED | OUTPATIENT
Start: 2025-02-04 | End: 2025-02-04

## 2025-02-26 ENCOUNTER — TELEPHONE (OUTPATIENT)
Age: 50
End: 2025-02-26

## 2025-03-01 ENCOUNTER — APPOINTMENT (OUTPATIENT)
Dept: LAB | Facility: CLINIC | Age: 50
End: 2025-03-01
Payer: COMMERCIAL

## 2025-03-01 DIAGNOSIS — E11.65 TYPE 2 DIABETES MELLITUS WITH HYPERGLYCEMIA, WITHOUT LONG-TERM CURRENT USE OF INSULIN (HCC): ICD-10-CM

## 2025-03-01 DIAGNOSIS — C73 PAPILLARY THYROID CARCINOMA (HCC): ICD-10-CM

## 2025-03-01 DIAGNOSIS — E89.0 POSTOPERATIVE HYPOTHYROIDISM: ICD-10-CM

## 2025-03-01 DIAGNOSIS — E20.9 HYPOPARATHYROIDISM, UNSPECIFIED HYPOPARATHYROIDISM TYPE (HCC): ICD-10-CM

## 2025-03-01 LAB
ALBUMIN SERPL BCG-MCNC: 3.9 G/DL (ref 3.5–5)
ALP SERPL-CCNC: 74 U/L (ref 34–104)
ALT SERPL W P-5'-P-CCNC: 71 U/L (ref 7–52)
ANION GAP SERPL CALCULATED.3IONS-SCNC: 10 MMOL/L (ref 4–13)
AST SERPL W P-5'-P-CCNC: 41 U/L (ref 13–39)
BILIRUB SERPL-MCNC: 0.61 MG/DL (ref 0.2–1)
BUN SERPL-MCNC: 19 MG/DL (ref 5–25)
CALCIUM SERPL-MCNC: 8.1 MG/DL (ref 8.4–10.2)
CHLORIDE SERPL-SCNC: 97 MMOL/L (ref 96–108)
CO2 SERPL-SCNC: 31 MMOL/L (ref 21–32)
CREAT SERPL-MCNC: 0.8 MG/DL (ref 0.6–1.3)
EST. AVERAGE GLUCOSE BLD GHB EST-MCNC: 275 MG/DL
GFR SERPL CREATININE-BSD FRML MDRD: 104 ML/MIN/1.73SQ M
GLUCOSE P FAST SERPL-MCNC: 281 MG/DL (ref 65–99)
HBA1C MFR BLD: 11.2 %
POTASSIUM SERPL-SCNC: 3.7 MMOL/L (ref 3.5–5.3)
PROT SERPL-MCNC: 6.5 G/DL (ref 6.4–8.4)
PTH-INTACT SERPL-MCNC: 1.1 PG/ML (ref 12–88)
SODIUM SERPL-SCNC: 138 MMOL/L (ref 135–147)
T4 FREE SERPL-MCNC: 1.63 NG/DL (ref 0.61–1.12)
TSH SERPL DL<=0.05 MIU/L-ACNC: 1.81 UIU/ML (ref 0.45–4.5)

## 2025-03-01 PROCEDURE — 86800 THYROGLOBULIN ANTIBODY: CPT

## 2025-03-01 PROCEDURE — 36415 COLL VENOUS BLD VENIPUNCTURE: CPT

## 2025-03-01 PROCEDURE — 80053 COMPREHEN METABOLIC PANEL: CPT

## 2025-03-01 PROCEDURE — 84443 ASSAY THYROID STIM HORMONE: CPT

## 2025-03-01 PROCEDURE — 84439 ASSAY OF FREE THYROXINE: CPT

## 2025-03-01 PROCEDURE — 83036 HEMOGLOBIN GLYCOSYLATED A1C: CPT

## 2025-03-01 PROCEDURE — 84432 ASSAY OF THYROGLOBULIN: CPT

## 2025-03-01 PROCEDURE — 83970 ASSAY OF PARATHORMONE: CPT

## 2025-03-03 ENCOUNTER — RESULTS FOLLOW-UP (OUTPATIENT)
Age: 50
End: 2025-03-03

## 2025-03-03 NOTE — PROGRESS NOTES
Name: Wilber Carey Jr.      : 1975      MRN: [id number]  Encounter Provider: ISAÍAS Sweeney  Encounter Date: 3/4/2025   Encounter department: [facility] [place]  :  Assessment & Plan  Papillary thyroid carcinoma (HCC)  Thyroglobulin and thyroglobulin antibody levels are pending.  Encouraged to schedule surveillance ultrasound head neck lymph node mapping.  Reviewed significance of thyroglobulin levels and importance of maintaining TSH suppression.  Goal TSH 0.1-0.5.  Orders:  •  T4, free; Future  •  TSH, 3rd generation; Future  •  levothyroxine (Synthroid) 200 mcg tablet; Take 2 tablets (400 mcg total) by mouth daily    Postoperative hypothyroidism  Presents clinically and biochemically euthyroid.    Reviewed basic pathophysiology of postsurgical hypothyroidism including significance of TSH and free T4 values.    Goal TSH 0.1-0.5.    Increase Synthroid to 400 mcg daily.  Repeat labs in 6 weeks.    Component      Latest Ref Rng 3/1/2025   TSH 3RD GENERATON      0.450 - 4.500 uIU/mL 1.807    FREE T4      0.61 - 1.12 ng/dL 1.63 (H)       Legend:  (H) High  Orders:  •  T4, free; Future  •  TSH, 3rd generation; Future  •  levothyroxine (Synthroid) 200 mcg tablet; Take 2 tablets (400 mcg total) by mouth daily    Hypoparathyroidism, unspecified hypoparathyroidism type (HCC)  Postoperative hypoparathyroidism secondary to total thyroidectomy.  Denies symptoms of hypocalcemia.  No kidney stones.  Continue calcium supplements and calcitriol.  Corrected calcium level 8.2  Orders:  •  Comprehensive metabolic panel; Future  •  Vitamin D 25 hydroxy; Future  •  PTH, intact; Future    Type 2 diabetes mellitus with hyperglycemia, without long-term current use of insulin (HCC)  A1c continues to worsen.  Goal A1c less than 7%.    Continue Ozempic 2 mg weekly and metformin 1000 mg twice daily.  Start basal insulin Tresiba.  Inject 18 units once daily.  Start bolus  insulin, Lyumjev.  Inject 6 units 3 times a day immediately before meals.  Insulin injection training provided.    Will check C-peptide level prior to next visit.  Can consider initiation of SGLT2 inhibitor once blood glucose levels have improved.  Can also consider transition from Ozempic to Mounjaro for tighter glycemic control.    Counseled on the long-term effects of hyperglycemia and uncontrolled diabetes including risk for heart attack, stroke, kidney failure, diabetic foot ulcers, limb loss, blindness, and death.    Reviewed risks as well as signs and symptoms of hypoglycemia.  Rule of 15's provided in AVS for appropriate treatment.  Call the office for blood glucose levels less than 70 mg/dL or persistent patterns over 250 mg/dL.  Start Dexcom G7 continuous glucose monitor.    Continue to improve diet and lifestyle including attention to the amount and type of carbohydrates consumed as well as increased physical activity as tolerated.  Stay well-hydrated.    Follow-up in 3 months.  Labs prior to next visit.  Lab Results   Component Value Date    HGBA1C 11.2 (H) 03/01/2025     Orders:  •  C-peptide; Future  •  insulin degludec (Tresiba FlexTouch) 200 units/mL CONCENTRATED U-200 injection pen; Inject 18 Units under the skin daily  •  Insulin Lispro-aabc, 1 U Dial, (Lyumjev KwikPen) 100 UNIT/ML SOPN; Inject 6 Units under the skin 3 (three) times a day before meals  •  Insulin Pen Needle (BD Pen Needle Lori U/F) 32G X 4 MM MISC; Use 4 (four) times a day  •  Hemoglobin A1C; Future  •  Comprehensive metabolic panel; Future  •  Vitamin D 25 hydroxy; Future  •  PTH, intact; Future  •  semaglutide, 2 mg/dose, (Ozempic, 2 MG/DOSE,) 8 mg/ mL injection pen; Inject 0.75 mL (2 mg total) under the skin every 7 days  •  Continuous Glucose Sensor (Dexcom G7 Sensor); Use 1 sensor every 10 days to continuously monitor blood glucose levels    Class 3 severe obesity due to excess calories with serious comorbidity and body mass  index (BMI) of 45.0 to 49.9 in adult (HCC)  BMI 47.76.  He has lost approximately 13 pounds since his previous visit.    Continue Ozempic 2 mg weekly.  Can consider transition to Mounjaro at his follow-up appointment if tighter glycemic control is required as well as increased weight loss effect.    Continue to decrease caloric intake, follow balanced diet, avoid processed foods and sweetened beverages, and stay well-hydrated.  Increase physical activity as tolerated.  He is currently meeting fitness goals with over 10K steps per day.     11/20/24 03/04/25   Weight - Scale 170 kg (375 lb) (H) 164 kg (362 lb) (H)   (H): Data is abnormally high           History of Present Illness {?Quick Links Encounters * My Last Note * Last Note in Specialty * Snapshot * Since Last Visit * History :14122}  HPI  Wilber Carey Jr. is a 49 y.o. male who presents  to the office today for follow-up of hyperparathyroidism, papillary thyroid cancer with subsequent postoperative hypothyroidism, and type 2 diabetes, without long term insulin use.      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 Mckeon.  He recalls being told there is remnant thyroid tissue present due to proximity of tissue to vocal cords and to expect a detectable thyroglobulin level.  Recent thyroglobulin 6/7/2024 was higher at 7.0 and has since trended down to 5.5 on 11/11/2024 (Previous results in the 3-4 range over the past few years). Thyroglobulin antibodies historically have been undetectable.  Most recent thyroglobulin/thyroglobulin antibody levels were drawn and are still pending.  He completed US head neck lymph node ultrasound mapping on 7/30/2024 which demonstrated no evidence of recurrent or metastatic disease. Thyroid bed nodules with minimal change. One on the left may be slightly larger but may be related to  measurement differences. Correlation with tumor markers was advised as well as to complete a 3 to 6-month follow-up ultrasound, which has been ordered and remains uncompleted.  His goal TSH is 0.1-0.5.     For hypothyroidism, he is taking Synthroid 400 mcg Monday- Saturday and 200 mcg .     For the Hypoparathyroidism, he is taking Calcium supplements 600 mg 4 times daily and calcitriol 0.25 mg twice daily. He denies kidney stones.  Taking HCTZ for hypercalcuria.  Denies symptoms of hypocalcemia.   Last year he had a fracture of 5th metatarsal which required treatment with surgery.  It was at the site of an old fracture from football injury.  His corrected calcium on lab work is low, 8.2 mg/dL.    For type 2 diabetes, he was off medications for approximately 5 years and managed through diet and lifestyle.  In 2024 A1c was found to be 11.4% and he was started on Ozempic and metformin by his PCP.  Complications of diabetes include: HLD.  His most recent A1c is 11.2%.     Denies recent episodes of hypoglycemia.  Symptoms of hypoglycemia include: Unknown has never experienced     Current home glucose monitoring:   Fastin-250     Current Medication Regimen:   Ozempic 2 mg weekly  Metformin 1000 mg twice daily     Hypertension, taking: Not on ACEi/ARB  Hyperlipidemia, taking: Simvastatin 10 mg daily, Tolerating well with no myalgias  History of Pancreatitis: No  Diabetes Education: Attended    History obtained from: patient    Component      Latest Ref Rng 3/1/2025   Sodium      135 - 147 mmol/L 138    Potassium      3.5 - 5.3 mmol/L 3.7    Chloride      96 - 108 mmol/L 97    Carbon Dioxide      21 - 32 mmol/L 31    ANION GAP      4 - 13 mmol/L 10    BUN      5 - 25 mg/dL 19    Creatinine      0.60 - 1.30 mg/dL 0.80    GLUCOSE, FASTING      65 - 99 mg/dL 281 (H)    Calcium      8.4 - 10.2 mg/dL 8.1 (L)    AST      13 - 39 U/L 41 (H)    ALT      7 - 52 U/L 71 (H)    ALK PHOS      34 - 104 U/L 74    Total  Protein      6.4 - 8.4 g/dL 6.5    Albumin      3.5 - 5.0 g/dL 3.9    Total Bilirubin      0.20 - 1.00 mg/dL 0.61    GFR, Calculated      ml/min/1.73sq m 104    PTH      12.0 - 88.0 pg/mL 1.1 (L)       Legend:  (H) High  (L) Low    Review of Systems   Constitutional:  Negative for appetite change, fatigue and unexpected weight change.   HENT:  Negative for congestion, hearing loss, sore throat, trouble swallowing and voice change.    Respiratory:  Negative for cough.    Cardiovascular:  Negative for chest pain and palpitations.   Gastrointestinal:  Negative for abdominal pain, constipation, diarrhea, nausea and vomiting.   Endocrine: Negative for cold intolerance, heat intolerance, polydipsia and polyuria.   Musculoskeletal:  Negative for myalgias.   Skin:  Negative for color change.   Neurological:  Negative for dizziness and weakness.   Psychiatric/Behavioral:  Negative for sleep disturbance.      Current Outpatient Medications on File Prior to Visit   Medication Sig Dispense Refill   • amLODIPine (NORVASC) 5 mg tablet TAKE 1 TABLET DAILY 90 tablet 1   • aspirin (ECOTRIN LOW STRENGTH) 81 mg EC tablet Take 81 mg by mouth daily Prophylactic usage     • Blood Glucose Monitoring Suppl (OneTouch Verio Reflect) w/Device KIT Check blood sugars once daily. Please substitute with appropriate alternative as covered by patient's insurance. Dx: E11.65 1 kit 0   • calcitriol (ROCALTROL) 0.25 mcg capsule TAKE 1 CAPSULE TWICE A  capsule 1   • Calcium Carb-Cholecalciferol (CALCIUM 600 + D PO) Take 600 mg by mouth 4 (four) times a day Does Not have a Parathyroid     • diclofenac-misoprostol (ARTHROTEC 75) 75-0.2 MG per tablet TAKE 1 TABLET TWICE A  tablet 3   • glucose blood (OneTouch Verio) test strip Check blood sugars once daily. Please substitute with appropriate alternative as covered by patient's insurance. Dx: E11.65 100 each 3   • hydroCHLOROthiazide 25 mg tablet TAKE 1 TABLET TWICE A DAY.  (IN ADDITION TO  "THE LOSARTAN/HCTZ) 180 tablet 1   • losartan-hydrochlorothiazide (HYZAAR) 100-25 MG per tablet TAKE 1 TABLET DAILY 90 tablet 1   • metFORMIN (GLUCOPHAGE-XR) 500 mg 24 hr tablet Take 2 tablets (1,000 mg total) by mouth 2 (two) times a day with meals 360 tablet 1   • OneTouch Delica Lancets 33G MISC Check blood sugars once daily. Please substitute with appropriate alternative as covered by patient's insurance. Dx: E11.65 100 each 3   • simvastatin (ZOCOR) 10 mg tablet TAKE 1 TABLET AT BEDTIME 90 tablet 1   • [DISCONTINUED] Continuous Glucose Sensor (Dexcom G7 Sensor) Use 1 Device see administration instructions Use 1 sensor every 10 days.  Disp 3 sensor for 30 day supply 9 each 3   • [DISCONTINUED] levothyroxine (Synthroid) 200 mcg tablet Take 2 tablets (400 mcg total) by mouth daily Monday-Saturday.  Take 200 mcg by mouth on . 200 tablet 1   • [DISCONTINUED] semaglutide, 2 mg/dose, (Ozempic, 2 MG/DOSE,) 8 mg/ mL injection pen Inject 0.75 mL (2 mg total) under the skin every 7 days 9 mL 1     No current facility-administered medications on file prior to visit.      Social History     Tobacco Use   • Smoking status: Former     Current packs/day: 0.00     Average packs/day: 1 pack/day for 10.0 years (10.0 ttl pk-yrs)     Types: Cigarettes     Start date:      Quit date:      Years since quittin.1   • Smokeless tobacco: Never   Vaping Use   • Vaping status: Never Used   Substance and Sexual Activity   • Alcohol use: Yes     Comment: 3x a week   • Drug use: Never   • Sexual activity: Not on file        Objective {?Quick Links Trend Vitals * Enter New Vitals * Results Review * Timeline (Adult) * Labs * Imaging * Cardiology * Procedures * Lung Cancer Screening * Surgical eConsent :98125}  /86 (BP Location: Right arm, Patient Position: Sitting, Cuff Size: Standard)   Pulse 98   Temp (!) 91 °F (32.8 °C)   Ht 6' 1\" (1.854 m)   Wt (!) 164 kg (362 lb)   SpO2 97%   BMI 47.76 kg/m²      Physical " Exam  Vitals reviewed.   Constitutional:       General: He is not in acute distress.     Appearance: Normal appearance. He is well-groomed. He is morbidly obese.   HENT:      Head: Normocephalic and atraumatic.      Mouth/Throat:      Mouth: Mucous membranes are moist.   Eyes:      Conjunctiva/sclera: Conjunctivae normal.   Cardiovascular:      Rate and Rhythm: Normal rate.   Pulmonary:      Effort: Pulmonary effort is normal. No respiratory distress.   Abdominal:      Palpations: Abdomen is soft.   Musculoskeletal:         General: No swelling.      Cervical back: Normal range of motion.   Skin:     General: Skin is warm and dry.   Neurological:      Mental Status: He is alert and oriented to person, place, and time.   Psychiatric:         Mood and Affect: Mood normal.         Behavior: Behavior normal. Behavior is cooperative.         Thought Content: Thought content normal.         Judgment: Judgment normal.         Administrative Statements {?Quick Links Full Problem List * Level of Service * [facility]:[id number]}  I have spent a total time of 39 minutes in caring for this patient on the day of the visit/encounter including Diagnostic results, Prognosis, Risks and benefits of tx options, Instructions for management, Patient and family education, Importance of tx compliance, Risk factor reductions, Impressions, Counseling / Coordination of care, Documenting in the medical record, Reviewing/placing orders in the medical record (including tests, medications, and/or procedures), and Obtaining or reviewing history  .

## 2025-03-04 ENCOUNTER — OFFICE VISIT (OUTPATIENT)
Age: 50
End: 2025-03-04
Payer: COMMERCIAL

## 2025-03-04 ENCOUNTER — OFFICE VISIT (OUTPATIENT)
Dept: OBGYN CLINIC | Facility: CLINIC | Age: 50
End: 2025-03-04
Payer: COMMERCIAL

## 2025-03-04 VITALS — BODY MASS INDEX: 41.75 KG/M2 | WEIGHT: 315 LBS | HEIGHT: 73 IN

## 2025-03-04 VITALS
HEART RATE: 98 BPM | OXYGEN SATURATION: 97 % | BODY MASS INDEX: 41.75 KG/M2 | HEIGHT: 73 IN | SYSTOLIC BLOOD PRESSURE: 132 MMHG | DIASTOLIC BLOOD PRESSURE: 86 MMHG | TEMPERATURE: 91 F | WEIGHT: 315 LBS

## 2025-03-04 DIAGNOSIS — E66.01 CLASS 3 SEVERE OBESITY DUE TO EXCESS CALORIES WITH SERIOUS COMORBIDITY AND BODY MASS INDEX (BMI) OF 45.0 TO 49.9 IN ADULT (HCC): ICD-10-CM

## 2025-03-04 DIAGNOSIS — C73 PAPILLARY THYROID CARCINOMA (HCC): Primary | ICD-10-CM

## 2025-03-04 DIAGNOSIS — E66.813 CLASS 3 SEVERE OBESITY DUE TO EXCESS CALORIES WITH SERIOUS COMORBIDITY AND BODY MASS INDEX (BMI) OF 45.0 TO 49.9 IN ADULT (HCC): ICD-10-CM

## 2025-03-04 DIAGNOSIS — E89.0 POSTOPERATIVE HYPOTHYROIDISM: ICD-10-CM

## 2025-03-04 DIAGNOSIS — E20.9 HYPOPARATHYROIDISM, UNSPECIFIED HYPOPARATHYROIDISM TYPE (HCC): ICD-10-CM

## 2025-03-04 DIAGNOSIS — E11.65 TYPE 2 DIABETES MELLITUS WITH HYPERGLYCEMIA, WITHOUT LONG-TERM CURRENT USE OF INSULIN (HCC): ICD-10-CM

## 2025-03-04 DIAGNOSIS — M17.12 PRIMARY OSTEOARTHRITIS OF LEFT KNEE: ICD-10-CM

## 2025-03-04 LAB
THYROGLOB AB SERPL-ACNC: <1 IU/ML (ref 0–0.9)
THYROGLOB SERPL-MCNC: 9.1 NG/ML (ref 1.4–29.2)

## 2025-03-04 PROCEDURE — 99214 OFFICE O/P EST MOD 30 MIN: CPT

## 2025-03-04 PROCEDURE — 99213 OFFICE O/P EST LOW 20 MIN: CPT | Performed by: STUDENT IN AN ORGANIZED HEALTH CARE EDUCATION/TRAINING PROGRAM

## 2025-03-04 RX ORDER — LEVOTHYROXINE SODIUM 200 UG/1
400 TABLET ORAL DAILY
Qty: 200 TABLET | Refills: 1 | Status: SHIPPED | OUTPATIENT
Start: 2025-03-04

## 2025-03-04 RX ORDER — ACYCLOVIR 400 MG/1
TABLET ORAL
Qty: 9 EACH | Refills: 3 | Status: SHIPPED | OUTPATIENT
Start: 2025-03-04

## 2025-03-04 RX ORDER — INSULIN LISPRO-AABC 100 [IU]/ML
6 INJECTION, SOLUTION SUBCUTANEOUS
Qty: 30 ML | Refills: 1 | Status: SHIPPED | OUTPATIENT
Start: 2025-03-04

## 2025-03-04 RX ORDER — PEN NEEDLE, DIABETIC 32GX 5/32"
NEEDLE, DISPOSABLE MISCELLANEOUS 4 TIMES DAILY
Qty: 400 EACH | Refills: 3 | Status: SHIPPED | OUTPATIENT
Start: 2025-03-04

## 2025-03-04 RX ORDER — INSULIN DEGLUDEC 200 U/ML
18 INJECTION, SOLUTION SUBCUTANEOUS DAILY
Qty: 9 ML | Refills: 1 | Status: SHIPPED | OUTPATIENT
Start: 2025-03-04

## 2025-03-04 NOTE — PATIENT INSTRUCTIONS
"Start Tresiba 18 units daily  Start lyumjev 6 units before meals  Take immediately before eating  Only take if you are eating   Continue Metformin 1000 mg twice daily  Continue Ozempic 2 mg weekly  Start Dexcom G7 rob  Download Dexcom G7 and then Dexcom Clarity rob  Use the same username/login for Dexcom Clarity as you did for Dexcom G7  In Dexcom Clarity rob, click profile button on the bottom right hand of screen  Then choose first option \"Manage Data Sharing \"  Hit continue and enter our clinic code: [place]  Address is for [place] - this is okay!!  Call the office for blood glucose levels less than 70 mg/dL or persistent patterns over 250 mg/dL.  Increase Synthroid to 400 mcg every day  Labs in 6 weeks  Schedule ultrasound head neck lymph node mapping      Low Blood Sugar  Steps to treat low blood sugar.    1. Test blood sugar if you have symptoms of low blood sugar:   Low Blood Sugar Symptoms:  o Sweaty  o Dizzy  o Rapid heartbeat  o Shaky  o Bad mood  o Hungry    2. Treat blood sugar less than 70 with 15 grams of fast-acting carbohydrate:   Examples of 15 grams Fast-Acting Carbohydrate:  o 4 oz juice/ 4 oz regular soda  o 1 tablespoon honey  o 3-4 hard candies (chew)  o 3-4 glucose tablets (chew)            3.   Wait 15 minutes and test your blood sugar again         4.   If blood sugar is less than 100, repeat steps 2-3.    5.   When your blood sugar is 100 or more, eat a snack if it will be longer than one hour until your next meal. The snack should be 15 grams of carbohydrate and a protein:   Examples of snacks:  o ½ sandwich  o 6 crackers with cheese or with peanut butter  o Piece of fruit with cheese or peanut butter      "

## 2025-03-04 NOTE — ASSESSMENT & PLAN NOTE
Presents clinically and biochemically euthyroid.    Reviewed basic pathophysiology of postsurgical hypothyroidism including significance of TSH and free T4 values.    Goal TSH 0.1-0.5.    Increase Synthroid to 400 mcg daily.  Repeat labs in 6 weeks.    Component      Latest Ref Rng 3/1/2025   TSH 3RD GENERATON      0.450 - 4.500 uIU/mL 1.807    FREE T4      0.61 - 1.12 ng/dL 1.63 (H)       Legend:  (H) High  Orders:  •  T4, free; Future  •  TSH, 3rd generation; Future  •  levothyroxine (Synthroid) 200 mcg tablet; Take 2 tablets (400 mcg total) by mouth daily

## 2025-03-04 NOTE — ASSESSMENT & PLAN NOTE
Postoperative hypoparathyroidism secondary to total thyroidectomy.  Denies symptoms of hypocalcemia.  No kidney stones.  Continue calcium supplements and calcitriol.  Corrected calcium level 8.2  Orders:  •  Comprehensive metabolic panel; Future  •  Vitamin D 25 hydroxy; Future  •  PTH, intact; Future

## 2025-03-04 NOTE — ASSESSMENT & PLAN NOTE
Thyroglobulin and thyroglobulin antibody levels are pending.  Encouraged to schedule surveillance ultrasound head neck lymph node mapping.  Reviewed significance of thyroglobulin levels and importance of maintaining TSH suppression.  Goal TSH 0.1-0.5.  Orders:  •  T4, free; Future  •  TSH, 3rd generation; Future  •  levothyroxine (Synthroid) 200 mcg tablet; Take 2 tablets (400 mcg total) by mouth daily

## 2025-03-04 NOTE — ASSESSMENT & PLAN NOTE
Patient is a Middle-Aged (Approx 40-66yo) male with functionally limiting knee pain with short distances, functional instability, mechanical symptoms and addressed modifiable risk factors.  Radiographic evaluation demonstrates severe degenerative changes in all compartments. Physical exam reveals neutral alignment and full range of motion. BMI 47.5. A1c 11.2%. Given these findings, I recommend:    - WBAT  - Activity modification to limit strain or impact on the joint as needed  - NSAIDs as needed  - Tylenol 1000mg up to three times daily as needed. Do not exceed 3000mg daily  - Supervised physical therapy. Script provided   - Home exercise program directed by PT  - Weight loss discussed. Weight needs to be 303lbs to obtain BMI of 40  - A1c 7.5 or less needed  - Knee sleeve or brace for comfort as needed  - Cane or walker recommended to offload joint as needed  - Patient may follow up prn for further evaluation and treatment

## 2025-03-04 NOTE — ASSESSMENT & PLAN NOTE
A1c continues to worsen.  Goal A1c less than 7%.    Continue Ozempic 2 mg weekly and metformin 1000 mg twice daily.  Start basal insulin Tresiba.  Inject 18 units once daily.  Start bolus insulin, Lyumjev.  Inject 6 units 3 times a day immediately before meals.  Insulin injection training provided.    Will check C-peptide level prior to next visit.  Can consider initiation of SGLT2 inhibitor once blood glucose levels have improved.  Can also consider transition from Ozempic to Mounjaro for tighter glycemic control.    Counseled on the long-term effects of hyperglycemia and uncontrolled diabetes including risk for heart attack, stroke, kidney failure, diabetic foot ulcers, limb loss, blindness, and death.    Reviewed risks as well as signs and symptoms of hypoglycemia.  Rule of 15's provided in AVS for appropriate treatment.  Call the office for blood glucose levels less than 70 mg/dL or persistent patterns over 250 mg/dL.  Start Dexcom G7 continuous glucose monitor.    Continue to improve diet and lifestyle including attention to the amount and type of carbohydrates consumed as well as increased physical activity as tolerated.  Stay well-hydrated.    Follow-up in 3 months.  Labs prior to next visit.  Lab Results   Component Value Date    HGBA1C 11.2 (H) 03/01/2025     Orders:  •  C-peptide; Future  •  insulin degludec (Tresiba FlexTouch) 200 units/mL CONCENTRATED U-200 injection pen; Inject 18 Units under the skin daily  •  Insulin Lispro-aabc, 1 U Dial, (Lyumjev KwikPen) 100 UNIT/ML SOPN; Inject 6 Units under the skin 3 (three) times a day before meals  •  Insulin Pen Needle (BD Pen Needle Lori U/F) 32G X 4 MM MISC; Use 4 (four) times a day  •  Hemoglobin A1C; Future  •  Comprehensive metabolic panel; Future  •  Vitamin D 25 hydroxy; Future  •  PTH, intact; Future  •  semaglutide, 2 mg/dose, (Ozempic, 2 MG/DOSE,) 8 mg/ mL injection pen; Inject 0.75 mL (2 mg total) under the skin every 7 days  •  Continuous Glucose  Sensor (Dexcom G7 Sensor); Use 1 sensor every 10 days to continuously monitor blood glucose levels

## 2025-03-04 NOTE — ASSESSMENT & PLAN NOTE
BMI 47.76.  He has lost approximately 13 pounds since his previous visit.    Continue Ozempic 2 mg weekly.  Can consider transition to Mounjaro at his follow-up appointment if tighter glycemic control is required as well as increased weight loss effect.    Continue to decrease caloric intake, follow balanced diet, avoid processed foods and sweetened beverages, and stay well-hydrated.  Increase physical activity as tolerated.  He is currently meeting fitness goals with over 10K steps per day.     11/20/24 03/04/25   Weight - Scale 170 kg (375 lb) (H) 164 kg (362 lb) (H)   (H): Data is abnormally high

## 2025-03-04 NOTE — PROGRESS NOTES
Date: 25  Wilber Carey Jr.   MRN# [id number]  : 1975      Chief Complaint: Left Knee Pain    Assessment and Plan:  The patient verbalized understanding of exam findings and treatment plan. We engaged in the shared decision-making process and treatment options were discussed at length with the patient. Surgical and conservative management discussed today along with risks and benefits. Patient was agreeable with the plan and all questions were answered to satisfaction.     Primary osteoarthritis of left knee  Patient is a Middle-Aged (Approx 40-64yo) male with functionally limiting knee pain with short distances, functional instability, mechanical symptoms and addressed modifiable risk factors.  Radiographic evaluation demonstrates severe degenerative changes in all compartments. Physical exam reveals neutral alignment and full range of motion. BMI 47.5. A1c 11.2%. Given these findings, I recommend:    - WBAT  - Activity modification to limit strain or impact on the joint as needed  - NSAIDs as needed  - Tylenol 1000mg up to three times daily as needed. Do not exceed 3000mg daily  - Supervised physical therapy. Script provided   - Home exercise program directed by PT  - Weight loss discussed. Weight needs to be 303lbs to obtain BMI of 40  - A1c 7.5 or less needed  - Knee sleeve or brace for comfort as needed  - Cane or walker recommended to offload joint as needed  - Patient may follow up prn for further evaluation and treatment              Subjective:     Knee Pain  Patient presents to the clinic today, referred by Dr. Tellez,, with complaints of left knee pain. . This is evaluated as a personal injury. The pain began several years ago that has worsened recently The pain is located medial, lateral and rates their pain as 7/10. He describes the symptoms as aching and sharp. Symptoms improve with rest. The symptoms are worse with activity. The knee has given out or felt unstable. The patient  "can bend and straighten the knee fully. Treatment to date has been ice, heat, Tylenol, NSAID's, knee sleeve/brace, cortisone injection, therapy, without significant relief.     Prior treatment:  NSAIDs Yes    Bracing Yes   Physical Therapy Yes   Cortisone Injections Yes   Viscosupplementation Yes      External Records Reviewed:   ED reports, lab reports, office notes, radiology reports, and x-ray reports    Orthopedic PMH  Previously seen Dr Simons and Dr. Tellez for left knee    Orthopedic Surgical History  LEFT KNEE ARTHROSCOPIC PARTIAL LATERAL MENISCECTOMY ; PATELLO FEMOEAL CHONDROPLASTY with Dr. Simons - 8/17/17    Estimated body mass index is 47.5 kg/m² as calculated from the following:    Height as of this encounter: 6' 1\" (1.854 m).    Weight as of this encounter: 163 kg (360 lb).    Lab Results   Component Value Date    HGBA1C 11.2 (H) 03/01/2025    HGBA1C 9.6 (H) 11/11/2024    HGBA1C 10.9 (H) 06/07/2024    HGBA1C 9.0 08/23/2016    HGBA1C 9.8 (H) 10/03/2015    HGBA1C 8.8 (H) 05/06/2014   .   Lab Results   Component Value Date    EGFR 104 03/01/2025    EGFR 99 01/24/2025    EGFR 99 11/11/2024    CREATININE 0.80 03/01/2025    CREATININE 0.90 01/24/2025    CREATININE 0.89 11/11/2024        Allergy:  No Known Allergies  Medications:  All current active meds have been reviewed   Past Medical History:  Past Medical History:   Diagnosis Date    Asthma     Diabetes mellitus (HCC)     Diet Controlled    Diabetes type 2, uncontrolled 05/06/2014    Diverticulitis     Hyperlipidemia     Hypertension     Immunity to measles determined by serologic test 07/02/2019    PONV (postoperative nausea and vomiting)     Thyroid cancer (HCC)     radioactive iodine     Thyroid cancer (HCC) 02/12/2020    Type 2 diabetes mellitus (HCC) 07/17/2013     Past Surgical History:  Past Surgical History:   Procedure Laterality Date    BIOPSY CORE NEEDLE      Thyroid    CHEST WALL BIOPSY N/A 02/07/2020    Procedure: EXCISION  BIOPSY " LESION/MASS from back;  Surgeon: Olaf Delgadillo MD;  Location: [facility];  Service: General    COLON SURGERY      COLOSTOMY      with reversal    MN ARTHRS KNE SURG W/MENISCECTOMY MED/LAT W/SHVG Left 2017    Procedure: KNEE ARTHROSCOPIC PARTIAL LATERAL MENISCECTOMY ; PATELLO FEMOEAL CHONDROPLASTY;  Surgeon: Jem Simons MD;  Location: [facility];  Service: Orthopedics    MN OPEN TREATMENT METATARSAL FRACTURE EACH Right 3/2/2023    Procedure: OPEN REDUCTION W/ INTERNAL FIXATION (ORIF) FOOT 5TH METATARSAL;  Surgeon: Roxi Medeiros DPM;  Location: [facility];  Service: Podiatry    THYROIDECTOMY      Removal of 3 Parathyroids     Family History:  Family History   Problem Relation Age of Onset    Diabetes Mother     Hypertension Mother     Hypertension Father     Hyperlipidemia Father     Thyroid disease unspecified Sister     Sleep apnea Neg Hx      Social History:  Social History     Substance and Sexual Activity   Alcohol Use Yes    Comment: 3x a week     Social History     Substance and Sexual Activity   Drug Use Never     Social History     Tobacco Use   Smoking Status Former    Current packs/day: 0.00    Average packs/day: 1 pack/day for 10.0 years (10.0 ttl pk-yrs)    Types: Cigarettes    Start date:     Quit date:     Years since quittin.1   Smokeless Tobacco Never           Review of Systems:  General- denies fever/chills  HEENT- denies hearing loss or sore throat  Eyes- denies eye pain or visual disturbances, denies red eyes  Respiratory- denies cough or SOB  Cardio- denies chest pain or palpitations  GI- denies abdominal pain  Endocrine- denies urinary frequency  Urinary- denies pain with urination  Musculoskeletal- Negative except noted above  Skin- denies rashes or wounds  Neurological- denies dizziness or headache  Psychiatric- denies anxiety or difficulty concentrating      Objective:   BP Readings from Last 1 Encounters:   25 132/86      Wt Readings from Last 1 Encounters:  "  03/04/25 (!) 163 kg (360 lb)      Pulse Readings from Last 1 Encounters:   03/04/25 98        BMI: Estimated body mass index is 47.5 kg/m² as calculated from the following:    Height as of this encounter: 6' 1\" (1.854 m).    Weight as of this encounter: 163 kg (360 lb).      Physical Exam  Ht 6' 1\" (1.854 m)   Wt (!) 163 kg (360 lb)   BMI 47.50 kg/m²   General/Constitutional: No apparent distress: well-nourished and well developed.  Eyes: normal ocular motion  Cardio: RRR, Normal S1S2, No m/r/g.   Lymphatic: No appreciable lymphadenopathy  Respiratory: Non-labored breathing, CTA b/l no w/c/r  Vascular: No edema, swelling or tenderness, except as noted in detailed exam. Extremities well perfused. No LE edema  Integumentary: No impressive skin lesions present, except as noted in detailed exam.  Neuro: No ataxia or tremors noted  Psych: Normal mood and affect, oriented to person, place and time. Appropriate affect.  Musculoskeletal: Normal, except as noted in detailed exam and in HPI.    Gait and Station:   normal and antalgic    Left Knee Exam:      Inspection:  normal color, temperature, turgor and moisture    Overall limb alignment: neutral    Effusion: minimal    ROM 0 to 120 with pain    Extensor Lag: Absent    Palpation: Medial and Lateral joint line tenderness to palpation    stable to AP translation at 90 deg    Coronal plane stable in full extension    Coronal plane stable in mid-flexion     Motor: 5/5 EHL/FHL/TA/GS/Qd/Hs    Vascular: Toes WWP with BCR    Sensory: SILT DP/SP/Radha/Saph/Ti      Images:  I personally reviewed relevant images in the PACS system and my interpretation is as follows:    X-rays of the left knee: severe/end stage joint space narrowing, subchondral sclerosis, subchondral cysts, osteophyte formation. No fracture or dislocation.       Scribe Attestation      I,:  Jerome Andrea PA-C am acting as a scribe while in the presence of the attending physician.:       I,:  Josiah Brizuela MD " personally performed the services described in this documentation    as scribed in my presence.:                 Josiah Brizuela MD  Adult Reconstruction Specialist   [facility]

## 2025-03-05 ENCOUNTER — TELEPHONE (OUTPATIENT)
Age: 50
End: 2025-03-05

## 2025-03-05 NOTE — TELEPHONE ENCOUNTER
Receive fax from [employer] -     Need authorization to fill patient Synthroid. -     Called [facility]. - Insurance will not cover brand name but [facility] can wave that and bill for generic and ship the name brand Synthroid.    Spoke to pharmacy to authorize.

## 2025-03-11 ENCOUNTER — TELEPHONE (OUTPATIENT)
Age: 50
End: 2025-03-11

## 2025-03-11 NOTE — TELEPHONE ENCOUNTER
Received fax from [employer] - Denial of Dexcom Sensors.    Called [facility] for reason.    They state that reached out to the office for clarification for prior auth but since they did not receive an answer, they denied the authorizaiton    We never received anything from [employer] until today's denial    Did prior auth on the phone.    Approved    CASE# [id number]  Feb 9 2025 - March 9 2026    Called Wilber to advised him to disregard denial letter and that his Dexcom sensors are approved

## 2025-03-14 DIAGNOSIS — I10 HYPERTENSION, UNSPECIFIED TYPE: ICD-10-CM

## 2025-03-14 RX ORDER — LOSARTAN POTASSIUM AND HYDROCHLOROTHIAZIDE 25; 100 MG/1; MG/1
1 TABLET ORAL DAILY
Qty: 90 TABLET | Refills: 1 | Status: SHIPPED | OUTPATIENT
Start: 2025-03-14

## 2025-04-07 ENCOUNTER — TELEPHONE (OUTPATIENT)
Age: 50
End: 2025-04-07

## 2025-04-07 DIAGNOSIS — E11.65 TYPE 2 DIABETES MELLITUS WITH HYPERGLYCEMIA, WITHOUT LONG-TERM CURRENT USE OF INSULIN (HCC): ICD-10-CM

## 2025-04-07 RX ORDER — INSULIN LISPRO-AABC 100 [IU]/ML
8 INJECTION, SOLUTION SUBCUTANEOUS
Qty: 30 ML | Refills: 1 | Status: SHIPPED | OUTPATIENT
Start: 2025-04-07 | End: 2025-04-09 | Stop reason: SDUPTHER

## 2025-04-07 RX ORDER — INSULIN DEGLUDEC 200 U/ML
22 INJECTION, SOLUTION SUBCUTANEOUS DAILY
Qty: 18 ML | Refills: 1 | Status: SHIPPED | OUTPATIENT
Start: 2025-04-07 | End: 2025-04-09 | Stop reason: SDUPTHER

## 2025-04-07 RX ORDER — INSULIN LISPRO-AABC 100 [IU]/ML
6 INJECTION, SOLUTION SUBCUTANEOUS
Qty: 30 ML | Refills: 1 | Status: CANCELLED | OUTPATIENT
Start: 2025-04-07

## 2025-04-07 RX ORDER — INSULIN DEGLUDEC 200 U/ML
18 INJECTION, SOLUTION SUBCUTANEOUS DAILY
Qty: 9 ML | Refills: 1 | Status: CANCELLED | OUTPATIENT
Start: 2025-04-07

## 2025-04-07 NOTE — TELEPHONE ENCOUNTER
Spoke with patient.   Blood glucose levels dramatically improved since his previous visit.  GMI 8.3%.  Persistent hyperglycemia with occasional good return to euglycemia overnight.  Minimal variability in blood glucose levels.    Increase Tresiba to 22 units nightly.  Increase Lyumjev to 8 units before meals.  Continue rest of regimen the same.    He knows to call the office for blood glucose levels less than 70 mg/dL or persistent patterns over 250 mg/dL.

## 2025-04-07 NOTE — TELEPHONE ENCOUNTER
Pt called advised to use clarity rob for his dexcom  Pt given share code. Pt still having an issue wants call back.

## 2025-04-07 NOTE — TELEPHONE ENCOUNTER
Pt insurance only covered one month at [facility]      Pt needs 90 day refill send to     [facility].     Insulin lispro  Insulin degludec  Insulin pen needle

## 2025-04-08 RX ORDER — PEN NEEDLE, DIABETIC 32GX 5/32"
NEEDLE, DISPOSABLE MISCELLANEOUS 4 TIMES DAILY
Qty: 400 EACH | Refills: 1 | Status: SHIPPED | OUTPATIENT
Start: 2025-04-08

## 2025-04-09 DIAGNOSIS — E11.65 TYPE 2 DIABETES MELLITUS WITH HYPERGLYCEMIA, WITHOUT LONG-TERM CURRENT USE OF INSULIN (HCC): ICD-10-CM

## 2025-04-09 RX ORDER — INSULIN DEGLUDEC 200 U/ML
INJECTION, SOLUTION SUBCUTANEOUS DAILY
Qty: 9 ML | Refills: 0 | Status: CANCELLED | OUTPATIENT
Start: 2025-04-09

## 2025-04-09 RX ORDER — INSULIN LISPRO-AABC 100 [IU]/ML
8 INJECTION, SOLUTION SUBCUTANEOUS
Qty: 3 ML | Refills: 0 | Status: SHIPPED | OUTPATIENT
Start: 2025-04-09

## 2025-04-09 RX ORDER — INSULIN DEGLUDEC 200 U/ML
22 INJECTION, SOLUTION SUBCUTANEOUS DAILY
Qty: 9 ML | Refills: 0 | Status: SHIPPED | OUTPATIENT
Start: 2025-04-09

## 2025-04-09 NOTE — TELEPHONE ENCOUNTER
Other reason request has been forwarded to provider: Patient requesting one pen of Tresiba and one pen of Lymjev sent to his local pharmacy until his [facility] order arrives

## 2025-04-09 NOTE — TELEPHONE ENCOUNTER
Needs one pen each sent to local until [facility] comes in. Unable to find Rebeccakev in orders to que up.      Reason for call:   [x] Refill   [] Prior Auth  [] Other:     Office:   [] PCP/Provider -   [x] Specialty/Provider - [facility]      Medication: Tresiba Flex Touch     Dose/Frequency: 200 units/ml    Quantity:     Pharmacy: [facility] [address]    Local Pharmacy   Does the patient have enough for 3 days?   [] Yes   [x] No - Send as HP to POD    Mail Away Pharmacy   Does the patient have enough for 10 days?   [] Yes   [] No - Send as HP to POD

## 2025-04-17 NOTE — ASSESSMENT & PLAN NOTE
Repeat thyroid function tests - tsh should be lower [Average] : average [Cooperative] : cooperative [Euthymic] : euthymic [Full] : full [Clear] : clear [Linear/Goal Directed] : linear/goal directed [WNL] : within normal limits

## 2025-04-23 DIAGNOSIS — E11.65 TYPE 2 DIABETES MELLITUS WITH HYPERGLYCEMIA, WITHOUT LONG-TERM CURRENT USE OF INSULIN (HCC): ICD-10-CM

## 2025-04-24 RX ORDER — INSULIN LISPRO-AABC 100 [IU]/ML
INJECTION, SOLUTION SUBCUTANEOUS
Qty: 30 ML | Refills: 1 | Status: SHIPPED | OUTPATIENT
Start: 2025-04-24

## 2025-04-24 NOTE — TELEPHONE ENCOUNTER
Requested medication(s) are due for refill today: Yes  Patient has already received a courtesy refill: No  Other reason request has been forwarded to provider:  You notes say 6 units 3 times a day not 8 units

## 2025-05-05 PROBLEM — Z99.89 AMBULATES WITH CANE: Status: ACTIVE | Noted: 2025-05-05

## 2025-05-06 ENCOUNTER — OFFICE VISIT (OUTPATIENT)
Dept: OBGYN CLINIC | Facility: CLINIC | Age: 50
End: 2025-05-06
Payer: COMMERCIAL

## 2025-05-06 VITALS — BODY MASS INDEX: 41.75 KG/M2 | WEIGHT: 315 LBS | HEIGHT: 73 IN

## 2025-05-06 DIAGNOSIS — M17.12 PRIMARY OSTEOARTHRITIS OF LEFT KNEE: Primary | ICD-10-CM

## 2025-05-06 PROCEDURE — 99214 OFFICE O/P EST MOD 30 MIN: CPT | Performed by: STUDENT IN AN ORGANIZED HEALTH CARE EDUCATION/TRAINING PROGRAM

## 2025-05-06 PROCEDURE — 20610 DRAIN/INJ JOINT/BURSA W/O US: CPT | Performed by: STUDENT IN AN ORGANIZED HEALTH CARE EDUCATION/TRAINING PROGRAM

## 2025-05-06 RX ADMIN — BETAMETHASONE SODIUM PHOSPHATE AND BETAMETHASONE ACETATE 2 MG: 3; 3 INJECTION, SUSPENSION INTRA-ARTICULAR; INTRALESIONAL; INTRAMUSCULAR; SOFT TISSUE at 16:15

## 2025-05-06 RX ADMIN — ROPIVACAINE HYDROCHLORIDE 2 ML: 5 INJECTION, SOLUTION EPIDURAL; INFILTRATION; PERINEURAL at 16:15

## 2025-05-06 NOTE — ASSESSMENT & PLAN NOTE
-WBAT  -Activity modification to limit strain or impact on the joint  -Tylenol 1000mg up to three times daily as needed. Do not exceed 3000mg daily  -Home exercise program directed by PT  -Weight loss discussed   -Knee sleeve or brace for comfort  -Cane or walker recommended to offload joint  -Corticosteroid injection was offered, accepted, and administered.  Risk benefits and alternatives were discussed prior to injection.  Patient tolerated the procedure well.  -Patient may follow up in 3 month(s) for further evaluation and treatment

## 2025-05-06 NOTE — PROGRESS NOTES
Date: 25  Wilber Carey Jr.   MRN# [id number]  : 1975      Chief Complaint: Left Knee Pain    Assessment and Plan:  The patient verbalized understanding of exam findings and treatment plan. We engaged in the shared decision-making process and treatment options were discussed at length with the patient. Surgical and conservative management discussed today along with risks and benefits. Patient was agreeable with the plan and all questions were answered to satisfaction.     Assessment & Plan  Primary osteoarthritis of left knee  -WBAT  -Activity modification to limit strain or impact on the joint  -Tylenol 1000mg up to three times daily as needed. Do not exceed 3000mg daily  -Home exercise program directed by PT  -Weight loss discussed   -Knee sleeve or brace for comfort  -Cane or walker recommended to offload joint  -Corticosteroid injection was offered, accepted, and administered.  Risk benefits and alternatives were discussed prior to injection.  Patient tolerated the procedure well.  -Patient may follow up in 3 month(s) for further evaluation and treatment             Subjective:   Wilber Carey Jr. is a 49 y.o. male who is being seen in follow-up for Left knee pain. Patient states his CSI on 25 provided 6-8 weeks of relief. When we last saw he we recommended conservative treatment.  Pain has improved, than returned.. No other orthopedic complaints or concerns.       Prior treatment:  NSAIDs Yes    Bracing Yes   Physical Therapy Yes   Cortisone Injections Yes   Viscosupplementation Yes     Allergy:  No Known Allergies  Medications:  All current active meds have been reviewed   Past Medical History:  Past Medical History:   Diagnosis Date    Asthma     Diabetes mellitus (HCC)     Diet Controlled    Diabetes type 2, uncontrolled 2014    Diverticulitis     Hyperlipidemia     Hypertension     Immunity to measles determined by serologic test 2019    PONV (postoperative nausea  and vomiting)     Thyroid cancer (HCC)     radioactive iodine     Thyroid cancer (HCC) 2020    Type 2 diabetes mellitus (HCC) 2013     Past Surgical History:  Past Surgical History:   Procedure Laterality Date    BIOPSY CORE NEEDLE      Thyroid    CHEST WALL BIOPSY N/A 2020    Procedure: EXCISION  BIOPSY LESION/MASS from back;  Surgeon: Olaf Delgadillo MD;  Location: [facility];  Service: General    COLON SURGERY      COLOSTOMY      with reversal    SD ARTHRS KNE SURG W/MENISCECTOMY MED/LAT W/SHVG Left 2017    Procedure: KNEE ARTHROSCOPIC PARTIAL LATERAL MENISCECTOMY ; PATELLO FEMOEAL CHONDROPLASTY;  Surgeon: Jem Simons MD;  Location: [facility];  Service: Orthopedics    SD OPEN TREATMENT METATARSAL FRACTURE EACH Right 3/2/2023    Procedure: OPEN REDUCTION W/ INTERNAL FIXATION (ORIF) FOOT 5TH METATARSAL;  Surgeon: Roxi Medeiros DPM;  Location: [facility];  Service: Podiatry    THYROIDECTOMY      Removal of 3 Parathyroids     Family History:  Family History   Problem Relation Age of Onset    Diabetes Mother     Hypertension Mother     Hypertension Father     Hyperlipidemia Father     Thyroid disease unspecified Sister     Sleep apnea Neg Hx      Social History:  Social History     Substance and Sexual Activity   Alcohol Use Yes    Comment: 3x a week     Social History     Substance and Sexual Activity   Drug Use Never     Social History     Tobacco Use   Smoking Status Former    Current packs/day: 0.00    Average packs/day: 1 pack/day for 10.0 years (10.0 ttl pk-yrs)    Types: Cigarettes    Start date:     Quit date:     Years since quittin.3   Smokeless Tobacco Never           Review of Systems:  General- denies fever/chills  HEENT- denies hearing loss or sore throat  Eyes- denies eye pain or visual disturbances, denies red eyes  Respiratory- denies cough or SOB  Cardio- denies chest pain or palpitations  GI- denies abdominal pain  Endocrine- denies urinary  "frequency  Urinary- denies pain with urination  Musculoskeletal- Negative except noted above  Skin- denies rashes or wounds  Neurological- denies dizziness or headache  Psychiatric- denies anxiety or difficulty concentrating    Objective:   BP Readings from Last 1 Encounters:   03/04/25 132/86      Wt Readings from Last 1 Encounters:   05/06/25 (!) 163 kg (360 lb)      Pulse Readings from Last 1 Encounters:   03/04/25 98        BMI: Estimated body mass index is 47.5 kg/m² as calculated from the following:    Height as of this encounter: 6' 1\" (1.854 m).    Weight as of this encounter: 163 kg (360 lb).    Physical Exam  Ht 6' 1\" (1.854 m)   Wt (!) 163 kg (360 lb)   BMI 47.50 kg/m²   General/Constitutional: No apparent distress: well-nourished and well developed.  Eyes: normal ocular motion  Cardio: RRR, Normal S1S2, No m/r/g.   Lymphatic: No appreciable lymphadenopathy  Respiratory: Non-labored breathing, CTA b/l no w/c/r  Vascular: No edema, swelling or tenderness, except as noted in detailed exam. Extremities well perfused. No LE edema  Integumentary: No impressive skin lesions present, except as noted in detailed exam.  Neuro: No ataxia or tremors noted  Psych: Normal mood and affect, oriented to person, place and time. Appropriate affect.  Musculoskeletal: Normal, except as noted in detailed exam and in HPI.    Gait and Station:   normal and antalgic     Left Knee Exam:      Inspection:  normal color, temperature, turgor and moisture    Overall limb alignment: neutral    Effusion: minimal    ROM 0 to 120 with pain    Extensor Lag: Absent    Palpation: Medial and Lateral joint line tenderness to palpation    stable to AP translation at 90 deg    Coronal plane stable in full extension    Coronal plane stable in mid-flexion     Motor: 5/5 EHL/FHL/TA/GS/Qd/Hs    Vascular: Toes WWP with BCR    Sensory: SILT DP/SP/Radha/Saph/Ti     Large joint arthrocentesis: L knee    Performed by: Josiah Brizuela MD  Authorized " by: Josiah Brizuela MD    Universal Protocol:  Consent: Verbal consent obtained.  Risks and benefits: risks, benefits and alternatives were discussed  Consent given by: patient  Patient understanding: patient states understanding of the procedure being performed  Patient identity confirmed: verbally with patient  Supporting Documentation  Indications: pain and joint swelling     Is this a Visco injection? NoProcedure Details  Location: knee - L knee  Needle size: 22 G  Ultrasound guidance: no  Approach: lateral  Medications administered: 2 mg betamethasone acetate-betamethasone sodium phosphate 6 (3-3) mg/mL; 2 mL ropivacaine 0.5 %    Patient tolerance: patient tolerated the procedure well with no immediate complications  Dressing:  Sterile dressing applied           Images:  No new imaging    I have spent a total time of 30 minutes in caring for this patient on the day of the visit/encounter including Diagnostic results, Prognosis, Risks and benefits of tx options, Instructions for management, Patient and family education, Importance of tx compliance, Risk factor reductions, Impressions, Counseling / Coordination of care, Documenting in the medical record, and Obtaining or reviewing history  .      Scribe Attestation      I,:  Duane Sheikh am acting as a scribe while in the presence of the attending physician.:       I,:  Josiah Brizuela MD personally performed the services described in this documentation    as scribed in my presence.:               Josiah Brizuela MD  Adult Reconstruction Specialist   [facility]

## 2025-05-07 RX ORDER — ROPIVACAINE HYDROCHLORIDE 5 MG/ML
2 INJECTION, SOLUTION EPIDURAL; INFILTRATION; PERINEURAL
Status: COMPLETED | OUTPATIENT
Start: 2025-05-06 | End: 2025-05-06

## 2025-05-07 RX ORDER — BETAMETHASONE SODIUM PHOSPHATE AND BETAMETHASONE ACETATE 3; 3 MG/ML; MG/ML
2 INJECTION, SUSPENSION INTRA-ARTICULAR; INTRALESIONAL; INTRAMUSCULAR; SOFT TISSUE
Status: COMPLETED | OUTPATIENT
Start: 2025-05-06 | End: 2025-05-06

## 2025-05-14 DIAGNOSIS — E11.65 TYPE 2 DIABETES MELLITUS WITH HYPERGLYCEMIA, WITHOUT LONG-TERM CURRENT USE OF INSULIN (HCC): ICD-10-CM

## 2025-05-14 RX ORDER — INSULIN LISPRO-AABC 100 [IU]/ML
8 INJECTION, SOLUTION SUBCUTANEOUS
Qty: 30 ML | Refills: 1 | Status: SHIPPED | OUTPATIENT
Start: 2025-05-14

## 2025-06-02 DIAGNOSIS — E11.65 TYPE 2 DIABETES MELLITUS WITH HYPERGLYCEMIA, WITHOUT LONG-TERM CURRENT USE OF INSULIN (HCC): ICD-10-CM

## 2025-06-02 RX ORDER — METFORMIN HYDROCHLORIDE 500 MG/1
1000 TABLET, EXTENDED RELEASE ORAL 2 TIMES DAILY WITH MEALS
Qty: 360 TABLET | Refills: 1 | Status: SHIPPED | OUTPATIENT
Start: 2025-06-02

## 2025-06-06 DIAGNOSIS — E11.65 TYPE 2 DIABETES MELLITUS WITH HYPERGLYCEMIA, WITHOUT LONG-TERM CURRENT USE OF INSULIN (HCC): ICD-10-CM

## 2025-06-09 ENCOUNTER — TELEPHONE (OUTPATIENT)
Age: 50
End: 2025-06-09

## 2025-06-09 RX ORDER — ACYCLOVIR 400 MG/1
TABLET ORAL
Qty: 9 EACH | Refills: 1 | Status: SHIPPED | OUTPATIENT
Start: 2025-06-09

## 2025-06-10 ENCOUNTER — APPOINTMENT (OUTPATIENT)
Dept: LAB | Facility: CLINIC | Age: 50
End: 2025-06-10
Payer: COMMERCIAL

## 2025-06-10 ENCOUNTER — OFFICE VISIT (OUTPATIENT)
Dept: FAMILY MEDICINE CLINIC | Facility: CLINIC | Age: 50
End: 2025-06-10
Payer: COMMERCIAL

## 2025-06-10 ENCOUNTER — TELEPHONE (OUTPATIENT)
Dept: ADMINISTRATIVE | Facility: OTHER | Age: 50
End: 2025-06-10

## 2025-06-10 VITALS
SYSTOLIC BLOOD PRESSURE: 132 MMHG | TEMPERATURE: 95.9 F | WEIGHT: 315 LBS | DIASTOLIC BLOOD PRESSURE: 82 MMHG | BODY MASS INDEX: 41.75 KG/M2 | OXYGEN SATURATION: 97 % | HEART RATE: 79 BPM | HEIGHT: 73 IN

## 2025-06-10 DIAGNOSIS — C73 PAPILLARY THYROID CARCINOMA (HCC): ICD-10-CM

## 2025-06-10 DIAGNOSIS — E11.65 TYPE 2 DIABETES MELLITUS WITH HYPERGLYCEMIA, WITHOUT LONG-TERM CURRENT USE OF INSULIN (HCC): ICD-10-CM

## 2025-06-10 DIAGNOSIS — Z00.00 ANNUAL PHYSICAL EXAM: Primary | ICD-10-CM

## 2025-06-10 DIAGNOSIS — E89.0 POSTOPERATIVE HYPOTHYROIDISM: ICD-10-CM

## 2025-06-10 DIAGNOSIS — I10 BENIGN ESSENTIAL HYPERTENSION: ICD-10-CM

## 2025-06-10 DIAGNOSIS — E20.9 HYPOPARATHYROIDISM, UNSPECIFIED HYPOPARATHYROIDISM TYPE (HCC): ICD-10-CM

## 2025-06-10 PROBLEM — R42 DIZZINESS: Status: RESOLVED | Noted: 2024-06-11 | Resolved: 2025-06-10

## 2025-06-10 PROBLEM — Z99.89 AMBULATES WITH CANE: Status: RESOLVED | Noted: 2025-05-05 | Resolved: 2025-06-10

## 2025-06-10 LAB
25(OH)D3 SERPL-MCNC: 54.5 NG/ML (ref 30–100)
ALBUMIN SERPL BCG-MCNC: 4.4 G/DL (ref 3.5–5)
ALBUMIN/CREAT UR: NORMAL
ALP SERPL-CCNC: 66 U/L (ref 34–104)
ALT SERPL W P-5'-P-CCNC: 35 U/L (ref 7–52)
ANION GAP SERPL CALCULATED.3IONS-SCNC: 12 MMOL/L (ref 4–13)
AST SERPL W P-5'-P-CCNC: 24 U/L (ref 13–39)
BILIRUB SERPL-MCNC: 0.54 MG/DL (ref 0.2–1)
BUN SERPL-MCNC: 15 MG/DL (ref 5–25)
CALCIUM SERPL-MCNC: 7.9 MG/DL (ref 8.4–10.2)
CHLORIDE SERPL-SCNC: 98 MMOL/L (ref 96–108)
CO2 SERPL-SCNC: 29 MMOL/L (ref 21–32)
CREAT SERPL-MCNC: 0.69 MG/DL (ref 0.6–1.3)
GFR SERPL CREATININE-BSD FRML MDRD: 110 ML/MIN/1.73SQ M
GLUCOSE P FAST SERPL-MCNC: 123 MG/DL (ref 65–99)
POTASSIUM SERPL-SCNC: 4.3 MMOL/L (ref 3.5–5.3)
PROT SERPL-MCNC: 7.2 G/DL (ref 6.4–8.4)
PTH-INTACT SERPL-MCNC: 2.1 PG/ML (ref 12–88)
SL AMB POCT HEMOGLOBIN AIC: 6.3 (ref ?–6.5)
SODIUM SERPL-SCNC: 139 MMOL/L (ref 135–147)
T4 FREE SERPL-MCNC: 1.8 NG/DL (ref 0.61–1.12)
TSH SERPL DL<=0.05 MIU/L-ACNC: 0.06 UIU/ML (ref 0.45–4.5)

## 2025-06-10 PROCEDURE — 82570 ASSAY OF URINE CREATININE: CPT | Performed by: NURSE PRACTITIONER

## 2025-06-10 PROCEDURE — 80053 COMPREHEN METABOLIC PANEL: CPT

## 2025-06-10 PROCEDURE — 82306 VITAMIN D 25 HYDROXY: CPT

## 2025-06-10 PROCEDURE — 83036 HEMOGLOBIN GLYCOSYLATED A1C: CPT | Performed by: NURSE PRACTITIONER

## 2025-06-10 PROCEDURE — 84443 ASSAY THYROID STIM HORMONE: CPT

## 2025-06-10 PROCEDURE — 99396 PREV VISIT EST AGE 40-64: CPT | Performed by: NURSE PRACTITIONER

## 2025-06-10 PROCEDURE — 36415 COLL VENOUS BLD VENIPUNCTURE: CPT

## 2025-06-10 PROCEDURE — 84681 ASSAY OF C-PEPTIDE: CPT

## 2025-06-10 PROCEDURE — 83970 ASSAY OF PARATHORMONE: CPT

## 2025-06-10 PROCEDURE — 82043 UR ALBUMIN QUANTITATIVE: CPT | Performed by: NURSE PRACTITIONER

## 2025-06-10 PROCEDURE — 84439 ASSAY OF FREE THYROXINE: CPT

## 2025-06-10 NOTE — ASSESSMENT & PLAN NOTE
Lab Results   Component Value Date    HGBA1C 6.3 06/10/2025       Orders:    POCT hemoglobin A1c    POCT urine microalbumin/creatinine  following with endocrine

## 2025-06-10 NOTE — TELEPHONE ENCOUNTER
----- Message from Geneva LAZO sent at 6/10/2025  7:05 AM EDT -----  Regarding: Diabetic Eye Exam  06/10/25 7:05 Jeremías, our patient Wilber VENTURA Kalinroscoe . has had Diabetic Eye Exam completed/performed. Please assist in updating the patient chart by making an External outreach to [facility] eye facility located in [facility]. The date of service is December 2024.Thank you,[facility]

## 2025-06-10 NOTE — PROGRESS NOTES
Adult Annual Physical  Name: Wilber Carey Jr.      : 1975      MRN: [id number]  Encounter Provider: ISAÍAS Mendoza  Encounter Date: 6/10/2025   Encounter department: [facility]    :  Assessment & Plan  Type 2 diabetes mellitus with hyperglycemia, without long-term current use of insulin ([facility]ly [facility])    Lab Results   Component Value Date    HGBA1C 6.3 06/10/2025       Orders:    POCT hemoglobin A1c    POCT urine microalbumin/creatinine  following with endocrine   Annual physical exam         Benign essential hypertension  BP well controlled          Postoperative hypothyroidism  Synthroid 400 mcg                 Immunizations:  - Immunizations due: Prevnar 20         History of Present Illness     Adult Annual Physical:  Patient presents for annual physical. Patient here today for his check up and to have his paperwork completed. He is following with endocrine. .     Diet and Physical Activity:  - Diet/Nutrition: no special diet.  - Exercise: no formal exercise.    Depression Screening:  - PHQ-2 Score: 0    General Health:  - Sleep: sleeps poorly and 4-6 hours of sleep on average.  - Hearing: normal hearing bilateral ears.  - Vision: no vision problems.  - Dental: brushes teeth twice daily, floss regularly and regular dental visits.     Health:    - Urinary symptoms: none.     Review of Systems   Constitutional:  Negative for activity change, appetite change, chills, diaphoresis, fatigue, fever and unexpected weight change.   HENT:  Negative for congestion, ear pain, hearing loss, postnasal drip, sinus pressure, sinus pain, sneezing and sore throat.    Eyes:  Negative for pain, redness and visual disturbance.   Respiratory:  Negative for cough and shortness of breath.    Cardiovascular:  Negative for chest pain and leg swelling.   Gastrointestinal:  Negative for abdominal pain, diarrhea, nausea and vomiting.   Endocrine: Negative.    Genitourinary: Negative.   "  Musculoskeletal:  Negative for arthralgias.   Allergic/Immunologic: Negative.    Neurological:  Negative for dizziness and light-headedness.   Hematological: Negative.    Psychiatric/Behavioral:  Negative for behavioral problems and dysphoric mood.          Objective   /82 (BP Location: Left arm, Patient Position: Sitting, Cuff Size: Large)   Pulse 79   Temp (!) 95.9 °F (35.5 °C)   Ht 6' 1\" (1.854 m)   Wt (!) 166 kg (365 lb)   SpO2 97%   BMI 48.16 kg/m²     Physical Exam  Constitutional:       General: He is not in acute distress.     Appearance: He is well-developed.   HENT:      Head: Normocephalic and atraumatic.     Eyes:      Pupils: Pupils are equal, round, and reactive to light.     Neck:      Thyroid: No thyromegaly.     Cardiovascular:      Rate and Rhythm: Normal rate and regular rhythm.      Pulses: no weak pulses.           Dorsalis pedis pulses are 2+ on the right side and 2+ on the left side.        Posterior tibial pulses are 2+ on the right side and 2+ on the left side.      Heart sounds: Normal heart sounds. No murmur heard.  Pulmonary:      Effort: Pulmonary effort is normal. No respiratory distress.      Breath sounds: Normal breath sounds. No wheezing.   Abdominal:      General: Bowel sounds are normal.      Palpations: Abdomen is soft.     Musculoskeletal:         General: Normal range of motion.      Cervical back: Normal range of motion.   Feet:      Right foot:      Skin integrity: No ulcer, skin breakdown, erythema, warmth, callus or dry skin.      Left foot:      Skin integrity: No ulcer, skin breakdown, erythema, warmth, callus or dry skin.     Skin:     General: Skin is warm and dry.     Neurological:      Mental Status: He is alert and oriented to person, place, and time.       Patient's shoes and socks removed.    Right Foot/Ankle   Right Foot Inspection  Skin Exam: skin normal and skin intact. No dry skin, no warmth, no callus, no erythema, no maceration, no abnormal " color, no pre-ulcer, no ulcer and no callus.     Toe Exam: ROM and strength within normal limits.     Sensory   Vibration: intact  Proprioception: intact  Monofilament testing: intact    Vascular  Capillary refills: < 3 seconds  The right DP pulse is 2+. The right PT pulse is 2+.     Left Foot/Ankle  Left Foot Inspection  Skin Exam: skin normal and skin intact. No dry skin, no warmth, no erythema, no maceration, normal color, no pre-ulcer, no ulcer and no callus.     Toe Exam: ROM and strength within normal limits.     Sensory   Vibration: intact  Proprioception: intact  Monofilament testing: intact    Vascular  Capillary refills: < 3 seconds  The left DP pulse is 2+. The left PT pulse is 2+.     Assign Risk Category  No deformity present  No loss of protective sensation  No weak pulses  Risk: 0

## 2025-06-10 NOTE — LETTER
Diabetic Eye Exam Form    Date Requested: 25  Patient: Wilber Carey Jr.  Patient : 1975   Referring Provider: ISAÍAS Mendoza      DIABETIC Eye Exam Date _______________________________      Type of Exam MUST be documented for Diabetic Eye Exams. Please CHECK ONE.     Retinal Exam       Dilated Retinal Exam       OCT       Optomap-Iris Exam      Fundus Photography       Left Eye - Please check Retinopathy or No Retinopathy        Exam did show retinopathy    Exam did not show retinopathy       Right Eye - Please check Retinopathy or No Retinopathy       Exam did show retinopathy    Exam did not show retinopathy       Comments __________________________________________________________    Practice Providing Exam ______________________________________________    Exam Performed By (print name) _______________________________________      Provider Signature ___________________________________________________      These reports are needed for  compliance.    Please fax this completed form and a copy of the Diabetic Eye Exam report to the [facility] Based Department as soon as possible via Fax [phone], attention [place]: Phone [phone]. Our office is located at [address] [place] [address].     We thank you for your assistance in treating our mutual patient.   [facility] [phone] F [phone]

## 2025-06-10 NOTE — PATIENT INSTRUCTIONS
"Patient Education     Routine physical for adults   The Basics   Written by the doctors and editors at [facility]   What is a physical? -- A physical is a routine visit, or \"check-up,\" with your doctor. You might also hear it called a \"wellness visit\" or \"preventive visit.\"  During each visit, the doctor will:   Ask about your physical and mental health   Ask about your habits, behaviors, and lifestyle   Do an exam   Give you vaccines if needed   Talk to you about any medicines you take   Give advice about your health   Answer your questions  Getting regular check-ups is an important part of taking care of your health. It can help your doctor find and treat any problems you have. But it's also important for preventing health problems.  A routine physical is different from a \"sick visit.\" A sick visit is when you see a doctor because of a health concern or problem. Since physicals are scheduled ahead of time, you can think about what you want to ask the doctor.  How often should I get a physical? -- It depends on your age and health. In general, for people age 21 years and older:   If you are younger than 50 years, you might be able to get a physical every 3 years.   If you are 50 years or older, your doctor might recommend a physical every year.  If you have an ongoing health condition, like diabetes or high blood pressure, your doctor will probably want to see you more often.  What happens during a physical? -- In general, each visit will include:   Physical exam - The doctor or nurse will check your height, weight, heart rate, and blood pressure. They will also look at your eyes and ears. They will ask about how you are feeling and whether you have any symptoms that bother you.   Medicines - It's a good idea to bring a list of all the medicines you take to each doctor visit. Your doctor will talk to you about your medicines and answer any questions. Tell them if you are having any side effects that bother you. You " "should also tell them if you are having trouble paying for any of your medicines.   Habits and behaviors - This includes:   Your diet   Your exercise habits   Whether you smoke, drink alcohol, or use drugs   Whether you are sexually active   Whether you feel safe at home  Your doctor will talk to you about things you can do to improve your health and lower your risk of health problems. They will also offer help and support. For example, if you want to quit smoking, they can give you advice and might prescribe medicines. If you want to improve your diet or get more physical activity, they can help you with this, too.   Lab tests, if needed - The tests you get will depend on your age and situation. For example, your doctor might want to check your:   Cholesterol   Blood sugar   Iron level   Vaccines - The recommended vaccines will depend on your age, health, and what vaccines you already had. Vaccines are very important because they can prevent certain serious or deadly infections.   Discussion of screening - \"Screening\" means checking for diseases or other health problems before they cause symptoms. Your doctor can recommend screening based on your age, risk, and preferences. This might include tests to check for:   Cancer, such as breast, prostate, cervical, ovarian, colorectal, prostate, lung, or skin cancer   Sexually transmitted infections, such as chlamydia and gonorrhea   Mental health conditions like depression and anxiety  Your doctor will talk to you about the different types of screening tests. They can help you decide which screenings to have. They can also explain what the results might mean.   Answering questions - The physical is a good time to ask the doctor or nurse questions about your health. If needed, they can refer you to other doctors or specialists, too.  Adults older than 65 years often need other care, too. As you get older, your doctor will talk to you about:   How to prevent falling at " home   Hearing or vision tests   Memory testing   How to take your medicines safely   Making sure that you have the help and support you need at home  All topics are updated as new evidence becomes available and our peer review process is complete.  This topic retrieved from [employer] on: May 02, 2024.  Topic 933578 Version 1.0  Release: 32.4.3 - C32.122  © 2024 UpToDate, Inc. and/or its affiliates. All rights reserved.  Consumer Information Use and Disclaimer   Disclaimer: This generalized information is a limited summary of diagnosis, treatment, and/or medication information. It is not meant to be comprehensive and should be used as a tool to help the user understand and/or assess potential diagnostic and treatment options. It does NOT include all information about conditions, treatments, medications, side effects, or risks that may apply to a specific patient. It is not intended to be medical advice or a substitute for the medical advice, diagnosis, or treatment of a health care provider based on the health care provider's examination and assessment of a patient's specific and unique circumstances. Patients must speak with a health care provider for complete information about their health, medical questions, and treatment options, including any risks or benefits regarding use of medications. This information does not endorse any treatments or medications as safe, effective, or approved for treating a specific patient. UpToDate, Inc. and its affiliates disclaim any warranty or liability relating to this information or the use thereof.The use of this information is governed by the Terms of Use, available at https://www.[employer]uwer.com/en/know/clinical-effectiveness-terms. 2024© UpToDate, Inc. and its affiliates and/or licensors. All rights reserved.  Copyright   © 2024 UpToDate, Inc. and/or its affiliates. All rights reserved.

## 2025-06-10 NOTE — LETTER
Diabetic Eye Exam Form    Date Requested: 25  Patient: Wilber Carey Jr.  Patient : 1975   Referring Provider: ISAÍAS Mendoza      DIABETIC Eye Exam Date _______________________________      Type of Exam MUST be documented for Diabetic Eye Exams. Please CHECK ONE.     Retinal Exam       Dilated Retinal Exam       OCT       Optomap-Iris Exam      Fundus Photography       Left Eye - Please check Retinopathy or No Retinopathy        Exam did show retinopathy    Exam did not show retinopathy       Right Eye - Please check Retinopathy or No Retinopathy       Exam did show retinopathy    Exam did not show retinopathy       Comments __________________________________________________________    Practice Providing Exam ______________________________________________    Exam Performed By (print name) _______________________________________      Provider Signature ___________________________________________________      These reports are needed for  compliance.    Please fax this completed form and a copy of the Diabetic Eye Exam report to the [facility] Based Department as soon as possible via Fax [phone], attention [place]: Phone [phone]. Our office is located at [address] [place] [address].     We thank you for your assistance in treating our mutual patient.   [facility] [phone] F [phone]

## 2025-06-11 ENCOUNTER — TELEPHONE (OUTPATIENT)
Age: 50
End: 2025-06-11

## 2025-06-11 ENCOUNTER — RESULTS FOLLOW-UP (OUTPATIENT)
Age: 50
End: 2025-06-11

## 2025-06-11 LAB — C PEPTIDE SERPL-MCNC: 3.6 NG/ML (ref 1.1–4.4)

## 2025-06-11 NOTE — TELEPHONE ENCOUNTER
Upon review of the In Basket request and the patient's chart, initial outreach has been made via fax to facility. Please see Contacts section for details.     Thank you  Filiberto Khan MA

## 2025-06-11 NOTE — TELEPHONE ENCOUNTER
Patient says his dates are incorrect on his FMLA forms he needs them to say 6/3/25-6/2/26. Please advise

## 2025-06-18 NOTE — TELEPHONE ENCOUNTER
As a follow-up, a second attempt has been made for outreach via fax to facility. Please see Contacts section for details.    Thank you  Filiberto Khan MA

## 2025-06-24 DIAGNOSIS — I10 HYPERTENSION, UNSPECIFIED TYPE: ICD-10-CM

## 2025-06-24 RX ORDER — AMLODIPINE BESYLATE 5 MG/1
5 TABLET ORAL DAILY
Qty: 90 TABLET | Refills: 1 | Status: SHIPPED | OUTPATIENT
Start: 2025-06-24

## 2025-06-24 RX ORDER — CALCITRIOL 0.25 UG/1
0.25 CAPSULE, LIQUID FILLED ORAL 2 TIMES DAILY
Qty: 180 CAPSULE | Refills: 1 | Status: SHIPPED | OUTPATIENT
Start: 2025-06-24 | End: 2025-06-25 | Stop reason: SDUPTHER

## 2025-06-24 NOTE — PROGRESS NOTES
Name: Wilber Carey Jr.      : 1975      MRN: [id number]  Encounter Provider: ISAÍAS Sweeney  Encounter Date: 2025   Encounter department: [facility] [place]  :  Assessment & Plan  Type 2 diabetes mellitus with hyperglycemia, with long-term current use of insulin (HCC)  A1c dramatically improved from previous visit.    Due to no weight loss, will transition Ozempic 2 mg weekly to Mounjaro 7.5 mg once weekly with plan to increase titration upwards every 4 weeks as tolerated.  Continue Tresiba 24 units daily.  Continue Lyumjev 10 units before meals and start correctional scale of 2 extra units for every 50 over 150 mg/dL.  Continue metformin 1000 mg twice daily.    Reviewed risks as well as signs and symptoms of hypoglycemia.  Rule of 15's provided in AVS for appropriate treatment.  Continue Dexcom G7 continuous glucose monitor.  Call the office for blood glucose levels less than 70 mg/dL or persistent patterns over 250 mg/dL.      Continue to improve diet and lifestyle including attention to the amount and type of carbohydrates consumed as well as increased physical activity as tolerated.  Stay well-hydrated.      Follow-up in 3 months.  Labs prior to next visit.  Lab Results   Component Value Date    HGBA1C 6.3 06/10/2025       Orders:  •  Comprehensive metabolic panel; Future  •  Vitamin D 25 hydroxy; Future  •  Hemoglobin A1C; Future  •  Comprehensive metabolic panel; Future  •  Tirzepatide (Mounjaro) 7.5 MG/0.5ML SOAJ; Inject 7.5 mg under the skin once a week  •  Insulin Lispro-aabc, 1 U Dial, (Lyumjev KwikPen) 100 UNIT/ML SOPN; Inject 10 units before meals plus scale. Max daily dose 60 units  •  insulin degludec (Tresiba FlexTouch) 200 units/mL CONCENTRATED U-200 injection pen; Inject 24 Units under the skin in the morning.    Benign essential hypertension  /80 in the office today.  Continue current regimen.  Orders:  •   losartan-hydrochlorothiazide (HYZAAR) 100-25 MG per tablet; Take 1 tablet by mouth daily    Class 3 severe obesity due to excess calories with serious comorbidity and body mass index (BMI) of 45.0 to 49.9 in adult  BMI 48.08    Weight has remained stable since his previous appointment.  Will transition from Ozempic to Mounjaro for tighter glycemic control as well as added weight loss effect.    Continue to decrease caloric intake, follow balanced diet, avoid processed foods and sweetened beverages, and stay well-hydrated.  Increase physical activity as tolerated.       Papillary thyroid carcinoma (HCC)  Status post total thyroidectomy 2009 with RUIZ therapy followed by radical neck dissection in 2011 with Dr. Mckeon.  He has known remnant thyroid tissue secondary to tissue proximity to vocal cords.  Thyroglobulin levels have remained detectable with most recently slight increase.  Encouraged to follow-up with surveillance ultrasound head neck lymph node mapping which has been overdue.  Orders:  •  TSH, 3rd generation; Future  •  T4, free; Future  •  TSH, 3rd generation; Future  •  T4, free; Future  •  levothyroxine (Synthroid) 200 mcg tablet; Take 2 tablets by mouth daily Monday-Saturday. Take 1 tablet on Sunday.    Postoperative hypothyroidism  Presents clinically euthyroid, biochemically over suppressed.    Decrease Synthroid to 400 mcg Monday-Saturday and 200 mcg on Sunday.  Repeat thyroid function tests in 6 weeks.  Goal TSH 0.1-0.5.  Orders:  •  TSH, 3rd generation; Future  •  T4, free; Future  •  TSH, 3rd generation; Future  •  T4, free; Future  •  levothyroxine (Synthroid) 200 mcg tablet; Take 2 tablets by mouth daily Monday-Saturday. Take 1 tablet on Sunday.    Other hypoparathyroidism (HCC)  Vitamin D level at goal.  Continue calcitriol 0.25 mg twice daily.  Continue calcium 600 mg 4 times daily.  Repeat labs prior to next visit.  Orders:  •  Calcium, urine, 24 hour; Future  •  Phosphorus; Future  •   Comprehensive metabolic panel; Future  •  Vitamin D 25 hydroxy; Future  •  Magnesium; Future  •  Creatinine, urine, 24 hour; Future  •  calcitriol (ROCALTROL) 0.25 mcg capsule; Take 1 capsule (0.25 mcg total) by mouth 2 (two) times a day    Hypocalcemia  Calcium levels on most recent labs have been low.  He admits to missing doses of calcium supplement.  He has Tums for rescue when symptomatic.  Discussed severe adverse effects of hypocalcemia including risk for seizures.  Repeat CMP in 2 week to evaluate calcium levels when compliant with supplementation.    Orders:  •  Calcium, urine, 24 hour; Future  •  Comprehensive metabolic panel; Future  •  hydroCHLOROthiazide 25 mg tablet; TAKE 1 TABLET TWICE A DAY.  (IN ADDITION TO THE LOSARTAN/HCTZ)    Hypercalciuria  Continue hydrochlorothiazide.  Will evaluate 24-hour urine calcium with next set of labs.  Orders:  •  Calcium, urine, 24 hour; Future  •  Comprehensive metabolic panel; Future  •  Creatinine, urine, 24 hour; Future  •  losartan-hydrochlorothiazide (HYZAAR) 100-25 MG per tablet; Take 1 tablet by mouth daily  •  hydroCHLOROthiazide 25 mg tablet; TAKE 1 TABLET TWICE A DAY.  (IN ADDITION TO THE LOSARTAN/HCTZ)          History of Present Illness     Wilber Carey Jr. is a 49 y.o. male who presents  to the office today for follow-up of hypoparathyroidism, papillary thyroid cancer with subsequent postoperative hypothyroidism, and type 2 diabetes, without long term insulin use.  He was last seen in the office 3/4/2025.     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 Mckeon.  He recalls being told there is remnant thyroid tissue present due to proximity of tissue to vocal cords and to expect a detectable thyroglobulin level.  Recent thyroglobulin 3/1/2025 was 9.1, which is higher than previous trend in the 3-4 range.   Thyroglobulin antibodies remain undetectable.  He completed US head neck lymph node ultrasound mapping on 7/30/2024 which demonstrated no evidence of recurrent or metastatic disease. Thyroid bed nodules with minimal change. One on the left may be slightly larger but may be related to measurement differences. Correlation with tumor markers was advised as well as to complete a 3 to 6-month follow-up ultrasound.  Surveillance ultrasound has been ordered and remains uncompleted despite reminder at his previous appointment..  His goal TSH is 0.1-0.5.     For postoperative hypothyroidism, he is taking Synthroid 400 mcg daily.  He reports he takes this medication consistently and correctly.     Component      Latest Ref Rng 6/10/2025   FREE T4      0.61 - 1.12 ng/dL 1.80 (H)    TSH 3RD GENERATON      0.450 - 4.500 uIU/mL 0.062 (L)      For Hypoparathyroidism,  He is taking Calcium supplements 600 mg 4 times daily.  He is also taking calcitriol 0.25 mg twice daily. He denies kidney stones.  He is taking HCTZ for hypercalcuria, total dose of 75 mg daily.  Denies symptoms of hypocalcemia at present.   He has history of fracture of 5th metatarsal which required treatment with surgery.  It was at the site of an old fracture from football injury.  His corrected calcium on lab work is low, 7.6 mg/dL     Component      Latest Ref Rng 6/10/2025   Calcium      8.4 - 10.2 mg/dL 7.9 (L)    Albumin      3.5 - 5.0 g/dL 4.4    VITAMIN D, 25-HYDROXY      30.0 - 100.0 ng/mL 54.5    PTH      12.0 - 88.0 pg/mL 2.1 (L)       For type 2 diabetes,  He was off medications for approximately 5 years and managed through diet and lifestyle.  In 5/2024 A1c was found to be 11.4%. Complications of diabetes include: HLD.  At his last visit, his A1c was 11.2% and he was started on basal/bolus insulin regimen.  His most recent A1c is improved to 6.3%.  His C-peptide level was 3.6 ng/mL, demonstrating good insulin reserve.     At his previous visit he  weighed 362 pounds.  Today he weighs 365 pounds.     Denies recent episodes of hypoglycemia.  Symptoms of hypoglycemia include: Unknown, has never experienced     Current home glucose monitoring: Dexcom G7     Current Medication Regimen:   Ozempic 2 mg weekly  Metformin 1000 mg twice daily  Tresiba 24 units daily  Lyumjev 10 units 3 times a day before meals     Health Maintenance   Topic Date Due   • Diabetic Eye Exam  10/31/2019   • Diabetic Foot Exam  06/10/2026       Hypertension, taking: Not on ACEi/ARB  Hyperlipidemia, taking: Simvastatin 10 mg daily  History of Pancreatitis: No  Diabetes Education: Attended    CGM REVIEW:  Device used : Dexcom G7  Indication: Type 2 Diabetes  Date Range: 6/12/2025 - 6/25/2025  More than 72 hours of data was reviewed. Report to be scanned to chart.     Analysis of data:   Average Glucose: 165 mg/dL  Coefficient of Variation: 20.7%  SD : 34 mg/dL  Time in Target Range: 71%  Time Above Range: 28% high; 1% very high  Time Below Range: 0% low; 0% very low    Interpretation of data: Blood glucose levels reasonably controlled.  Minimal variation noted overall.  Occasional episodes of postprandial hyperglycemia, occasionally sustained greater than 2 hours.  Very rare severe hyperglycemia.  No episodes of hypoglycemia observed.  Typical good return to euglycemia overnight.  GMI 7.2%      Component      Latest Ref Rng 6/10/2025   Sodium      135 - 147 mmol/L 139    Potassium      3.5 - 5.3 mmol/L 4.3    Chloride      96 - 108 mmol/L 98    Carbon Dioxide      21 - 32 mmol/L 29    ANION GAP      4 - 13 mmol/L 12    BUN      5 - 25 mg/dL 15    Creatinine      0.60 - 1.30 mg/dL 0.69    GLUCOSE, FASTING      65 - 99 mg/dL 123 (H)    Calcium      8.4 - 10.2 mg/dL 7.9 (L)    AST      13 - 39 U/L 24    ALT      7 - 52 U/L 35    ALK PHOS      34 - 104 U/L 66    Total Protein      6.4 - 8.4 g/dL 7.2    Albumin      3.5 - 5.0 g/dL 4.4    Total Bilirubin      0.20 - 1.00 mg/dL 0.54    GFR,  "Calculated      ml/min/1.73sq m 110      Component      Latest Ref Rng 6/10/2025   Albumin Creat Ratio <30 mg/g    C-PEPTIDE      1.1 - 4.4 ng/mL 3.6        History obtained from: patient    Review of Systems   Constitutional:  Negative for appetite change, fatigue and unexpected weight change.   HENT:  Negative for trouble swallowing and voice change.    Respiratory:  Negative for cough.    Cardiovascular:  Negative for palpitations.   Gastrointestinal:  Negative for abdominal pain, constipation, diarrhea, nausea and vomiting.   Endocrine: Negative for cold intolerance, heat intolerance, polydipsia and polyuria.   Genitourinary:  Negative for dysuria.   Musculoskeletal:  Positive for arthralgias and back pain.   Neurological:  Positive for numbness. Negative for dizziness and weakness.   Psychiatric/Behavioral:  The patient is not nervous/anxious.      Medications Ordered Prior to Encounter[1]   Social History[2]     Objective   /80 (BP Location: Right arm, Patient Position: Sitting, Cuff Size: Large)   Pulse 77   Temp 97.5 °F (36.4 °C) (Temporal)   Ht 6' 1\" (1.854 m)   Wt (!) 165 kg (364 lb 6.4 oz)   SpO2 97%   BMI 48.08 kg/m²      Physical Exam  Vitals reviewed.   Constitutional:       General: He is not in acute distress.     Appearance: Normal appearance. He is well-groomed. He is morbidly obese.   HENT:      Head: Normocephalic and atraumatic.      Mouth/Throat:      Mouth: Mucous membranes are moist.     Eyes:      Conjunctiva/sclera: Conjunctivae normal.     Neck:      Comments: Status post total thyroidectomy 2009  Cardiovascular:      Rate and Rhythm: Normal rate.   Pulmonary:      Effort: Pulmonary effort is normal. No respiratory distress.   Abdominal:      General: There is no distension.      Palpations: Abdomen is soft.     Musculoskeletal:         General: No swelling.      Cervical back: Normal range of motion.     Skin:     General: Skin is warm and dry.     Neurological:      Mental " Status: He is alert and oriented to person, place, and time.     Psychiatric:         Mood and Affect: Mood normal.         Behavior: Behavior normal. Behavior is cooperative.         Thought Content: Thought content normal.         Judgment: Judgment normal.         Administrative Statements   I have spent a total time of 38 minutes in caring for this patient on the day of the visit/encounter including Diagnostic results, Prognosis, Risks and benefits of tx options, Instructions for management, Patient and family education, Importance of tx compliance, Risk factor reductions, Impressions, Counseling / Coordination of care, Documenting in the medical record, Reviewing/placing orders in the medical record (including tests, medications, and/or procedures), and Obtaining or reviewing history  .         [1]  Current Outpatient Medications on File Prior to Visit   Medication Sig Dispense Refill   • amLODIPine (NORVASC) 5 mg tablet TAKE 1 TABLET DAILY 90 tablet 1   • aspirin (ECOTRIN LOW STRENGTH) 81 mg EC tablet Take 81 mg by mouth in the morning. Prophylactic usage.     • Blood Glucose Monitoring Suppl (OneTouch Verio Reflect) w/Device KIT Check blood sugars once daily. Please substitute with appropriate alternative as covered by patient's insurance. Dx: E11.65 1 kit 0   • Calcium Carb-Cholecalciferol (CALCIUM 600 + D PO) Take 600 mg by mouth in the morning and 600 mg at noon and 600 mg in the evening and 600 mg before bedtime. Does Not have a Parathyroid.     • Continuous Glucose Sensor (Dexcom G7 Sensor) Use 1 sensor every 10 days to continuously monitor blood glucose levels 9 each 1   • diclofenac-misoprostol (ARTHROTEC 75) 75-0.2 MG per tablet TAKE 1 TABLET TWICE A  tablet 3   • glucose blood (OneTouch Verio) test strip Check blood sugars once daily. Please substitute with appropriate alternative as covered by patient's insurance. Dx: E11.65 100 each 3   • Insulin Pen Needle (BD Pen Needle Lori U/F) 32G X 4  MM MISC Use 4 (four) times a day 400 each 1   • metFORMIN (GLUCOPHAGE-XR) 500 mg 24 hr tablet TAKE 2 TABLETS TWICE A DAY WITH MEALS 360 tablet 1   • OneTouch Delica Lancets 33G MISC Check blood sugars once daily. Please substitute with appropriate alternative as covered by patient's insurance. Dx: E11.65 100 each 3   • simvastatin (ZOCOR) 10 mg tablet TAKE 1 TABLET AT BEDTIME 90 tablet 1   • [DISCONTINUED] calcitriol (ROCALTROL) 0.25 mcg capsule TAKE 1 CAPSULE TWICE A  capsule 1   • [DISCONTINUED] hydroCHLOROthiazide 25 mg tablet TAKE 1 TABLET TWICE A DAY.  (IN ADDITION TO THE LOSARTAN/HCTZ) 180 tablet 1   • [DISCONTINUED] insulin degludec (Tresiba FlexTouch) 200 units/mL CONCENTRATED U-200 injection pen Inject 22 Units under the skin daily (Patient taking differently: Inject 24 Units under the skin in the morning.) 9 mL 0   • [DISCONTINUED] Insulin Lispro-aabc, 1 U Dial, (Lyumjev KwikPen) 100 UNIT/ML SOPN Inject 8 Units under the skin 3 (three) times a day before meals (Patient taking differently: Inject 10 Units under the skin 3 (three) times a day before meals) 30 mL 1   • [DISCONTINUED] levothyroxine (Synthroid) 200 mcg tablet Take 2 tablets (400 mcg total) by mouth daily 200 tablet 1   • [DISCONTINUED] losartan-hydrochlorothiazide (HYZAAR) 100-25 MG per tablet TAKE 1 TABLET DAILY 90 tablet 1   • [DISCONTINUED] semaglutide, 2 mg/dose, (Ozempic, 2 MG/DOSE,) 8 mg/ mL injection pen Inject 0.75 mL (2 mg total) under the skin every 7 days 9 mL 1     No current facility-administered medications on file prior to visit.   [2]  Social History  Tobacco Use   • Smoking status: Former     Current packs/day: 0.00     Average packs/day: 1 pack/day for 10.0 years (10.0 ttl pk-yrs)     Types: Cigarettes     Start date:      Quit date:      Years since quittin.4   • Smokeless tobacco: Never   Vaping Use   • Vaping status: Never Used   Substance and Sexual Activity   • Alcohol use: Yes     Comment: 3x a week    • Drug use: Never

## 2025-06-25 ENCOUNTER — TELEPHONE (OUTPATIENT)
Age: 50
End: 2025-06-25

## 2025-06-25 ENCOUNTER — TELEPHONE (OUTPATIENT)
Dept: ADMINISTRATIVE | Facility: OTHER | Age: 50
End: 2025-06-25

## 2025-06-25 ENCOUNTER — OFFICE VISIT (OUTPATIENT)
Age: 50
End: 2025-06-25
Payer: COMMERCIAL

## 2025-06-25 VITALS
WEIGHT: 315 LBS | TEMPERATURE: 97.5 F | OXYGEN SATURATION: 97 % | SYSTOLIC BLOOD PRESSURE: 127 MMHG | HEIGHT: 73 IN | HEART RATE: 77 BPM | DIASTOLIC BLOOD PRESSURE: 80 MMHG | BODY MASS INDEX: 41.75 KG/M2

## 2025-06-25 DIAGNOSIS — C73 PAPILLARY THYROID CARCINOMA (HCC): ICD-10-CM

## 2025-06-25 DIAGNOSIS — Z79.4 TYPE 2 DIABETES MELLITUS WITH HYPERGLYCEMIA, WITH LONG-TERM CURRENT USE OF INSULIN (HCC): Primary | ICD-10-CM

## 2025-06-25 DIAGNOSIS — E11.65 TYPE 2 DIABETES MELLITUS WITH HYPERGLYCEMIA, WITH LONG-TERM CURRENT USE OF INSULIN (HCC): Primary | ICD-10-CM

## 2025-06-25 DIAGNOSIS — E83.51 HYPOCALCEMIA: ICD-10-CM

## 2025-06-25 DIAGNOSIS — R82.994 HYPERCALCIURIA: ICD-10-CM

## 2025-06-25 DIAGNOSIS — E89.0 POSTOPERATIVE HYPOTHYROIDISM: ICD-10-CM

## 2025-06-25 DIAGNOSIS — E66.813 CLASS 3 SEVERE OBESITY DUE TO EXCESS CALORIES WITH SERIOUS COMORBIDITY AND BODY MASS INDEX (BMI) OF 45.0 TO 49.9 IN ADULT: ICD-10-CM

## 2025-06-25 DIAGNOSIS — M17.12 PRIMARY OSTEOARTHRITIS OF LEFT KNEE: Primary | ICD-10-CM

## 2025-06-25 DIAGNOSIS — E20.89 OTHER HYPOPARATHYROIDISM (HCC): ICD-10-CM

## 2025-06-25 DIAGNOSIS — I10 BENIGN ESSENTIAL HYPERTENSION: ICD-10-CM

## 2025-06-25 PROCEDURE — 95251 CONT GLUC MNTR ANALYSIS I&R: CPT

## 2025-06-25 PROCEDURE — 99214 OFFICE O/P EST MOD 30 MIN: CPT

## 2025-06-25 RX ORDER — INSULIN DEGLUDEC 200 U/ML
24 INJECTION, SOLUTION SUBCUTANEOUS DAILY
Qty: 30 ML | Refills: 1 | Status: SHIPPED | OUTPATIENT
Start: 2025-06-25

## 2025-06-25 RX ORDER — CALCITRIOL 0.25 UG/1
0.25 CAPSULE, LIQUID FILLED ORAL 2 TIMES DAILY
Qty: 180 CAPSULE | Refills: 1 | Status: SHIPPED | OUTPATIENT
Start: 2025-06-25

## 2025-06-25 RX ORDER — LOSARTAN POTASSIUM AND HYDROCHLOROTHIAZIDE 25; 100 MG/1; MG/1
1 TABLET ORAL DAILY
Qty: 90 TABLET | Refills: 1 | Status: SHIPPED | OUTPATIENT
Start: 2025-06-25

## 2025-06-25 RX ORDER — TIRZEPATIDE 7.5 MG/.5ML
7.5 INJECTION, SOLUTION SUBCUTANEOUS WEEKLY
Qty: 2 ML | Refills: 2 | Status: SHIPPED | OUTPATIENT
Start: 2025-06-25

## 2025-06-25 RX ORDER — HYDROCHLOROTHIAZIDE 25 MG/1
TABLET ORAL
Qty: 180 TABLET | Refills: 1 | Status: SHIPPED | OUTPATIENT
Start: 2025-06-25

## 2025-06-25 RX ORDER — LEVOTHYROXINE SODIUM 200 UG/1
TABLET ORAL
Qty: 200 TABLET | Refills: 1 | Status: SHIPPED | OUTPATIENT
Start: 2025-06-25

## 2025-06-25 RX ORDER — INSULIN LISPRO-AABC 100 [IU]/ML
INJECTION, SOLUTION SUBCUTANEOUS
Qty: 45 ML | Refills: 1 | Status: SHIPPED | OUTPATIENT
Start: 2025-06-25

## 2025-06-25 NOTE — ASSESSMENT & PLAN NOTE
BMI 48.08    Weight has remained stable since his previous appointment.  Will transition from Ozempic to Mounjaro for tighter glycemic control as well as added weight loss effect.    Continue to decrease caloric intake, follow balanced diet, avoid processed foods and sweetened beverages, and stay well-hydrated.  Increase physical activity as tolerated.

## 2025-06-25 NOTE — ASSESSMENT & PLAN NOTE
Lab Results   Component Value Date    HGBA1C 6.3 06/10/2025       Orders:  •  Comprehensive metabolic panel; Future  •  Vitamin D 25 hydroxy; Future  •  Hemoglobin A1C; Future  •  Tirzepatide (Mounjaro) 7.5 MG/0.5ML SOAJ; Inject 7.5 mg under the skin once a week  •  Insulin Lispro-aabc, 1 U Dial, (Louiseumtim KwikPen) 100 UNIT/ML SOPN; Inject 10 units before meals plus scale. Max daily dose 60 units  •  insulin degludec (Tresiba FlexTouch) 200 units/mL CONCENTRATED U-200 injection pen; Inject 24 Units under the skin in the morning.

## 2025-06-25 NOTE — ASSESSMENT & PLAN NOTE
Vitamin D level at goal.  Continue calcitriol 0.25 mg twice daily.  Continue calcium 600 mg 4 times daily.  Repeat labs prior to next visit.  Orders:  •  Calcium, urine, 24 hour; Future  •  Phosphorus; Future  •  Comprehensive metabolic panel; Future  •  Vitamin D 25 hydroxy; Future  •  Magnesium; Future  •  Creatinine, urine, 24 hour; Future  •  calcitriol (ROCALTROL) 0.25 mcg capsule; Take 1 capsule (0.25 mcg total) by mouth 2 (two) times a day

## 2025-06-25 NOTE — TELEPHONE ENCOUNTER
Pt has not been seen since Sept 2024  Will need OV re-eval to determine injection type    Please schedule with MG

## 2025-06-25 NOTE — ASSESSMENT & PLAN NOTE
/80 in the office today.  Continue current regimen.  Orders:  •  losartan-hydrochlorothiazide (HYZAAR) 100-25 MG per tablet; Take 1 tablet by mouth daily

## 2025-06-25 NOTE — ASSESSMENT & PLAN NOTE
A1c dramatically improved from previous visit.    Due to no weight loss, will transition Ozempic 2 mg weekly to Mounjaro 7.5 mg once weekly with plan to increase titration upwards every 4 weeks as tolerated.  Continue Tresiba 24 units daily.  Continue Lyumjev 10 units before meals and start correctional scale of 2 extra units for every 50 over 150 mg/dL.  Continue metformin 1000 mg twice daily.    Reviewed risks as well as signs and symptoms of hypoglycemia.  Rule of 15's provided in AVS for appropriate treatment.  Continue Dexcom G7 continuous glucose monitor.  Call the office for blood glucose levels less than 70 mg/dL or persistent patterns over 250 mg/dL.      Continue to improve diet and lifestyle including attention to the amount and type of carbohydrates consumed as well as increased physical activity as tolerated.  Stay well-hydrated.      Follow-up in 3 months.  Labs prior to next visit.  Lab Results   Component Value Date    HGBA1C 6.3 06/10/2025       Orders:  •  Comprehensive metabolic panel; Future  •  Vitamin D 25 hydroxy; Future  •  Hemoglobin A1C; Future  •  Comprehensive metabolic panel; Future  •  Tirzepatide (Mounjaro) 7.5 MG/0.5ML SOAJ; Inject 7.5 mg under the skin once a week  •  Insulin Lispro-aabc, 1 U Dial, (Lyumjev KwikPen) 100 UNIT/ML SOPN; Inject 10 units before meals plus scale. Max daily dose 60 units  •  insulin degludec (Tresiba FlexTouch) 200 units/mL CONCENTRATED U-200 injection pen; Inject 24 Units under the skin in the morning.

## 2025-06-25 NOTE — ASSESSMENT & PLAN NOTE
Presents clinically euthyroid, biochemically over suppressed.    Decrease Synthroid to 400 mcg Monday-Saturday and 200 mcg on Sunday.  Repeat thyroid function tests in 6 weeks.  Goal TSH 0.1-0.5.  Orders:  •  TSH, 3rd generation; Future  •  T4, free; Future  •  TSH, 3rd generation; Future  •  T4, free; Future  •  levothyroxine (Synthroid) 200 mcg tablet; Take 2 tablets by mouth daily Monday-Saturday. Take 1 tablet on Sunday.

## 2025-06-25 NOTE — PATIENT INSTRUCTIONS
Continue metformin 1000 mg twice daily  Finish Ozempic 2 mg weekly  Then transition to Mounjaro 7.5 mg weekly x 4 weeks  If tolerating well, we can increase doses monthly   Continue Tresiba 24 units daily  Continue Lyumjev 10 units 3 times a day before meals  Inject additional insulin as per sliding scale--   150-200 mg/dL: 2 extra units   201-250 mg/dL: 4 extra units   251-300 mg/dL: 6 extra units  301-350 mg/dL: 8 extra units   More than 350 mg/dL: 10 extra units  Call the office for blood glucose levels less than 70 mg/dL or persistent patterns over 250 mg/dL.  Continue calcium carbonate.    Continue calcitriol  Repeat CMP 2 weeks  Reduce levothyroxine to 400 mcg daily Monday-Saturday and 200 mcg on Sunday  Labs in 6 weeks, after 8/6  Schedule US head neck and lymph node mapping        Low Blood Sugar  Steps to treat low blood sugar.    1. Test blood sugar if you have symptoms of low blood sugar:   Low Blood Sugar Symptoms:  o Sweaty  o Dizzy  o Rapid heartbeat  o Shaky  o Bad mood  o Hungry    2. Treat blood sugar less than 70 with 15 grams of fast-acting carbohydrate:   Examples of 15 grams Fast-Acting Carbohydrate:  o 4 oz juice/ 4 oz regular soda  o 1 tablespoon honey  o 1 pack of fun size skittles (about 15 individual skittles)  o 12 gummy bears  o 4 starburst   o 3-4 hard candies (chew)  o 3-4 glucose tablets (chew)            3.   Wait 15 minutes and test your blood sugar again         4.   If blood sugar is less than 100, repeat steps 2-3.    5.   When your blood sugar is 100 or more, eat a snack if it will be longer than one hour until your next meal. The snack should be 15 grams of carbohydrate and a protein:   Examples of snacks:  o ½ sandwich  o 6 crackers with cheese or with peanut butter  o Piece of fruit with cheese or peanut butter

## 2025-06-25 NOTE — TELEPHONE ENCOUNTER
----- Message from Leda WHITEHEAD sent at 6/25/2025  9:35 AM EDT -----  Hello, our patient attached above has had Diabetic Eye Exam completed/performed. Please assist in updating the patient chart by making an External outreach to     [facility]    [address], [place] [address] [phone]    The date of service is 2025     Thank you,  Vikki Coburn  [facility] [place]

## 2025-06-25 NOTE — TELEPHONE ENCOUNTER
Caller: Wilber    Doctor: Dr. Brizuela    Reason for call: please place a script for Lateral  Knee Brace  for left knee in the chart patient will get the brace at the [facility].  Please advise the patient  Thank you  Call back#: [phone]  
Smart

## 2025-06-25 NOTE — ASSESSMENT & PLAN NOTE
Status post total thyroidectomy 2009 with RUIZ therapy followed by radical neck dissection in 2011 with Dr. Mckeon.  He has known remnant thyroid tissue secondary to tissue proximity to vocal cords.  Thyroglobulin levels have remained detectable with most recently slight increase.  Encouraged to follow-up with surveillance ultrasound head neck lymph node mapping which has been overdue.  Orders:  •  TSH, 3rd generation; Future  •  T4, free; Future  •  TSH, 3rd generation; Future  •  T4, free; Future  •  levothyroxine (Synthroid) 200 mcg tablet; Take 2 tablets by mouth daily Monday-Saturday. Take 1 tablet on Sunday.

## 2025-06-25 NOTE — ASSESSMENT & PLAN NOTE
Continue hydrochlorothiazide.  Will evaluate 24-hour urine calcium with next set of labs.  Orders:  •  Calcium, urine, 24 hour; Future  •  Comprehensive metabolic panel; Future  •  Creatinine, urine, 24 hour; Future  •  losartan-hydrochlorothiazide (HYZAAR) 100-25 MG per tablet; Take 1 tablet by mouth daily  •  hydroCHLOROthiazide 25 mg tablet; TAKE 1 TABLET TWICE A DAY.  (IN ADDITION TO THE LOSARTAN/HCTZ)   [facility]  Otorhinolaryngology-Head & Neck Surgery  Discharge Summary      Patient Name: Guillermina Castillo  MRN: [id number]  Admission Date: 7/5/2018  Hospital Length of Stay: 0 days  Discharge Date and Time:  07/06/2018 7:52 AM  Attending Physician: Lupe Reynoso MD   Discharging Provider: Aimee Wells MD  Primary Care Provider: Sharif Ngo Ii, MD     HPI: Guillermina is a 2 y/o female with right middle lobe syndrome, asthma, and sleep disordered breathing. There was concerned that the adenotonsillar inflammation was contributing to nighttime cough and recurrent strep tonsillitis.     Procedure(s) (LRB):  LARYNGOSCOPY, DIRECT, WITH BRONCHOSCOPY (N/A)  TONSILLECTOMY AND ADENOIDECTOMY (Bilateral)     Hospital Course: Following completion of an electively scheduled procedure, the patient was transferred to the floor for postoperative monitoring.Her  hospital course was uneventful and noted for adequate pain control and PO intake following surgery. She   is discharged home in good condition and will follow-up with Dr. Reynoso            Consults:     Significant Diagnostic Studies: None    Pending Diagnostic Studies:     None        Final Active Diagnoses:    Diagnosis Date Noted POA    PRINCIPAL PROBLEM:  Chronic bronchitis [J42] 07/05/2018 Yes      Problems Resolved During this Admission:    Diagnosis Date Noted Date Resolved POA      Discharged Condition: good    Disposition: Home or Self Care    Follow Up:  Follow-up Information     Cleopatra Pierson NP In 3 weeks.    Specialty:  Pediatric Otolaryngology  Why:  For wound re-check  Contact information:  [address] [place] [address] [id number] [phone]                 Patient Instructions:     Advance diet as tolerated     Activity order - Light Activity    Order Comments: For 2 weeks       Medications:  Reconciled Home Medications:      Medication List      START taking these medications    hydrocodone-apap 7.5-325 MG/15 ML oral  solution  Commonly known as:  HYCET  Take 3.2 mLs by mouth every 6 (six) hours as needed.        CONTINUE taking these medications    * albuterol 90 mcg/actuation inhaler  daily as needed.     * albuterol 2.5 mg /3 mL (0.083 %) nebulizer solution  Commonly known as:  PROVENTIL  2.5 mg.     azithromycin 100 mg/5 mL suspension  Commonly known as:  ZITHROMAX  Take by mouth. Monday Wednesday Friday     budesonide-formoterol 160-4.5 mcg 160-4.5 mcg/actuation Hfaa  Commonly known as:  SYMBICORT  Inhale 2 puffs into the lungs every 12 (twelve) hours. Controller     fluticasone 50 mcg/actuation nasal spray  Commonly known as:  FLONASE  2 sprays by Each Nare route once daily.     inhaler,assist devices,access Faith  Use with inhalers     montelukast 4 MG chewable tablet  Take 4 mg by mouth.     nebulizer and compressor Faith  Use as directed        * This list has 2 medication(s) that are the same as other medications prescribed for you. Read the directions carefully, and ask your doctor or other care provider to review them with you.                Aimee Wells MD  Otorhinolaryngology-Head & Neck Surgery  [facility]

## 2025-06-25 NOTE — LETTER
2nd Request         Diabetic Eye Exam Form    Date Requested: 25  Patient: Wilber Carey Jr.  Patient : 1975   Referring Provider: ISAÍAS Mendoza      DIABETIC Eye Exam Date _______________________________      Type of Exam MUST be documented for Diabetic Eye Exams. Please CHECK ONE.     Retinal Exam       Dilated Retinal Exam       OCT       Optomap-Iris Exam      Fundus Photography       Left Eye - Please check Retinopathy or No Retinopathy        Exam did show retinopathy    Exam did not show retinopathy       Right Eye - Please check Retinopathy or No Retinopathy       Exam did show retinopathy    Exam did not show retinopathy       Comments __________________________________________________________    Practice Providing Exam ______________________________________________    Exam Performed By (print name) _______________________________________      Provider Signature ___________________________________________________      These reports are needed for  compliance.    Please fax this completed form and a copy of the Diabetic Eye Exam report   to the [facility] Based Department as soon as possible via   Fax [phone] attention Nallely: Phone [phone].   Our office located at [address] [place] [address]     We thank you for your assistance in treating our mutual patient.

## 2025-06-25 NOTE — TELEPHONE ENCOUNTER
Caller: Patient    Doctor: Dr. Crawford    Reason for call: Would like to schedule another procedure for his back    Call back#:

## 2025-06-26 ENCOUNTER — OFFICE VISIT (OUTPATIENT)
Dept: URGENT CARE | Facility: CLINIC | Age: 50
End: 2025-06-26
Payer: COMMERCIAL

## 2025-06-26 VITALS
BODY MASS INDEX: 48.68 KG/M2 | SYSTOLIC BLOOD PRESSURE: 122 MMHG | DIASTOLIC BLOOD PRESSURE: 74 MMHG | HEART RATE: 91 BPM | TEMPERATURE: 97.8 F | OXYGEN SATURATION: 97 % | RESPIRATION RATE: 18 BRPM | WEIGHT: 315 LBS

## 2025-06-26 DIAGNOSIS — M54.50 CHRONIC LEFT-SIDED LOW BACK PAIN WITHOUT SCIATICA: Primary | ICD-10-CM

## 2025-06-26 DIAGNOSIS — G89.29 CHRONIC LEFT-SIDED LOW BACK PAIN WITHOUT SCIATICA: Primary | ICD-10-CM

## 2025-06-26 PROCEDURE — S9083 URGENT CARE CENTER GLOBAL: HCPCS | Performed by: PHYSICIAN ASSISTANT

## 2025-06-26 PROCEDURE — G0382 LEV 3 HOSP TYPE B ED VISIT: HCPCS | Performed by: PHYSICIAN ASSISTANT

## 2025-06-26 RX ORDER — PREDNISONE 20 MG/1
TABLET ORAL
Qty: 30 TABLET | Refills: 0 | Status: SHIPPED | OUTPATIENT
Start: 2025-06-26 | End: 2025-07-11

## 2025-06-26 NOTE — TELEPHONE ENCOUNTER
Upon review of the In Basket request and the patient's chart, initial outreach has been made via fax to facility. Please see Contacts section for details.     Thank you  Nallely Hinton MA

## 2025-06-26 NOTE — TELEPHONE ENCOUNTER
Upon review of the In Basket request we were able to locate, review, and update the patient chart as requested for Diabetic Eye Exam.    Any additional questions or concerns should be emailed to the Practice Liaisons via the appropriate education email address, please do not reply via In Basket.    Thank you  Filiberto Khan MA   [facility]

## 2025-06-26 NOTE — PATIENT INSTRUCTIONS
Rx prednisone taper, take as directed.  Monitor sugars closely.   Follow up with spine specialist tomorrow as scheduled.

## 2025-06-26 NOTE — PROGRESS NOTES
[facility]        NAME: Wilber Carey Jr. is a 49 y.o. male  : 1975    MRN: [id number]  DATE: 2025  TIME: 5:32 PM    Assessment and Plan   Chronic left-sided low back pain without sciatica [M54.50, G89.29]  1. Chronic left-sided low back pain without sciatica  predniSONE 20 mg tablet            Patient Instructions     Rx prednisone taper, take as directed.  Monitor sugars closely.   Follow up with spine specialist tomorrow as scheduled.    If tests have been performed at [facility], our office will contact you with results if changes need to be made to the care plan discussed with you at the visit. [facility]     Chief Complaint     Chief Complaint   Patient presents with    Back Pain     Lower back pain related to herniated discs. Painful when bending. Using ice packs and nsaids. With no relief. Sx for 3-4 days.          History of Present Illness       Patient is a 50 yo male who presents with chronic low back pain on the left without sciatica and has been acting up for the past 3-4 days. 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 He notes he has a consultation with the spine specialist tomorrow to set up his next epidural injection. Denies numbness/tingling this visit. Denies radiation of pain.         Review of Systems   Review of Systems   Constitutional: Negative.    HENT: Negative.     Respiratory: Negative.     Cardiovascular: Negative.    Gastrointestinal: Negative.    Musculoskeletal:  Positive for back pain.   Skin: Negative.    Neurological: Negative.          Current Medications     Current Medications[1]    Current Allergies     Allergies as of 2025    (No Known Allergies)            The following portions of the patient's history were reviewed and updated as appropriate: allergies, current medications, past family history, past medical history, past social history, past surgical history  and problem list.     Past Medical History[2]    Past Surgical History[3]    Family History[4]      Medications have been verified.        Objective   /74   Pulse 91   Temp 97.8 °F (36.6 °C)   Resp 18   Wt (!) 167 kg (369 lb)   SpO2 97%   BMI 48.68 kg/m²        Physical Exam     Physical Exam  Vitals reviewed.   Constitutional:       Appearance: Normal appearance. He is obese.     Cardiovascular:      Rate and Rhythm: Normal rate and regular rhythm.      Pulses: Normal pulses.      Heart sounds: Normal heart sounds.   Pulmonary:      Effort: Pulmonary effort is normal.      Breath sounds: Normal breath sounds.     Musculoskeletal:         General: Tenderness (L lower back) and signs of injury (L lower back) present.      Cervical back: Normal range of motion and neck supple.     Skin:     General: Skin is warm and dry.      Capillary Refill: Capillary refill takes less than 2 seconds.      Findings: No rash.     Neurological:      General: No focal deficit present.      Mental Status: He is alert and oriented to person, place, and time.     Psychiatric:         Mood and Affect: Mood normal.         Behavior: Behavior normal.                        [1]   Current Outpatient Medications:     amLODIPine (NORVASC) 5 mg tablet, TAKE 1 TABLET DAILY, Disp: 90 tablet, Rfl: 1    aspirin (ECOTRIN LOW STRENGTH) 81 mg EC tablet, Take 81 mg by mouth in the morning. Prophylactic usage., Disp: , Rfl:     Blood Glucose Monitoring Suppl (OneTouch Verio Reflect) w/Device KIT, Check blood sugars once daily. Please substitute with appropriate alternative as covered by patient's insurance. Dx: E11.65, Disp: 1 kit, Rfl: 0    calcitriol (ROCALTROL) 0.25 mcg capsule, Take 1 capsule (0.25 mcg total) by mouth 2 (two) times a day, Disp: 180 capsule, Rfl: 1    Calcium Carb-Cholecalciferol (CALCIUM 600 + D PO), Take 600 mg by mouth in the morning and 600 mg at noon and 600 mg in the evening and 600 mg before bedtime. Does Not have a  Parathyroid., Disp: , Rfl:     Continuous Glucose Sensor (Dexcom G7 Sensor), Use 1 sensor every 10 days to continuously monitor blood glucose levels, Disp: 9 each, Rfl: 1    diclofenac-misoprostol (ARTHROTEC 75) 75-0.2 MG per tablet, TAKE 1 TABLET TWICE A DAY, Disp: 180 tablet, Rfl: 3    glucose blood (OneTouch Verio) test strip, Check blood sugars once daily. Please substitute with appropriate alternative as covered by patient's insurance. Dx: E11.65, Disp: 100 each, Rfl: 3    hydroCHLOROthiazide 25 mg tablet, TAKE 1 TABLET TWICE A DAY.  (IN ADDITION TO THE LOSARTAN/HCTZ), Disp: 180 tablet, Rfl: 1    insulin degludec (Tresiba FlexTouch) 200 units/mL CONCENTRATED U-200 injection pen, Inject 24 Units under the skin in the morning., Disp: 30 mL, Rfl: 1    Insulin Lispro-aabc, 1 U Dial, (Lyumjev KwikPen) 100 UNIT/ML SOPN, Inject 10 units before meals plus scale. Max daily dose 60 units, Disp: 45 mL, Rfl: 1    Insulin Pen Needle (BD Pen Needle Lori U/F) 32G X 4 MM MISC, Use 4 (four) times a day, Disp: 400 each, Rfl: 1    levothyroxine (Synthroid) 200 mcg tablet, Take 2 tablets by mouth daily Monday-Saturday. Take 1 tablet on Sunday., Disp: 200 tablet, Rfl: 1    losartan-hydrochlorothiazide (HYZAAR) 100-25 MG per tablet, Take 1 tablet by mouth daily, Disp: 90 tablet, Rfl: 1    metFORMIN (GLUCOPHAGE-XR) 500 mg 24 hr tablet, TAKE 2 TABLETS TWICE A DAY WITH MEALS, Disp: 360 tablet, Rfl: 1    OneTouch Delica Lancets 33G MISC, Check blood sugars once daily. Please substitute with appropriate alternative as covered by patient's insurance. Dx: E11.65, Disp: 100 each, Rfl: 3    predniSONE 20 mg tablet, Take 3 tablets (60 mg total) by mouth daily for 5 days, THEN 2 tablets (40 mg total) daily for 5 days, THEN 1 tablet (20 mg total) daily for 5 days., Disp: 30 tablet, Rfl: 0    simvastatin (ZOCOR) 10 mg tablet, TAKE 1 TABLET AT BEDTIME, Disp: 90 tablet, Rfl: 1    Tirzepatide (Mounjaro) 7.5 MG/0.5ML SOAJ, Inject 7.5 mg under the  skin once a week, Disp: 2 mL, Rfl: 2  [2]   Past Medical History:  Diagnosis Date    Asthma     Diabetes mellitus (HCC)     Diet Controlled    Diabetes type 2, uncontrolled 05/06/2014    Diverticulitis     Hyperlipidemia     Hypertension     Immunity to measles determined by serologic test 07/02/2019    PONV (postoperative nausea and vomiting)     Thyroid cancer (HCC)     radioactive iodine     Thyroid cancer (HCC) 02/12/2020    Type 2 diabetes mellitus (HCC) 07/17/2013   [3]   Past Surgical History:  Procedure Laterality Date    BIOPSY CORE NEEDLE      Thyroid    CHEST WALL BIOPSY N/A 02/07/2020    Procedure: EXCISION  BIOPSY LESION/MASS from back;  Surgeon: Olaf Delgadillo MD;  Location: [facility];  Service: General    COLON SURGERY      COLOSTOMY      with reversal    WV ARTHRS KNE SURG W/MENISCECTOMY MED/LAT W/SHVG Left 08/17/2017    Procedure: KNEE ARTHROSCOPIC PARTIAL LATERAL MENISCECTOMY ; PATELLO FEMOEAL CHONDROPLASTY;  Surgeon: Jem Simons MD;  Location: [facility];  Service: Orthopedics    [place] OPEN TREATMENT METATARSAL FRACTURE EACH Right 3/2/2023    Procedure: OPEN REDUCTION W/ INTERNAL FIXATION (ORIF) FOOT 5TH METATARSAL;  Surgeon: Roxi Medeiros DPM;  Location: [facility];  Service: Podiatry    THYROIDECTOMY      Removal of 3 Parathyroids   [4]   Family History  Problem Relation Name Age of Onset    Diabetes Mother      Hypertension Mother      Hypertension Father      Hyperlipidemia Father      Thyroid disease unspecified Sister      Sleep apnea Neg Hx

## 2025-06-27 ENCOUNTER — OFFICE VISIT (OUTPATIENT)
Dept: PAIN MEDICINE | Facility: CLINIC | Age: 50
End: 2025-06-27
Payer: COMMERCIAL

## 2025-06-27 ENCOUNTER — PATIENT MESSAGE (OUTPATIENT)
Dept: PAIN MEDICINE | Facility: CLINIC | Age: 50
End: 2025-06-27

## 2025-06-27 VITALS — BODY MASS INDEX: 41.75 KG/M2 | WEIGHT: 315 LBS | HEIGHT: 73 IN

## 2025-06-27 DIAGNOSIS — M54.16 LUMBAR RADICULOPATHY: Primary | ICD-10-CM

## 2025-06-27 DIAGNOSIS — M54.42 CHRONIC BILATERAL LOW BACK PAIN WITH LEFT-SIDED SCIATICA: ICD-10-CM

## 2025-06-27 DIAGNOSIS — M51.26 LUMBAR DISC HERNIATION: ICD-10-CM

## 2025-06-27 DIAGNOSIS — G89.29 CHRONIC BILATERAL LOW BACK PAIN WITH LEFT-SIDED SCIATICA: ICD-10-CM

## 2025-06-27 PROCEDURE — 99214 OFFICE O/P EST MOD 30 MIN: CPT

## 2025-06-27 NOTE — PROGRESS NOTES
Name: Wilber Carey Jr.      : 1975      MRN: [id number]  Encounter Provider: ISAÍAS Brumfield  Encounter Date: 2025   Encounter department: [facility]  :  Assessment & Plan  Lumbar radiculopathy  Lumbar disc herniation  Chronic bilateral low back pain with left-sided sciatica    Orders:  •  [place] spine and pain procedure; Future      Patient returns to the office to be reevaluated for a repeat epidural steroid injection.  Patient's pain pattern is pain that is in his bilateral low back and radiates down his left posterior leg.  On 10/9/2024 the patient had a lumbar epidural steroid injection at L5-S1 reports approximately 6 months 80% pain relief.  I recommend the patient have a repeat lumbar epidural steroid injection at L5-S1.  Patient was recently seen at an urgent care and has been provided a prednisone taper.  Patient's last A1c was on 6/10/2025 was a 6.3. Complete risks and benefits including bleeding, infection, tissue reaction, nerve injury and allergic reaction were discussed. The approach was demonstrated using models and literature was provided. Verbal and written consent was obtained.    My impressions and treatment recommendations were discussed in detail with the patient who verbalized understanding and had no further questions.  Discharge instructions were provided. I personally saw and examined the patient and I agree with the above discussed plan of care.    History of Present Illness {?Quick Links Encounters * My Last Note * Last Note in Specialty * Snapshot * Since Last Visit * History :73029}    Wilber Carey Jr. is a 49 y.o. male who presents for a follow up office visit in regards to Back Pain (Patient was last seen 24/Had LESI 10/9 L5-L1/Today he comes in for wanting another injection went to urgent care last night and has been taking the steroid that was given /Still presenting with a constant shooting pain ). At today's visit  "patient states that their pain symptoms are worse with a pain score of 10/10 on the verbal numeric pain scale.  The patient's pain is worse at no specific time of the time.  The patient's pain is Constant in nature.  And the quality of the patient's pain is described as shooting.  The patient's pain is located in the bilateral low back left worse and radiating down left leg. Patient currently using prednisone prescribe through urgentcare.      Review of Systems   Respiratory:  Negative for shortness of breath.    Cardiovascular:  Negative for chest pain.   Gastrointestinal:  Negative for constipation, diarrhea, nausea and vomiting.   Musculoskeletal:  Positive for arthralgias, back pain, gait problem and joint swelling. Negative for myalgias.   Skin:  Negative for rash.   Neurological:  Negative for dizziness, seizures and weakness.   All other systems reviewed and are negative.      Medical History Reviewed by provider this encounter:  Tobacco  Allergies  Meds  Problems  Med Hx  Surg Hx  Fam Hx     .     Objective {?Quick Links Trend Vitals * Enter New Vitals * Results Review * Timeline (Adult) * Labs * Imaging * Cardiology * Procedures * Lung Cancer Screening * Surgical eConsent :20905}  Ht 6' 1\" (1.854 m)   Wt (!) 167 kg (369 lb)   BMI 48.68 kg/m²      Pain Score: 10-Worst pain ever      Constitutional:normal, well developed, well nourished, alert, in no distress and non-toxic and no overt pain behavior. and obese  Eyes:anicteric  HEENT:grossly intact  Neck:supple, symmetric, trachea midline and no masses   Pulmonary:even and unlabored  Cardiovascular:No edema or pitting edema present  Skin:Normal without rashes or lesions and well hydrated  Psychiatric:Mood and affect appropriate  Neurologic:Cranial Nerves II-XII grossly intact  Musculoskeletal: antalgic gait and positive slump test on left    This document was created using speech voice recognition software.   Grammatical errors, random word " insertions, pronoun errors, and incomplete sentences are an occasional consequence of this system due to software limitations, ambient noise, and hardware issues.   Any formal questions or concerns about content, text, or information contained within the body of this dictation should be directly addressed to the provider for clarification.

## 2025-06-27 NOTE — PATIENT COMMUNICATION
Pt is scheduled for LESI with Dr Crawford on 7/22/25    Pt is diabetic and wears a glucose monitor on his arm    Pt does not take prescription blood thinners or full dose aspirin    Pt given instructions in office and via myc message    Have you completed PT/HEP/Chiro in the past 6 months for dedicated area? Pt has had previous injections for pain relief  If yes, how long did you complete?  What was the frequency?  Did it provide relief?  If no, reason therapy was not completed?     Information: Selecting Yes will display possible errors in your note based on the variables you have selected. This validation is only offered as a suggestion for you. PLEASE NOTE THAT THE VALIDATION TEXT WILL BE REMOVED WHEN YOU FINALIZE YOUR NOTE. IF YOU WANT TO FAX A PRELIMINARY NOTE YOU WILL NEED TO TOGGLE THIS TO 'NO' IF YOU DO NOT WANT IT IN YOUR FAXED NOTE.

## 2025-06-27 NOTE — H&P (VIEW-ONLY)
Name: Wilber Carey Jr.      : 1975      MRN: [id number]  Encounter Provider: ISAÍAS Brumfield  Encounter Date: 2025   Encounter department: [facility] [place]  :  Assessment & Plan  Lumbar radiculopathy  Lumbar disc herniation  Chronic bilateral low back pain with left-sided sciatica    Orders:  •  [place] spine and pain procedure; Future      Patient returns to the office to be reevaluated for a repeat epidural steroid injection.  Patient's pain pattern is pain that is in his bilateral low back and radiates down his left posterior leg.  On 10/9/2024 the patient had a lumbar epidural steroid injection at L5-S1 reports approximately 6 months 80% pain relief.  I recommend the patient have a repeat lumbar epidural steroid injection at L5-S1.  Patient was recently seen at an urgent care and has been provided a prednisone taper.  Patient's last A1c was on 6/10/2025 was a 6.3. Complete risks and benefits including bleeding, infection, tissue reaction, nerve injury and allergic reaction were discussed. The approach was demonstrated using models and literature was provided. Verbal and written consent was obtained.    My impressions and treatment recommendations were discussed in detail with the patient who verbalized understanding and had no further questions.  Discharge instructions were provided. I personally saw and examined the patient and I agree with the above discussed plan of care.    History of Present Illness     Wilber Carey Jr. is a 49 y.o. male who presents for a follow up office visit in regards to Back Pain (Patient was last seen 24/Had LESI 10/9 L5-L1/Today he comes in for wanting another injection went to urgent care last night and has been taking the steroid that was given /Still presenting with a constant shooting pain ). At today's visit patient states that their pain symptoms are worse with a pain score of 10/10 on the verbal numeric pain scale.  The  "patient's pain is worse at no specific time of the time.  The patient's pain is Constant in nature.  And the quality of the patient's pain is described as shooting.  The patient's pain is located in the bilateral low back left worse and radiating down left leg. Patient currently using prednisone prescribe through urgentcare.      Review of Systems   Respiratory:  Negative for shortness of breath.    Cardiovascular:  Negative for chest pain.   Gastrointestinal:  Negative for constipation, diarrhea, nausea and vomiting.   Musculoskeletal:  Positive for arthralgias, back pain, gait problem and joint swelling. Negative for myalgias.   Skin:  Negative for rash.   Neurological:  Negative for dizziness, seizures and weakness.   All other systems reviewed and are negative.      Medical History Reviewed by provider this encounter:  Tobacco  Allergies  Meds  Problems  Med Hx  Surg Hx  Fam Hx     .     Objective   Ht 6' 1\" (1.854 m)   Wt (!) 167 kg (369 lb)   BMI 48.68 kg/m²      Pain Score: 10-Worst pain ever      Constitutional:normal, well developed, well nourished, alert, in no distress and non-toxic and no overt pain behavior. and obese  Eyes:anicteric  HEENT:grossly intact  Neck:supple, symmetric, trachea midline and no masses   Pulmonary:even and unlabored  Cardiovascular:No edema or pitting edema present  Skin:Normal without rashes or lesions and well hydrated  Psychiatric:Mood and affect appropriate  Neurologic:Cranial Nerves II-XII grossly intact  Musculoskeletal: antalgic gait and positive slump test on left    This document was created using speech voice recognition software.   Grammatical errors, random word insertions, pronoun errors, and incomplete sentences are an occasional consequence of this system due to software limitations, ambient noise, and hardware issues.   Any formal questions or concerns about content, text, or information contained within the body of this dictation should be directly " addressed to the provider for clarification.

## 2025-06-27 NOTE — PATIENT INSTRUCTIONS
Epidural Steroid Injection, Ambulatory Care   GENERAL INFORMATION:   What do I need to know about an epidural steroid injection?  An epidural steroid injection (LINETTE) is a procedure to inject steroid medicine into the epidural space. The epidural space is between your spinal cord and vertebrae. Steroids reduce inflammation and fluid buildup in your spine that may be causing pain. You may be given pain medicine along with the steroids.   How do I prepare for an LINETTE?  Your healthcare provider will talk to you about how to prepare for your procedure. He will tell you what medicines to take or not take on the day of your procedure. You may need to stop taking blood thinners or other medicines several days before your procedure. You may need to adjust any diabetes medicine you take on the day of your procedure. Steroid medicine can increase your blood sugar level.   What will happen during an LINETTE?   You will be given medicine to numb the procedure area. You will be awake for the procedure, but you will not feel pain. You may also be given medicine to help you relax during the procedure. Contrast liquid will be used to help your healthcare provider see the area better. Tell the healthcare provider if you have ever had an allergic reaction to contrast liquid.    Your healthcare provider may place the needle into your neck area, middle of your back, or tailbone area. He may inject the medicine next to the nerves that are causing your pain. He may instead inject the medicine into a larger area of the epidural space. This helps the medicine spread to more nerves. Your healthcare provider will use a fluoroscope to help guide the needle to the right place. A fluoroscope is a type of x-ray. After the procedure, a bandage will be placed over the injection site to prevent infection.  What are the risks of an LINETTE?  You may have temporary or permanent nerve damage or paralysis. You may have bleeding or develop a serious infection,  such as meningitis (swelling of the brain coverings). An abscess may also develop. You may need surgery to fix the abscess. You may have a seizure, anxiety, or trouble sleeping. If you are a man, you may have temporary erectile dysfunction (not able to have an erection).   CARE AGREEMENT:   You have the right to help plan your care. Learn about your health condition and how it may be treated. Discuss treatment options with your caregivers to decide what care you want to receive. You always have the right to refuse treatment. The above information is an  only. It is not intended as medical advice for individual conditions or treatments. Talk to your doctor, nurse or pharmacist before following any medical regimen to see if it is safe and effective for you.  © 2014 [employer] Information is for End User's use only and may not be sold, redistributed or otherwise used for commercial purposes. All illustrations and images included in CareNotes® are the copyrighted property of [employer] or [employer].

## 2025-07-01 NOTE — TELEPHONE ENCOUNTER
As a follow-up, a second attempt has been made for outreach via fax to facility. Please see Contacts section for details.    Thank you  Nallely Hinton MA

## 2025-07-02 NOTE — TELEPHONE ENCOUNTER
Upon review of the In Basket request we have found as a result of outreach that the patient did not have the requested item(s) completed or the patient was not seen by the practice.     Any additional questions or concerns should be emailed to the Practice Liaisons via the appropriate education email address, please do not reply via In Basket.    Thank you  Nallely Hinton MA   [facility]

## 2025-07-03 DIAGNOSIS — E78.5 HYPERLIPIDEMIA, UNSPECIFIED HYPERLIPIDEMIA TYPE: ICD-10-CM

## 2025-07-03 RX ORDER — SIMVASTATIN 10 MG
10 TABLET ORAL
Qty: 90 TABLET | Refills: 1 | Status: SHIPPED | OUTPATIENT
Start: 2025-07-03

## 2025-07-22 ENCOUNTER — HOSPITAL ENCOUNTER (OUTPATIENT)
Dept: RADIOLOGY | Facility: CLINIC | Age: 50
Discharge: HOME | End: 2025-07-22
Payer: COMMERCIAL

## 2025-07-22 VITALS
DIASTOLIC BLOOD PRESSURE: 84 MMHG | SYSTOLIC BLOOD PRESSURE: 124 MMHG | RESPIRATION RATE: 20 BRPM | HEART RATE: 74 BPM | TEMPERATURE: 97 F | OXYGEN SATURATION: 99 %

## 2025-07-22 DIAGNOSIS — M54.16 LUMBAR RADICULOPATHY: ICD-10-CM

## 2025-07-22 PROCEDURE — 62323 NJX INTERLAMINAR LMBR/SAC: CPT | Performed by: STUDENT IN AN ORGANIZED HEALTH CARE EDUCATION/TRAINING PROGRAM

## 2025-07-22 RX ORDER — PAPAVERINE HCL 150 MG
20 CAPSULE, EXTENDED RELEASE ORAL ONCE
Status: COMPLETED | OUTPATIENT
Start: 2025-07-22 | End: 2025-07-22

## 2025-07-22 RX ADMIN — IOHEXOL 1 ML: 300 INJECTION, SOLUTION INTRAVENOUS at 09:03

## 2025-07-22 RX ADMIN — DEXAMETHASONE SODIUM PHOSPHATE 20 MG: 10 INJECTION, SOLUTION INTRAMUSCULAR; INTRAVENOUS at 09:05

## 2025-07-22 NOTE — INTERVAL H&P NOTE
Update: (This section must be completed if the H&P was completed greater than 24 hrs to procedure or admission)    H&P reviewed. After examining the patient, I find no changed to the H&P since it had been written.    Patient re-evaluated. Accept as history and physical.    Juancho Crawford MD/July 22, 2025/8:50 AM

## 2025-07-22 NOTE — DISCHARGE INSTR - LAB
Epidural Steroid Injection   WHAT YOU NEED TO KNOW:   An epidural steroid injection (LINETTE) is a procedure to inject steroid medicine into the epidural space. The epidural space is between your spinal cord and vertebrae. Steroids reduce inflammation and fluid buildup in your spine that may be causing pain. You may be given pain medicine along with the steroids.          ACTIVITY  Do not drive or operate machinery today.  No strenuous activity today - bending, lifting, etc.  You may resume normal activites starting tomorrow - start slowly and as tolerated.  You may shower today, but no tub baths or hot tubs.  You may have numbness for several hours from the local anesthetic. Please use caution and common sense, especially with weight-bearing activities.    CARE OF THE INJECTION SITE  If you have soreness or pain, apply ice to the area today (20 minutes on/20 minutes off).  Starting tomorrow, you may use warm, moist heat or ice if needed.  You may have an increase or change in your discomfort for 36-48 hours after your treatment.  Apply ice and continue with any pain medication you have been prescribed.  Notify the [facility] if you have any of the following: redness, drainage, swelling, headache, stiff neck or fever above 100°F.    SPECIAL INSTRUCTIONS  Our office will contact you in approximately 14 days for a progress report.    MEDICATIONS  Continue to take all routine medications.  Our office may have instructed you to hold some medications.    As no general anesthesia was used in today's procedure, you should not experience any side effects related to anesthesia.     If you are diabetic, the steroids used in today's injection may temporarily increase your blood sugar levels after the first few days after your injection. Please keep a close eye on your sugars and alert the doctor who manages your diabetes if your sugars are significantly high from your baseline or you are symptomatic.     If you have a  problem specifically related to your procedure, please call our office at [phone].  Problems not related to your procedure should be directed to your primary care physician.

## 2025-08-01 ENCOUNTER — TELEPHONE (OUTPATIENT)
Dept: PAIN MEDICINE | Facility: CLINIC | Age: 50
End: 2025-08-01

## 2025-08-04 ENCOUNTER — APPOINTMENT (OUTPATIENT)
Dept: LAB | Facility: CLINIC | Age: 50
End: 2025-08-04
Payer: COMMERCIAL

## 2025-08-04 DIAGNOSIS — C73 PAPILLARY THYROID CARCINOMA (HCC): ICD-10-CM

## 2025-08-04 DIAGNOSIS — E89.0 POSTOPERATIVE HYPOTHYROIDISM: ICD-10-CM

## 2025-08-04 DIAGNOSIS — E83.51 HYPOCALCEMIA: ICD-10-CM

## 2025-08-04 DIAGNOSIS — E11.65 TYPE 2 DIABETES MELLITUS WITH HYPERGLYCEMIA, WITH LONG-TERM CURRENT USE OF INSULIN (HCC): ICD-10-CM

## 2025-08-04 DIAGNOSIS — Z79.4 TYPE 2 DIABETES MELLITUS WITH HYPERGLYCEMIA, WITH LONG-TERM CURRENT USE OF INSULIN (HCC): ICD-10-CM

## 2025-08-04 PROCEDURE — 80053 COMPREHEN METABOLIC PANEL: CPT

## 2025-08-04 PROCEDURE — 36415 COLL VENOUS BLD VENIPUNCTURE: CPT

## 2025-08-04 PROCEDURE — 84439 ASSAY OF FREE THYROXINE: CPT

## 2025-08-04 PROCEDURE — 84443 ASSAY THYROID STIM HORMONE: CPT

## 2025-08-05 ENCOUNTER — TELEPHONE (OUTPATIENT)
Dept: PAIN MEDICINE | Facility: CLINIC | Age: 50
End: 2025-08-05

## 2025-08-05 VITALS — BODY MASS INDEX: 41.75 KG/M2 | WEIGHT: 315 LBS | HEIGHT: 73 IN

## 2025-08-05 DIAGNOSIS — M17.12 PRIMARY OSTEOARTHRITIS OF LEFT KNEE: Primary | ICD-10-CM

## 2025-08-05 LAB
ALBUMIN SERPL BCG-MCNC: 4 G/DL (ref 3.5–5)
ALP SERPL-CCNC: 58 U/L (ref 34–104)
ALT SERPL W P-5'-P-CCNC: 31 U/L (ref 7–52)
ANION GAP SERPL CALCULATED.3IONS-SCNC: 10 MMOL/L (ref 4–13)
AST SERPL W P-5'-P-CCNC: 22 U/L (ref 13–39)
BILIRUB SERPL-MCNC: 0.54 MG/DL (ref 0.2–1)
BUN SERPL-MCNC: 20 MG/DL (ref 5–25)
CALCIUM SERPL-MCNC: 8 MG/DL (ref 8.4–10.2)
CHLORIDE SERPL-SCNC: 97 MMOL/L (ref 96–108)
CO2 SERPL-SCNC: 31 MMOL/L (ref 21–32)
CREAT SERPL-MCNC: 0.72 MG/DL (ref 0.6–1.3)
GFR SERPL CREATININE-BSD FRML MDRD: 108 ML/MIN/1.73SQ M
GLUCOSE P FAST SERPL-MCNC: 124 MG/DL (ref 65–99)
POTASSIUM SERPL-SCNC: 3.7 MMOL/L (ref 3.5–5.3)
PROT SERPL-MCNC: 6.9 G/DL (ref 6.4–8.4)
SODIUM SERPL-SCNC: 138 MMOL/L (ref 135–147)
T4 FREE SERPL-MCNC: 1.49 NG/DL (ref 0.61–1.12)
TSH SERPL DL<=0.05 MIU/L-ACNC: 0.14 UIU/ML (ref 0.45–4.5)

## 2025-08-05 PROCEDURE — 99214 OFFICE O/P EST MOD 30 MIN: CPT | Performed by: STUDENT IN AN ORGANIZED HEALTH CARE EDUCATION/TRAINING PROGRAM

## 2025-08-05 PROCEDURE — 20610 DRAIN/INJ JOINT/BURSA W/O US: CPT | Performed by: STUDENT IN AN ORGANIZED HEALTH CARE EDUCATION/TRAINING PROGRAM

## 2025-08-05 RX ADMIN — BETAMETHASONE SODIUM PHOSPHATE AND BETAMETHASONE ACETATE 12 MG: 3; 3 INJECTION, SUSPENSION INTRA-ARTICULAR; INTRALESIONAL; INTRAMUSCULAR; SOFT TISSUE at 15:45

## 2025-08-05 RX ADMIN — LIDOCAINE HYDROCHLORIDE 2 ML: 10 INJECTION, SOLUTION INFILTRATION; PERINEURAL at 15:45

## 2025-08-07 RX ORDER — BETAMETHASONE SODIUM PHOSPHATE AND BETAMETHASONE ACETATE 3; 3 MG/ML; MG/ML
12 INJECTION, SUSPENSION INTRA-ARTICULAR; INTRALESIONAL; INTRAMUSCULAR; SOFT TISSUE
Status: COMPLETED | OUTPATIENT
Start: 2025-08-05 | End: 2025-08-05

## 2025-08-07 RX ORDER — LIDOCAINE HYDROCHLORIDE 10 MG/ML
2 INJECTION, SOLUTION INFILTRATION; PERINEURAL
Status: COMPLETED | OUTPATIENT
Start: 2025-08-05 | End: 2025-08-05

## 2025-08-18 ENCOUNTER — OFFICE VISIT (OUTPATIENT)
Dept: PAIN MEDICINE | Facility: CLINIC | Age: 50
End: 2025-08-18
Payer: COMMERCIAL

## 2025-08-18 VITALS — HEIGHT: 73 IN | WEIGHT: 315 LBS | BODY MASS INDEX: 41.75 KG/M2

## 2025-08-18 DIAGNOSIS — M54.42 CHRONIC BILATERAL LOW BACK PAIN WITH LEFT-SIDED SCIATICA: ICD-10-CM

## 2025-08-18 DIAGNOSIS — M51.26 LUMBAR DISC HERNIATION: ICD-10-CM

## 2025-08-18 DIAGNOSIS — G89.29 CHRONIC BILATERAL LOW BACK PAIN WITH LEFT-SIDED SCIATICA: ICD-10-CM

## 2025-08-18 DIAGNOSIS — M54.16 LUMBAR RADICULOPATHY: Primary | ICD-10-CM

## 2025-08-18 PROCEDURE — 99213 OFFICE O/P EST LOW 20 MIN: CPT

## (undated) DEVICE — SCD SEQUENTIAL COMPRESSION COMFORT SLEEVE MEDIUM KNEE LENGTH: Brand: KENDALL SCD

## (undated) DEVICE — CURITY NON-ADHERENT STRIPS: Brand: CURITY

## (undated) DEVICE — SUT VICRYL 3-0 SH 27 IN J416H

## (undated) DEVICE — GLOVE INDICATOR PI UNDERGLOVE SZ 7.5 BLUE

## (undated) DEVICE — GLOVE INDICATOR PI UNDERGLOVE SZ 8.5 BLUE

## (undated) DEVICE — TUBING SUCTION 5MM X 12 FT

## (undated) DEVICE — PAD GROUNDING ADULT

## (undated) DEVICE — ACE WRAP 6 IN STERILE

## (undated) DEVICE — LIGHT GLOVE GREEN

## (undated) DEVICE — BANDAGE ACE VELCRO 4 IN LF

## (undated) DEVICE — BLADE SHAVER DISSECTOR 4MM 13CM COOLCUT

## (undated) DEVICE — HYDROPHILIC WOUND DRESSING WITH ZINC PLUS VITAMINS A AND B6.: Brand: DERMAGRAN®-B

## (undated) DEVICE — SPONGE PVP SCRUB WING STERILE

## (undated) DEVICE — GAUZE SPONGES,16 PLY: Brand: CURITY

## (undated) DEVICE — SUT VICRYL 3-0 REEL 54 IN J285G

## (undated) DEVICE — PLUMEPEN PRO 10FT

## (undated) DEVICE — PAD CAST 6 IN COTTON NON STERILE

## (undated) DEVICE — DRAPE SHEET THREE QUARTER

## (undated) DEVICE — 3M™ TEGADERM™ TRANSPARENT FILM DRESSING FRAME STYLE, 1626W, 4 IN X 4-3/4 IN (10 CM X 12 CM), 50/CT 4CT/CASE: Brand: 3M™ TEGADERM™

## (undated) DEVICE — GLOVE INDICATOR PI UNDERGLOVE SZ 7 BLUE

## (undated) DEVICE — NEEDLE 25G X 1 1/2

## (undated) DEVICE — ABDOMINAL PAD: Brand: DERMACEA

## (undated) DEVICE — TUBING ARTHROSCOPIC WAVE  MAIN PUMP

## (undated) DEVICE — PACK UNIVERSAL ARTHRSCOPY PBDS

## (undated) DEVICE — SKIN MARKER DUAL TIP WITH RULER CAP, FLEXIBLE RULER AND LABELS: Brand: DEVON

## (undated) DEVICE — 3M™ STERI-STRIP™ REINFORCED ADHESIVE SKIN CLOSURES, R1546, 1/4 IN X 4 IN (6 MM X 100 MM), 10 STRIPS/ENVELOPE: Brand: 3M™ STERI-STRIP™

## (undated) DEVICE — BETHLEHEM UNIVERSAL OUTPATIENT: Brand: CARDINAL HEALTH

## (undated) DEVICE — BLADE SHAVER DISSECTOR 3.5MM 13CM COOLCUT

## (undated) DEVICE — SUT ETHILON 3-0 FSLX 30 IN 1673H

## (undated) DEVICE — ARTHROSCOPY FLOOR MAT

## (undated) DEVICE — PADDING CAST 4 IN  COTTON STRL

## (undated) DEVICE — ACE WRAP 6 IN UNSTERILE

## (undated) DEVICE — GLOVE SRG BIOGEL 8

## (undated) DEVICE — LIGHT HANDLE COVER SLEEVE DISP BLUE STELLAR

## (undated) DEVICE — DRAPE EQUIPMENT RF WAND

## (undated) DEVICE — MINOR PROCEDURE DRAPE: Brand: CONVERTORS

## (undated) DEVICE — OCCLUSIVE GAUZE STRIP,3% BISMUTH TRIBROMOPHENATE IN PETROLATUM BLEND: Brand: XEROFORM

## (undated) DEVICE — SPONGE GAUZE 4 X 9

## (undated) DEVICE — U-DRAPE: Brand: CONVERTORS

## (undated) DEVICE — ALL PURPOSE SPONGES,NON-WOVEN, 4 PLY: Brand: CURITY

## (undated) DEVICE — POOLE SUCTION HANDLE: Brand: CARDINAL HEALTH

## (undated) DEVICE — GUIDEWIRE 2 X 200MM W/TROCAR TIP

## (undated) DEVICE — BETHLEHEM UNIVERSAL  MIONR EXT: Brand: CARDINAL HEALTH

## (undated) DEVICE — CHLORAPREP HI-LITE 26ML ORANGE

## (undated) DEVICE — INTENDED FOR TISSUE SEPARATION, AND OTHER PROCEDURES THAT REQUIRE A SHARP SURGICAL BLADE TO PUNCTURE OR CUT.: Brand: BARD-PARKER ® CARBON RIB-BACK BLADES

## (undated) DEVICE — NEEDLE 25GA X 1 IN SAFETY GLIDE

## (undated) DEVICE — STOCKINETTE REGULAR

## (undated) DEVICE — INTENDED FOR TISSUE SEPARATION, AND OTHER PROCEDURES THAT REQUIRE A SHARP SURGICAL BLADE TO PUNCTURE OR CUT.: Brand: BARD-PARKER SAFETY BLADES SIZE 15, STERILE

## (undated) DEVICE — 3M™ TEGADERM™ TRANSPARENT FILM DRESSING FRAME STYLE, 1624W, 2-3/8 IN X 2-3/4 IN (6 CM X 7 CM), 100/CT 4CT/CASE: Brand: 3M™ TEGADERM™

## (undated) DEVICE — ACE WRAP 4 IN STERILE

## (undated) DEVICE — GLOVE SRG BIOGEL ECLIPSE 7.5

## (undated) DEVICE — CUFF TOURNIQUET 30 X 4 IN QUICK CONNECT DISP 1BLA

## (undated) DEVICE — DRAPE C-ARM X-RAY

## (undated) DEVICE — DRILL BIT 1.7MM

## (undated) DEVICE — GLOVE SRG BIOGEL 6.5

## (undated) DEVICE — CUFF TOURNIQUET 24 X 4 IN QUICK CONNECT DISP 1BLA

## (undated) DEVICE — Device

## (undated) DEVICE — DRAPE C-ARMOUR